# Patient Record
Sex: FEMALE | Race: WHITE | NOT HISPANIC OR LATINO | URBAN - METROPOLITAN AREA
[De-identification: names, ages, dates, MRNs, and addresses within clinical notes are randomized per-mention and may not be internally consistent; named-entity substitution may affect disease eponyms.]

---

## 2017-01-20 ENCOUNTER — EMERGENCY (EMERGENCY)
Facility: HOSPITAL | Age: 82
LOS: 1 days | Discharge: HOME | End: 2017-01-20
Attending: EMERGENCY MEDICINE | Admitting: EMERGENCY MEDICINE
Payer: MEDICARE

## 2017-01-20 VITALS
HEART RATE: 72 BPM | SYSTOLIC BLOOD PRESSURE: 123 MMHG | OXYGEN SATURATION: 100 % | TEMPERATURE: 98 F | RESPIRATION RATE: 18 BRPM | DIASTOLIC BLOOD PRESSURE: 75 MMHG

## 2017-01-20 DIAGNOSIS — Z95.828 PRESENCE OF OTHER VASCULAR IMPLANTS AND GRAFTS: Chronic | ICD-10-CM

## 2017-01-20 PROCEDURE — 99283 EMERGENCY DEPT VISIT LOW MDM: CPT | Mod: GC

## 2017-01-20 RX ORDER — LIDOCAINE 4 G/100G
1 CREAM TOPICAL
Qty: 1 | Refills: 0
Start: 2017-01-20 | End: 2017-02-19

## 2017-01-20 RX ORDER — LIDOCAINE 4 G/100G
1 CREAM TOPICAL ONCE
Qty: 0 | Refills: 0 | Status: COMPLETED | OUTPATIENT
Start: 2017-01-20 | End: 2017-01-20

## 2017-01-20 RX ADMIN — LIDOCAINE 1 PATCH: 4 CREAM TOPICAL at 23:34

## 2017-01-20 NOTE — ED PROVIDER NOTE - ATTENDING CONTRIBUTION TO CARE
Dr. Mcdaniel: I have personally seen and examined this patient at the bedside. I have fully participated in the care of this patient. I have reviewed all pertinent clinical information, including history, physical exam, plan and the Resident's note and agree except as noted. HPI above as by me. PE above as by me. DDX low back pain PLAN no fecal urinary incontinence, no new trauma, ambulatory with assistance with adequate hha and pt, discussed adding lidoderm patch q24h and tylenol q4h for breakthrough pain. patient's pain improved with lidoderm and tylenol. daughter feels safe to take patient home. Understands to return for new or worsenign symptoms. Stable dc home.

## 2017-01-20 NOTE — ED PROVIDER NOTE - NEUROLOGICAL, MLM
Alert and oriented, no focal deficits, no motor or sensory deficits. 5/5 strength in bilateral lower extremities, no loss of sensation.

## 2017-01-20 NOTE — ED PROVIDER NOTE - ASSOCIATED SYMPTOMS
Violeta at: 86F hx htn, extensive degenerative spine disease, ckd brought in by daughter for low back pain. Patient [facility] 2/2 degenerative spine disease, dc percocet 5/325 mg one tab q6h prn, home PT, and HHA m-f 9-5. Pt and family denies falls since discharge, denies urinary or fecal incontinence, or fever. Patient reports continued pain, grabs at left paraspinal thoracic area. Daughter concerned that percocet gives relief for a few hours then patient severely uncomfortable, pain worse with walking, is ambulatory with cane.

## 2017-01-20 NOTE — ED PROVIDER NOTE - PMH
Anemia Iron Deficiency    CAD (Coronary Artery Disease)    Carpal Tunnel Syndrome    CKD (chronic kidney disease)    Dementia    DVT (deep venous thrombosis)    Dyslipidemia    GI (gastrointestinal bleed)    Glaucoma    H/o Diverticulosis    HTN (Hypertension)    OA (Osteoarthritis)    Trigger finger

## 2017-01-20 NOTE — ED PROVIDER NOTE - PHYSICAL EXAMINATION
Violeta att: nad, aaox2, perrl, neck supple, neg spinal tenderness, pos diffuse lt paraspinal tenderness lumbar level, jolene le intact sensation neg numbness, patient voided voluntarily into bedside commode, transfer oob using her cane with no assitance

## 2017-01-20 NOTE — ED PROVIDER NOTE - NS ED MD DISPO DISCHARGE CCDA
" I have right ar and shoulder pain for a month I received medication from my MD but even after the refill it doesn't help. I can't sleep I can't move it. "
Patient/Caregiver provided printed discharge information.

## 2017-01-20 NOTE — ED PROVIDER NOTE - PSH
Cataract  bilateral 2008    [place] filter in place  placed 2012  H/o Ovarian Cyst    Knee Arthroplasty right 8/2009 & left 9/2010

## 2017-01-20 NOTE — ED PROVIDER NOTE - OBJECTIVE STATEMENT
87 y/o F with h/o hypertension, dementia, degenerative spine disease, CKD s/p [facility] presents with lower back pain. Daughter at bedside states that she gave patient oxycodone 5 mg at 1 PM today and patient was whimpering in pain. Patient laying in bed comfortably and denies any pain at this time. Daughter states that pain is mainly when patient moves. Patient has been ambulatory in the house today. No new symptoms today. Daughter concerned about pain control.

## 2017-01-20 NOTE — ED PROVIDER NOTE - CHPI ED SYMPTOMS NEG
no weakness/no tingling/no saddle anesthesia, no bowel/bladder incontinence, no lower extremity weakness/no fever/no chills

## 2017-01-20 NOTE — ED PROVIDER NOTE - MEDICAL DECISION MAKING DETAILS
87 y/o F with degenerative spine disease presents with lower back pain. Patient s/p discharge from [place] [facility]. Denies any new neurological symptoms or new distribution of pain. Patient without pain currently at rest. Will try percocet for pain control and reassess. No imaging indicated as there are no red flag symptoms and patient is neurologically intact.

## 2017-01-20 NOTE — ED ADULT TRIAGE NOTE - CHIEF COMPLAINT QUOTE
Pt discharged from [facility] for rehab yesterday. Was there for back pain and was having difficulty ambulating. States she is taking oxycodone 5mg at home for pain but says it is not helping. Last took med at 1pm. Daughter states pt was crying in pain at home.

## 2017-01-21 VITALS
RESPIRATION RATE: 18 BRPM | SYSTOLIC BLOOD PRESSURE: 99 MMHG | DIASTOLIC BLOOD PRESSURE: 50 MMHG | OXYGEN SATURATION: 97 % | HEART RATE: 60 BPM

## 2017-01-21 NOTE — ED ADULT NURSE NOTE - CHIEF COMPLAINT QUOTE
Pt discharged from [facility] for rehab yesterday. Was there for back pain and was having difficulty ambulating. States she is taking oxycodone 5mg at home for pain but says it is not helping. Last took med at 1pm. Daughter states pt was crying in pain at home.

## 2017-01-21 NOTE — ED ADULT NURSE NOTE - PSH
Cataract  bilateral 2008    [place] filter in place  placed 2012  H/o Ovarian Cyst    Knee Arthroplasty right 8/2009 & left 9/2010

## 2017-01-24 ENCOUNTER — EMERGENCY (EMERGENCY)
Facility: HOSPITAL | Age: 82
LOS: 1 days | Discharge: HOME | End: 2017-01-24
Attending: EMERGENCY MEDICINE | Admitting: EMERGENCY MEDICINE
Payer: MEDICARE

## 2017-01-24 VITALS
SYSTOLIC BLOOD PRESSURE: 114 MMHG | RESPIRATION RATE: 18 BRPM | DIASTOLIC BLOOD PRESSURE: 67 MMHG | HEART RATE: 62 BPM | TEMPERATURE: 98 F

## 2017-01-24 DIAGNOSIS — Z95.828 PRESENCE OF OTHER VASCULAR IMPLANTS AND GRAFTS: Chronic | ICD-10-CM

## 2017-01-24 PROCEDURE — 99284 EMERGENCY DEPT VISIT MOD MDM: CPT | Mod: GC

## 2017-01-24 RX ORDER — MULTIVIT WITH MIN/MFOLATE/K2 340-15/3 G
300 POWDER (GRAM) ORAL ONCE
Qty: 0 | Refills: 0 | Status: COMPLETED | OUTPATIENT
Start: 2017-01-24 | End: 2017-01-24

## 2017-01-24 RX ADMIN — Medication 300 MILLILITER(S): at 23:02

## 2017-01-24 RX ADMIN — Medication 1 ENEMA: at 23:22

## 2017-01-24 NOTE — ED PROVIDER NOTE - PSH
Cataract  bilateral 2008    [place] filter in place  placed 2012  H/o Ovarian Cyst    Knee Arthroplasty right 8/2009 & left 9/2010

## 2017-01-24 NOTE — ED PROVIDER NOTE - OBJECTIVE STATEMENT
85yo female with pmh of hypertension, dementia, degenerative spine disease, CKD presents with constipation. Pt was given oxycodone for back pain and has not had a bowel movement for 6 days. Pt with lower abd pain and constipated. Pt has been taking senna colace and today took lactulose with no relief. PT denies fevers/chills, n/v, urinary symptoms, cp/sob/palp, ha. loc.

## 2017-01-24 NOTE — ED ADULT NURSE NOTE - CHIEF COMPLAINT QUOTE
Pt d/c from [facility] last Thursday. Seen here on Friday for lower back pain. Pt has been taking opioids for pain and now c/o constipation. Pt took lactulose and ducolax today and only had small BM. Pt's daughter also states that pt had low bp today during home PT. Systolic was 86. BP now 114/67. Pt has degenerative spine disease.

## 2017-01-24 NOTE — ED ADULT NURSE NOTE - OBJECTIVE STATEMENT
Pt received into room 1 co ABD pain and constipation. Pt states she was recently discharged from [facility] last Thursday for lower back pain where she was prescribed opoid medication. Pt states she took lactulose and dulcolax today but only had a very small BM and is still experiencing discomfort and pain. Pt A&0 x2, in NAD, VS as noted, MD evaluated, medicated as ordered. PMH dementia. Comfort measures provided, call bell within reach, family at bedside, will continue to monitor for safety and comfort.

## 2017-01-24 NOTE — ED PROVIDER NOTE - ATTENDING CONTRIBUTION TO CARE
DR. CHAIDEZ, ATTENDING MD-  I performed a face to face bedside interview with patient regarding history of present illness, review of symptoms and past medical history. I completed an independent physical exam.  I have discussed patient's plan of care with the resident.   Documentation as above in the note.    my exam: abd soft, nt

## 2017-01-24 NOTE — ED PROVIDER NOTE - PROGRESS NOTE DETAILS
pt had 3 large BM in the ED after bowel treatment and is feeling better.  Was pending a CT scan that was delayed.  The patient felt better after BM and is requesting dc without the scan

## 2017-01-24 NOTE — ED ADULT TRIAGE NOTE - CHIEF COMPLAINT QUOTE
Pt d/c from [facility] last Thursday. Seen here on Friday for lower back pain. Pt has been taking opioids for pain and now c/o constipation. Pt took lactulose and ducolax today and only had small BM. Pt's daughter also states that pt had low bp today during home PT. Systolic was 86. BP now 114/67. Pt has degenerative spine disease.

## 2017-01-24 NOTE — ED ADULT NURSE NOTE - PSH
Cataract  bilateral 2008    [place] filter in place  placed 2012  H/o Ovarian Cyst    Knee Arthroplasty right 8/2009 & left 9/2010

## 2017-01-25 VITALS
SYSTOLIC BLOOD PRESSURE: 132 MMHG | HEART RATE: 58 BPM | OXYGEN SATURATION: 100 % | DIASTOLIC BLOOD PRESSURE: 61 MMHG | RESPIRATION RATE: 17 BRPM

## 2017-01-25 LAB
ALBUMIN SERPL ELPH-MCNC: 3.5 G/DL — SIGNIFICANT CHANGE UP (ref 3.3–5)
ALP SERPL-CCNC: 67 U/L — SIGNIFICANT CHANGE UP (ref 40–120)
ALT FLD-CCNC: 12 U/L — SIGNIFICANT CHANGE UP (ref 4–33)
AST SERPL-CCNC: 26 U/L — SIGNIFICANT CHANGE UP (ref 4–32)
BASE EXCESS BLDV CALC-SCNC: -1 MMOL/L — SIGNIFICANT CHANGE UP
BASOPHILS # BLD AUTO: 0.02 K/UL — SIGNIFICANT CHANGE UP (ref 0–0.2)
BASOPHILS NFR BLD AUTO: 0.3 % — SIGNIFICANT CHANGE UP (ref 0–2)
BILIRUB SERPL-MCNC: 0.4 MG/DL — SIGNIFICANT CHANGE UP (ref 0.2–1.2)
BLOOD GAS VENOUS - CREATININE: 2.77 MG/DL — HIGH (ref 0.5–1.3)
BUN SERPL-MCNC: 58 MG/DL — HIGH (ref 7–23)
CALCIUM SERPL-MCNC: 10.3 MG/DL — SIGNIFICANT CHANGE UP (ref 8.4–10.5)
CHLORIDE BLDV-SCNC: 104 MMOL/L — SIGNIFICANT CHANGE UP (ref 96–108)
CHLORIDE SERPL-SCNC: 99 MMOL/L — SIGNIFICANT CHANGE UP (ref 98–107)
CO2 SERPL-SCNC: 23 MMOL/L — SIGNIFICANT CHANGE UP (ref 22–31)
CREAT SERPL-MCNC: 2.69 MG/DL — HIGH (ref 0.5–1.3)
EOSINOPHIL # BLD AUTO: 0.12 K/UL — SIGNIFICANT CHANGE UP (ref 0–0.5)
EOSINOPHIL NFR BLD AUTO: 1.8 % — SIGNIFICANT CHANGE UP (ref 0–6)
GAS PNL BLDV: 138 MMOL/L — SIGNIFICANT CHANGE UP (ref 136–146)
GLUCOSE BLDV-MCNC: 105 — HIGH (ref 70–99)
GLUCOSE SERPL-MCNC: 107 MG/DL — HIGH (ref 70–99)
HCO3 BLDV-SCNC: 22 MMOL/L — SIGNIFICANT CHANGE UP (ref 20–27)
HCT VFR BLD CALC: 32.4 % — LOW (ref 34.5–45)
HCT VFR BLDV CALC: 33.3 % — LOW (ref 34.5–45)
HGB BLD-MCNC: 10.2 G/DL — LOW (ref 11.5–15.5)
HGB BLDV-MCNC: 10.8 G/DL — LOW (ref 11.5–15.5)
IMM GRANULOCYTES NFR BLD AUTO: 0.2 % — SIGNIFICANT CHANGE UP (ref 0–1.5)
LACTATE BLDV-MCNC: 1.4 MMOL/L — SIGNIFICANT CHANGE UP (ref 0.5–2)
LIDOCAIN IGE QN: 30.2 U/L — SIGNIFICANT CHANGE UP (ref 7–60)
LYMPHOCYTES # BLD AUTO: 1.05 K/UL — SIGNIFICANT CHANGE UP (ref 1–3.3)
LYMPHOCYTES # BLD AUTO: 16.2 % — SIGNIFICANT CHANGE UP (ref 13–44)
MCHC RBC-ENTMCNC: 28.3 PG — SIGNIFICANT CHANGE UP (ref 27–34)
MCHC RBC-ENTMCNC: 31.5 % — LOW (ref 32–36)
MCV RBC AUTO: 89.8 FL — SIGNIFICANT CHANGE UP (ref 80–100)
MONOCYTES # BLD AUTO: 0.62 K/UL — SIGNIFICANT CHANGE UP (ref 0–0.9)
MONOCYTES NFR BLD AUTO: 9.6 % — SIGNIFICANT CHANGE UP (ref 2–14)
NEUTROPHILS # BLD AUTO: 4.67 K/UL — SIGNIFICANT CHANGE UP (ref 1.8–7.4)
NEUTROPHILS NFR BLD AUTO: 71.9 % — SIGNIFICANT CHANGE UP (ref 43–77)
PCO2 BLDV: 47 MMHG — SIGNIFICANT CHANGE UP (ref 41–51)
PH BLDV: 7.33 PH — SIGNIFICANT CHANGE UP (ref 7.32–7.43)
PLATELET # BLD AUTO: 353 K/UL — SIGNIFICANT CHANGE UP (ref 150–400)
PMV BLD: 9 FL — SIGNIFICANT CHANGE UP (ref 7–13)
PO2 BLDV: < 24 MMHG — LOW (ref 35–40)
POTASSIUM BLDV-SCNC: 5.4 MMOL/L — HIGH (ref 3.4–4.5)
POTASSIUM SERPL-MCNC: 5.1 MMOL/L — SIGNIFICANT CHANGE UP (ref 3.5–5.3)
POTASSIUM SERPL-SCNC: 5.1 MMOL/L — SIGNIFICANT CHANGE UP (ref 3.5–5.3)
PROT SERPL-MCNC: 7.1 G/DL — SIGNIFICANT CHANGE UP (ref 6–8.3)
RBC # BLD: 3.61 M/UL — LOW (ref 3.8–5.2)
RBC # FLD: 18.4 % — HIGH (ref 10.3–14.5)
SAO2 % BLDV: 34.6 % — LOW (ref 60–85)
SODIUM SERPL-SCNC: 140 MMOL/L — SIGNIFICANT CHANGE UP (ref 135–145)
WBC # BLD: 6.49 K/UL — SIGNIFICANT CHANGE UP (ref 3.8–10.5)
WBC # FLD AUTO: 6.49 K/UL — SIGNIFICANT CHANGE UP (ref 3.8–10.5)

## 2017-01-25 PROCEDURE — 74000: CPT | Mod: 26

## 2017-01-25 RX ORDER — SODIUM CHLORIDE 9 MG/ML
1000 INJECTION INTRAMUSCULAR; INTRAVENOUS; SUBCUTANEOUS ONCE
Qty: 0 | Refills: 0 | Status: COMPLETED | OUTPATIENT
Start: 2017-01-25 | End: 2017-01-25

## 2017-01-25 RX ADMIN — SODIUM CHLORIDE 1000 MILLILITER(S): 9 INJECTION INTRAMUSCULAR; INTRAVENOUS; SUBCUTANEOUS at 02:29

## 2017-01-27 ENCOUNTER — INPATIENT (INPATIENT)
Facility: HOSPITAL | Age: 82
LOS: 3 days | Discharge: NURSING, REHAB OR LONG-TERM CARE | End: 2017-01-31
Attending: INTERNAL MEDICINE | Admitting: INTERNAL MEDICINE
Payer: MEDICARE

## 2017-01-27 VITALS
HEART RATE: 72 BPM | WEIGHT: 119.93 LBS | RESPIRATION RATE: 20 BRPM | DIASTOLIC BLOOD PRESSURE: 61 MMHG | TEMPERATURE: 98 F | SYSTOLIC BLOOD PRESSURE: 110 MMHG | OXYGEN SATURATION: 97 % | HEIGHT: 63 IN

## 2017-01-27 DIAGNOSIS — Z95.828 PRESENCE OF OTHER VASCULAR IMPLANTS AND GRAFTS: Chronic | ICD-10-CM

## 2017-01-27 DIAGNOSIS — I82.532 CHRONIC EMBOLISM AND THROMBOSIS OF LEFT POPLITEAL VEIN: ICD-10-CM

## 2017-01-27 DIAGNOSIS — S32.000A WEDGE COMPRESSION FRACTURE OF UNSPECIFIED LUMBAR VERTEBRA, INITIAL ENCOUNTER FOR CLOSED FRACTURE: ICD-10-CM

## 2017-01-27 DIAGNOSIS — E87.5 HYPERKALEMIA: ICD-10-CM

## 2017-01-27 DIAGNOSIS — N18.2 CHRONIC KIDNEY DISEASE, STAGE 2 (MILD): ICD-10-CM

## 2017-01-27 LAB
ALBUMIN SERPL ELPH-MCNC: 2.9 G/DL — LOW (ref 3.3–5)
ALP SERPL-CCNC: 76 U/L — SIGNIFICANT CHANGE UP (ref 40–120)
ALT FLD-CCNC: 23 U/L — SIGNIFICANT CHANGE UP (ref 12–78)
ANION GAP SERPL CALC-SCNC: 10 MMOL/L — SIGNIFICANT CHANGE UP (ref 5–17)
ANION GAP SERPL CALC-SCNC: 9 MMOL/L — SIGNIFICANT CHANGE UP (ref 5–17)
ANISOCYTOSIS BLD QL: SLIGHT — SIGNIFICANT CHANGE UP
APPEARANCE UR: CLEAR — SIGNIFICANT CHANGE UP
APTT BLD: 24.7 SEC — LOW (ref 27.5–37.4)
AST SERPL-CCNC: 26 U/L — SIGNIFICANT CHANGE UP (ref 15–37)
BACTERIA # UR AUTO: ABNORMAL
BILIRUB SERPL-MCNC: 0.2 MG/DL — SIGNIFICANT CHANGE UP (ref 0.2–1.2)
BILIRUB UR-MCNC: NEGATIVE — SIGNIFICANT CHANGE UP
BUN SERPL-MCNC: 47 MG/DL — HIGH (ref 7–23)
BUN SERPL-MCNC: 50 MG/DL — HIGH (ref 7–23)
CALCIUM SERPL-MCNC: 9.3 MG/DL — SIGNIFICANT CHANGE UP (ref 8.5–10.1)
CALCIUM SERPL-MCNC: 9.4 MG/DL — SIGNIFICANT CHANGE UP (ref 8.5–10.1)
CHLORIDE SERPL-SCNC: 108 MMOL/L — SIGNIFICANT CHANGE UP (ref 96–108)
CHLORIDE SERPL-SCNC: 109 MMOL/L — HIGH (ref 96–108)
CO2 SERPL-SCNC: 25 MMOL/L — SIGNIFICANT CHANGE UP (ref 22–31)
CO2 SERPL-SCNC: 25 MMOL/L — SIGNIFICANT CHANGE UP (ref 22–31)
COLOR SPEC: YELLOW — SIGNIFICANT CHANGE UP
CREAT SERPL-MCNC: 2.14 MG/DL — HIGH (ref 0.5–1.3)
CREAT SERPL-MCNC: 2.34 MG/DL — HIGH (ref 0.5–1.3)
DACRYOCYTES BLD QL SMEAR: SLIGHT — SIGNIFICANT CHANGE UP
DIFF PNL FLD: NEGATIVE — SIGNIFICANT CHANGE UP
ELLIPTOCYTES BLD QL SMEAR: SLIGHT — SIGNIFICANT CHANGE UP
EOSINOPHIL NFR BLD AUTO: 4 % — SIGNIFICANT CHANGE UP (ref 0–6)
EPI CELLS # UR: SIGNIFICANT CHANGE UP
GLUCOSE SERPL-MCNC: 64 MG/DL — LOW (ref 70–99)
GLUCOSE SERPL-MCNC: 97 MG/DL — SIGNIFICANT CHANGE UP (ref 70–99)
GLUCOSE UR QL: NEGATIVE MG/DL — SIGNIFICANT CHANGE UP
HCT VFR BLD CALC: 28.7 % — LOW (ref 34.5–45)
HGB BLD-MCNC: 9.8 G/DL — LOW (ref 11.5–15.5)
HYPOCHROMIA BLD QL: SLIGHT — SIGNIFICANT CHANGE UP
KETONES UR-MCNC: NEGATIVE — SIGNIFICANT CHANGE UP
LEUKOCYTE ESTERASE UR-ACNC: ABNORMAL
LIDOCAIN IGE QN: 145 U/L — SIGNIFICANT CHANGE UP (ref 73–393)
LYMPHOCYTES # BLD AUTO: 14 % — SIGNIFICANT CHANGE UP (ref 13–44)
MACROCYTES BLD QL: SLIGHT — SIGNIFICANT CHANGE UP
MCHC RBC-ENTMCNC: 29.2 PG — SIGNIFICANT CHANGE UP (ref 27–34)
MCHC RBC-ENTMCNC: 34.1 GM/DL — SIGNIFICANT CHANGE UP (ref 32–36)
MCV RBC AUTO: 85.7 FL — SIGNIFICANT CHANGE UP (ref 80–100)
MICROCYTES BLD QL: SLIGHT — SIGNIFICANT CHANGE UP
MONOCYTES NFR BLD AUTO: 1 % — LOW (ref 2–14)
NEUTROPHILS NFR BLD AUTO: 81 % — HIGH (ref 43–77)
NITRITE UR-MCNC: POSITIVE
OVALOCYTES BLD QL SMEAR: SLIGHT — SIGNIFICANT CHANGE UP
PH UR: 6 — SIGNIFICANT CHANGE UP (ref 4.8–8)
PLAT MORPH BLD: NORMAL — SIGNIFICANT CHANGE UP
PLATELET # BLD AUTO: 355 K/UL — SIGNIFICANT CHANGE UP (ref 150–400)
POIKILOCYTOSIS BLD QL AUTO: SLIGHT — SIGNIFICANT CHANGE UP
POLYCHROMASIA BLD QL SMEAR: SLIGHT — SIGNIFICANT CHANGE UP
POTASSIUM SERPL-MCNC: 4.2 MMOL/L — SIGNIFICANT CHANGE UP (ref 3.5–5.3)
POTASSIUM SERPL-MCNC: 5.4 MMOL/L — HIGH (ref 3.5–5.3)
POTASSIUM SERPL-SCNC: 4.2 MMOL/L — SIGNIFICANT CHANGE UP (ref 3.5–5.3)
POTASSIUM SERPL-SCNC: 5.4 MMOL/L — HIGH (ref 3.5–5.3)
PROT SERPL-MCNC: 7.2 GM/DL — SIGNIFICANT CHANGE UP (ref 6–8.3)
PROT UR-MCNC: NEGATIVE MG/DL — SIGNIFICANT CHANGE UP
RBC # BLD: 3.35 M/UL — LOW (ref 3.8–5.2)
RBC # FLD: 16.8 % — HIGH (ref 11–15)
RBC BLD AUTO: ABNORMAL
SCHISTOCYTES BLD QL AUTO: SLIGHT — SIGNIFICANT CHANGE UP
SODIUM SERPL-SCNC: 142 MMOL/L — SIGNIFICANT CHANGE UP (ref 135–145)
SODIUM SERPL-SCNC: 144 MMOL/L — SIGNIFICANT CHANGE UP (ref 135–145)
SP GR SPEC: 1.01 — SIGNIFICANT CHANGE UP (ref 1.01–1.02)
UROBILINOGEN FLD QL: NEGATIVE MG/DL — SIGNIFICANT CHANGE UP
WBC # BLD: 6.8 K/UL — SIGNIFICANT CHANGE UP (ref 3.8–10.5)
WBC # FLD AUTO: 6.8 K/UL — SIGNIFICANT CHANGE UP (ref 3.8–10.5)
WBC UR QL: ABNORMAL

## 2017-01-27 PROCEDURE — 74176 CT ABD & PELVIS W/O CONTRAST: CPT | Mod: 26

## 2017-01-27 PROCEDURE — 71010: CPT | Mod: 26

## 2017-01-27 PROCEDURE — 93970 EXTREMITY STUDY: CPT | Mod: 26

## 2017-01-27 PROCEDURE — 99284 EMERGENCY DEPT VISIT MOD MDM: CPT

## 2017-01-27 RX ORDER — SODIUM CHLORIDE 9 MG/ML
3 INJECTION INTRAMUSCULAR; INTRAVENOUS; SUBCUTANEOUS ONCE
Qty: 0 | Refills: 0 | Status: COMPLETED | OUTPATIENT
Start: 2017-01-27 | End: 2017-01-27

## 2017-01-27 RX ORDER — DEXTROSE 50 % IN WATER 50 %
50 SYRINGE (ML) INTRAVENOUS ONCE
Qty: 0 | Refills: 0 | Status: COMPLETED | OUTPATIENT
Start: 2017-01-27 | End: 2017-01-27

## 2017-01-27 RX ORDER — DONEPEZIL HYDROCHLORIDE 10 MG/1
10 TABLET, FILM COATED ORAL AT BEDTIME
Qty: 0 | Refills: 0 | Status: DISCONTINUED | OUTPATIENT
Start: 2017-01-27 | End: 2017-01-31

## 2017-01-27 RX ORDER — PREGABALIN 225 MG/1
100 CAPSULE ORAL DAILY
Qty: 0 | Refills: 0 | Status: DISCONTINUED | OUTPATIENT
Start: 2017-01-27 | End: 2017-01-31

## 2017-01-27 RX ORDER — CEFTRIAXONE 500 MG/1
1 INJECTION, POWDER, FOR SOLUTION INTRAMUSCULAR; INTRAVENOUS EVERY 24 HOURS
Qty: 0 | Refills: 0 | Status: DISCONTINUED | OUTPATIENT
Start: 2017-01-27 | End: 2017-01-31

## 2017-01-27 RX ORDER — VALSARTAN 80 MG/1
80 TABLET ORAL DAILY
Qty: 0 | Refills: 0 | Status: DISCONTINUED | OUTPATIENT
Start: 2017-01-27 | End: 2017-01-31

## 2017-01-27 RX ORDER — CALCITRIOL 0.5 UG/1
0.25 CAPSULE ORAL DAILY
Qty: 0 | Refills: 0 | Status: DISCONTINUED | OUTPATIENT
Start: 2017-01-27 | End: 2017-01-31

## 2017-01-27 RX ORDER — LIDOCAINE 4 G/100G
1 CREAM TOPICAL DAILY
Qty: 0 | Refills: 0 | Status: DISCONTINUED | OUTPATIENT
Start: 2017-01-27 | End: 2017-01-31

## 2017-01-27 RX ORDER — LATANOPROST 0.05 MG/ML
1 SOLUTION/ DROPS OPHTHALMIC; TOPICAL AT BEDTIME
Qty: 0 | Refills: 0 | Status: DISCONTINUED | OUTPATIENT
Start: 2017-01-27 | End: 2017-01-31

## 2017-01-27 RX ORDER — FUROSEMIDE 40 MG
20 TABLET ORAL DAILY
Qty: 0 | Refills: 0 | Status: DISCONTINUED | OUTPATIENT
Start: 2017-01-27 | End: 2017-01-31

## 2017-01-27 RX ORDER — INSULIN HUMAN 100 [IU]/ML
10 INJECTION, SOLUTION SUBCUTANEOUS ONCE
Qty: 0 | Refills: 0 | Status: COMPLETED | OUTPATIENT
Start: 2017-01-27 | End: 2017-01-27

## 2017-01-27 RX ORDER — SODIUM POLYSTYRENE SULFONATE 4.1 MEQ/G
30 POWDER, FOR SUSPENSION ORAL ONCE
Qty: 0 | Refills: 0 | Status: COMPLETED | OUTPATIENT
Start: 2017-01-27 | End: 2017-01-27

## 2017-01-27 RX ORDER — HEPARIN SODIUM 5000 [USP'U]/ML
5000 INJECTION INTRAVENOUS; SUBCUTANEOUS EVERY 12 HOURS
Qty: 0 | Refills: 0 | Status: DISCONTINUED | OUTPATIENT
Start: 2017-01-27 | End: 2017-01-31

## 2017-01-27 RX ORDER — FERROUS SULFATE 325(65) MG
325 TABLET ORAL DAILY
Qty: 0 | Refills: 0 | Status: DISCONTINUED | OUTPATIENT
Start: 2017-01-27 | End: 2017-01-31

## 2017-01-27 RX ORDER — IOHEXOL 300 MG/ML
30 INJECTION, SOLUTION INTRAVENOUS ONCE
Qty: 0 | Refills: 0 | Status: COMPLETED | OUTPATIENT
Start: 2017-01-27 | End: 2017-01-27

## 2017-01-27 RX ORDER — PYRIDOXINE HCL (VITAMIN B6) 100 MG
100 TABLET ORAL DAILY
Qty: 0 | Refills: 0 | Status: DISCONTINUED | OUTPATIENT
Start: 2017-01-27 | End: 2017-01-31

## 2017-01-27 RX ORDER — SODIUM CHLORIDE 9 MG/ML
1000 INJECTION, SOLUTION INTRAVENOUS
Qty: 0 | Refills: 0 | Status: DISCONTINUED | OUTPATIENT
Start: 2017-01-27 | End: 2017-01-31

## 2017-01-27 RX ORDER — AMIODARONE HYDROCHLORIDE 400 MG/1
200 TABLET ORAL EVERY OTHER DAY
Qty: 0 | Refills: 0 | Status: DISCONTINUED | OUTPATIENT
Start: 2017-01-27 | End: 2017-01-31

## 2017-01-27 RX ORDER — MULTIVIT-MIN/FERROUS GLUCONATE 9 MG/15 ML
1 LIQUID (ML) ORAL DAILY
Qty: 0 | Refills: 0 | Status: DISCONTINUED | OUTPATIENT
Start: 2017-01-27 | End: 2017-01-31

## 2017-01-27 RX ADMIN — Medication 50 MILLILITER(S): at 16:18

## 2017-01-27 RX ADMIN — INSULIN HUMAN 10 UNIT(S): 100 INJECTION, SOLUTION SUBCUTANEOUS at 16:19

## 2017-01-27 RX ADMIN — DONEPEZIL HYDROCHLORIDE 10 MILLIGRAM(S): 10 TABLET, FILM COATED ORAL at 21:22

## 2017-01-27 RX ADMIN — IOHEXOL 30 MILLILITER(S): 300 INJECTION, SOLUTION INTRAVENOUS at 16:21

## 2017-01-27 RX ADMIN — SODIUM CHLORIDE 3 MILLILITER(S): 9 INJECTION INTRAMUSCULAR; INTRAVENOUS; SUBCUTANEOUS at 14:47

## 2017-01-27 RX ADMIN — SODIUM POLYSTYRENE SULFONATE 30 GRAM(S): 4.1 POWDER, FOR SUSPENSION ORAL at 16:19

## 2017-01-27 RX ADMIN — LATANOPROST 1 DROP(S): 0.05 SOLUTION/ DROPS OPHTHALMIC; TOPICAL at 21:33

## 2017-01-27 NOTE — ED PROVIDER NOTE - PSH
Cataract  bilateral 2008    [place] filter in place  placed 2012  H/o Ovarian Cyst    Knee Arthroplasty right 8/2009 & left 9/2010

## 2017-01-27 NOTE — ED ADULT NURSE NOTE - PSH
Cataract  bilateral 2008    [place] filter in place  placed 2012  H/o Ovarian Cyst    Knee Arthroplasty right 8/2009 & left 9/2010

## 2017-01-27 NOTE — ED PROVIDER NOTE - CARE PLAN
Principal Discharge DX:	Hyperkalemia  Secondary Diagnosis:	CKD (chronic kidney disease)  Secondary Diagnosis:	Dementia

## 2017-01-27 NOTE — ED ADULT NURSE NOTE - OBJECTIVE STATEMENT
received pt lying on stretcher c/o swelling to the b/l legs,  left leg worse, pain across the abdomen since last vidya denies any F/N/V/D

## 2017-01-28 DIAGNOSIS — N39.0 URINARY TRACT INFECTION, SITE NOT SPECIFIED: ICD-10-CM

## 2017-01-28 LAB
ANION GAP SERPL CALC-SCNC: 9 MMOL/L — SIGNIFICANT CHANGE UP (ref 5–17)
BUN SERPL-MCNC: 38 MG/DL — HIGH (ref 7–23)
CALCIUM SERPL-MCNC: 8.9 MG/DL — SIGNIFICANT CHANGE UP (ref 8.5–10.1)
CHLORIDE SERPL-SCNC: 109 MMOL/L — HIGH (ref 96–108)
CO2 SERPL-SCNC: 27 MMOL/L — SIGNIFICANT CHANGE UP (ref 22–31)
CREAT SERPL-MCNC: 1.93 MG/DL — HIGH (ref 0.5–1.3)
GLUCOSE SERPL-MCNC: 72 MG/DL — SIGNIFICANT CHANGE UP (ref 70–99)
HCT VFR BLD CALC: 28.3 % — LOW (ref 34.5–45)
HGB BLD-MCNC: 9.4 G/DL — LOW (ref 11.5–15.5)
MCHC RBC-ENTMCNC: 29.3 PG — SIGNIFICANT CHANGE UP (ref 27–34)
MCHC RBC-ENTMCNC: 33.2 GM/DL — SIGNIFICANT CHANGE UP (ref 32–36)
MCV RBC AUTO: 88.3 FL — SIGNIFICANT CHANGE UP (ref 80–100)
PLATELET # BLD AUTO: 328 K/UL — SIGNIFICANT CHANGE UP (ref 150–400)
POTASSIUM SERPL-MCNC: 4.3 MMOL/L — SIGNIFICANT CHANGE UP (ref 3.5–5.3)
POTASSIUM SERPL-SCNC: 4.3 MMOL/L — SIGNIFICANT CHANGE UP (ref 3.5–5.3)
RBC # BLD: 3.2 M/UL — LOW (ref 3.8–5.2)
RBC # FLD: 17.1 % — HIGH (ref 11–15)
SODIUM SERPL-SCNC: 145 MMOL/L — SIGNIFICANT CHANGE UP (ref 135–145)
WBC # BLD: 7 K/UL — SIGNIFICANT CHANGE UP (ref 3.8–10.5)
WBC # FLD AUTO: 7 K/UL — SIGNIFICANT CHANGE UP (ref 3.8–10.5)

## 2017-01-28 RX ADMIN — Medication 1 TABLET(S): at 11:13

## 2017-01-28 RX ADMIN — HEPARIN SODIUM 5000 UNIT(S): 5000 INJECTION INTRAVENOUS; SUBCUTANEOUS at 17:08

## 2017-01-28 RX ADMIN — CALCITRIOL 0.25 MICROGRAM(S): 0.5 CAPSULE ORAL at 11:13

## 2017-01-28 RX ADMIN — LIDOCAINE 1 PATCH: 4 CREAM TOPICAL at 23:13

## 2017-01-28 RX ADMIN — HEPARIN SODIUM 5000 UNIT(S): 5000 INJECTION INTRAVENOUS; SUBCUTANEOUS at 06:06

## 2017-01-28 RX ADMIN — Medication 325 MILLIGRAM(S): at 11:13

## 2017-01-28 RX ADMIN — LIDOCAINE 1 PATCH: 4 CREAM TOPICAL at 11:13

## 2017-01-28 RX ADMIN — LATANOPROST 1 DROP(S): 0.05 SOLUTION/ DROPS OPHTHALMIC; TOPICAL at 22:43

## 2017-01-28 RX ADMIN — CEFTRIAXONE 100 GRAM(S): 500 INJECTION, POWDER, FOR SOLUTION INTRAMUSCULAR; INTRAVENOUS at 01:39

## 2017-01-28 RX ADMIN — Medication 20 MILLIGRAM(S): at 06:06

## 2017-01-28 RX ADMIN — SODIUM CHLORIDE 70 MILLILITER(S): 9 INJECTION, SOLUTION INTRAVENOUS at 01:39

## 2017-01-28 RX ADMIN — PREGABALIN 100 MICROGRAM(S): 225 CAPSULE ORAL at 11:13

## 2017-01-28 RX ADMIN — VALSARTAN 80 MILLIGRAM(S): 80 TABLET ORAL at 06:06

## 2017-01-28 RX ADMIN — AMIODARONE HYDROCHLORIDE 200 MILLIGRAM(S): 400 TABLET ORAL at 11:13

## 2017-01-28 RX ADMIN — Medication 100 MILLIGRAM(S): at 11:13

## 2017-01-28 RX ADMIN — DONEPEZIL HYDROCHLORIDE 10 MILLIGRAM(S): 10 TABLET, FILM COATED ORAL at 22:43

## 2017-01-28 NOTE — CONSULT NOTE ADULT - ASSESSMENT
Chronic renal disease: renal function are stable. edema is now mild. no further intervention indicated renal wise. d/c plan as per admitting physician.

## 2017-01-28 NOTE — CONSULT NOTE ADULT - SUBJECTIVE AND OBJECTIVE BOX
Patient is a 86y old  Female who presents with a chief complaint of leg edema (2017 19:39)    HPI:  85 yo h/o CKD, anemia and HTN c/o b/l leg swelling, denies sob and chest pain. (2017 19:39)  Patient is well known to our service. She carries the diagnosis of CKD which has been stable and managed medically over the years.     PAST MEDICAL & SURGICAL HISTORY:  Dementia  Trigger finger  CKD (chronic kidney disease)  DVT (deep venous thrombosis)  GI (gastrointestinal bleed)  Anemia Iron Deficiency  H/o Diverticulosis  Carpal Tunnel Syndrome  Trigger Finger  Dyslipidemia  Glaucoma  OA (Osteoarthritis)  HTN (Hypertension)  CAD (Coronary Artery Disease)  [place] filter in place: placed   H/o Ovarian Cyst  Cataract  bilateral   Knee Arthroplasty right 2009 &amp; left 2010    Allergies    No Known Drug Allergies  PCN, Sulfa (Urticaria)    Intolerances      FAMILY HISTORY:  No pertinent family history in first degree relatives  Family history unknown  No significant family history    MEDICATIONS  (STANDING):  valsartan 80milliGRAM(s) Oral daily  amiodarone    Tablet 200milliGRAM(s) Oral every other day  donepezil 10milliGRAM(s) Oral at bedtime  lidocaine   Patch 1Patch Transdermal daily  furosemide    Tablet 20milliGRAM(s) Oral daily  ferrous    sulfate 325milliGRAM(s) Oral daily  latanoprost 0.005% Ophthalmic Solution 1Drop(s) Both EYES at bedtime  calcitriol   Capsule 0.25MICROGram(s) Oral daily  multivitamin/minerals 1Tablet(s) Oral daily  cyanocobalamin 100MICROGram(s) Oral daily  pyridoxine 100milliGRAM(s) Oral daily  calcium carbonate  625 mG + Vitamin D (OsCal 250 + D) 1Tablet(s) Oral daily  cefTRIAXone   IVPB 1Gram(s) IV Intermittent every 24 hours  heparin  Injectable 5000Unit(s) SubCutaneous every 12 hours  sodium chloride 0.45%. 1000milliLiter(s) IV Continuous <Continuous>    Vital Signs Last 24 Hrs  T(C): 37.3, Max: 37.7 ( @ 22:30)  T(F): 99.2, Max: 99.8 ( @ 22:30)  HR: 74 (70 - 76)  BP: 146/77 (109/62 - 146/77)  BP(mean): --  RR: 18 (16 - 20)  SpO2: 98% (95% - 98%)  I&O's Summary    I & Os for current day (as of 2017 10:40)  =============================================  IN: 750 ml / OUT: 775 ml / NET: -25 ml    Daily Height in cm: 160.02 (2017 13:43)    Daily   PHYSICAL EXAM:    Constitutional: Patient is resting comfortably in bed; confused but verbally responsive.     Eyes: normal      Neck: supple    Respiratory: clear lungs    Cardiovascular: irrg. rate and rhythm.     Gastrointestinal: soft, non tender    Extremities: no edema                         9.4    7.0   )-----------( 328      ( 2017 08:52 )             28.3     2017 08:52    145    |  109    |  38     ----------------------------<  72     4.3     |  27     |  1.93     Ca    8.9        2017 08:52    TPro  7.2    /  Alb  2.9    /  TBili  0.2    /  DBili  x      /  AST  26     /  ALT  23     /  AlkPhos  76     2017 14:50    Urinalysis Basic - ( 2017 16:32 )    Color: Yellow / Appearance: Clear / S.010 / pH: x  Gluc: x / Ketone: Negative  / Bili: Negative / Urobili: Negative mg/dL   Blood: x / Protein: Negative mg/dL / Nitrite: Positive   Leuk Esterase: Moderate / RBC: x / WBC 11-25   Sq Epi: x / Non Sq Epi: Occasional / Bacteria: Many

## 2017-01-28 NOTE — PROGRESS NOTE ADULT - SUBJECTIVE AND OBJECTIVE BOX
Patient is a 86y old  Female who presents with a chief complaint of leg edema (2017 19:39)      INTERVAL HPI/OVERNIGHT EVENTS: doing better    MEDICATIONS  (STANDING):  valsartan 80milliGRAM(s) Oral daily  amiodarone    Tablet 200milliGRAM(s) Oral every other day  donepezil 10milliGRAM(s) Oral at bedtime  lidocaine   Patch 1Patch Transdermal daily  furosemide    Tablet 20milliGRAM(s) Oral daily  ferrous    sulfate 325milliGRAM(s) Oral daily  latanoprost 0.005% Ophthalmic Solution 1Drop(s) Both EYES at bedtime  calcitriol   Capsule 0.25MICROGram(s) Oral daily  multivitamin/minerals 1Tablet(s) Oral daily  cyanocobalamin 100MICROGram(s) Oral daily  pyridoxine 100milliGRAM(s) Oral daily  calcium carbonate  625 mG + Vitamin D (OsCal 250 + D) 1Tablet(s) Oral daily  cefTRIAXone   IVPB 1Gram(s) IV Intermittent every 24 hours  heparin  Injectable 5000Unit(s) SubCutaneous every 12 hours  sodium chloride 0.45%. 1000milliLiter(s) IV Continuous <Continuous>    MEDICATIONS  (PRN):      Allergies    No Known Drug Allergies  PCN, Sulfa (Urticaria)    Intolerances        REVIEW OF SYSTEMS:  CONSTITUTIONAL: No fever, weight loss, or fatigue  EYES: No eye pain, visual disturbances, or discharge  ENMT:  No difficulty hearing, tinnitus, vertigo; No sinus or throat pain  NECK: No pain or stiffness  BREASTS: No pain, masses, or nipple discharge  RESPIRATORY: No cough, wheezing, chills or hemoptysis; No shortness of breath  CARDIOVASCULAR: No chest pain, palpitations, dizziness, or leg swelling  GASTROINTESTINAL: No abdominal or epigastric pain. No nausea, vomiting, or hematemesis; No diarrhea or constipation. No melena or hematochezia.  GENITOURINARY: No dysuria, frequency, hematuria, or incontinence  NEUROLOGICAL: No headaches, memory loss, loss of strength, numbness, or tremors  SKIN: No itching, burning, rashes, or lesions   LYMPH NODES: No enlarged glands  ENDOCRINE: No heat or cold intolerance; No hair loss  MUSCULOSKELETAL: No joint pain or swelling; No muscle, back, or extremity pain  PSYCHIATRIC: No depression, anxiety, mood swings, or difficulty sleeping  HEME/LYMPH: No easy bruising, or bleeding gums  ALLERGY AND IMMUNOLOGIC: No hives or eczema    Vital Signs Last 24 Hrs  T(C): 36.7, Max: 37.7 ( @ 22:30)  T(F): 98, Max: 99.8 ( @ 22:30)  HR: 78 (70 - 78)  BP: 102/71 (102/71 - 146/77)  BP(mean): --  RR: 16 (16 - 19)  SpO2: 98% (95% - 98%)    PHYSICAL EXAM:  GENERAL: NAD, well-groomed, well-developed  HEAD:  Atraumatic, Normocephalic  EYES: EOMI, PERRLA, conjunctiva and sclera clear  ENMT: No tonsillar erythema, exudates, or enlargement; Moist mucous membranes, Good dentition, No lesions  NECK: Supple, No JVD, Normal thyroid  NERVOUS SYSTEM:  Alert & Oriented X3, Good concentration; Motor Strength 5/5 B/L upper and lower extremities; DTRs 2+ intact and symmetric  CHEST/LUNG: Clear to percussion bilaterally; No rales, rhonchi, wheezing, or rubs  HEART: Regular rate and rhythm; No murmurs, rubs, or gallops  ABDOMEN: Soft, Nontender, Nondistended; Bowel sounds present  EXTREMITIES:  2+ Peripheral Pulses, No clubbing, cyanosis, or edema  LYMPH: No lymphadenopathy noted  SKIN: No rashes or lesions    LABS:                        9.4    7.0   )-----------( 328      ( 2017 08:52 )             28.3     2017 08:52    145    |  109    |  38     ----------------------------<  72     4.3     |  27     |  1.93     Ca    8.9        2017 08:52    TPro  7.2    /  Alb  2.9    /  TBili  0.2    /  DBili  x      /  AST  26     /  ALT  23     /  AlkPhos  76     2017 14:50    PTT - ( 2017 19:00 )  PTT:24.7 sec  Urinalysis Basic - ( 2017 16:32 )    Color: Yellow / Appearance: Clear / S.010 / pH: x  Gluc: x / Ketone: Negative  / Bili: Negative / Urobili: Negative mg/dL   Blood: x / Protein: Negative mg/dL / Nitrite: Positive   Leuk Esterase: Moderate / RBC: x / WBC 11-25   Sq Epi: x / Non Sq Epi: Occasional / Bacteria: Many      CAPILLARY BLOOD GLUCOSE    CULTURES:    HEMOGLOBIN A1C:    CHOLESTEROL:        RADIOLOGY & ADDITIONAL TESTS:

## 2017-01-29 DIAGNOSIS — N39.0 URINARY TRACT INFECTION, SITE NOT SPECIFIED: ICD-10-CM

## 2017-01-29 RX ADMIN — CALCITRIOL 0.25 MICROGRAM(S): 0.5 CAPSULE ORAL at 11:06

## 2017-01-29 RX ADMIN — PREGABALIN 100 MICROGRAM(S): 225 CAPSULE ORAL at 11:06

## 2017-01-29 RX ADMIN — SODIUM CHLORIDE 70 MILLILITER(S): 9 INJECTION, SOLUTION INTRAVENOUS at 05:49

## 2017-01-29 RX ADMIN — Medication 20 MILLIGRAM(S): at 05:49

## 2017-01-29 RX ADMIN — Medication 325 MILLIGRAM(S): at 11:06

## 2017-01-29 RX ADMIN — Medication 1 TABLET(S): at 11:06

## 2017-01-29 RX ADMIN — DONEPEZIL HYDROCHLORIDE 10 MILLIGRAM(S): 10 TABLET, FILM COATED ORAL at 22:38

## 2017-01-29 RX ADMIN — Medication 1 TABLET(S): at 11:26

## 2017-01-29 RX ADMIN — Medication 100 MILLIGRAM(S): at 11:06

## 2017-01-29 RX ADMIN — LIDOCAINE 1 PATCH: 4 CREAM TOPICAL at 11:06

## 2017-01-29 RX ADMIN — LATANOPROST 1 DROP(S): 0.05 SOLUTION/ DROPS OPHTHALMIC; TOPICAL at 22:39

## 2017-01-29 RX ADMIN — LIDOCAINE 1 PATCH: 4 CREAM TOPICAL at 23:06

## 2017-01-29 RX ADMIN — HEPARIN SODIUM 5000 UNIT(S): 5000 INJECTION INTRAVENOUS; SUBCUTANEOUS at 17:27

## 2017-01-29 RX ADMIN — CEFTRIAXONE 100 GRAM(S): 500 INJECTION, POWDER, FOR SOLUTION INTRAMUSCULAR; INTRAVENOUS at 00:10

## 2017-01-29 RX ADMIN — VALSARTAN 80 MILLIGRAM(S): 80 TABLET ORAL at 05:49

## 2017-01-29 RX ADMIN — HEPARIN SODIUM 5000 UNIT(S): 5000 INJECTION INTRAVENOUS; SUBCUTANEOUS at 05:54

## 2017-01-29 NOTE — PROGRESS NOTE ADULT - SUBJECTIVE AND OBJECTIVE BOX
Patient is a 86y old  Female who presents with a chief complaint of leg edema (2017 19:39)      INTERVAL HPI/OVERNIGHT EVENTS: pt is doing well    MEDICATIONS  (STANDING):  valsartan 80milliGRAM(s) Oral daily  amiodarone    Tablet 200milliGRAM(s) Oral every other day  donepezil 10milliGRAM(s) Oral at bedtime  lidocaine   Patch 1Patch Transdermal daily  furosemide    Tablet 20milliGRAM(s) Oral daily  ferrous    sulfate 325milliGRAM(s) Oral daily  latanoprost 0.005% Ophthalmic Solution 1Drop(s) Both EYES at bedtime  calcitriol   Capsule 0.25MICROGram(s) Oral daily  multivitamin/minerals 1Tablet(s) Oral daily  cyanocobalamin 100MICROGram(s) Oral daily  pyridoxine 100milliGRAM(s) Oral daily  calcium carbonate  625 mG + Vitamin D (OsCal 250 + D) 1Tablet(s) Oral daily  cefTRIAXone   IVPB 1Gram(s) IV Intermittent every 24 hours  heparin  Injectable 5000Unit(s) SubCutaneous every 12 hours  sodium chloride 0.45%. 1000milliLiter(s) IV Continuous <Continuous>    MEDICATIONS  (PRN):      Allergies    No Known Drug Allergies  PCN, Sulfa (Urticaria)    Intolerances        REVIEW OF SYSTEMS:  CONSTITUTIONAL: No fever, weight loss, or fatigue  EYES: No eye pain, visual disturbances, or discharge  ENMT:  No difficulty hearing, tinnitus, vertigo; No sinus or throat pain  NECK: No pain or stiffness  BREASTS: No pain, masses, or nipple discharge  RESPIRATORY: No cough, wheezing, chills or hemoptysis; No shortness of breath  CARDIOVASCULAR: No chest pain, palpitations, dizziness, or leg swelling  GASTROINTESTINAL: No abdominal or epigastric pain. No nausea, vomiting, or hematemesis; No diarrhea or constipation. No melena or hematochezia.  GENITOURINARY: No dysuria, frequency, hematuria, or incontinence  NEUROLOGICAL: No headaches, memory loss, loss of strength, numbness, or tremors  SKIN: No itching, burning, rashes, or lesions   LYMPH NODES: No enlarged glands  ENDOCRINE: No heat or cold intolerance; No hair loss  MUSCULOSKELETAL: No joint pain or swelling; No muscle, back, or extremity pain  PSYCHIATRIC: No depression, anxiety, mood swings, or difficulty sleeping  HEME/LYMPH: No easy bruising, or bleeding gums  ALLERGY AND IMMUNOLOGIC: No hives or eczema    Vital Signs Last 24 Hrs  T(C): 37.2, Max: 37.7 ( @ 17:15)  T(F): 99, Max: 99.8 ( @ 17:15)  HR: 66 (66 - 78)  BP: 146/59 (102/71 - 146/59)  BP(mean): --  RR: 16 (16 - 17)  SpO2: 96% (96% - 99%)    PHYSICAL EXAM:  GENERAL: NAD, well-groomed, well-developed  HEAD:  Atraumatic, Normocephalic  EYES: EOMI, PERRLA, conjunctiva and sclera clear  ENMT: No tonsillar erythema, exudates, or enlargement; Moist mucous membranes, Good dentition, No lesions  NECK: Supple, No JVD, Normal thyroid  NERVOUS SYSTEM:  Alert & Oriented X3, Good concentration; Motor Strength 5/5 B/L upper and lower extremities; DTRs 2+ intact and symmetric  CHEST/LUNG: Clear to percussion bilaterally; No rales, rhonchi, wheezing, or rubs  HEART: Regular rate and rhythm; No murmurs, rubs, or gallops  ABDOMEN: Soft, Nontender, Nondistended; Bowel sounds present  EXTREMITIES:  2+ Peripheral Pulses, No clubbing, cyanosis, or edema  LYMPH: No lymphadenopathy noted  SKIN: No rashes or lesions    LABS:                        9.4    7.0   )-----------( 328      ( 2017 08:52 )             28.3     2017 08:52    145    |  109    |  38     ----------------------------<  72     4.3     |  27     |  1.93     Ca    8.9        2017 08:52    TPro  7.2    /  Alb  2.9    /  TBili  0.2    /  DBili  x      /  AST  26     /  ALT  23     /  AlkPhos  76     2017 14:50    PTT - ( 2017 19:00 )  PTT:24.7 sec  Urinalysis Basic - ( 2017 16:32 )    Color: Yellow / Appearance: Clear / S.010 / pH: x  Gluc: x / Ketone: Negative  / Bili: Negative / Urobili: Negative mg/dL   Blood: x / Protein: Negative mg/dL / Nitrite: Positive   Leuk Esterase: Moderate / RBC: x / WBC 11-25   Sq Epi: x / Non Sq Epi: Occasional / Bacteria: Many      CAPILLARY BLOOD GLUCOSE    CULTURES:    HEMOGLOBIN A1C:    CHOLESTEROL:        RADIOLOGY & ADDITIONAL TESTS:

## 2017-01-30 LAB
ANION GAP SERPL CALC-SCNC: 10 MMOL/L — SIGNIFICANT CHANGE UP (ref 5–17)
BUN SERPL-MCNC: 24 MG/DL — HIGH (ref 7–23)
CALCIUM SERPL-MCNC: 9.3 MG/DL — SIGNIFICANT CHANGE UP (ref 8.5–10.1)
CHLORIDE SERPL-SCNC: 107 MMOL/L — SIGNIFICANT CHANGE UP (ref 96–108)
CO2 SERPL-SCNC: 28 MMOL/L — SIGNIFICANT CHANGE UP (ref 22–31)
CREAT SERPL-MCNC: 1.62 MG/DL — HIGH (ref 0.5–1.3)
GLUCOSE SERPL-MCNC: 76 MG/DL — SIGNIFICANT CHANGE UP (ref 70–99)
HCT VFR BLD CALC: 28.5 % — LOW (ref 34.5–45)
HGB BLD-MCNC: 9.4 G/DL — LOW (ref 11.5–15.5)
MCHC RBC-ENTMCNC: 29.1 PG — SIGNIFICANT CHANGE UP (ref 27–34)
MCHC RBC-ENTMCNC: 33.1 GM/DL — SIGNIFICANT CHANGE UP (ref 32–36)
MCV RBC AUTO: 87.9 FL — SIGNIFICANT CHANGE UP (ref 80–100)
PLATELET # BLD AUTO: 312 K/UL — SIGNIFICANT CHANGE UP (ref 150–400)
POTASSIUM SERPL-MCNC: 4.1 MMOL/L — SIGNIFICANT CHANGE UP (ref 3.5–5.3)
POTASSIUM SERPL-SCNC: 4.1 MMOL/L — SIGNIFICANT CHANGE UP (ref 3.5–5.3)
RBC # BLD: 3.25 M/UL — LOW (ref 3.8–5.2)
RBC # FLD: 17.3 % — HIGH (ref 11–15)
SODIUM SERPL-SCNC: 145 MMOL/L — SIGNIFICANT CHANGE UP (ref 135–145)
WBC # BLD: 6.5 K/UL — SIGNIFICANT CHANGE UP (ref 3.8–10.5)
WBC # FLD AUTO: 6.5 K/UL — SIGNIFICANT CHANGE UP (ref 3.8–10.5)

## 2017-01-30 RX ORDER — CALCIUM CARBONATE 500(1250)
1 TABLET ORAL DAILY
Qty: 0 | Refills: 0 | Status: DISCONTINUED | OUTPATIENT
Start: 2017-01-30 | End: 2017-01-31

## 2017-01-30 RX ADMIN — SODIUM CHLORIDE 70 MILLILITER(S): 9 INJECTION, SOLUTION INTRAVENOUS at 11:33

## 2017-01-30 RX ADMIN — Medication 1 TABLET(S): at 11:40

## 2017-01-30 RX ADMIN — PREGABALIN 100 MICROGRAM(S): 225 CAPSULE ORAL at 11:41

## 2017-01-30 RX ADMIN — HEPARIN SODIUM 5000 UNIT(S): 5000 INJECTION INTRAVENOUS; SUBCUTANEOUS at 05:33

## 2017-01-30 RX ADMIN — CEFTRIAXONE 100 GRAM(S): 500 INJECTION, POWDER, FOR SOLUTION INTRAMUSCULAR; INTRAVENOUS at 00:36

## 2017-01-30 RX ADMIN — Medication 100 MILLIGRAM(S): at 11:44

## 2017-01-30 RX ADMIN — HEPARIN SODIUM 5000 UNIT(S): 5000 INJECTION INTRAVENOUS; SUBCUTANEOUS at 18:18

## 2017-01-30 RX ADMIN — Medication 325 MILLIGRAM(S): at 11:40

## 2017-01-30 RX ADMIN — LIDOCAINE 1 PATCH: 4 CREAM TOPICAL at 18:17

## 2017-01-30 RX ADMIN — CEFTRIAXONE 100 GRAM(S): 500 INJECTION, POWDER, FOR SOLUTION INTRAMUSCULAR; INTRAVENOUS at 23:40

## 2017-01-30 RX ADMIN — AMIODARONE HYDROCHLORIDE 200 MILLIGRAM(S): 400 TABLET ORAL at 11:40

## 2017-01-30 RX ADMIN — Medication 1 TABLET(S): at 15:39

## 2017-01-30 RX ADMIN — Medication 20 MILLIGRAM(S): at 05:32

## 2017-01-30 RX ADMIN — LIDOCAINE 1 PATCH: 4 CREAM TOPICAL at 11:41

## 2017-01-30 RX ADMIN — CALCITRIOL 0.25 MICROGRAM(S): 0.5 CAPSULE ORAL at 11:40

## 2017-01-30 RX ADMIN — DONEPEZIL HYDROCHLORIDE 10 MILLIGRAM(S): 10 TABLET, FILM COATED ORAL at 22:16

## 2017-01-30 RX ADMIN — LATANOPROST 1 DROP(S): 0.05 SOLUTION/ DROPS OPHTHALMIC; TOPICAL at 22:16

## 2017-01-30 NOTE — PHYSICAL THERAPY INITIAL EVALUATION ADULT - CRITERIA FOR SKILLED THERAPEUTIC INTERVENTIONS
therapy frequency/predicted duration of therapy intervention/anticipated discharge recommendation/Subacute Rehab/risk reduction/prevention/impairments found/functional limitations in following categories/rehab potential

## 2017-01-30 NOTE — PHYSICAL THERAPY INITIAL EVALUATION ADULT - PLANNED THERAPY INTERVENTIONS, PT EVAL
strengthening/postural re-education/balance training/transfer training/gait training/bed mobility training

## 2017-01-30 NOTE — PROGRESS NOTE ADULT - SUBJECTIVE AND OBJECTIVE BOX
Patient is a 86y old  Female who presents with a chief complaint of leg edema (27 Jan 2017 19:39)      INTERVAL HPI/OVERNIGHT EVENTS: no event    MEDICATIONS  (STANDING):  valsartan 80milliGRAM(s) Oral daily  amiodarone    Tablet 200milliGRAM(s) Oral every other day  donepezil 10milliGRAM(s) Oral at bedtime  lidocaine   Patch 1Patch Transdermal daily  furosemide    Tablet 20milliGRAM(s) Oral daily  ferrous    sulfate 325milliGRAM(s) Oral daily  latanoprost 0.005% Ophthalmic Solution 1Drop(s) Both EYES at bedtime  calcitriol   Capsule 0.25MICROGram(s) Oral daily  multivitamin/minerals 1Tablet(s) Oral daily  cyanocobalamin 100MICROGram(s) Oral daily  pyridoxine 100milliGRAM(s) Oral daily  calcium carbonate  625 mG + Vitamin D (OsCal 250 + D) 1Tablet(s) Oral daily  cefTRIAXone   IVPB 1Gram(s) IV Intermittent every 24 hours  heparin  Injectable 5000Unit(s) SubCutaneous every 12 hours  sodium chloride 0.45%. 1000milliLiter(s) IV Continuous <Continuous>    MEDICATIONS  (PRN):      Allergies    No Known Drug Allergies  PCN, Sulfa (Urticaria)    Intolerances        REVIEW OF SYSTEMS:  CONSTITUTIONAL: No fever, weight loss, or fatigue  EYES: No eye pain, visual disturbances, or discharge  ENMT:  No difficulty hearing, tinnitus, vertigo; No sinus or throat pain  NECK: No pain or stiffness  BREASTS: No pain, masses, or nipple discharge  RESPIRATORY: No cough, wheezing, chills or hemoptysis; No shortness of breath  CARDIOVASCULAR: No chest pain, palpitations, dizziness, or leg swelling  GASTROINTESTINAL: No abdominal or epigastric pain. No nausea, vomiting, or hematemesis; No diarrhea or constipation. No melena or hematochezia.  GENITOURINARY: No dysuria, frequency, hematuria, or incontinence  NEUROLOGICAL: No headaches, memory loss, loss of strength, numbness, or tremors  SKIN: No itching, burning, rashes, or lesions   LYMPH NODES: No enlarged glands  ENDOCRINE: No heat or cold intolerance; No hair loss  MUSCULOSKELETAL: No joint pain or swelling; No muscle, back, or extremity pain  PSYCHIATRIC: No depression, anxiety, mood swings, or difficulty sleeping  HEME/LYMPH: No easy bruising, or bleeding gums  ALLERGY AND IMMUNOLOGIC: No hives or eczema    Vital Signs Last 24 Hrs  T(C): 37.1, Max: 37.1 (01-30 @ 05:38)  T(F): 98.8, Max: 98.8 (01-30 @ 05:38)  HR: 59 (59 - 73)  BP: 116/62 (107/56 - 136/75)  BP(mean): --  RR: 14 (12 - 15)  SpO2: 99% (98% - 99%)    PHYSICAL EXAM:  GENERAL: NAD, well-groomed, well-developed  HEAD:  Atraumatic, Normocephalic  EYES: EOMI, PERRLA, conjunctiva and sclera clear  ENMT: No tonsillar erythema, exudates, or enlargement; Moist mucous membranes, Good dentition, No lesions  NECK: Supple, No JVD, Normal thyroid  NERVOUS SYSTEM:  Alert & Oriented X3, Good concentration; Motor Strength 5/5 B/L upper and lower extremities; DTRs 2+ intact and symmetric  CHEST/LUNG: Clear to percussion bilaterally; No rales, rhonchi, wheezing, or rubs  HEART: Regular rate and rhythm; No murmurs, rubs, or gallops  ABDOMEN: Soft, Nontender, Nondistended; Bowel sounds present  EXTREMITIES:  2+ Peripheral Pulses, No clubbing, cyanosis, or edema  LYMPH: No lymphadenopathy noted  SKIN: No rashes or lesions    LABS:                        9.4    6.5   )-----------( 312      ( 30 Jan 2017 06:16 )             28.5     30 Jan 2017 06:16    145    |  107    |  24     ----------------------------<  76     4.1     |  28     |  1.62     Ca    9.3        30 Jan 2017 06:16          CAPILLARY BLOOD GLUCOSE    CULTURES:    HEMOGLOBIN A1C:    CHOLESTEROL:        RADIOLOGY & ADDITIONAL TESTS:

## 2017-01-30 NOTE — PHYSICAL THERAPY INITIAL EVALUATION ADULT - BED MOBILITY LIMITATIONS, REHAB EVAL
decreased ability to use legs for bridging/pushing/impaired ability to control trunk for mobility/decreased ability to use arms for pushing/pulling

## 2017-01-31 VITALS
TEMPERATURE: 99 F | RESPIRATION RATE: 14 BRPM | HEART RATE: 71 BPM | DIASTOLIC BLOOD PRESSURE: 88 MMHG | OXYGEN SATURATION: 98 % | SYSTOLIC BLOOD PRESSURE: 133 MMHG

## 2017-01-31 RX ADMIN — LIDOCAINE 1 PATCH: 4 CREAM TOPICAL at 11:10

## 2017-01-31 RX ADMIN — PREGABALIN 100 MICROGRAM(S): 225 CAPSULE ORAL at 11:09

## 2017-01-31 RX ADMIN — Medication 20 MILLIGRAM(S): at 05:17

## 2017-01-31 RX ADMIN — Medication 325 MILLIGRAM(S): at 11:09

## 2017-01-31 RX ADMIN — HEPARIN SODIUM 5000 UNIT(S): 5000 INJECTION INTRAVENOUS; SUBCUTANEOUS at 05:17

## 2017-01-31 RX ADMIN — CALCITRIOL 0.25 MICROGRAM(S): 0.5 CAPSULE ORAL at 11:09

## 2017-01-31 RX ADMIN — SODIUM CHLORIDE 70 MILLILITER(S): 9 INJECTION, SOLUTION INTRAVENOUS at 05:18

## 2017-01-31 RX ADMIN — Medication 100 MILLIGRAM(S): at 11:09

## 2017-01-31 RX ADMIN — Medication 1 TABLET(S): at 11:09

## 2017-01-31 NOTE — DISCHARGE NOTE ADULT - SECONDARY DIAGNOSIS.
Chronic deep vein thrombosis (DVT) of popliteal vein of left lower extremity Stage 2 chronic kidney disease Dementia without behavioral disturbance, unspecified dementia type Urinary tract infection with hematuria, site unspecified

## 2017-01-31 NOTE — DISCHARGE NOTE ADULT - PATIENT PORTAL LINK FT
“You can access the [facility] Patient Portal, offered by [facility], by registering with the following website: [email or web address]”

## 2017-01-31 NOTE — DISCHARGE NOTE ADULT - CARE PLAN
Principal Discharge DX:	Lumbar compression fracture, closed, initial encounter  Goal:	pain control  Instructions for follow-up, activity and diet:	follow up with rehab  Secondary Diagnosis:	Chronic deep vein thrombosis (DVT) of popliteal vein of left lower extremity  Secondary Diagnosis:	Stage 2 chronic kidney disease  Secondary Diagnosis:	Dementia without behavioral disturbance, unspecified dementia type  Secondary Diagnosis:	Urinary tract infection with hematuria, site unspecified

## 2017-01-31 NOTE — DISCHARGE NOTE ADULT - MEDICATION SUMMARY - MEDICATIONS TO TAKE
I will START or STAY ON the medications listed below when I get home from the hospital:    oxyCODONE 5 mg oral capsule  --  by mouth , As Needed  -- Indication: For Lumbar compression fracture, closed, initial encounter    valsartan 80 mg oral tablet  -- 1 tab(s) by mouth once a day  -- Indication: For HTN    amiodarone 200 mg oral tablet  -- 1 tab(s) by mouth every other day  -- Indication: For AFIB    pravastatin 40 mg oral tablet  -- 1 tab(s) by mouth once a day (at bedtime)  -- Indication: For HLD    isradipine 5 mg oral capsule  -- 1 cap(s) by mouth once a day in the morning (TDD:7.5)  -- Indication: For HTN    isradipine 2.5 mg oral capsule  -- 1 cap(s) by mouth once a day (at bedtime) (TDD: 7.5mg)  -- Indication: For HTN    donepezil 10 mg oral tablet  -- 1 tab(s) by mouth once a day (at bedtime)  -- Indication: For Dementia    Lidoderm 5% topical film  -- Apply on skin to affected area once a day  -- For external use only.  Remove old patch prior to applying a new patch.    -- Indication: For Lumbar compression fracture, closed, initial encounter    furosemide 20 mg oral tablet  -- 1 tab(s) by mouth once a day  -- Indication: For HTN    ferrous sulfate 325 mg (65 mg elemental iron) oral tablet  -- 1 tab(s) by mouth once a day  -- Indication: For anemia    polyethylene glycol 3350 oral powder for reconstitution  -- 17 gram(s) by mouth once a day, As needed, Constipation  -- Indication: For Constipation    Kionex 15 g/60 mL oral and rectal suspension  -- 60 milliliter(s) oral 2 times a week  -- Indication: For Hyperkalemia    Lumigan 0.01% solution  -- 1 gtt drops to each affected eye once a day (in the evening) x 30 days   -- Indication: For eye    Caltrate 600 + D oral tablet  -- 1 tab(s) by mouth once a day  -- Indication: For osteoporosis    Centrum Silver oral tablet  -- 1 tab(s) by mouth once a day  -- Indication: For vitamin    Vitamin B12 100 mcg oral tablet  -- 1 tab(s) by mouth once a day  -- Indication: For vitamin    calcitriol 0.25 mcg oral capsule  -- 1 cap(s) by mouth once a day  -- Indication: For vitamin    Vitamin B6 100 mg tablet  -- 1 tab(s) by mouth once a day  -- Indication: For vitamin

## 2017-02-03 DIAGNOSIS — I25.10 ATHEROSCLEROTIC HEART DISEASE OF NATIVE CORONARY ARTERY WITHOUT ANGINA PECTORIS: ICD-10-CM

## 2017-02-03 DIAGNOSIS — M19.90 UNSPECIFIED OSTEOARTHRITIS, UNSPECIFIED SITE: ICD-10-CM

## 2017-02-03 DIAGNOSIS — M48.56XA COLLAPSED VERTEBRA, NOT ELSEWHERE CLASSIFIED, LUMBAR REGION, INITIAL ENCOUNTER FOR FRACTURE: ICD-10-CM

## 2017-02-03 DIAGNOSIS — Z88.2 ALLERGY STATUS TO SULFONAMIDES: ICD-10-CM

## 2017-02-03 DIAGNOSIS — Z88.0 ALLERGY STATUS TO PENICILLIN: ICD-10-CM

## 2017-02-03 DIAGNOSIS — I48.91 UNSPECIFIED ATRIAL FIBRILLATION: ICD-10-CM

## 2017-02-03 DIAGNOSIS — E87.5 HYPERKALEMIA: ICD-10-CM

## 2017-02-03 DIAGNOSIS — M54.5 LOW BACK PAIN: ICD-10-CM

## 2017-02-03 DIAGNOSIS — I12.9 HYPERTENSIVE CHRONIC KIDNEY DISEASE WITH STAGE 1 THROUGH STAGE 4 CHRONIC KIDNEY DISEASE, OR UNSPECIFIED CHRONIC KIDNEY DISEASE: ICD-10-CM

## 2017-02-03 DIAGNOSIS — S32.000A WEDGE COMPRESSION FRACTURE OF UNSPECIFIED LUMBAR VERTEBRA, INITIAL ENCOUNTER FOR CLOSED FRACTURE: ICD-10-CM

## 2017-02-03 DIAGNOSIS — F03.90 UNSPECIFIED DEMENTIA, UNSPECIFIED SEVERITY, WITHOUT BEHAVIORAL DISTURBANCE, PSYCHOTIC DISTURBANCE, MOOD DISTURBANCE, AND ANXIETY: ICD-10-CM

## 2017-02-03 DIAGNOSIS — R60.0 LOCALIZED EDEMA: ICD-10-CM

## 2017-02-03 DIAGNOSIS — R31.9 HEMATURIA, UNSPECIFIED: ICD-10-CM

## 2017-02-03 DIAGNOSIS — Z95.828 PRESENCE OF OTHER VASCULAR IMPLANTS AND GRAFTS: ICD-10-CM

## 2017-02-03 DIAGNOSIS — Z87.11 PERSONAL HISTORY OF PEPTIC ULCER DISEASE: ICD-10-CM

## 2017-02-03 DIAGNOSIS — D64.9 ANEMIA, UNSPECIFIED: ICD-10-CM

## 2017-02-03 DIAGNOSIS — Z79.891 LONG TERM (CURRENT) USE OF OPIATE ANALGESIC: ICD-10-CM

## 2017-02-03 DIAGNOSIS — H40.9 UNSPECIFIED GLAUCOMA: ICD-10-CM

## 2017-02-03 DIAGNOSIS — N18.2 CHRONIC KIDNEY DISEASE, STAGE 2 (MILD): ICD-10-CM

## 2017-02-03 DIAGNOSIS — N39.0 URINARY TRACT INFECTION, SITE NOT SPECIFIED: ICD-10-CM

## 2017-02-03 DIAGNOSIS — E78.5 HYPERLIPIDEMIA, UNSPECIFIED: ICD-10-CM

## 2017-02-03 DIAGNOSIS — I82.532 CHRONIC EMBOLISM AND THROMBOSIS OF LEFT POPLITEAL VEIN: ICD-10-CM

## 2017-06-20 NOTE — ED ADULT NURSE NOTE - THOUGHTS OF HOMICIDE/VIOLENCE TOWARDS OTHERS YN, MLM
Patient Name: Lizy Uriostegui, : 2004, MRN: [id number]   Date:  2017  Referring Physician:  Kirstin Dennis    Diagnosis:Closed displaced fracture of left tibial spine , Acute meniscal tear of left knee, subsequent encounter s/p ACL repair 130   122   5/5   3+/5     Knee extension   0   -3  5/5   3/5: no resistance applied at this time                             Plan: Continue skilled Physical Therapy 2 x/week or a total of 16 visits over a 90 day period Frequency may be tapered as needed No

## 2017-12-22 ENCOUNTER — EMERGENCY (EMERGENCY)
Facility: HOSPITAL | Age: 82
LOS: 0 days | Discharge: HOME | End: 2017-12-22
Attending: EMERGENCY MEDICINE
Payer: MEDICARE

## 2017-12-22 VITALS
OXYGEN SATURATION: 98 % | WEIGHT: 119.05 LBS | HEIGHT: 62 IN | TEMPERATURE: 98 F | RESPIRATION RATE: 18 BRPM | HEART RATE: 60 BPM | SYSTOLIC BLOOD PRESSURE: 141 MMHG | DIASTOLIC BLOOD PRESSURE: 84 MMHG

## 2017-12-22 VITALS
SYSTOLIC BLOOD PRESSURE: 131 MMHG | DIASTOLIC BLOOD PRESSURE: 63 MMHG | RESPIRATION RATE: 17 BRPM | TEMPERATURE: 98 F | OXYGEN SATURATION: 100 %

## 2017-12-22 DIAGNOSIS — I25.10 ATHEROSCLEROTIC HEART DISEASE OF NATIVE CORONARY ARTERY WITHOUT ANGINA PECTORIS: ICD-10-CM

## 2017-12-22 DIAGNOSIS — G56.00 CARPAL TUNNEL SYNDROME, UNSPECIFIED UPPER LIMB: ICD-10-CM

## 2017-12-22 DIAGNOSIS — R51 HEADACHE: ICD-10-CM

## 2017-12-22 DIAGNOSIS — D50.9 IRON DEFICIENCY ANEMIA, UNSPECIFIED: ICD-10-CM

## 2017-12-22 DIAGNOSIS — N18.9 CHRONIC KIDNEY DISEASE, UNSPECIFIED: ICD-10-CM

## 2017-12-22 DIAGNOSIS — F03.90 UNSPECIFIED DEMENTIA WITHOUT BEHAVIORAL DISTURBANCE: ICD-10-CM
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
## 2017-12-22 DIAGNOSIS — E78.5 HYPERLIPIDEMIA, UNSPECIFIED: ICD-10-CM
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
## 2017-12-22 DIAGNOSIS — S09.90XA UNSPECIFIED INJURY OF HEAD, INITIAL ENCOUNTER: ICD-10-CM

## 2017-12-22 DIAGNOSIS — M25.511 PAIN IN RIGHT SHOULDER: ICD-10-CM

## 2017-12-22 DIAGNOSIS — Z95.828 PRESENCE OF OTHER VASCULAR IMPLANTS AND GRAFTS: Chronic | ICD-10-CM

## 2017-12-22 DIAGNOSIS — Z88.2 ALLERGY STATUS TO SULFONAMIDES: ICD-10-CM

## 2017-12-22 DIAGNOSIS — I12.9 HYPERTENSIVE CHRONIC KIDNEY DISEASE WITH STAGE 1 THROUGH STAGE 4 CHRONIC KIDNEY DISEASE, OR UNSPECIFIED CHRONIC KIDNEY DISEASE: ICD-10-CM

## 2017-12-22 DIAGNOSIS — S42.294A OTHER NONDISPLACED FRACTURE OF UPPER END OF RIGHT HUMERUS, INITIAL ENCOUNTER FOR CLOSED FRACTURE: ICD-10-CM

## 2017-12-22 DIAGNOSIS — K92.2 GASTROINTESTINAL HEMORRHAGE, UNSPECIFIED: ICD-10-CM

## 2017-12-22 DIAGNOSIS — M19.90 UNSPECIFIED OSTEOARTHRITIS, UNSPECIFIED SITE: ICD-10-CM

## 2017-12-22 DIAGNOSIS — H40.9 UNSPECIFIED GLAUCOMA: ICD-10-CM

## 2017-12-22 DIAGNOSIS — Z86.718 PERSONAL HISTORY OF OTHER VENOUS THROMBOSIS AND EMBOLISM: ICD-10-CM

## 2017-12-22 DIAGNOSIS — Z88.0 ALLERGY STATUS TO PENICILLIN: ICD-10-CM

## 2017-12-22 LAB
ALBUMIN SERPL ELPH-MCNC: 3.5 G/DL — SIGNIFICANT CHANGE UP (ref 3.3–5)
ALP SERPL-CCNC: 65 U/L — SIGNIFICANT CHANGE UP (ref 40–120)
ALT FLD-CCNC: 20 U/L — SIGNIFICANT CHANGE UP (ref 12–78)
ANION GAP SERPL CALC-SCNC: 8 MMOL/L — SIGNIFICANT CHANGE UP (ref 5–17)
APTT BLD: 29.7 SEC — SIGNIFICANT CHANGE UP (ref 27.5–37.4)
AST SERPL-CCNC: 19 U/L — SIGNIFICANT CHANGE UP (ref 15–37)
BASOPHILS # BLD AUTO: 0 K/UL — SIGNIFICANT CHANGE UP (ref 0–0.2)
BASOPHILS NFR BLD AUTO: 0.4 % — SIGNIFICANT CHANGE UP (ref 0–2)
BILIRUB SERPL-MCNC: 0.6 MG/DL — SIGNIFICANT CHANGE UP (ref 0.2–1.2)
BUN SERPL-MCNC: 32 MG/DL — HIGH (ref 7–23)
CALCIUM SERPL-MCNC: 9.5 MG/DL — SIGNIFICANT CHANGE UP (ref 8.5–10.1)
CHLORIDE SERPL-SCNC: 108 MMOL/L — SIGNIFICANT CHANGE UP (ref 96–108)
CK MB BLD-MCNC: 1.8 % — SIGNIFICANT CHANGE UP (ref 0–3.5)
CK MB CFR SERPL CALC: 2 NG/ML — SIGNIFICANT CHANGE UP (ref 0.5–3.6)
CK SERPL-CCNC: 111 U/L — SIGNIFICANT CHANGE UP (ref 26–192)
CO2 SERPL-SCNC: 27 MMOL/L — SIGNIFICANT CHANGE UP (ref 22–31)
CREAT SERPL-MCNC: 1.95 MG/DL — HIGH (ref 0.5–1.3)
EOSINOPHIL # BLD AUTO: 0 K/UL — SIGNIFICANT CHANGE UP (ref 0–0.5)
EOSINOPHIL NFR BLD AUTO: 0.1 % — SIGNIFICANT CHANGE UP (ref 0–6)
GLUCOSE SERPL-MCNC: 146 MG/DL — HIGH (ref 70–99)
HCT VFR BLD CALC: 37.1 % — SIGNIFICANT CHANGE UP (ref 34.5–45)
HGB BLD-MCNC: 11.9 G/DL — SIGNIFICANT CHANGE UP (ref 11.5–15.5)
INR BLD: 1.18 RATIO — HIGH (ref 0.88–1.16)
LYMPHOCYTES # BLD AUTO: 0.4 K/UL — LOW (ref 1–3.3)
LYMPHOCYTES # BLD AUTO: 3.5 % — LOW (ref 13–44)
MCHC RBC-ENTMCNC: 30.3 PG — SIGNIFICANT CHANGE UP (ref 27–34)
MCHC RBC-ENTMCNC: 32 GM/DL — SIGNIFICANT CHANGE UP (ref 32–36)
MCV RBC AUTO: 94.5 FL — SIGNIFICANT CHANGE UP (ref 80–100)
MONOCYTES # BLD AUTO: 0.4 K/UL — SIGNIFICANT CHANGE UP (ref 0–0.9)
MONOCYTES NFR BLD AUTO: 3.3 % — SIGNIFICANT CHANGE UP (ref 2–14)
NEUTROPHILS # BLD AUTO: 10.3 K/UL — HIGH (ref 1.8–7.4)
NEUTROPHILS NFR BLD AUTO: 92.6 % — HIGH (ref 43–77)
PLATELET # BLD AUTO: 258 K/UL — SIGNIFICANT CHANGE UP (ref 150–400)
POTASSIUM SERPL-MCNC: 4.7 MMOL/L — SIGNIFICANT CHANGE UP (ref 3.5–5.3)
POTASSIUM SERPL-SCNC: 4.7 MMOL/L — SIGNIFICANT CHANGE UP (ref 3.5–5.3)
PROT SERPL-MCNC: 7.2 GM/DL — SIGNIFICANT CHANGE UP (ref 6–8.3)
PROTHROM AB SERPL-ACNC: 12.9 SEC — HIGH (ref 9.8–12.7)
RBC # BLD: 3.93 M/UL — SIGNIFICANT CHANGE UP (ref 3.8–5.2)
RBC # FLD: 18.9 % — HIGH (ref 11–15)
SODIUM SERPL-SCNC: 143 MMOL/L — SIGNIFICANT CHANGE UP (ref 135–145)
TROPONIN I SERPL-MCNC: <.015 NG/ML — SIGNIFICANT CHANGE UP (ref 0.01–0.04)
WBC # BLD: 11.1 K/UL — HIGH (ref 3.8–10.5)
WBC # FLD AUTO: 11.1 K/UL — HIGH (ref 3.8–10.5)

## 2017-12-22 PROCEDURE — 73080 X-RAY EXAM OF ELBOW: CPT | Mod: 26,RT

## 2017-12-22 PROCEDURE — 70450 CT HEAD/BRAIN W/O DYE: CPT | Mod: 26

## 2017-12-22 PROCEDURE — 73030 X-RAY EXAM OF SHOULDER: CPT | Mod: 26,RT

## 2017-12-22 PROCEDURE — 93010 ELECTROCARDIOGRAM REPORT: CPT

## 2017-12-22 PROCEDURE — 73060 X-RAY EXAM OF HUMERUS: CPT | Mod: 26,RT

## 2017-12-22 PROCEDURE — 99284 EMERGENCY DEPT VISIT MOD MDM: CPT | Mod: 57,25

## 2017-12-22 PROCEDURE — 23600 CLTX PROX HUMRL FX W/O MNPJ: CPT | Mod: 54

## 2017-12-22 RX ORDER — MORPHINE SULFATE 50 MG/1
2 CAPSULE, EXTENDED RELEASE ORAL ONCE
Qty: 0 | Refills: 0 | Status: DISCONTINUED | OUTPATIENT
Start: 2017-12-22 | End: 2017-12-22

## 2017-12-22 RX ORDER — OXYCODONE HYDROCHLORIDE 5 MG/1
0 TABLET ORAL
Qty: 0 | Refills: 0 | COMMUNITY

## 2017-12-22 RX ADMIN — MORPHINE SULFATE 2 MILLIGRAM(S): 50 CAPSULE, EXTENDED RELEASE ORAL at 10:39

## 2017-12-22 NOTE — ED PROVIDER NOTE - PROGRESS NOTE DETAILS
Berenice: ct head and labs unremarkable (cr at pt's baseline). spoke to ortho, would like sling and f/u with Dr. Cardona, no surgery at this time. neurovascularly intact. given script for percocet for breakthrough pain, ibuprofen for moderate. precautions/instructions for meds, will f/u ortho. given copy of results. daughter present and understands.

## 2017-12-22 NOTE — ED PROVIDER NOTE - MUSCULOSKELETAL, MLM
right shoulder with deformity/tenderness near proximal humerus, limited rom from pain, no ttp over clavicle. normal sensation/strength/perfusion distally.

## 2017-12-22 NOTE — ED PROVIDER NOTE - OBJECTIVE STATEMENT
Dementia  Trigger finger  CKD (chronic kidney disease)  DVT (deep venous thrombosis)  GI (gastrointestinal bleed)  Anemia Iron Deficiency  H/o Diverticulosis  Carpal Tunnel Syndrome  Trigger Finger  Dyslipidemia  Glaucoma  OA (Osteoarthritis)  HTN (Hypertension)  CAD (Coronary Artery Disease)  Ariel filter in place: placed 2012 86 y/o female hx Dementia, CKD,, DVT with ivc filter, GI bleed/diverticulosis, htn, CAD, recent diagnosis afib started on eliquis several months ago present with right shoulder pain s/p believed mechanical fall this morning. Pt lives with daughter, daughter says checked on pt shortly before, then a little after pt was screaming for help and was on grown, wearing slippers and appears to have slipped on slick ground. Pt was awake, with severe shoulder pain. Pt only c/o shoulder pain, no headache, neuro symptoms, cp, sob, abd pain. Pt with some dementia but is at baseline per daughter, answers most questions appropriately.     ROS: No fever/chills. No eye pain/changes in vision, No ear pain/sore throat/dysphagia, No chest pain/palpitations. No SOB/cough/. No abdominal pain, N/V/D, no black/bloody bm. No dysuria/frequency/discharge, No headache. No Dizziness.    No rashes or breaks in skin. No numbness/tingling/weakness.

## 2017-12-22 NOTE — ED PROVIDER NOTE - MEDICAL DECISION MAKING DETAILS
likely proximal humerus fracture (less likely dislocation). on eliquis and likely mechanical fall, will do ct head, labs, ekg, xray RUE, pain control, reassess.

## 2017-12-22 NOTE — ED ADULT NURSE NOTE - PSH
Cataract  bilateral 2008    Sunshine filter in place  placed 2012  H/o Ovarian Cyst    Knee Arthroplasty right 8/2009 & left 9/2010

## 2017-12-22 NOTE — ED ADULT NURSE REASSESSMENT NOTE - NS ED NURSE REASSESS COMMENT FT1
pt seen and re-evaluated by ED attending d/c ready in stable condition left ED ambulatory via wheelchair  accompanied by daughter

## 2017-12-22 NOTE — ED PROVIDER NOTE - CARE PLAN
Principal Discharge DX:	Other closed nondisplaced fracture of proximal end of right humerus, initial encounter

## 2017-12-22 NOTE — ED ADULT NURSE NOTE - OBJECTIVE STATEMENT
received pt with daughter at bedside slipped and fell in the bedroom on the way to the bathroom, hx of dementia unknown loc

## 2018-01-02 ENCOUNTER — INPATIENT (INPATIENT)
Facility: HOSPITAL | Age: 83
LOS: 2 days | Discharge: NURSING, REHAB OR LONG-TERM CARE | End: 2018-01-05
Attending: INTERNAL MEDICINE | Admitting: INTERNAL MEDICINE
Payer: MEDICARE

## 2018-01-02 VITALS
TEMPERATURE: 98 F | SYSTOLIC BLOOD PRESSURE: 121 MMHG | WEIGHT: 119.93 LBS | DIASTOLIC BLOOD PRESSURE: 70 MMHG | RESPIRATION RATE: 18 BRPM | OXYGEN SATURATION: 99 % | HEIGHT: 63 IN | HEART RATE: 57 BPM

## 2018-01-02 DIAGNOSIS — N18.9 CHRONIC KIDNEY DISEASE, UNSPECIFIED: ICD-10-CM

## 2018-01-02 DIAGNOSIS — Z95.828 PRESENCE OF OTHER VASCULAR IMPLANTS AND GRAFTS: Chronic | ICD-10-CM

## 2018-01-02 DIAGNOSIS — R07.9 CHEST PAIN, UNSPECIFIED: ICD-10-CM

## 2018-01-02 DIAGNOSIS — F03.90 UNSPECIFIED DEMENTIA WITHOUT BEHAVIORAL DISTURBANCE: ICD-10-CM

## 2018-01-02 DIAGNOSIS — R06.02 SHORTNESS OF BREATH: ICD-10-CM

## 2018-01-02 LAB
ACANTHOCYTES BLD QL SMEAR: SLIGHT — SIGNIFICANT CHANGE UP
ALBUMIN SERPL ELPH-MCNC: 2.7 G/DL — LOW (ref 3.3–5)
ALP SERPL-CCNC: 74 U/L — SIGNIFICANT CHANGE UP (ref 40–120)
ALT FLD-CCNC: 16 U/L — SIGNIFICANT CHANGE UP (ref 12–78)
ANION GAP SERPL CALC-SCNC: 10 MMOL/L — SIGNIFICANT CHANGE UP (ref 5–17)
ANISOCYTOSIS BLD QL: SLIGHT — SIGNIFICANT CHANGE UP
APTT BLD: 32.4 SEC — SIGNIFICANT CHANGE UP (ref 27.5–37.4)
AST SERPL-CCNC: 18 U/L — SIGNIFICANT CHANGE UP (ref 15–37)
BILIRUB SERPL-MCNC: 0.4 MG/DL — SIGNIFICANT CHANGE UP (ref 0.2–1.2)
BUN SERPL-MCNC: 34 MG/DL — HIGH (ref 7–23)
CALCIUM SERPL-MCNC: 9.3 MG/DL — SIGNIFICANT CHANGE UP (ref 8.5–10.1)
CHLORIDE SERPL-SCNC: 107 MMOL/L — SIGNIFICANT CHANGE UP (ref 96–108)
CK MB BLD-MCNC: 1.1 % — SIGNIFICANT CHANGE UP (ref 0–3.5)
CK MB CFR SERPL CALC: 0.6 NG/ML — SIGNIFICANT CHANGE UP (ref 0.5–3.6)
CK SERPL-CCNC: 55 U/L — SIGNIFICANT CHANGE UP (ref 26–192)
CO2 SERPL-SCNC: 27 MMOL/L — SIGNIFICANT CHANGE UP (ref 22–31)
CREAT SERPL-MCNC: 2.09 MG/DL — HIGH (ref 0.5–1.3)
DACRYOCYTES BLD QL SMEAR: SLIGHT — SIGNIFICANT CHANGE UP
ELLIPTOCYTES BLD QL SMEAR: SLIGHT — SIGNIFICANT CHANGE UP
EOSINOPHIL NFR BLD AUTO: 2 % — SIGNIFICANT CHANGE UP (ref 0–6)
GLUCOSE SERPL-MCNC: 84 MG/DL — SIGNIFICANT CHANGE UP (ref 70–99)
HCT VFR BLD CALC: 31.6 % — LOW (ref 34.5–45)
HGB BLD-MCNC: 10.2 G/DL — LOW (ref 11.5–15.5)
HYPOCHROMIA BLD QL: SLIGHT — SIGNIFICANT CHANGE UP
INR BLD: 1.22 RATIO — HIGH (ref 0.88–1.16)
LYMPHOCYTES # BLD AUTO: 18 % — SIGNIFICANT CHANGE UP (ref 13–44)
MACROCYTES BLD QL: SLIGHT — SIGNIFICANT CHANGE UP
MCHC RBC-ENTMCNC: 29.5 PG — SIGNIFICANT CHANGE UP (ref 27–34)
MCHC RBC-ENTMCNC: 32.2 GM/DL — SIGNIFICANT CHANGE UP (ref 32–36)
MCV RBC AUTO: 91.8 FL — SIGNIFICANT CHANGE UP (ref 80–100)
MICROCYTES BLD QL: SLIGHT — SIGNIFICANT CHANGE UP
MONOCYTES NFR BLD AUTO: 1 % — LOW (ref 2–14)
NEUTROPHILS NFR BLD AUTO: 79 % — HIGH (ref 43–77)
NT-PROBNP SERPL-SCNC: 1189 PG/ML — HIGH (ref 0–450)
OVALOCYTES BLD QL SMEAR: SLIGHT — SIGNIFICANT CHANGE UP
PLAT MORPH BLD: NORMAL — SIGNIFICANT CHANGE UP
PLATELET # BLD AUTO: 316 K/UL — SIGNIFICANT CHANGE UP (ref 150–400)
POIKILOCYTOSIS BLD QL AUTO: SLIGHT — SIGNIFICANT CHANGE UP
POLYCHROMASIA BLD QL SMEAR: SLIGHT — SIGNIFICANT CHANGE UP
POTASSIUM SERPL-MCNC: 4.4 MMOL/L — SIGNIFICANT CHANGE UP (ref 3.5–5.3)
POTASSIUM SERPL-SCNC: 4.4 MMOL/L — SIGNIFICANT CHANGE UP (ref 3.5–5.3)
PROT SERPL-MCNC: 6.9 GM/DL — SIGNIFICANT CHANGE UP (ref 6–8.3)
PROTHROM AB SERPL-ACNC: 13.4 SEC — HIGH (ref 9.8–12.7)
RBC # BLD: 3.44 M/UL — LOW (ref 3.8–5.2)
RBC # FLD: 18.4 % — HIGH (ref 11–15)
RBC BLD AUTO: ABNORMAL
SCHISTOCYTES BLD QL AUTO: SLIGHT — SIGNIFICANT CHANGE UP
SODIUM SERPL-SCNC: 144 MMOL/L — SIGNIFICANT CHANGE UP (ref 135–145)
TROPONIN I SERPL-MCNC: <.015 NG/ML — SIGNIFICANT CHANGE UP (ref 0.01–0.04)
WBC # BLD: 7 K/UL — SIGNIFICANT CHANGE UP (ref 3.8–10.5)
WBC # FLD AUTO: 7 K/UL — SIGNIFICANT CHANGE UP (ref 3.8–10.5)

## 2018-01-02 PROCEDURE — 93010 ELECTROCARDIOGRAM REPORT: CPT

## 2018-01-02 PROCEDURE — 71045 X-RAY EXAM CHEST 1 VIEW: CPT | Mod: 26

## 2018-01-02 PROCEDURE — 99285 EMERGENCY DEPT VISIT HI MDM: CPT

## 2018-01-02 RX ORDER — DONEPEZIL HYDROCHLORIDE 10 MG/1
10 TABLET, FILM COATED ORAL AT BEDTIME
Qty: 0 | Refills: 0 | Status: DISCONTINUED | OUTPATIENT
Start: 2018-01-02 | End: 2018-01-05

## 2018-01-02 RX ORDER — AMLODIPINE BESYLATE 2.5 MG/1
2.5 TABLET ORAL
Qty: 0 | Refills: 0 | Status: DISCONTINUED | OUTPATIENT
Start: 2018-01-02 | End: 2018-01-05

## 2018-01-02 RX ORDER — INFLUENZA VIRUS VACCINE 15; 15; 15; 15 UG/.5ML; UG/.5ML; UG/.5ML; UG/.5ML
0.5 SUSPENSION INTRAMUSCULAR ONCE
Qty: 0 | Refills: 0 | Status: DISCONTINUED | OUTPATIENT
Start: 2018-01-02 | End: 2018-01-05

## 2018-01-02 RX ORDER — ASPIRIN/CALCIUM CARB/MAGNESIUM 324 MG
325 TABLET ORAL ONCE
Qty: 0 | Refills: 0 | Status: COMPLETED | OUTPATIENT
Start: 2018-01-02 | End: 2018-01-02

## 2018-01-02 RX ORDER — PYRIDOXINE HCL (VITAMIN B6) 100 MG
100 TABLET ORAL DAILY
Qty: 0 | Refills: 0 | Status: DISCONTINUED | OUTPATIENT
Start: 2018-01-02 | End: 2018-01-05

## 2018-01-02 RX ORDER — ACETAMINOPHEN 500 MG
650 TABLET ORAL EVERY 6 HOURS
Qty: 0 | Refills: 0 | Status: DISCONTINUED | OUTPATIENT
Start: 2018-01-02 | End: 2018-01-05

## 2018-01-02 RX ORDER — APIXABAN 2.5 MG/1
1.25 TABLET, FILM COATED ORAL EVERY 12 HOURS
Qty: 0 | Refills: 0 | Status: DISCONTINUED | OUTPATIENT
Start: 2018-01-02 | End: 2018-01-03

## 2018-01-02 RX ORDER — ONDANSETRON 8 MG/1
4 TABLET, FILM COATED ORAL EVERY 8 HOURS
Qty: 0 | Refills: 0 | Status: DISCONTINUED | OUTPATIENT
Start: 2018-01-02 | End: 2018-01-05

## 2018-01-02 RX ORDER — ATORVASTATIN CALCIUM 80 MG/1
10 TABLET, FILM COATED ORAL AT BEDTIME
Qty: 0 | Refills: 0 | Status: DISCONTINUED | OUTPATIENT
Start: 2018-01-02 | End: 2018-01-05

## 2018-01-02 RX ORDER — LATANOPROST 0.05 MG/ML
1 SOLUTION/ DROPS OPHTHALMIC; TOPICAL AT BEDTIME
Qty: 0 | Refills: 0 | Status: DISCONTINUED | OUTPATIENT
Start: 2018-01-02 | End: 2018-01-05

## 2018-01-02 RX ORDER — CALCITRIOL 0.5 UG/1
0.25 CAPSULE ORAL DAILY
Qty: 0 | Refills: 0 | Status: DISCONTINUED | OUTPATIENT
Start: 2018-01-02 | End: 2018-01-05

## 2018-01-02 RX ADMIN — Medication 325 MILLIGRAM(S): at 18:51

## 2018-01-02 RX ADMIN — ATORVASTATIN CALCIUM 10 MILLIGRAM(S): 80 TABLET, FILM COATED ORAL at 22:43

## 2018-01-02 RX ADMIN — LATANOPROST 1 DROP(S): 0.05 SOLUTION/ DROPS OPHTHALMIC; TOPICAL at 22:43

## 2018-01-02 RX ADMIN — DONEPEZIL HYDROCHLORIDE 10 MILLIGRAM(S): 10 TABLET, FILM COATED ORAL at 22:44

## 2018-01-02 NOTE — ED PROVIDER NOTE - PSH
Cataract  bilateral 2008    [place] filter in place  placed 2012  H/o Ovarian Cyst    Knee Arthroplasty right 8/2009 & left 9/2010

## 2018-01-02 NOTE — H&P ADULT - NSHPLABSRESULTS_GEN_ALL_CORE
10.2   7.0   )-----------( 316      ( 02 Jan 2018 16:35 )             31.6     01-02    144  |  107  |  34<H>  ----------------------------<  84  4.4   |  27  |  2.09<H>    Ca    9.3      02 Jan 2018 16:35    TPro  6.9  /  Alb  2.7<L>  /  TBili  0.4  /  DBili  x   /  AST  18  /  ALT  16  /  AlkPhos  74  01-02    PT/INR - ( 02 Jan 2018 16:35 )   PT: 13.4 sec;   INR: 1.22 ratio         PTT - ( 02 Jan 2018 16:35 )  PTT:32.4 sec

## 2018-01-02 NOTE — H&P ADULT - PSH
Cataract  bilateral 2008    [place] filter in place  placed 2012  H/o Ovarian Cyst    Knee Arthroplasty right 8/2009 & left 9/2010

## 2018-01-02 NOTE — ED PROVIDER NOTE - OBJECTIVE STATEMENT
86 yo female with history of dementia, afib presents with daughter c/o chest pain and shortness of breath ewarlier today while walking. Patient denies fever, chills, fall, abdominal pain. Patient's daughter states that pain did not last long, less than 5 min. Patient unable to provide further information. Daughter states that feet are swollen.

## 2018-01-02 NOTE — ED ADULT NURSE NOTE - NS ED NURSE RECORD ANOTHER VITAL SIGN
Yes Is This A New Presentation, Or A Follow-Up?: Skin Lesion How Severe Is Your Skin Lesion?: moderate Has Your Skin Lesion Been Treated?: been treated

## 2018-01-02 NOTE — ED PROVIDER NOTE - CARE PLAN
Principal Discharge DX:	Chest pain  Secondary Diagnosis:	Shortness of breath  Secondary Diagnosis:	Dementia

## 2018-01-02 NOTE — ED ADULT NURSE NOTE - CHIEF COMPLAINT QUOTE
pt BIB daughter states this AM had a period of shortness of breath with chest pain. hx of afib on eloquest

## 2018-01-02 NOTE — H&P ADULT - ASSESSMENT
86 yo female with history of dementia, afib presents with daughter c/o chest pain and shortness of breath ewarlier today while walking. Patient denies fever, chills, fall, abdominal pain.

## 2018-01-03 DIAGNOSIS — N18.3 CHRONIC KIDNEY DISEASE, STAGE 3 (MODERATE): ICD-10-CM

## 2018-01-03 LAB
ANION GAP SERPL CALC-SCNC: 12 MMOL/L — SIGNIFICANT CHANGE UP (ref 5–17)
BUN SERPL-MCNC: 36 MG/DL — HIGH (ref 7–23)
CALCIUM SERPL-MCNC: 9.1 MG/DL — SIGNIFICANT CHANGE UP (ref 8.5–10.1)
CHLORIDE SERPL-SCNC: 106 MMOL/L — SIGNIFICANT CHANGE UP (ref 96–108)
CK SERPL-CCNC: 41 U/L — SIGNIFICANT CHANGE UP (ref 26–192)
CK SERPL-CCNC: 54 U/L — SIGNIFICANT CHANGE UP (ref 26–192)
CO2 SERPL-SCNC: 25 MMOL/L — SIGNIFICANT CHANGE UP (ref 22–31)
CREAT SERPL-MCNC: 1.89 MG/DL — HIGH (ref 0.5–1.3)
GLUCOSE SERPL-MCNC: 71 MG/DL — SIGNIFICANT CHANGE UP (ref 70–99)
MAGNESIUM SERPL-MCNC: 2.2 MG/DL — SIGNIFICANT CHANGE UP (ref 1.6–2.6)
POTASSIUM SERPL-MCNC: 4.1 MMOL/L — SIGNIFICANT CHANGE UP (ref 3.5–5.3)
POTASSIUM SERPL-SCNC: 4.1 MMOL/L — SIGNIFICANT CHANGE UP (ref 3.5–5.3)
SODIUM SERPL-SCNC: 143 MMOL/L — SIGNIFICANT CHANGE UP (ref 135–145)
TROPONIN I SERPL-MCNC: <.015 NG/ML — SIGNIFICANT CHANGE UP (ref 0.01–0.04)
TROPONIN I SERPL-MCNC: <.015 NG/ML — SIGNIFICANT CHANGE UP (ref 0.01–0.04)

## 2018-01-03 RX ORDER — APIXABAN 2.5 MG/1
2.5 TABLET, FILM COATED ORAL EVERY 12 HOURS
Qty: 0 | Refills: 0 | Status: DISCONTINUED | OUTPATIENT
Start: 2018-01-03 | End: 2018-01-05

## 2018-01-03 RX ADMIN — Medication 1 TABLET(S): at 11:18

## 2018-01-03 RX ADMIN — ATORVASTATIN CALCIUM 10 MILLIGRAM(S): 80 TABLET, FILM COATED ORAL at 21:17

## 2018-01-03 RX ADMIN — DONEPEZIL HYDROCHLORIDE 10 MILLIGRAM(S): 10 TABLET, FILM COATED ORAL at 21:17

## 2018-01-03 RX ADMIN — AMLODIPINE BESYLATE 2.5 MILLIGRAM(S): 2.5 TABLET ORAL at 05:27

## 2018-01-03 RX ADMIN — APIXABAN 2.5 MILLIGRAM(S): 2.5 TABLET, FILM COATED ORAL at 17:11

## 2018-01-03 RX ADMIN — LATANOPROST 1 DROP(S): 0.05 SOLUTION/ DROPS OPHTHALMIC; TOPICAL at 21:17

## 2018-01-03 RX ADMIN — CALCITRIOL 0.25 MICROGRAM(S): 0.5 CAPSULE ORAL at 11:18

## 2018-01-03 RX ADMIN — Medication 100 MILLIGRAM(S): at 11:18

## 2018-01-03 RX ADMIN — APIXABAN 2.5 MILLIGRAM(S): 2.5 TABLET, FILM COATED ORAL at 05:27

## 2018-01-03 RX ADMIN — AMLODIPINE BESYLATE 2.5 MILLIGRAM(S): 2.5 TABLET ORAL at 17:11

## 2018-01-03 NOTE — CONSULT NOTE ADULT - SUBJECTIVE AND OBJECTIVE BOX
HPI:  HPI:  86 yo female with history of dementia, afib presents with daughter c/o chest pain and shortness of breath ewarlier today while walking. Patient denies fever, chills, fall, abdominal pain. (2018 20:39)      Chief Complaint:  Patient is a 87y old  Female who presents with a chief complaint of chest pain and weakness (2018 20:39)      Review of Systems:  see above         Social History/Family History  SOCHX:   tobacco,  -  alcohol    FMHX: FA/MO  - contributory       Discussed with:  PMD, Family    Physical Exam:    Vital Signs:  Vital Signs Last 24 Hrs  T(C): 37.6 (2018 17:38), Max: 37.6 (2018 17:38)  T(F): 99.6 (2018 17:38), Max: 99.6 (2018 17:38)  HR: 62 (2018 17:38) (56 - 66)  BP: 165/64 (2018 17:38) (130/60 - 165/64)  BP(mean): --  RR: 18 (2018 17:38) (16 - 18)  SpO2: 99% (2018 17:38) (94% - 100%)  Daily Height in cm: 157.48 (2018 22:00)    Daily Weight in k.6 (2018 05:35)  I&O's Summary    2018 07:01  -  2018 21:25  --------------------------------------------------------  IN: 480 mL / OUT: 0 mL / NET: 480 mL          Chest:  Full & symmetric excursion, no increased effort, breath sounds clear  Cardiovascular:  Regular rhythm, S1, S2, no murmur/rub/S3/S4, no carotid/femoral/abdominal bruit, radial/pedal pulses 2+, no edema  Abdomen:  Soft, non-tender, non-distended, normoactive bowel sounds, no HSM      Laboratory:                          10.2   7.0   )-----------( 316      ( 2018 16:35 )             31.6         143  |  106  |  36<H>  ----------------------------<  71  4.1   |  25  |  1.89<H>    Ca    9.1      2018 06:31  Mg     2.2     -03    TPro  6.9  /  Alb  2.7<L>  /  TBili  0.4  /  DBili  x   /  AST  18  /  ALT  16  /  AlkPhos  74        CARDIAC MARKERS ( 2018 06:31 )  <.015 ng/mL / x     / 54 U/L / x     / x      CARDIAC MARKERS ( 2018 00:21 )  <.015 ng/mL / x     / 41 U/L / x     / x      CARDIAC MARKERS ( 2018 16:35 )  <.015 ng/mL / x     / 55 U/L / x     / 0.6 ng/mL      CAPILLARY BLOOD GLUCOSE        LIVER FUNCTIONS - ( 2018 16:35 )  Alb: 2.7 g/dL / Pro: 6.9 gm/dL / ALK PHOS: 74 U/L / ALT: 16 U/L / AST: 18 U/L / GGT: x           PT/INR - ( 2018 16:35 )   PT: 13.4 sec;   INR: 1.22 ratio         PTT - ( 2018 16:35 )  PTT:32.4 sec        Assessment:  SOB, CP  CE negative  Await echo  No new EKG changes

## 2018-01-03 NOTE — PROGRESS NOTE ADULT - SUBJECTIVE AND OBJECTIVE BOX
Patient is a 87y old  Female who presents with a chief complaint of chest pain and weakness (02 Jan 2018 20:39)      INTERVAL HPI/OVERNIGHT EVENTS:  pt with no complaint  MEDICATIONS  (STANDING):  amLODIPine   Tablet 2.5 milliGRAM(s) Oral two times a day  apixaban 2.5 milliGRAM(s) Oral every 12 hours  atorvastatin 10 milliGRAM(s) Oral at bedtime  calcitriol   Capsule 0.25 MICROGram(s) Oral daily  calcium carbonate 1250 mG + Vitamin D (OsCal 500 + D) 1 Tablet(s) Oral daily  donepezil 10 milliGRAM(s) Oral at bedtime  influenza   Vaccine 0.5 milliLiter(s) IntraMuscular once  latanoprost 0.005% Ophthalmic Solution 1 Drop(s) Both EYES at bedtime  pyridoxine 100 milliGRAM(s) Oral daily    MEDICATIONS  (PRN):  acetaminophen   Tablet. 650 milliGRAM(s) Oral every 6 hours PRN Mild Pain (1 - 3)  ondansetron    Tablet 4 milliGRAM(s) Oral every 8 hours PRN Nausea and/or Vomiting      Allergies    No Known Drug Allergies  PCN, Sulfa (Urticaria)    Intolerances        REVIEW OF SYSTEMS:  CONSTITUTIONAL: No fever, weight loss, or fatigue  EYES: No eye pain, visual disturbances, or discharge  ENMT:  No difficulty hearing, tinnitus, vertigo; No sinus or throat pain  NECK: No pain or stiffness  BREASTS: No pain, masses, or nipple discharge  RESPIRATORY: No cough, wheezing, chills or hemoptysis; No shortness of breath  CARDIOVASCULAR: No chest pain, palpitations, dizziness, or leg swelling  GASTROINTESTINAL: No abdominal or epigastric pain. No nausea, vomiting, or hematemesis; No diarrhea or constipation. No melena or hematochezia.  GENITOURINARY: No dysuria, frequency, hematuria, or incontinence  NEUROLOGICAL: No headaches, memory loss, loss of strength, numbness, or tremors  SKIN: No itching, burning, rashes, or lesions   LYMPH NODES: No enlarged glands  ENDOCRINE: No heat or cold intolerance; No hair loss  MUSCULOSKELETAL: No joint pain or swelling; No muscle, back, or extremity pain  PSYCHIATRIC: No depression, anxiety, mood swings, or difficulty sleeping  HEME/LYMPH: No easy bruising, or bleeding gums  ALLERGY AND IMMUNOLOGIC: No hives or eczema    Vital Signs Last 24 Hrs  T(C): 37.1 (03 Jan 2018 11:37), Max: 37.1 (03 Jan 2018 11:37)  T(F): 98.7 (03 Jan 2018 11:37), Max: 98.7 (03 Jan 2018 11:37)  HR: 66 (03 Jan 2018 11:37) (56 - 66)  BP: 130/60 (03 Jan 2018 11:37) (121/70 - 156/74)  BP(mean): --  RR: 16 (03 Jan 2018 11:37) (15 - 19)  SpO2: 100% (03 Jan 2018 11:37) (94% - 100%)    PHYSICAL EXAM:  GENERAL: NAD, well-groomed, well-developed  HEAD:  Atraumatic, Normocephalic  EYES: EOMI, PERRLA, conjunctiva and sclera clear  ENMT: No tonsillar erythema, exudates, or enlargement; Moist mucous membranes, Good dentition, No lesions  NECK: Supple, No JVD, Normal thyroid  NERVOUS SYSTEM:  Alert & Oriented X3, Good concentration; Motor Strength 5/5 B/L upper and lower extremities; DTRs 2+ intact and symmetric  CHEST/LUNG: Clear to percussion bilaterally; No rales, rhonchi, wheezing, or rubs  HEART: Regular rate and rhythm; No murmurs, rubs, or gallops  ABDOMEN: Soft, Nontender, Nondistended; Bowel sounds present  EXTREMITIES:  2+ Peripheral Pulses, No clubbing, cyanosis, or edema  LYMPH: No lymphadenopathy noted  SKIN: No rashes or lesions    LABS:                        10.2   7.0   )-----------( 316      ( 02 Jan 2018 16:35 )             31.6     01-03    143  |  106  |  36<H>  ----------------------------<  71  4.1   |  25  |  1.89<H>    Ca    9.1      03 Jan 2018 06:31  Mg     2.2     01-03    TPro  6.9  /  Alb  2.7<L>  /  TBili  0.4  /  DBili  x   /  AST  18  /  ALT  16  /  AlkPhos  74  01-02    PT/INR - ( 02 Jan 2018 16:35 )   PT: 13.4 sec;   INR: 1.22 ratio         PTT - ( 02 Jan 2018 16:35 )  PTT:32.4 sec    CAPILLARY BLOOD GLUCOSE        CULTURES:    HEMOGLOBIN A1C:    CHOLESTEROL:        RADIOLOGY & ADDITIONAL TESTS:

## 2018-01-04 DIAGNOSIS — S42.201K UNSPECIFIED FRACTURE OF UPPER END OF RIGHT HUMERUS, SUBSEQUENT ENCOUNTER FOR FRACTURE WITH NONUNION: ICD-10-CM

## 2018-01-04 LAB
ANION GAP SERPL CALC-SCNC: 9 MMOL/L — SIGNIFICANT CHANGE UP (ref 5–17)
BUN SERPL-MCNC: 31 MG/DL — HIGH (ref 7–23)
CALCIUM SERPL-MCNC: 9.5 MG/DL — SIGNIFICANT CHANGE UP (ref 8.5–10.1)
CHLORIDE SERPL-SCNC: 105 MMOL/L — SIGNIFICANT CHANGE UP (ref 96–108)
CO2 SERPL-SCNC: 27 MMOL/L — SIGNIFICANT CHANGE UP (ref 22–31)
CREAT SERPL-MCNC: 1.8 MG/DL — HIGH (ref 0.5–1.3)
GLUCOSE SERPL-MCNC: 72 MG/DL — SIGNIFICANT CHANGE UP (ref 70–99)
HCT VFR BLD CALC: 31.8 % — LOW (ref 34.5–45)
HGB BLD-MCNC: 10.7 G/DL — LOW (ref 11.5–15.5)
MCHC RBC-ENTMCNC: 30.7 PG — SIGNIFICANT CHANGE UP (ref 27–34)
MCHC RBC-ENTMCNC: 33.5 GM/DL — SIGNIFICANT CHANGE UP (ref 32–36)
MCV RBC AUTO: 91.7 FL — SIGNIFICANT CHANGE UP (ref 80–100)
PLATELET # BLD AUTO: 329 K/UL — SIGNIFICANT CHANGE UP (ref 150–400)
POTASSIUM SERPL-MCNC: 4.6 MMOL/L — SIGNIFICANT CHANGE UP (ref 3.5–5.3)
POTASSIUM SERPL-SCNC: 4.6 MMOL/L — SIGNIFICANT CHANGE UP (ref 3.5–5.3)
RBC # BLD: 3.47 M/UL — LOW (ref 3.8–5.2)
RBC # FLD: 17.6 % — HIGH (ref 11–15)
SODIUM SERPL-SCNC: 141 MMOL/L — SIGNIFICANT CHANGE UP (ref 135–145)
WBC # BLD: 7.9 K/UL — SIGNIFICANT CHANGE UP (ref 3.8–10.5)
WBC # FLD AUTO: 7.9 K/UL — SIGNIFICANT CHANGE UP (ref 3.8–10.5)

## 2018-01-04 PROCEDURE — 73030 X-RAY EXAM OF SHOULDER: CPT | Mod: 26,RT

## 2018-01-04 RX ADMIN — AMLODIPINE BESYLATE 2.5 MILLIGRAM(S): 2.5 TABLET ORAL at 17:24

## 2018-01-04 RX ADMIN — CALCITRIOL 0.25 MICROGRAM(S): 0.5 CAPSULE ORAL at 11:20

## 2018-01-04 RX ADMIN — Medication 100 MILLIGRAM(S): at 11:20

## 2018-01-04 RX ADMIN — ATORVASTATIN CALCIUM 10 MILLIGRAM(S): 80 TABLET, FILM COATED ORAL at 21:17

## 2018-01-04 RX ADMIN — Medication 1 TABLET(S): at 11:20

## 2018-01-04 RX ADMIN — DONEPEZIL HYDROCHLORIDE 10 MILLIGRAM(S): 10 TABLET, FILM COATED ORAL at 21:17

## 2018-01-04 RX ADMIN — LATANOPROST 1 DROP(S): 0.05 SOLUTION/ DROPS OPHTHALMIC; TOPICAL at 21:17

## 2018-01-04 RX ADMIN — AMLODIPINE BESYLATE 2.5 MILLIGRAM(S): 2.5 TABLET ORAL at 05:18

## 2018-01-04 RX ADMIN — APIXABAN 2.5 MILLIGRAM(S): 2.5 TABLET, FILM COATED ORAL at 05:19

## 2018-01-04 RX ADMIN — APIXABAN 2.5 MILLIGRAM(S): 2.5 TABLET, FILM COATED ORAL at 17:24

## 2018-01-04 NOTE — PROGRESS NOTE ADULT - PROBLEM SELECTOR PROBLEM 3
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

## 2018-01-04 NOTE — PHYSICAL THERAPY INITIAL EVALUATION ADULT - CRITERIA FOR SKILLED THERAPEUTIC INTERVENTIONS
anticipated discharge recommendation/rehab potential/impairments found/functional limitations in following categories/risk reduction/prevention

## 2018-01-04 NOTE — PHYSICAL THERAPY INITIAL EVALUATION ADULT - PERTINENT HX OF CURRENT PROBLEM, REHAB EVAL
Pt is a 87y.o. F admitted for chest pain and SOB. PMH significant for dementia. AFib. CT head: negative. CXR: negative. Elbow radiograph: negative. Shoulder radiograph: (+) for proximal humeral fracture, NWB RUE with sling, not a surgical candidate.

## 2018-01-04 NOTE — PHYSICAL THERAPY INITIAL EVALUATION ADULT - MODALITIES TREATMENT COMMENTS
Pt transferred supine<>sit with min(A)x1, remained compliant with NWB orders of RUE but required verbal cueing. Sling adjusted with patient sitting EOB. Pt able to stand with mod(A)x1 but unable to take >1step. Increased dyspnea upon standing. Pt returned to supine positioning with mod(A)x1, call bell and phone in reach, JESSICA Rabago aware.

## 2018-01-04 NOTE — PHYSICAL THERAPY INITIAL EVALUATION ADULT - MANUAL MUSCLE TESTING RESULTS, REHAB EVAL
grossly assessed due to/grossly 2+ to 3-/5 bilateral LE, grossly 3+/5 LUE; Unable to test RUE due to presence of proximal humeral fracture

## 2018-01-04 NOTE — PROGRESS NOTE ADULT - SUBJECTIVE AND OBJECTIVE BOX
Patient is a 87y old  Female who presents with a chief complaint of chest pain and weakness (02 Jan 2018 20:39)      INTERVAL HPI/OVERNIGHT EVENTS:  pt is doing better  MEDICATIONS  (STANDING):  amLODIPine   Tablet 2.5 milliGRAM(s) Oral two times a day  apixaban 2.5 milliGRAM(s) Oral every 12 hours  atorvastatin 10 milliGRAM(s) Oral at bedtime  calcitriol   Capsule 0.25 MICROGram(s) Oral daily  calcium carbonate 1250 mG + Vitamin D (OsCal 500 + D) 1 Tablet(s) Oral daily  donepezil 10 milliGRAM(s) Oral at bedtime  influenza   Vaccine 0.5 milliLiter(s) IntraMuscular once  latanoprost 0.005% Ophthalmic Solution 1 Drop(s) Both EYES at bedtime  pyridoxine 100 milliGRAM(s) Oral daily    MEDICATIONS  (PRN):  acetaminophen   Tablet. 650 milliGRAM(s) Oral every 6 hours PRN Mild Pain (1 - 3)  ondansetron    Tablet 4 milliGRAM(s) Oral every 8 hours PRN Nausea and/or Vomiting      Allergies    No Known Drug Allergies  PCN, Sulfa (Urticaria)    Intolerances        REVIEW OF SYSTEMS:  CONSTITUTIONAL: No fever, weight loss, or fatigue  EYES: No eye pain, visual disturbances, or discharge  ENMT:  No difficulty hearing, tinnitus, vertigo; No sinus or throat pain  NECK: No pain or stiffness  BREASTS: No pain, masses, or nipple discharge  RESPIRATORY: No cough, wheezing, chills or hemoptysis; No shortness of breath  CARDIOVASCULAR: No chest pain, palpitations, dizziness, or leg swelling  GASTROINTESTINAL: No abdominal or epigastric pain. No nausea, vomiting, or hematemesis; No diarrhea or constipation. No melena or hematochezia.  GENITOURINARY: No dysuria, frequency, hematuria, or incontinence  NEUROLOGICAL: No headaches, memory loss, loss of strength, numbness, or tremors  SKIN: No itching, burning, rashes, or lesions   LYMPH NODES: No enlarged glands  ENDOCRINE: No heat or cold intolerance; No hair loss  MUSCULOSKELETAL: No joint pain or swelling; No muscle, back, or extremity pain  PSYCHIATRIC: No depression, anxiety, mood swings, or difficulty sleeping  HEME/LYMPH: No easy bruising, or bleeding gums  ALLERGY AND IMMUNOLOGIC: No hives or eczema    Vital Signs Last 24 Hrs  T(C): 37.1 (04 Jan 2018 11:05), Max: 37.6 (03 Jan 2018 17:38)  T(F): 98.8 (04 Jan 2018 11:05), Max: 99.6 (03 Jan 2018 17:38)  HR: 63 (04 Jan 2018 11:05) (62 - 64)  BP: 115/59 (04 Jan 2018 11:05) (115/59 - 165/64)  BP(mean): --  RR: 18 (04 Jan 2018 11:05) (18 - 18)  SpO2: 99% (04 Jan 2018 11:05) (97% - 100%)    PHYSICAL EXAM:  GENERAL: NAD, well-groomed, well-developed  HEAD:  Atraumatic, Normocephalic  EYES: EOMI, PERRLA, conjunctiva and sclera clear  ENMT: No tonsillar erythema, exudates, or enlargement; Moist mucous membranes, Good dentition, No lesions  NECK: Supple, No JVD, Normal thyroid  NERVOUS SYSTEM:  Alert & Oriented X3, Good concentration; Motor Strength 5/5 B/L upper and lower extremities; DTRs 2+ intact and symmetric  CHEST/LUNG: Clear to percussion bilaterally; No rales, rhonchi, wheezing, or rubs  HEART: Regular rate and rhythm; No murmurs, rubs, or gallops  ABDOMEN: Soft, Nontender, Nondistended; Bowel sounds present  EXTREMITIES:  2+ Peripheral Pulses, No clubbing, cyanosis, or edema  LYMPH: No lymphadenopathy noted  SKIN: No rashes or lesions    LABS:                        10.7   7.9   )-----------( 329      ( 04 Jan 2018 07:36 )             31.8     01-04    141  |  105  |  31<H>  ----------------------------<  72  4.6   |  27  |  1.80<H>    Ca    9.5      04 Jan 2018 07:36  Mg     2.2     01-03    TPro  6.9  /  Alb  2.7<L>  /  TBili  0.4  /  DBili  x   /  AST  18  /  ALT  16  /  AlkPhos  74  01-02    PT/INR - ( 02 Jan 2018 16:35 )   PT: 13.4 sec;   INR: 1.22 ratio         PTT - ( 02 Jan 2018 16:35 )  PTT:32.4 sec    CAPILLARY BLOOD GLUCOSE        CULTURES:    HEMOGLOBIN A1C:    CHOLESTEROL:        RADIOLOGY & ADDITIONAL TESTS:

## 2018-01-04 NOTE — PHYSICAL THERAPY INITIAL EVALUATION ADULT - IMPAIRMENTS FOUND, PT EVAL
gross motor/joint integrity and mobility/aerobic capacity/endurance/muscle strength/ROM/Limitations in (B)LE strength, (B)UE ROM, balance

## 2018-01-04 NOTE — CONSULT NOTE ADULT - SUBJECTIVE AND OBJECTIVE BOX
87y Female RHD, seen on 12/22/17 s/p [facility] fall w/ R prox hum fx, treated with sling RUE and outpt followup, admitted 1/2/18 for chest pain/weakness, consulted for WB status of RUE and for further orthopedic followup as pt did not see orthopedist outpt. Pt denies any further trauma, no new falls, denies numbness/tingling. Denies fever/chills. Denies pain/injury elsewhere. No other complaints.  Ambulates mostly with cane. Pt hx dementia but answered most questions appropriately.      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, unspecified fracture morphology, subsequent encounter  Stage 3 chronic kidney disease: Stage 3 chronic kidney disease  Dementia without behavioral disturbance, unspecified dementia type: Dementia without behavioral disturbance, unspecified dementia type  CKD (chronic kidney disease): CKD (chronic kidney disease)  Shortness of breath: Shortness of breath  Chest pain, unspecified type: Chest pain, unspecified type    MEDICATIONS  (STANDING):  amLODIPine   Tablet 2.5 milliGRAM(s) Oral two times a day  apixaban 2.5 milliGRAM(s) Oral every 12 hours  atorvastatin 10 milliGRAM(s) Oral at bedtime  calcitriol   Capsule 0.25 MICROGram(s) Oral daily  calcium carbonate 1250 mG + Vitamin D (OsCal 500 + D) 1 Tablet(s) Oral daily  donepezil 10 milliGRAM(s) Oral at bedtime  influenza   Vaccine 0.5 milliLiter(s) IntraMuscular once  latanoprost 0.005% Ophthalmic Solution 1 Drop(s) Both EYES at bedtime  pyridoxine 100 milliGRAM(s) Oral daily    Allergies    No Known Drug Allergies  PCN, Sulfa (Urticaria)    Intolerances                        10.7   7.9   )-----------( 329      ( 04 Jan 2018 07:36 )             31.8     04 Jan 2018 07:36    141    |  105    |  31     ----------------------------<  72     4.6     |  27     |  1.80     Ca    9.5        04 Jan 2018 07:36  Mg     2.2       03 Jan 2018 00:21    Vital Signs Last 24 Hrs  T(C): 36.4 (01-04-18 @ 16:41), Max: 37.1 (01-04-18 @ 11:05)  T(F): 97.6 (01-04-18 @ 16:41), Max: 98.8 (01-04-18 @ 11:05)  HR: 62 (01-04-18 @ 16:41) (62 - 73)  BP: 114/63 (01-04-18 @ 16:41) (114/63 - 148/71)  BP(mean): --  RR: 18 (01-04-18 @ 16:41) (18 - 18)  SpO2: 99% (01-04-18 @ 16:41) (97% - 100%)    Imaging: XR demonstrates R proximal humerus fracture, confirmed located by axillary view.     Physical Exam  Gen: NAD  R UE: Skin intact,  +ttp shoulder, no other bony ttp, +r/u/m/ain/pin function, SILT, radial pulse intact, compartments soft/compressible, warm/well perfused  Secondary: no bony ttp/full painless ROM at baseline per pt b/l LE/LUE, SILT, pulses palpable, able to SLR b/l.    A/P: 87y Female with R proximal humerus fracture    Pain control  NWB R UE in sling, keep c/d/I   Ice  Active movement of fingers/elbow encouraged  Ca/Vit D, outpt osteoporosis workup  Possible need for surgical intervention discussed with patient  All question answered  no acute orthopedic surgical intervention at this time  Follow up with Dr. Cardona as outpatient within 1 week, call office for appointment   Ortho stable 87y Female RHD, seen on 12/22/17 s/p [facility] fall w/ R prox hum fx, treated with sling RUE and outpt followup, admitted 1/2/18 for chest pain/weakness, consulted for WB status of RUE and for further orthopedic followup as pt did not see orthopedist outpt. Pt denies any further trauma, no new falls, denies numbness/tingling. Denies fever/chills. Denies pain/injury elsewhere. No other complaints.  Ambulates mostly with cane. Pt hx dementia but answered most questions appropriately.      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, unspecified fracture morphology, subsequent encounter  Stage 3 chronic kidney disease: Stage 3 chronic kidney disease  Dementia without behavioral disturbance, unspecified dementia type: Dementia without behavioral disturbance, unspecified dementia type  CKD (chronic kidney disease): CKD (chronic kidney disease)  Shortness of breath: Shortness of breath  Chest pain, unspecified type: Chest pain, unspecified type    MEDICATIONS  (STANDING):  amLODIPine   Tablet 2.5 milliGRAM(s) Oral two times a day  apixaban 2.5 milliGRAM(s) Oral every 12 hours  atorvastatin 10 milliGRAM(s) Oral at bedtime  calcitriol   Capsule 0.25 MICROGram(s) Oral daily  calcium carbonate 1250 mG + Vitamin D (OsCal 500 + D) 1 Tablet(s) Oral daily  donepezil 10 milliGRAM(s) Oral at bedtime  influenza   Vaccine 0.5 milliLiter(s) IntraMuscular once  latanoprost 0.005% Ophthalmic Solution 1 Drop(s) Both EYES at bedtime  pyridoxine 100 milliGRAM(s) Oral daily    Allergies    No Known Drug Allergies  PCN, Sulfa (Urticaria)    Intolerances                        10.7   7.9   )-----------( 329      ( 04 Jan 2018 07:36 )             31.8     04 Jan 2018 07:36    141    |  105    |  31     ----------------------------<  72     4.6     |  27     |  1.80     Ca    9.5        04 Jan 2018 07:36  Mg     2.2       03 Jan 2018 00:21    Vital Signs Last 24 Hrs  T(C): 36.4 (01-04-18 @ 16:41), Max: 37.1 (01-04-18 @ 11:05)  T(F): 97.6 (01-04-18 @ 16:41), Max: 98.8 (01-04-18 @ 11:05)  HR: 62 (01-04-18 @ 16:41) (62 - 73)  BP: 114/63 (01-04-18 @ 16:41) (114/63 - 148/71)  BP(mean): --  RR: 18 (01-04-18 @ 16:41) (18 - 18)  SpO2: 99% (01-04-18 @ 16:41) (97% - 100%)    Imaging: XR demonstrates R proximal humerus fracture, confirmed located by axillary view.     Physical Exam  Gen: NAD  R UE: Skin intact,  +ttp shoulder, no other bony ttp, +r/u/m/ain/pin function, SILT, radial pulse intact, compartments soft/compressible, warm/well perfused  Secondary: no bony ttp/full painless ROM at baseline per pt b/l LE/LUE, SILT, pulses palpable, able to SLR b/l.    A/P: 87y Female with R proximal humerus fracture    Pain control  NWB R UE in sling, keep c/d/I   Ice  Active movement of fingers/elbow encouraged  Ca/Vit D, outpt osteoporosis workup  Possible need for surgical intervention discussed with patient  Surgery would likely be the best option but pt has very high risk of postoperative complications  All question answered  no acute orthopedic surgical intervention at this time  Follow up with Dr. Cardona as outpatient within 1 week, call office for appointment   Ortho stable

## 2018-01-04 NOTE — PHYSICAL THERAPY INITIAL EVALUATION ADULT - BED MOBILITY LIMITATIONS, REHAB EVAL
NWB through RUE via MD order/decreased ability to use legs for bridging/pushing/decreased ability to use arms for pushing/pulling

## 2018-01-05 ENCOUNTER — TRANSCRIPTION ENCOUNTER (OUTPATIENT)
Age: 83
End: 2018-01-05

## 2018-01-05 VITALS — OXYGEN SATURATION: 95 % | DIASTOLIC BLOOD PRESSURE: 81 MMHG | HEART RATE: 63 BPM | SYSTOLIC BLOOD PRESSURE: 129 MMHG

## 2018-01-05 LAB
ANION GAP SERPL CALC-SCNC: 9 MMOL/L — SIGNIFICANT CHANGE UP (ref 5–17)
BUN SERPL-MCNC: 34 MG/DL — HIGH (ref 7–23)
CALCIUM SERPL-MCNC: 9.2 MG/DL — SIGNIFICANT CHANGE UP (ref 8.5–10.1)
CHLORIDE SERPL-SCNC: 110 MMOL/L — HIGH (ref 96–108)
CO2 SERPL-SCNC: 25 MMOL/L — SIGNIFICANT CHANGE UP (ref 22–31)
CREAT SERPL-MCNC: 1.8 MG/DL — HIGH (ref 0.5–1.3)
GLUCOSE SERPL-MCNC: 74 MG/DL — SIGNIFICANT CHANGE UP (ref 70–99)
HCT VFR BLD CALC: 34.2 % — LOW (ref 34.5–45)
HGB BLD-MCNC: 10.7 G/DL — LOW (ref 11.5–15.5)
MCHC RBC-ENTMCNC: 29 PG — SIGNIFICANT CHANGE UP (ref 27–34)
MCHC RBC-ENTMCNC: 31.2 GM/DL — LOW (ref 32–36)
MCV RBC AUTO: 93 FL — SIGNIFICANT CHANGE UP (ref 80–100)
PLATELET # BLD AUTO: 346 K/UL — SIGNIFICANT CHANGE UP (ref 150–400)
POTASSIUM SERPL-MCNC: 4.8 MMOL/L — SIGNIFICANT CHANGE UP (ref 3.5–5.3)
POTASSIUM SERPL-SCNC: 4.8 MMOL/L — SIGNIFICANT CHANGE UP (ref 3.5–5.3)
RBC # BLD: 3.68 M/UL — LOW (ref 3.8–5.2)
RBC # FLD: 18.2 % — HIGH (ref 11–15)
SODIUM SERPL-SCNC: 144 MMOL/L — SIGNIFICANT CHANGE UP (ref 135–145)
WBC # BLD: 8.3 K/UL — SIGNIFICANT CHANGE UP (ref 3.8–10.5)
WBC # FLD AUTO: 8.3 K/UL — SIGNIFICANT CHANGE UP (ref 3.8–10.5)

## 2018-01-05 PROCEDURE — 93306 TTE W/DOPPLER COMPLETE: CPT | Mod: 26

## 2018-01-05 RX ORDER — ATORVASTATIN CALCIUM 80 MG/1
1 TABLET, FILM COATED ORAL
Qty: 0 | Refills: 0 | DISCHARGE
Start: 2018-01-05

## 2018-01-05 RX ORDER — APIXABAN 2.5 MG/1
1 TABLET, FILM COATED ORAL
Qty: 0 | Refills: 0 | DISCHARGE
Start: 2018-01-05

## 2018-01-05 RX ORDER — APIXABAN 2.5 MG/1
0.5 TABLET, FILM COATED ORAL
Qty: 0 | Refills: 0 | DISCHARGE
Start: 2018-01-05

## 2018-01-05 RX ADMIN — Medication 650 MILLIGRAM(S): at 16:33

## 2018-01-05 RX ADMIN — APIXABAN 2.5 MILLIGRAM(S): 2.5 TABLET, FILM COATED ORAL at 05:43

## 2018-01-05 RX ADMIN — Medication 1 TABLET(S): at 11:17

## 2018-01-05 RX ADMIN — Medication 100 MILLIGRAM(S): at 11:17

## 2018-01-05 RX ADMIN — CALCITRIOL 0.25 MICROGRAM(S): 0.5 CAPSULE ORAL at 11:17

## 2018-01-05 RX ADMIN — AMLODIPINE BESYLATE 2.5 MILLIGRAM(S): 2.5 TABLET ORAL at 05:43

## 2018-01-05 RX ADMIN — Medication 650 MILLIGRAM(S): at 16:04

## 2018-01-05 NOTE — DISCHARGE NOTE ADULT - SECONDARY DIAGNOSIS.
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

## 2018-01-05 NOTE — OCCUPATIONAL THERAPY INITIAL EVALUATION ADULT - PRECAUTIONS/LIMITATIONS, REHAB EVAL
Pt  has diminished reaction time due to age related changes and cognitive limitations./fall precautions

## 2018-01-05 NOTE — OCCUPATIONAL THERAPY INITIAL EVALUATION ADULT - IMPAIRMENTS CONTRIBUTING IMPAIRED BED MOBILITY, REHAB EVAL
decreased ROM/decreased sensation/ataxic/impaired postural control/cognition/impaired coordination/impaired sensory feedback/pain/scissoring/decreased strength/impaired balance/decreased flexibility

## 2018-01-05 NOTE — OCCUPATIONAL THERAPY INITIAL EVALUATION ADULT - GENERAL OBSERVATIONS, REHAB EVAL
Pt was  seen for initial  consult encountered supine in bed, AA&Oxto  name; pt is confused, but cooperative & followed simple command.Pt was observed with shoulder supported in sling due to fractured humerus. Pt denied pain. Pt presents with generalized weakness , deficits with activity tolerance, balance, ADL management and functional mobility. Pt is right hand .

## 2018-01-05 NOTE — OCCUPATIONAL THERAPY INITIAL EVALUATION ADULT - PLANNED THERAPY INTERVENTIONS, OT EVAL
ADL retraining/IADL retraining/balance training/fine motor coordination training/ROM/strengthening/stretching/joint mobilization/parent/caregiver training.../bed mobility training/massage/motor coordination training/energy conservation techniques/cognitive, visual perceptual/neuromuscular re-education/transfer training

## 2018-01-05 NOTE — OCCUPATIONAL THERAPY INITIAL EVALUATION ADULT - LIVES WITH, PROFILE
her daughter in an apartment . Pt is unable to  provide information  about the set up of her environment due to cognitive limitations.

## 2018-01-05 NOTE — DISCHARGE NOTE ADULT - MEDICATION SUMMARY - MEDICATIONS TO TAKE
I will START or STAY ON the medications listed below when I get home from the hospital:    Percocet 5/325 oral tablet  -- 1 tab(s) by mouth every 8 hours, As Needed -for severe pain MDD:3   -- Caution federal law prohibits the transfer of this drug to any person other  than the person for whom it was prescribed.  May cause drowsiness.  Alcohol may intensify this effect.  Use care when operating dangerous machinery.  This prescription cannot be refilled.  This product contains acetaminophen.  Do not use  with any other product containing acetaminophen to prevent possible liver damage.  Using more of this medication than prescribed may cause serious breathing problems.    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, unspecified fracture morphology, subsequent encounter    valsartan 80 mg oral tablet  -- orally every other day (at bedtime)  -- Indication: For htn    amiodarone 200 mg oral tablet  -- 1 tab(s) by mouth every other day  -- Indication: For AFIB    apixaban 2.5 mg oral tablet  -- 1 tab(s) by mouth every 12 hours  -- Indication: For AFIB    atorvastatin 10 mg oral tablet  -- 1 tab(s) by mouth once a day (at bedtime)  -- Indication: For Dc    pravastatin 40 mg oral tablet  -- 1 tab(s) by mouth once a day (at bedtime)  -- Indication: For HLD    isradipine 2.5 mg oral capsule  -- 1 cap(s) by mouth once a day (at bedtime) (TDD: 7.5mg)  -- Indication: For HTN    Kionex 15 g/60 mL oral and rectal suspension  -- 60 milliliter(s) oral 2 times a week  -- Indication: For potassium    donepezil 10 mg oral tablet  -- 1 tab(s) by mouth once a day (at bedtime)  -- Indication: For Dementia    Lidoderm 5% topical film  -- Apply on skin to affected area once a day  -- For external use only.  Remove old patch prior to applying a new patch.    -- Indication: For pain    ferrous sulfate 325 mg (65 mg elemental iron) oral tablet  -- 1 tab(s) by mouth once a day  -- Indication: For anemia    Lumigan 0.01% solution  -- 1 gtt drops to each affected eye once a day (in the evening) x 30 days   -- Indication: For eye    Caltrate 600 + D oral tablet  -- 1 tab(s) by mouth once a day  -- Indication: For osteoporosis    Centrum Silver oral tablet  -- 1 tab(s) by mouth once a day  -- Indication: For vitamin    Vitamin B12 100 mcg oral tablet  -- 1 tab(s) by mouth once a day  -- Indication: For vitamin    calcitriol 0.25 mcg oral capsule  -- 1 cap(s) by mouth once a day  -- Indication: For vitamin    Vitamin B6 100 mg tablet  -- 1 tab(s) by mouth once a day  -- Indication: For vitamin

## 2018-01-05 NOTE — OCCUPATIONAL THERAPY INITIAL EVALUATION ADULT - RANGE OF MOTION EXAMINATION, UPPER EXTREMITY
ROM in right elbow , wrist and digits is diminishes by greater  40%/Left UE Passive ROM was WFL  (within functional limits)/Left UE Active ROM was WFL (within functional limits)

## 2018-01-05 NOTE — DISCHARGE NOTE ADULT - HOSPITAL COURSE
88 yo female with history of dementia, afib presents with daughter c/o chest pain and shortness of breath ewarlier today while walking. Patient denies fever, chills, fall, abdominal pain.

## 2018-01-05 NOTE — DISCHARGE NOTE ADULT - CARE PLAN
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

## 2018-01-05 NOTE — DISCHARGE NOTE ADULT - PATIENT PORTAL LINK FT
“You can access the [facility] Patient Portal, offered by [facility], by registering with the following website: [email or web address]”

## 2018-01-05 NOTE — OCCUPATIONAL THERAPY INITIAL EVALUATION ADULT - PERTINENT HX OF CURRENT PROBLEM, REHAB EVAL
Pt presented to ER and  is diagnosed with chest , shortness of breath and Dementia .  X-ray on 1/4/18 of right shoulder results confirm  right  humeral head fracture.

## 2018-01-05 NOTE — OCCUPATIONAL THERAPY INITIAL EVALUATION ADULT - IMPAIRED TRANSFERS: BED/CHAIR, REHAB EVAL
narrow base of support/pain/impaired postural control/impaired coordination/decreased flexibility/ataxic/impaired balance/scissoring/abnormal muscle tone/impaired sensory feedback/decreased strength/decreased ROM/decreased sensation/cognition/impaired motor control

## 2018-01-05 NOTE — OCCUPATIONAL THERAPY INITIAL EVALUATION ADULT - SOCIAL CONCERNS
Pt  id more confused due to new environment and caregiver.  No one is available during the days to care for the patient and she cannot be left alone due to high risk for injury from falls/Complex psychosocial needs/coping issues

## 2018-01-08 DIAGNOSIS — Z79.891 LONG TERM (CURRENT) USE OF OPIATE ANALGESIC: ICD-10-CM

## 2018-01-08 DIAGNOSIS — H40.9 UNSPECIFIED GLAUCOMA: ICD-10-CM

## 2018-01-08 DIAGNOSIS — F03.90 UNSPECIFIED DEMENTIA WITHOUT BEHAVIORAL DISTURBANCE: ICD-10-CM

## 2018-01-08 DIAGNOSIS — K57.90 DIVERTICULOSIS OF INTESTINE, PART UNSPECIFIED, WITHOUT PERFORATION OR ABSCESS WITHOUT BLEEDING: ICD-10-CM

## 2018-01-08 DIAGNOSIS — R07.9 CHEST PAIN, UNSPECIFIED: ICD-10-CM

## 2018-01-08 DIAGNOSIS — Z88.2 ALLERGY STATUS TO SULFONAMIDES: ICD-10-CM

## 2018-01-08 DIAGNOSIS — Z88.0 ALLERGY STATUS TO PENICILLIN: ICD-10-CM

## 2018-01-08 DIAGNOSIS — D50.9 IRON DEFICIENCY ANEMIA, UNSPECIFIED: ICD-10-CM

## 2018-01-08 DIAGNOSIS — Z86.718 PERSONAL HISTORY OF OTHER VENOUS THROMBOSIS AND EMBOLISM: ICD-10-CM

## 2018-01-08 DIAGNOSIS — M81.0 AGE-RELATED OSTEOPOROSIS WITHOUT CURRENT PATHOLOGICAL FRACTURE: ICD-10-CM

## 2018-01-08 DIAGNOSIS — S42.201K UNSPECIFIED FRACTURE OF UPPER END OF RIGHT HUMERUS, SUBSEQUENT ENCOUNTER FOR FRACTURE WITH NONUNION: ICD-10-CM

## 2018-01-08 DIAGNOSIS — I25.10 ATHEROSCLEROTIC HEART DISEASE OF NATIVE CORONARY ARTERY WITHOUT ANGINA PECTORIS: ICD-10-CM

## 2018-01-08 DIAGNOSIS — Z91.81 HISTORY OF FALLING: ICD-10-CM

## 2018-01-08 DIAGNOSIS — N18.3 CHRONIC KIDNEY DISEASE, STAGE 3 (MODERATE): ICD-10-CM

## 2018-01-08 DIAGNOSIS — I48.91 UNSPECIFIED ATRIAL FIBRILLATION: ICD-10-CM

## 2018-01-08 DIAGNOSIS — E78.5 HYPERLIPIDEMIA, UNSPECIFIED: ICD-10-CM

## 2018-01-08 DIAGNOSIS — R06.02 SHORTNESS OF BREATH: ICD-10-CM
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
## 2018-01-08 DIAGNOSIS — I12.9 HYPERTENSIVE CHRONIC KIDNEY DISEASE WITH STAGE 1 THROUGH STAGE 4 CHRONIC KIDNEY DISEASE, OR UNSPECIFIED CHRONIC KIDNEY DISEASE: ICD-10-CM

## 2019-06-02 ENCOUNTER — INPATIENT (INPATIENT)
Facility: HOSPITAL | Age: 84
LOS: 1 days | Discharge: HOME HEALTH | End: 2019-06-04
Attending: INTERNAL MEDICINE | Admitting: INTERNAL MEDICINE
Payer: MEDICARE

## 2019-06-02 VITALS
SYSTOLIC BLOOD PRESSURE: 99 MMHG | HEIGHT: 62 IN | RESPIRATION RATE: 17 BRPM | DIASTOLIC BLOOD PRESSURE: 50 MMHG | OXYGEN SATURATION: 99 % | HEART RATE: 67 BPM | TEMPERATURE: 99 F | WEIGHT: 117.95 LBS

## 2019-06-02 DIAGNOSIS — Z95.828 PRESENCE OF OTHER VASCULAR IMPLANTS AND GRAFTS: Chronic | ICD-10-CM

## 2019-06-02 LAB
ALBUMIN SERPL ELPH-MCNC: 3.1 G/DL — LOW (ref 3.3–5)
ALP SERPL-CCNC: 55 U/L — SIGNIFICANT CHANGE UP (ref 40–120)
ALT FLD-CCNC: 14 U/L — SIGNIFICANT CHANGE UP (ref 12–78)
ANION GAP SERPL CALC-SCNC: 7 MMOL/L — SIGNIFICANT CHANGE UP (ref 5–17)
APTT BLD: 34.5 SEC — SIGNIFICANT CHANGE UP (ref 28.5–37)
AST SERPL-CCNC: 13 U/L — LOW (ref 15–37)
BASOPHILS # BLD AUTO: 0.04 K/UL — SIGNIFICANT CHANGE UP (ref 0–0.2)
BASOPHILS NFR BLD AUTO: 0.7 % — SIGNIFICANT CHANGE UP (ref 0–2)
BILIRUB SERPL-MCNC: 0.3 MG/DL — SIGNIFICANT CHANGE UP (ref 0.2–1.2)
BUN SERPL-MCNC: 52 MG/DL — HIGH (ref 7–23)
CALCIUM SERPL-MCNC: 9.8 MG/DL — SIGNIFICANT CHANGE UP (ref 8.5–10.1)
CHLORIDE SERPL-SCNC: 109 MMOL/L — HIGH (ref 96–108)
CO2 SERPL-SCNC: 26 MMOL/L — SIGNIFICANT CHANGE UP (ref 22–31)
CREAT SERPL-MCNC: 2.43 MG/DL — HIGH (ref 0.5–1.3)
EOSINOPHIL # BLD AUTO: 0.16 K/UL — SIGNIFICANT CHANGE UP (ref 0–0.5)
EOSINOPHIL NFR BLD AUTO: 2.8 % — SIGNIFICANT CHANGE UP (ref 0–6)
GLUCOSE SERPL-MCNC: 86 MG/DL — SIGNIFICANT CHANGE UP (ref 70–99)
HCT VFR BLD CALC: 28.1 % — LOW (ref 34.5–45)
HGB BLD-MCNC: 8.7 G/DL — LOW (ref 11.5–15.5)
IMM GRANULOCYTES NFR BLD AUTO: 0.2 % — SIGNIFICANT CHANGE UP (ref 0–1.5)
INR BLD: 1.17 RATIO — HIGH (ref 0.88–1.16)
LACTATE SERPL-SCNC: 0.7 MMOL/L — SIGNIFICANT CHANGE UP (ref 0.7–2)
LYMPHOCYTES # BLD AUTO: 1.14 K/UL — SIGNIFICANT CHANGE UP (ref 1–3.3)
LYMPHOCYTES # BLD AUTO: 19.9 % — SIGNIFICANT CHANGE UP (ref 13–44)
MAGNESIUM SERPL-MCNC: 2.6 MG/DL — SIGNIFICANT CHANGE UP (ref 1.6–2.6)
MCHC RBC-ENTMCNC: 28.9 PG — SIGNIFICANT CHANGE UP (ref 27–34)
MCHC RBC-ENTMCNC: 31 GM/DL — LOW (ref 32–36)
MCV RBC AUTO: 93.4 FL — SIGNIFICANT CHANGE UP (ref 80–100)
MONOCYTES # BLD AUTO: 0.62 K/UL — SIGNIFICANT CHANGE UP (ref 0–0.9)
MONOCYTES NFR BLD AUTO: 10.8 % — SIGNIFICANT CHANGE UP (ref 2–14)
NEUTROPHILS # BLD AUTO: 3.77 K/UL — SIGNIFICANT CHANGE UP (ref 1.8–7.4)
NEUTROPHILS NFR BLD AUTO: 65.6 % — SIGNIFICANT CHANGE UP (ref 43–77)
NRBC # BLD: 0 /100 WBCS — SIGNIFICANT CHANGE UP (ref 0–0)
PLATELET # BLD AUTO: 263 K/UL — SIGNIFICANT CHANGE UP (ref 150–400)
POTASSIUM SERPL-MCNC: 5 MMOL/L — SIGNIFICANT CHANGE UP (ref 3.5–5.3)
POTASSIUM SERPL-SCNC: 5 MMOL/L — SIGNIFICANT CHANGE UP (ref 3.5–5.3)
PROT SERPL-MCNC: 6.9 GM/DL — SIGNIFICANT CHANGE UP (ref 6–8.3)
PROTHROM AB SERPL-ACNC: 13.2 SEC — HIGH (ref 10–12.9)
RBC # BLD: 3.01 M/UL — LOW (ref 3.8–5.2)
RBC # FLD: 19.9 % — HIGH (ref 10.3–14.5)
SODIUM SERPL-SCNC: 142 MMOL/L — SIGNIFICANT CHANGE UP (ref 135–145)
WBC # BLD: 5.74 K/UL — SIGNIFICANT CHANGE UP (ref 3.8–10.5)
WBC # FLD AUTO: 5.74 K/UL — SIGNIFICANT CHANGE UP (ref 3.8–10.5)

## 2019-06-02 PROCEDURE — 93010 ELECTROCARDIOGRAM REPORT: CPT

## 2019-06-02 PROCEDURE — 99285 EMERGENCY DEPT VISIT HI MDM: CPT

## 2019-06-02 PROCEDURE — 71045 X-RAY EXAM CHEST 1 VIEW: CPT | Mod: 26

## 2019-06-02 PROCEDURE — 99222 1ST HOSP IP/OBS MODERATE 55: CPT | Mod: AI

## 2019-06-02 RX ORDER — VALSARTAN 80 MG/1
0 TABLET ORAL
Qty: 0 | Refills: 0 | DISCHARGE

## 2019-06-02 RX ORDER — SODIUM CHLORIDE 9 MG/ML
1000 INJECTION INTRAMUSCULAR; INTRAVENOUS; SUBCUTANEOUS
Refills: 0 | Status: COMPLETED | OUTPATIENT
Start: 2019-06-02 | End: 2019-06-03

## 2019-06-02 RX ADMIN — Medication 100 MILLIGRAM(S): at 20:51

## 2019-06-02 NOTE — H&P ADULT - NSICDXPASTMEDICALHX_GEN_ALL_CORE_FT
PAST MEDICAL HISTORY:  Anemia Iron Deficiency     CAD (Coronary Artery Disease)     Carpal Tunnel Syndrome     CKD (chronic kidney disease)     Dementia     DVT (deep venous thrombosis)     Dyslipidemia     GI (gastrointestinal bleed)     Glaucoma     H/o Diverticulosis     HTN (Hypertension)     OA (Osteoarthritis)     Trigger finger

## 2019-06-02 NOTE — H&P ADULT - HISTORY OF PRESENT ILLNESS
Pt is a 89 y/o female w/ pmh afib, arrhythmia on amiodarone, diverticulosis, glaucoma, htn, dementia, CKD,dvt,and  anemia comes in w/lle redness x 1 day.  daughter states that she just noticed pts lle with redness and skin breakdown.  she didnt check temp at home.  pt is w/dementia and doesnt report any problems.  Pt states she is just surprised to see this now, she nor daughter recall and trauma.  no sob, cp, palpitations, n/v/d/c no travels or sick contacts.

## 2019-06-02 NOTE — ED PROVIDER NOTE - PHYSICAL EXAMINATION
Gen: Alert, NAD  Head: NC, AT   Eyes: PERRL, EOMI, normal lids/conjunctiva  ENT: normal hearing, patent oropharynx without erythema/exudate, uvula midline  Neck: supple, no tenderness, Trachea midline  Pulm: Bilateral BS, normal resp effort, no wheeze/stridor/retractions  CV: RRR, no M/R/G, 2+ radial and dp pulses bl, no edema  Abd: soft, NT/ND, +BS, no hepatosplenomegaly  Mskel: extremities x4 with normal ROM and no joint effusions. no ctl spine ttp.   Skin: left leg with well demarcated area of erythem and warmth that is ttp.   Neuro: AAOx2, no sensory/motor deficits, CN 2-12 intact

## 2019-06-02 NOTE — ED PROVIDER NOTE - CLINICAL SUMMARY MEDICAL DECISION MAKING FREE TEXT BOX
pt pw cellulitis of leg. given age and comorbities, favor admission for iv abx. I read ekg as sinus amanda rate 59, right axis deviation, no st elevation or depression, normal qtc and pr, narrow qrs, no t inversions.

## 2019-06-02 NOTE — H&P ADULT - NSHPLABSRESULTS_GEN_ALL_CORE
LABS:                        8.7    5.74  )-----------( 263      ( 02 Jun 2019 20:34 )             28.1     06-02    142  |  109<H>  |  52<H>  ----------------------------<  86  5.0   |  26  |  2.43<H>    Ca    9.8      02 Jun 2019 20:34  Mg     2.6     06-02    TPro  6.9  /  Alb  3.1<L>  /  TBili  0.3  /  DBili  x   /  AST  13<L>  /  ALT  14  /  AlkPhos  55  06-02    PT/INR - ( 02 Jun 2019 20:46 )   PT: 13.2 sec;   INR: 1.17 ratio         PTT - ( 02 Jun 2019 20:46 )  PTT:34.5 sec    CAPILLARY BLOOD GLUCOSE          RADIOLOGY & ADDITIONAL TESTS:    Imaging Personally Reviewed:  [ X] YES  [ ] NO

## 2019-06-02 NOTE — ED ADULT NURSE NOTE - OBJECTIVE STATEMENT
pt received alert and oriented x2, as per daughter pt with bilateral leg and ankle edema, with redness to the right lower extremity , with swelling.

## 2019-06-02 NOTE — ED PROVIDER NOTE - OBJECTIVE STATEMENT
Pertinent PMH/PSH/FHx/SHx and Review of Systems contained within:  89F hx dementia, afib on eliquis and amio, htn, ckd, anemia pw left leg swelling and redness noticed x1 day. no fever, chills, nausea, vomiting, weakness, or trauma. Also ROS neg for bleeding, numbness, dysuria, ha, vision loss, rhinorrhea, dysuria  daughter with pt at bedside, nothing given for symptoms .  Fh and Sh not otherwise contributory  ROS otherwise negative

## 2019-06-02 NOTE — H&P ADULT - ASSESSMENT
88 y/o female w/ pmh afib, arrhythmia on amiodarone, diverticulosis, glaucoma, htn, dementia, CKD,dvt,and  anemia comes w/dellulitis of lle

## 2019-06-02 NOTE — H&P ADULT - NSICDXPASTSURGICALHX_GEN_ALL_CORE_FT
PAST SURGICAL HISTORY:  Cataract  bilateral 2008     Ariel filter in place placed 2012    H/o Ovarian Cyst     Knee Arthroplasty right 8/2009 & left 9/2010

## 2019-06-02 NOTE — ED ADULT TRIAGE NOTE - CHIEF COMPLAINT QUOTE
as per daughter " The left ankle and shin is swollen and painful." hx poppy filter, on Eliquis, ckd, htn,

## 2019-06-02 NOTE — H&P ADULT - NSHPPHYSICALEXAM_GEN_ALL_CORE
Vital Signs Last 24 Hrs  T(C): 37.4 (02 Jun 2019 16:11), Max: 37.4 (02 Jun 2019 16:11)  T(F): 99.3 (02 Jun 2019 16:11), Max: 99.3 (02 Jun 2019 16:11)  HR: 67 (02 Jun 2019 16:11) (67 - 67)  BP: 99/50 (02 Jun 2019 16:11) (99/50 - 99/50)  BP(mean): --  RR: 17 (02 Jun 2019 16:11) (17 - 17)  SpO2: 99% (02 Jun 2019 16:11) (99% - 99%)    PHYSICAL EXAM:    GENERAL: frail elderly female  HEAD:  Atraumatic, Normocephalic  EYES: EOMI, PERRLA, conjunctiva and sclera clear  ENMT: No tonsillar erythema, exudates, or enlargement; Moist mucous membranes, No lesions  NECK: Supple, No JVD, Normal thyroid  NERVOUS SYSTEM:  Alert & Oriented X1, Motor Strength 5/5 B/L upper and lower extremities;   CHEST/LUNG: Clear to percussion bilaterally; No rales, rhonchi, wheezing, or rubs  HEART: Regular rate and rhythm; No rubs, or gallops, +S1,S2  ABDOMEN: Soft, Nontender, Nondistended; Bowel sounds present  EXTREMITIES:  2+ Peripheral Pulses, No clubbing, cyanosis, 1+edema, +erythema 5x6 cm on left shin, +skin breakdown  LYMPH: No cervical adenopathy  RECTAL: deferred  BREAST: deferred  : deferred  SKIN: No rashes or lesions    IMPROVE VTE Individual Risk Assessment          RISK                                                          Points  [  x] Previous VTE                                                3  [  ] Thrombophilia                                             2  [  ] Lower limb paralysis                                    2        (unable to hold up >15 seconds)    [  ] Current Cancer                                             2         (within 6 months)  [  x] Immobilization > 24 hrs                              1  [  ] ICU/CCU stay > 24 hours                            1  [ x ] Age > 60                                                    1  IMPROVE VTE Score ____5_____

## 2019-06-03 DIAGNOSIS — I25.10 ATHEROSCLEROTIC HEART DISEASE OF NATIVE CORONARY ARTERY WITHOUT ANGINA PECTORIS: ICD-10-CM

## 2019-06-03 DIAGNOSIS — E78.5 HYPERLIPIDEMIA, UNSPECIFIED: ICD-10-CM

## 2019-06-03 DIAGNOSIS — I10 ESSENTIAL (PRIMARY) HYPERTENSION: ICD-10-CM

## 2019-06-03 DIAGNOSIS — Z29.9 ENCOUNTER FOR PROPHYLACTIC MEASURES, UNSPECIFIED: ICD-10-CM

## 2019-06-03 DIAGNOSIS — F03.90 UNSPECIFIED DEMENTIA WITHOUT BEHAVIORAL DISTURBANCE: ICD-10-CM

## 2019-06-03 DIAGNOSIS — L03.116 CELLULITIS OF LEFT LOWER LIMB: ICD-10-CM

## 2019-06-03 DIAGNOSIS — I48.2 CHRONIC ATRIAL FIBRILLATION: ICD-10-CM

## 2019-06-03 DIAGNOSIS — N18.4 CHRONIC KIDNEY DISEASE, STAGE 4 (SEVERE): ICD-10-CM

## 2019-06-03 LAB
ANION GAP SERPL CALC-SCNC: 8 MMOL/L — SIGNIFICANT CHANGE UP (ref 5–17)
BUN SERPL-MCNC: 49 MG/DL — HIGH (ref 7–23)
CALCIUM SERPL-MCNC: 9.2 MG/DL — SIGNIFICANT CHANGE UP (ref 8.5–10.1)
CHLORIDE SERPL-SCNC: 113 MMOL/L — HIGH (ref 96–108)
CO2 SERPL-SCNC: 25 MMOL/L — SIGNIFICANT CHANGE UP (ref 22–31)
CREAT SERPL-MCNC: 2.28 MG/DL — HIGH (ref 0.5–1.3)
GLUCOSE SERPL-MCNC: 74 MG/DL — SIGNIFICANT CHANGE UP (ref 70–99)
HCT VFR BLD CALC: 29.4 % — LOW (ref 34.5–45)
HGB BLD-MCNC: 9.1 G/DL — LOW (ref 11.5–15.5)
MCHC RBC-ENTMCNC: 29.1 PG — SIGNIFICANT CHANGE UP (ref 27–34)
MCHC RBC-ENTMCNC: 31 GM/DL — LOW (ref 32–36)
MCV RBC AUTO: 93.9 FL — SIGNIFICANT CHANGE UP (ref 80–100)
NRBC # BLD: 0 /100 WBCS — SIGNIFICANT CHANGE UP (ref 0–0)
PLATELET # BLD AUTO: 258 K/UL — SIGNIFICANT CHANGE UP (ref 150–400)
POTASSIUM SERPL-MCNC: 4.7 MMOL/L — SIGNIFICANT CHANGE UP (ref 3.5–5.3)
POTASSIUM SERPL-SCNC: 4.7 MMOL/L — SIGNIFICANT CHANGE UP (ref 3.5–5.3)
RBC # BLD: 3.13 M/UL — LOW (ref 3.8–5.2)
RBC # FLD: 19.7 % — HIGH (ref 10.3–14.5)
SODIUM SERPL-SCNC: 146 MMOL/L — HIGH (ref 135–145)
WBC # BLD: 5.21 K/UL — SIGNIFICANT CHANGE UP (ref 3.8–10.5)
WBC # FLD AUTO: 5.21 K/UL — SIGNIFICANT CHANGE UP (ref 3.8–10.5)

## 2019-06-03 RX ORDER — FERROUS SULFATE 325(65) MG
325 TABLET ORAL DAILY
Refills: 0 | Status: DISCONTINUED | OUTPATIENT
Start: 2019-06-03 | End: 2019-06-04

## 2019-06-03 RX ORDER — DONEPEZIL HYDROCHLORIDE 10 MG/1
10 TABLET, FILM COATED ORAL AT BEDTIME
Refills: 0 | Status: DISCONTINUED | OUTPATIENT
Start: 2019-06-03 | End: 2019-06-04

## 2019-06-03 RX ORDER — DOCUSATE SODIUM 100 MG
100 CAPSULE ORAL
Refills: 0 | Status: DISCONTINUED | OUTPATIENT
Start: 2019-06-03 | End: 2019-06-04

## 2019-06-03 RX ORDER — LACTOBACILLUS ACIDOPHILUS 100MM CELL
1 CAPSULE ORAL
Refills: 0 | Status: DISCONTINUED | OUTPATIENT
Start: 2019-06-03 | End: 2019-06-04

## 2019-06-03 RX ORDER — ATORVASTATIN CALCIUM 80 MG/1
10 TABLET, FILM COATED ORAL AT BEDTIME
Refills: 0 | Status: DISCONTINUED | OUTPATIENT
Start: 2019-06-03 | End: 2019-06-04

## 2019-06-03 RX ORDER — APIXABAN 2.5 MG/1
2.5 TABLET, FILM COATED ORAL EVERY 12 HOURS
Refills: 0 | Status: DISCONTINUED | OUTPATIENT
Start: 2019-06-03 | End: 2019-06-04

## 2019-06-03 RX ORDER — POLYETHYLENE GLYCOL 3350 17 G/17G
17 POWDER, FOR SOLUTION ORAL DAILY
Refills: 0 | Status: DISCONTINUED | OUTPATIENT
Start: 2019-06-03 | End: 2019-06-04

## 2019-06-03 RX ORDER — LATANOPROST 0.05 MG/ML
1 SOLUTION/ DROPS OPHTHALMIC; TOPICAL AT BEDTIME
Refills: 0 | Status: DISCONTINUED | OUTPATIENT
Start: 2019-06-03 | End: 2019-06-04

## 2019-06-03 RX ORDER — SODIUM CHLORIDE 9 MG/ML
1000 INJECTION, SOLUTION INTRAVENOUS
Refills: 0 | Status: DISCONTINUED | OUTPATIENT
Start: 2019-06-03 | End: 2019-06-03

## 2019-06-03 RX ORDER — CEFAZOLIN SODIUM 1 G
500 VIAL (EA) INJECTION EVERY 12 HOURS
Refills: 0 | Status: DISCONTINUED | OUTPATIENT
Start: 2019-06-03 | End: 2019-06-04

## 2019-06-03 RX ORDER — AMIODARONE HYDROCHLORIDE 400 MG/1
100 TABLET ORAL
Refills: 0 | Status: DISCONTINUED | OUTPATIENT
Start: 2019-06-03 | End: 2019-06-04

## 2019-06-03 RX ORDER — CEFTRIAXONE 500 MG/1
1 INJECTION, POWDER, FOR SOLUTION INTRAMUSCULAR; INTRAVENOUS EVERY 24 HOURS
Refills: 0 | Status: DISCONTINUED | OUTPATIENT
Start: 2019-06-03 | End: 2019-06-03

## 2019-06-03 RX ADMIN — Medication 100 MILLIGRAM(S): at 17:47

## 2019-06-03 RX ADMIN — APIXABAN 2.5 MILLIGRAM(S): 2.5 TABLET, FILM COATED ORAL at 17:47

## 2019-06-03 RX ADMIN — Medication 1 TABLET(S): at 11:52

## 2019-06-03 RX ADMIN — Medication 100 MILLIGRAM(S): at 18:23

## 2019-06-03 RX ADMIN — AMIODARONE HYDROCHLORIDE 100 MILLIGRAM(S): 400 TABLET ORAL at 07:00

## 2019-06-03 RX ADMIN — Medication 1 TABLET(S): at 17:47

## 2019-06-03 RX ADMIN — APIXABAN 2.5 MILLIGRAM(S): 2.5 TABLET, FILM COATED ORAL at 06:12

## 2019-06-03 RX ADMIN — Medication 1 TABLET(S): at 08:35

## 2019-06-03 RX ADMIN — LATANOPROST 1 DROP(S): 0.05 SOLUTION/ DROPS OPHTHALMIC; TOPICAL at 23:45

## 2019-06-03 RX ADMIN — SODIUM CHLORIDE 100 MILLILITER(S): 9 INJECTION INTRAMUSCULAR; INTRAVENOUS; SUBCUTANEOUS at 01:30

## 2019-06-03 RX ADMIN — Medication 100 MILLIGRAM(S): at 06:10

## 2019-06-03 RX ADMIN — ATORVASTATIN CALCIUM 10 MILLIGRAM(S): 80 TABLET, FILM COATED ORAL at 21:42

## 2019-06-03 RX ADMIN — Medication 325 MILLIGRAM(S): at 11:52

## 2019-06-03 RX ADMIN — DONEPEZIL HYDROCHLORIDE 10 MILLIGRAM(S): 10 TABLET, FILM COATED ORAL at 21:42

## 2019-06-03 NOTE — PROGRESS NOTE ADULT - SUBJECTIVE AND OBJECTIVE BOX
Patient is a 89y old  Female who presents with a chief complaint of left leg cellulitis (03 Jun 2019 10:20)      INTERVAL HPI/OVERNIGHT EVENTS:  pt with no acute distress  MEDICATIONS  (STANDING):  amiodarone    Tablet 100 milliGRAM(s) Oral <User Schedule>  apixaban 2.5 milliGRAM(s) Oral every 12 hours  atorvastatin 10 milliGRAM(s) Oral at bedtime  ceFAZolin   IVPB 500 milliGRAM(s) IV Intermittent every 12 hours  docusate sodium 100 milliGRAM(s) Oral two times a day  donepezil 10 milliGRAM(s) Oral at bedtime  ferrous    sulfate 325 milliGRAM(s) Oral daily  lactobacillus acidophilus 1 Tablet(s) Oral three times a day with meals  latanoprost 0.005% Ophthalmic Solution 1 Drop(s) Both EYES at bedtime  sodium chloride 0.45%. 1000 milliLiter(s) (70 mL/Hr) IV Continuous <Continuous>    MEDICATIONS  (PRN):  polyethylene glycol 3350 17 Gram(s) Oral daily PRN Constipation      Allergies    No Known Drug Allergies  PCN, Sulfa (Urticaria)    Intolerances        REVIEW OF SYSTEMS:  CONSTITUTIONAL: No fever, weight loss, or fatigue  EYES: No eye pain, visual disturbances, or discharge  ENMT:  No difficulty hearing, tinnitus, vertigo; No sinus or throat pain  NECK: No pain or stiffness  BREASTS: No pain, masses, or nipple discharge  RESPIRATORY: No cough, wheezing, chills or hemoptysis; No shortness of breath  CARDIOVASCULAR: No chest pain, palpitations, dizziness, or leg swelling  GASTROINTESTINAL: No abdominal or epigastric pain. No nausea, vomiting, or hematemesis; No diarrhea or constipation. No melena or hematochezia.  GENITOURINARY: No dysuria, frequency, hematuria, or incontinence  NEUROLOGICAL: No headaches, memory loss, loss of strength, numbness, or tremors  SKIN: No itching, burning, rashes, or lesions   LYMPH NODES: No enlarged glands  ENDOCRINE: No heat or cold intolerance; No hair loss  MUSCULOSKELETAL: No joint pain or swelling; No muscle, back, or extremity pain  PSYCHIATRIC: No depression, anxiety, mood swings, or difficulty sleeping  HEME/LYMPH: No easy bruising, or bleeding gums  ALLERGY AND IMMUNOLOGIC: No hives or eczema    Vital Signs Last 24 Hrs  T(C): 36.5 (03 Jun 2019 05:59), Max: 37.4 (02 Jun 2019 16:11)  T(F): 97.7 (03 Jun 2019 05:59), Max: 99.3 (02 Jun 2019 16:11)  HR: 61 (03 Jun 2019 05:59) (59 - 68)  BP: 141/65 (03 Jun 2019 05:59) (99/50 - 141/65)  BP(mean): --  RR: 18 (03 Jun 2019 05:59) (16 - 18)  SpO2: 98% (03 Jun 2019 05:59) (98% - 99%)    PHYSICAL EXAM:  GENERAL: NAD, well-groomed, well-developed  HEAD:  Atraumatic, Normocephalic  EYES: EOMI, PERRLA, conjunctiva and sclera clear  ENMT: No tonsillar erythema, exudates, or enlargement; Moist mucous membranes, Good dentition, No lesions  NECK: Supple, No JVD, Normal thyroid  NERVOUS SYSTEM:  Alert & Oriented X3, Good concentration; Motor Strength 5/5 B/L upper and lower extremities; DTRs 2+ intact and symmetric  CHEST/LUNG: Clear to percussion bilaterally; No rales, rhonchi, wheezing, or rubs  HEART: Regular rate and rhythm; No murmurs, rubs, or gallops  ABDOMEN: Soft, Nontender, Nondistended; Bowel sounds present  EXTREMITIES:  2+ Peripheral Pulses, No clubbing, cyanosis, or edema  LYMPH: No lymphadenopathy noted  SKIN: No rashes or lesions    LABS:                        9.1    5.21  )-----------( 258      ( 03 Jun 2019 08:13 )             29.4     06-03    146<H>  |  113<H>  |  49<H>  ----------------------------<  74  4.7   |  25  |  2.28<H>    Ca    9.2      03 Jun 2019 08:13  Mg     2.6     06-02    TPro  6.9  /  Alb  3.1<L>  /  TBili  0.3  /  DBili  x   /  AST  13<L>  /  ALT  14  /  AlkPhos  55  06-02    PT/INR - ( 02 Jun 2019 20:46 )   PT: 13.2 sec;   INR: 1.17 ratio         PTT - ( 02 Jun 2019 20:46 )  PTT:34.5 sec    CAPILLARY BLOOD GLUCOSE        CULTURES:    HEMOGLOBIN A1C:    CHOLESTEROL:        RADIOLOGY & ADDITIONAL TESTS:

## 2019-06-04 ENCOUNTER — TRANSCRIPTION ENCOUNTER (OUTPATIENT)
Age: 84
End: 2019-06-04

## 2019-06-04 VITALS
OXYGEN SATURATION: 98 % | RESPIRATION RATE: 17 BRPM | SYSTOLIC BLOOD PRESSURE: 153 MMHG | HEART RATE: 70 BPM | DIASTOLIC BLOOD PRESSURE: 89 MMHG

## 2019-06-04 LAB
ANION GAP SERPL CALC-SCNC: 8 MMOL/L — SIGNIFICANT CHANGE UP (ref 5–17)
BLD GP AB SCN SERPL QL: SIGNIFICANT CHANGE UP
BUN SERPL-MCNC: 45 MG/DL — HIGH (ref 7–23)
CALCIUM SERPL-MCNC: 10 MG/DL — SIGNIFICANT CHANGE UP (ref 8.5–10.1)
CHLORIDE SERPL-SCNC: 112 MMOL/L — HIGH (ref 96–108)
CO2 SERPL-SCNC: 22 MMOL/L — SIGNIFICANT CHANGE UP (ref 22–31)
CREAT SERPL-MCNC: 1.92 MG/DL — HIGH (ref 0.5–1.3)
GLUCOSE SERPL-MCNC: 100 MG/DL — HIGH (ref 70–99)
HCT VFR BLD CALC: 30.3 % — LOW (ref 34.5–45)
HGB BLD-MCNC: 9.4 G/DL — LOW (ref 11.5–15.5)
MCHC RBC-ENTMCNC: 28.7 PG — SIGNIFICANT CHANGE UP (ref 27–34)
MCHC RBC-ENTMCNC: 31 GM/DL — LOW (ref 32–36)
MCV RBC AUTO: 92.7 FL — SIGNIFICANT CHANGE UP (ref 80–100)
NRBC # BLD: 0 /100 WBCS — SIGNIFICANT CHANGE UP (ref 0–0)
PLATELET # BLD AUTO: 262 K/UL — SIGNIFICANT CHANGE UP (ref 150–400)
POTASSIUM SERPL-MCNC: 4.7 MMOL/L — SIGNIFICANT CHANGE UP (ref 3.5–5.3)
POTASSIUM SERPL-SCNC: 4.7 MMOL/L — SIGNIFICANT CHANGE UP (ref 3.5–5.3)
RBC # BLD: 3.27 M/UL — LOW (ref 3.8–5.2)
RBC # FLD: 19.2 % — HIGH (ref 10.3–14.5)
SODIUM SERPL-SCNC: 142 MMOL/L — SIGNIFICANT CHANGE UP (ref 135–145)
WBC # BLD: 6.25 K/UL — SIGNIFICANT CHANGE UP (ref 3.8–10.5)
WBC # FLD AUTO: 6.25 K/UL — SIGNIFICANT CHANGE UP (ref 3.8–10.5)

## 2019-06-04 RX ORDER — LACTOBACILLUS ACIDOPHILUS 100MM CELL
1 CAPSULE ORAL
Qty: 28 | Refills: 0
Start: 2019-06-04 | End: 2019-06-17

## 2019-06-04 RX ORDER — CEPHALEXIN 500 MG
1 CAPSULE ORAL
Qty: 10 | Refills: 0
Start: 2019-06-04 | End: 2019-06-08

## 2019-06-04 RX ADMIN — Medication 100 MILLIGRAM(S): at 06:15

## 2019-06-04 RX ADMIN — Medication 1 TABLET(S): at 08:11

## 2019-06-04 RX ADMIN — Medication 325 MILLIGRAM(S): at 11:28

## 2019-06-04 RX ADMIN — Medication 1 TABLET(S): at 13:07

## 2019-06-04 RX ADMIN — APIXABAN 2.5 MILLIGRAM(S): 2.5 TABLET, FILM COATED ORAL at 06:15

## 2019-06-04 RX ADMIN — Medication 100 MILLIGRAM(S): at 06:40

## 2019-06-04 NOTE — PROGRESS NOTE ADULT - SUBJECTIVE AND OBJECTIVE BOX
Patient feels well no complaints today.    MEDICATIONS  (STANDING):  amiodarone    Tablet 100 milliGRAM(s) Oral <User Schedule>  apixaban 2.5 milliGRAM(s) Oral every 12 hours  atorvastatin 10 milliGRAM(s) Oral at bedtime  ceFAZolin   IVPB 500 milliGRAM(s) IV Intermittent every 12 hours  docusate sodium 100 milliGRAM(s) Oral two times a day  donepezil 10 milliGRAM(s) Oral at bedtime  ferrous    sulfate 325 milliGRAM(s) Oral daily  lactobacillus acidophilus 1 Tablet(s) Oral three times a day with meals  latanoprost 0.005% Ophthalmic Solution 1 Drop(s) Both EYES at bedtime    MEDICATIONS  (PRN):  polyethylene glycol 3350 17 Gram(s) Oral daily PRN Constipation      06-03-19 @ 07:01  -  06-04-19 @ 07:00  --------------------------------------------------------  IN: 280 mL / OUT: 0 mL / NET: 280 mL    06-04-19 @ 07:01  -  06-04-19 @ 14:09  --------------------------------------------------------  IN: 150 mL / OUT: 0 mL / NET: 150 mL      PHYSICAL EXAM:      T(C): 36.6 (06-04-19 @ 11:09), Max: 37.2 (06-04-19 @ 05:21)  HR: 70 (06-04-19 @ 12:32) (59 - 70)  BP: 153/89 (06-04-19 @ 12:32) (144/60 - 161/74)  RR: 17 (06-04-19 @ 12:32) (16 - 18)  SpO2: 98% (06-04-19 @ 12:32) (97% - 100%)  Wt(kg): --  Respiratory: clear anteriorly, decreased BS at bases  Cardiovascular: S1 S2  Gastrointestinal: soft NT ND +BS  Extremities:  tr edema                                    9.4    6.25  )-----------( 262      ( 04 Jun 2019 06:58 )             30.3     06-04    142  |  112<H>  |  45<H>  ----------------------------<  100<H>  4.7   |  22  |  1.92<H>    Ca    10.0      04 Jun 2019 06:58  Mg     2.6     06-02    TPro  6.9  /  Alb  3.1<L>  /  TBili  0.3  /  DBili  x   /  AST  13<L>  /  ALT  14  /  AlkPhos  55  06-02      LIVER FUNCTIONS - ( 02 Jun 2019 20:34 )  Alb: 3.1 g/dL / Pro: 6.9 gm/dL / ALK PHOS: 55 U/L / ALT: 14 U/L / AST: 13 U/L / GGT: x           Creatinine Trend: 1.92<--, 2.28<--, 2.43<--    Assessment and Plan:    Chronic kidney disease 4; indices stabilizing;          PLAN:  Supportive care; continue current rx.

## 2019-06-04 NOTE — PHYSICAL THERAPY INITIAL EVALUATION ADULT - TRANSFER TRAINING, PT EVAL
Pt will independently perform sit to/from stand transfers without LOB using rolling walker by 2-3 weeks

## 2019-06-04 NOTE — DISCHARGE NOTE PROVIDER - HOSPITAL COURSE
Pt is a 88 y/o female w/ pmh afib, arrhythmia on amiodarone, diverticulosis, glaucoma, htn, dementia, CKD,dvt,and  anemia comes in w/lle redness x 1 day.  daughter states that she just noticed pts lle with redness and skin breakdown.  she didnt check temp at home.  pt is w/dementia and doesnt report any problems.  Pt states she is just surprised to see this now, she nor daughter recall and trauma. Patient treated for cellulitis with IV ABX. Cellulitis resolved and much improved. Patient symptoms controlled, medically optimized, and stable for discharge. Patient to follow up with PMD. To complete PO ABX.

## 2019-06-04 NOTE — DISCHARGE NOTE PROVIDER - NSDCCPCAREPLAN_GEN_ALL_CORE_FT
PRINCIPAL DISCHARGE DIAGNOSIS  Diagnosis: Cellulitis of leg, left  Assessment and Plan of Treatment: Complete PO ABX  Follow up with PMD      SECONDARY DISCHARGE DIAGNOSES  Diagnosis: Coronary artery disease involving native coronary artery of native heart without angina pectoris  Assessment and Plan of Treatment: Stable  Continue medications as prescribed      Diagnosis: Dementia without behavioral disturbance, unspecified dementia type  Assessment and Plan of Treatment: Preserve ADLs    Diagnosis: Essential hypertension  Assessment and Plan of Treatment: Continue medications as prescribed  Follow up with PMD  Low-sodium diet  AHA Recipes - [email or web address]      Diagnosis: Stage 4 chronic kidney disease  Assessment and Plan of Treatment: Stable  Follow up with PMD    Diagnosis: Chronic atrial fibrillation  Assessment and Plan of Treatment: Stable  Continue medications as prescribed

## 2019-06-04 NOTE — DISCHARGE NOTE PROVIDER - CARE PROVIDER_API CALL
Walter Owens (MD)  Internal Medicine  [address]  [place] [address]  Phone: [phone]  Fax: [phone]  Follow Up Time: 1 week

## 2019-06-04 NOTE — DISCHARGE NOTE NURSING/CASE MANAGEMENT/SOCIAL WORK - NSDCDPATPORTLINK_GEN_ALL_CORE
You can access the [facility] Patient Portal, offered by [facility], by registering with the following website: [email or web address]

## 2019-06-08 LAB
CULTURE RESULTS: SIGNIFICANT CHANGE UP
CULTURE RESULTS: SIGNIFICANT CHANGE UP
SPECIMEN SOURCE: SIGNIFICANT CHANGE UP
SPECIMEN SOURCE: SIGNIFICANT CHANGE UP

## 2019-06-09 DIAGNOSIS — Z79.899 OTHER LONG TERM (CURRENT) DRUG THERAPY: ICD-10-CM

## 2019-06-09 DIAGNOSIS — I25.10 ATHEROSCLEROTIC HEART DISEASE OF NATIVE CORONARY ARTERY WITHOUT ANGINA PECTORIS: ICD-10-CM

## 2019-06-09 DIAGNOSIS — M19.90 UNSPECIFIED OSTEOARTHRITIS, UNSPECIFIED SITE: ICD-10-CM

## 2019-06-09 DIAGNOSIS — D50.9 IRON DEFICIENCY ANEMIA, UNSPECIFIED: ICD-10-CM

## 2019-06-09 DIAGNOSIS — Z98.42 CATARACT EXTRACTION STATUS, LEFT EYE: ICD-10-CM

## 2019-06-09 DIAGNOSIS — I12.9 HYPERTENSIVE CHRONIC KIDNEY DISEASE WITH STAGE 1 THROUGH STAGE 4 CHRONIC KIDNEY DISEASE, OR UNSPECIFIED CHRONIC KIDNEY DISEASE: ICD-10-CM

## 2019-06-09 DIAGNOSIS — H40.9 UNSPECIFIED GLAUCOMA: ICD-10-CM

## 2019-06-09 DIAGNOSIS — F03.90 UNSPECIFIED DEMENTIA WITHOUT BEHAVIORAL DISTURBANCE: ICD-10-CM

## 2019-06-09 DIAGNOSIS — E78.5 HYPERLIPIDEMIA, UNSPECIFIED: ICD-10-CM

## 2019-06-09 DIAGNOSIS — L03.116 CELLULITIS OF LEFT LOWER LIMB: ICD-10-CM

## 2019-06-09 DIAGNOSIS — Z86.718 PERSONAL HISTORY OF OTHER VENOUS THROMBOSIS AND EMBOLISM: ICD-10-CM

## 2019-06-09 DIAGNOSIS — Z88.2 ALLERGY STATUS TO SULFONAMIDES: ICD-10-CM

## 2019-06-09 DIAGNOSIS — N18.4 CHRONIC KIDNEY DISEASE, STAGE 4 (SEVERE): ICD-10-CM

## 2019-06-09 DIAGNOSIS — I48.2 CHRONIC ATRIAL FIBRILLATION: ICD-10-CM

## 2019-06-09 DIAGNOSIS — Z79.01 LONG TERM (CURRENT) USE OF ANTICOAGULANTS: ICD-10-CM

## 2019-06-09 DIAGNOSIS — Z98.41 CATARACT EXTRACTION STATUS, RIGHT EYE: ICD-10-CM

## 2019-06-09 DIAGNOSIS — Z88.0 ALLERGY STATUS TO PENICILLIN: ICD-10-CM

## 2019-06-09 DIAGNOSIS — E87.0 HYPEROSMOLALITY AND HYPERNATREMIA: ICD-10-CM

## 2019-06-09 DIAGNOSIS — Z95.828 PRESENCE OF OTHER VASCULAR IMPLANTS AND GRAFTS: ICD-10-CM

## 2019-09-15 ENCOUNTER — EMERGENCY (EMERGENCY)
Facility: HOSPITAL | Age: 84
LOS: 1 days | Discharge: NURSING, REHAB OR LONG-TERM CARE | End: 2019-09-15
Attending: EMERGENCY MEDICINE | Admitting: EMERGENCY MEDICINE
Payer: MEDICARE

## 2019-09-15 VITALS
DIASTOLIC BLOOD PRESSURE: 61 MMHG | OXYGEN SATURATION: 97 % | SYSTOLIC BLOOD PRESSURE: 134 MMHG | TEMPERATURE: 99 F | RESPIRATION RATE: 15 BRPM | HEART RATE: 68 BPM

## 2019-09-15 VITALS
DIASTOLIC BLOOD PRESSURE: 61 MMHG | SYSTOLIC BLOOD PRESSURE: 97 MMHG | RESPIRATION RATE: 15 BRPM | HEIGHT: 58 IN | WEIGHT: 117.95 LBS | TEMPERATURE: 98 F | OXYGEN SATURATION: 98 % | HEART RATE: 64 BPM

## 2019-09-15 DIAGNOSIS — Z95.828 PRESENCE OF OTHER VASCULAR IMPLANTS AND GRAFTS: Chronic | ICD-10-CM

## 2019-09-15 LAB
ALBUMIN SERPL ELPH-MCNC: 3 G/DL — LOW (ref 3.3–5)
ALP SERPL-CCNC: 49 U/L — SIGNIFICANT CHANGE UP (ref 30–120)
ALT FLD-CCNC: 19 U/L DA — SIGNIFICANT CHANGE UP (ref 10–60)
ANION GAP SERPL CALC-SCNC: 8 MMOL/L — SIGNIFICANT CHANGE UP (ref 5–17)
APTT BLD: 28.4 SEC — LOW (ref 28.5–37)
AST SERPL-CCNC: 23 U/L — SIGNIFICANT CHANGE UP (ref 10–40)
BASOPHILS # BLD AUTO: 0.02 K/UL — SIGNIFICANT CHANGE UP (ref 0–0.2)
BASOPHILS NFR BLD AUTO: 0.3 % — SIGNIFICANT CHANGE UP (ref 0–2)
BILIRUB SERPL-MCNC: 0.2 MG/DL — SIGNIFICANT CHANGE UP (ref 0.2–1.2)
BUN SERPL-MCNC: 56 MG/DL — HIGH (ref 7–23)
CALCIUM SERPL-MCNC: 9.8 MG/DL — SIGNIFICANT CHANGE UP (ref 8.4–10.5)
CHLORIDE SERPL-SCNC: 110 MMOL/L — HIGH (ref 96–108)
CO2 SERPL-SCNC: 26 MMOL/L — SIGNIFICANT CHANGE UP (ref 22–31)
CREAT SERPL-MCNC: 2.29 MG/DL — HIGH (ref 0.5–1.3)
EOSINOPHIL # BLD AUTO: 0.1 K/UL — SIGNIFICANT CHANGE UP (ref 0–0.5)
EOSINOPHIL NFR BLD AUTO: 1.7 % — SIGNIFICANT CHANGE UP (ref 0–6)
GLUCOSE SERPL-MCNC: 113 MG/DL — HIGH (ref 70–99)
HCT VFR BLD CALC: 30.5 % — LOW (ref 34.5–45)
HGB BLD-MCNC: 9.5 G/DL — LOW (ref 11.5–15.5)
IMM GRANULOCYTES NFR BLD AUTO: 0.7 % — SIGNIFICANT CHANGE UP (ref 0–1.5)
INR BLD: 1.11 RATIO — SIGNIFICANT CHANGE UP (ref 0.88–1.16)
LYMPHOCYTES # BLD AUTO: 0.73 K/UL — LOW (ref 1–3.3)
LYMPHOCYTES # BLD AUTO: 12.7 % — LOW (ref 13–44)
MCHC RBC-ENTMCNC: 26.9 PG — LOW (ref 27–34)
MCHC RBC-ENTMCNC: 31.1 GM/DL — LOW (ref 32–36)
MCV RBC AUTO: 86.4 FL — SIGNIFICANT CHANGE UP (ref 80–100)
MONOCYTES # BLD AUTO: 0.5 K/UL — SIGNIFICANT CHANGE UP (ref 0–0.9)
MONOCYTES NFR BLD AUTO: 8.7 % — SIGNIFICANT CHANGE UP (ref 2–14)
NEUTROPHILS # BLD AUTO: 4.38 K/UL — SIGNIFICANT CHANGE UP (ref 1.8–7.4)
NEUTROPHILS NFR BLD AUTO: 75.9 % — SIGNIFICANT CHANGE UP (ref 43–77)
NRBC # BLD: 0 /100 WBCS — SIGNIFICANT CHANGE UP (ref 0–0)
PLATELET # BLD AUTO: 200 K/UL — SIGNIFICANT CHANGE UP (ref 150–400)
POTASSIUM SERPL-MCNC: 5.2 MMOL/L — SIGNIFICANT CHANGE UP (ref 3.5–5.3)
POTASSIUM SERPL-SCNC: 5.2 MMOL/L — SIGNIFICANT CHANGE UP (ref 3.5–5.3)
PROT SERPL-MCNC: 6.5 G/DL — SIGNIFICANT CHANGE UP (ref 6–8.3)
PROTHROM AB SERPL-ACNC: 12.1 SEC — SIGNIFICANT CHANGE UP (ref 10–12.9)
RBC # BLD: 3.53 M/UL — LOW (ref 3.8–5.2)
RBC # FLD: 17.1 % — HIGH (ref 10.3–14.5)
SODIUM SERPL-SCNC: 144 MMOL/L — SIGNIFICANT CHANGE UP (ref 135–145)
TROPONIN I SERPL-MCNC: 0 NG/ML — LOW (ref 0.02–0.06)
WBC # BLD: 5.77 K/UL — SIGNIFICANT CHANGE UP (ref 3.8–10.5)
WBC # FLD AUTO: 5.77 K/UL — SIGNIFICANT CHANGE UP (ref 3.8–10.5)

## 2019-09-15 PROCEDURE — 85610 PROTHROMBIN TIME: CPT

## 2019-09-15 PROCEDURE — 84484 ASSAY OF TROPONIN QUANT: CPT

## 2019-09-15 PROCEDURE — 93005 ELECTROCARDIOGRAM TRACING: CPT

## 2019-09-15 PROCEDURE — 93010 ELECTROCARDIOGRAM REPORT: CPT

## 2019-09-15 PROCEDURE — 80053 COMPREHEN METABOLIC PANEL: CPT

## 2019-09-15 PROCEDURE — 70450 CT HEAD/BRAIN W/O DYE: CPT | Mod: 26

## 2019-09-15 PROCEDURE — 71045 X-RAY EXAM CHEST 1 VIEW: CPT | Mod: 26

## 2019-09-15 PROCEDURE — 85027 COMPLETE CBC AUTOMATED: CPT

## 2019-09-15 PROCEDURE — 36415 COLL VENOUS BLD VENIPUNCTURE: CPT

## 2019-09-15 PROCEDURE — 71045 X-RAY EXAM CHEST 1 VIEW: CPT

## 2019-09-15 PROCEDURE — 99284 EMERGENCY DEPT VISIT MOD MDM: CPT

## 2019-09-15 PROCEDURE — 85730 THROMBOPLASTIN TIME PARTIAL: CPT

## 2019-09-15 PROCEDURE — 70450 CT HEAD/BRAIN W/O DYE: CPT

## 2019-09-15 PROCEDURE — 99284 EMERGENCY DEPT VISIT MOD MDM: CPT | Mod: 25

## 2019-09-15 RX ORDER — SODIUM CHLORIDE 9 MG/ML
1000 INJECTION INTRAMUSCULAR; INTRAVENOUS; SUBCUTANEOUS ONCE
Refills: 0 | Status: DISCONTINUED | OUTPATIENT
Start: 2019-09-15 | End: 2019-09-19

## 2019-09-15 NOTE — ED ADULT NURSE NOTE - CHPI ED NUR SYMPTOMS NEG
no vomiting/no nausea/no dizziness/no fever/no back pain/no shortness of breath/no congestion/no chills/no chest pain/no diaphoresis

## 2019-09-15 NOTE — ED ADULT NURSE NOTE - CHIEF COMPLAINT QUOTE
patient is from [facility], patient was unresponsive on a chair for awhile, as per pt's family. patient is awake and alert in triage. denies any pain

## 2019-09-15 NOTE — ED ADULT TRIAGE NOTE - CHIEF COMPLAINT QUOTE
patient is from [facility], patient was unresponsive on a chair for awhile, as per pt's family. patient is awake and alert in triage. denies any pain

## 2019-09-15 NOTE — ED PROVIDER NOTE - PATIENT PORTAL LINK FT
You can access the FollowMyHealth Patient Portal offered by [facility] by registering at the following website: [email or web address]. By joining [employer]’s FollowMyHealth portal, you will also be able to view your health information using other applications (apps) compatible with our system.

## 2019-09-15 NOTE — ED CLERICAL - CLERICAL COMMENTS
Called for transport back to [facility].  [facility]ulanz is coming between 2015 and 2045.

## 2019-09-15 NOTE — ED ADULT NURSE NOTE - NSIMPLEMENTINTERV_GEN_ALL_ED
Implemented All Fall with Harm Risk Interventions:  [place] to call system. Call bell, personal items and telephone within reach. Instruct patient to call for assistance. Room bathroom lighting operational. Non-slip footwear when patient is off stretcher. Physically safe environment: no spills, clutter or unnecessary equipment. Stretcher in lowest position, wheels locked, appropriate side rails in place. Provide visual cue, wrist band, yellow gown, etc. Monitor gait and stability. Monitor for mental status changes and reorient to person, place, and time. Review medications for side effects contributing to fall risk. Reinforce activity limits and safety measures with patient and family. Provide visual clues: red socks.

## 2019-09-15 NOTE — ED PROVIDER NOTE - MUSCULOSKELETAL, MLM
Spine appears normal, full range of motion, no muscle or joint tenderness. +Some arthritic changes in the knees.

## 2019-09-15 NOTE — ED ADULT NURSE NOTE - PSH
Cataract  bilateral 2008    [place] filter in place  placed 2012  H/o Ovarian Cyst    Knee Arthroplasty right 8/2009 & left 9/2010

## 2019-09-15 NOTE — ED PROVIDER NOTE - PMH
Anemia Iron Deficiency    Arthritis    CAD (Coronary Artery Disease)    Carpal Tunnel Syndrome    CKD (chronic kidney disease)    Dementia    DVT (deep venous thrombosis)    Dyslipidemia    GI (gastrointestinal bleed)    Glaucoma    H/o Diverticulosis    HTN (Hypertension)    OA (Osteoarthritis)    Trigger finger

## 2019-09-15 NOTE — ED PROVIDER NOTE - PSH
Cataract  bilateral 2008    [place] filter in place  placed 2012  H/o Ovarian Cyst    Knee Arthroplasty right 8/2009 & left 9/2010

## 2019-09-28 NOTE — ED PROVIDER NOTE - MUSCULOSKELETAL, MLM
Patient:   SHAN CAMPOS            MRN: [id number]            FIN: [id number]               Age:   40 years     Sex:  MALE     :  76   Associated Diagnoses:   None   Author:   AIMEE FORREST      Basic Information   Admit information:  PCP Dr. Eladio Jacome .    Source of history:  Self, Medical record.    History limitation:  None.       Chief Complaint   back pain      History of Present Illness             The patient presents with   Mr. Campos is a 39 yo male with no chronic problems who presented for evaluation of severe mid back pain. States he drove to [place] and had his left arm in an abnormally high position for most of the ride due to armrest position in rental car. He had some pain after this so on drive back kept arm in normal position. As part of his normal workout he did some pullups which caused worsening pain. Pain is located in mid back on left side. States he was unable to rotate his torso. He used heat which improved his pain but then also used an e-stim pad which made his pain much worse. Pain progressed to point that he was unable to move or walk so came to ED for evaluation. He was given muscle relaxants and started on medrol dosepak overnight. Today feeling much better though still with some pain; he is ambulating hallway..        Review of Systems   Constitutional:  No fever, No chills.    Respiratory:  No shortness of breath, No cough.    Cardiovascular:  No chest pain.    Gastrointestinal:  No nausea, No vomiting, No diarrhea.    Musculoskeletal:       Back pain: On the left side, In the middle of the back.    All other systems are negative      Health Status   Allergies:    Allergic Reactions (All)  Severity Not Documented  Cats- No reactions were documented.  No Known Medication Allergies   Current medications:   Medications (12) Active  Scheduled: (8)  MethylPREDNISolone 4 mg tab (from Dosepak)  1 tab, Oral, BID [with lunch & dinner]  MethylPREDNISolone 4 mg tab (from  Dosepak)  1 tab, Oral, TID [with meals]  MethylPREDNISolone 4 mg tab (from Dosepak)  2 tab, Oral, Q Bedtime  MethylPREDNISolone 4 mg tab (from Dosepak)  1 tab, Oral, QID [with meals & HS]  MethylPREDNISolone 4 mg tab (from Dosepak)  1 tab, Oral, TID [with breakfast, lunch & HS]  MethylPREDNISolone 4 mg tab (from Dosepak)  1 tab, Oral, BID [with breakfast & HS]  MethylPREDNISolone 4 mg tab (from Dosepak)  1 tab, Oral, Daily [with breakfast]  MethylPREDNISolone 4 mg tab Dosepak 21s  2 tab, Oral, BID [with breakfast & HS]  Continuous: (0)  PRN: (4)  Cyclobenzaprine 10 mg tab  10 mg 1 tab, Oral, Q8H  HYDROcodone-acetaminophen  mg tab  1 tab, Oral, Q6H  HYDROcodone-acetaminophen 5-325 mg tab  1 tab, Oral, Q6H  Morphine PF 2 mg/1 mL inj SDV  2 mg 1 mL, IV Push, Q2H  Home Medications (3) Active  cyclobenzaprine oral 10 mg tablet (Flexeril) 10 mg = 1 tab, PRN, Oral, Q8H  ketorolac 10 mg oral tablet 10 mg = 1 tab, PRN, Oral, Q6H  Medrol Dosepak 2 tab, Oral, BID [with breakfast & HS]         Histories   Past Medical History: Problem List / Past Medical History   No Chronic Problems     Family History:    MOTHER  Diabetes mellitus type 2: negative     Procedure history:    Appendectomy ([id number]).   Social History        Alcohol  Details: Use: None.  Exercise  Details: Excercise: Regularly.  Duration (mins): 60.  Times Per Week: Daily.  Type: Weight lifting.  Home/Environment  Details: Alcohol Abuse in Household: No.  Substance Abuse in Household: No.  Smoker in Household: Yes.  Patient Lives With: Spouse.  Ambulation: Independent.  Bathing: Independent.  Dressing: Independent.  Driving: Independent.  Eating: Independent.  Elimination: Independent.  Grooming: Independent.  Preparing Meal: Independent.  Taking Meds: Independent.  Toileting: Independent.  Transfers: Independent.  Substance Abuse  Details: Use: None.  Tobacco  Details: Smoked/Smokeless Tobacco Last 30 Days: Yes.  Use: Current some day  smoker.  Cultural/Hinduism Practices  Details: Hinduism or Cultural Practices While in Hospital: No.  .        Physical Examination   VS/Measurements     Vitals between:   02-MAR-2017 11:35:58   TO   03-MAR-2017 11:35:58                   LAST RESULT MINIMUM MAXIMUM  Temperature 36.6 36.4 36.6  Heart Rate 63 56 67  Respiratory Rate 16 16 18  NISBP           136 122 136  NIDBP           82 74 86  NIMBP           92 92 103  SpO2                    100 100 100      General:  Alert and oriented, No acute distress.    HENT:  Normocephalic, Oral mucosa is moist.    Neck:  Supple, Non-tender, No lymphadenopathy.    Respiratory:  Lungs are clear to auscultation, Respirations are non-labored, Breath sounds are equal.    Cardiovascular:  Normal rate, Regular rhythm, No murmur, No edema.    Gastrointestinal:  Soft, Non-tender, Non-distended, Normal bowel sounds, No organomegaly.    Musculoskeletal     tenderness to palpation of paraspinous muscles in mid left back.     Integumentary:  Warm, Dry, Pink.    Neurologic:  Alert, Oriented, No focal deficits, Cranial Nerves II-XII are grossly intact.    Psychiatric:  Cooperative, Appropriate mood & affect.       Review / Management   Results review:    No Qualifying Labs are resulted on this patient in the last 24 hours   .    Documentation reviewed:  Outpatient record including home medications.       Impression and Plan   Course:    Mr. Campos is a 39 yo male who presented with severe back spasm    Severe back spasm  --now improved s/p muscle relaxants, medrol dosepak  --can complete medrol dosepak at home  --discharge home with flexeril, toradol prn  --advised easy back stretches, continued mobility at home  --if persistent may benefit from outpatient physical therapy    FEN/Prophy  --no IVFs  --replete lytes per protocol  --general diet  --SCDs, ambulate    Dispo: discharge home.             Electronically Signed On 03/03/2017  11:40  __________________________________________________   [facility]     Spine appears normal, range of motion is not limited, no muscle or joint tenderness

## 2019-11-16 ENCOUNTER — INPATIENT (INPATIENT)
Facility: HOSPITAL | Age: 84
LOS: 3 days | Discharge: OTHER INSTITUTION | DRG: 153 | End: 2019-11-20
Attending: INTERNAL MEDICINE | Admitting: INTERNAL MEDICINE
Payer: MEDICARE

## 2019-11-16 VITALS
HEART RATE: 82 BPM | RESPIRATION RATE: 18 BRPM | SYSTOLIC BLOOD PRESSURE: 136 MMHG | WEIGHT: 134.92 LBS | DIASTOLIC BLOOD PRESSURE: 71 MMHG | HEIGHT: 61 IN | TEMPERATURE: 98 F | OXYGEN SATURATION: 92 %

## 2019-11-16 DIAGNOSIS — Z95.828 PRESENCE OF OTHER VASCULAR IMPLANTS AND GRAFTS: Chronic | ICD-10-CM

## 2019-11-16 DIAGNOSIS — R50.9 FEVER, UNSPECIFIED: ICD-10-CM

## 2019-11-16 LAB
ALBUMIN SERPL ELPH-MCNC: 3.8 G/DL — SIGNIFICANT CHANGE UP (ref 3.3–5)
ALP SERPL-CCNC: 75 U/L — SIGNIFICANT CHANGE UP (ref 40–120)
ALT FLD-CCNC: 18 U/L — SIGNIFICANT CHANGE UP (ref 12–78)
ANION GAP SERPL CALC-SCNC: 7 MMOL/L — SIGNIFICANT CHANGE UP (ref 5–17)
APPEARANCE UR: CLEAR — SIGNIFICANT CHANGE UP
APTT BLD: 35 SEC — SIGNIFICANT CHANGE UP (ref 28.5–37)
AST SERPL-CCNC: 24 U/L — SIGNIFICANT CHANGE UP (ref 15–37)
BASOPHILS # BLD AUTO: 0 K/UL — SIGNIFICANT CHANGE UP (ref 0–0.2)
BASOPHILS NFR BLD AUTO: 0 % — SIGNIFICANT CHANGE UP (ref 0–2)
BILIRUB SERPL-MCNC: 0.3 MG/DL — SIGNIFICANT CHANGE UP (ref 0.2–1.2)
BILIRUB UR-MCNC: NEGATIVE — SIGNIFICANT CHANGE UP
BUN SERPL-MCNC: 53 MG/DL — HIGH (ref 7–23)
CALCIUM SERPL-MCNC: 9.9 MG/DL — SIGNIFICANT CHANGE UP (ref 8.5–10.1)
CHLORIDE SERPL-SCNC: 116 MMOL/L — HIGH (ref 96–108)
CO2 SERPL-SCNC: 22 MMOL/L — SIGNIFICANT CHANGE UP (ref 22–31)
COLOR SPEC: YELLOW — SIGNIFICANT CHANGE UP
CREAT SERPL-MCNC: 2 MG/DL — HIGH (ref 0.5–1.3)
DIFF PNL FLD: ABNORMAL
EOSINOPHIL # BLD AUTO: 0 K/UL — SIGNIFICANT CHANGE UP (ref 0–0.5)
EOSINOPHIL NFR BLD AUTO: 0 % — SIGNIFICANT CHANGE UP (ref 0–6)
GLUCOSE SERPL-MCNC: 125 MG/DL — HIGH (ref 70–99)
GLUCOSE UR QL: NEGATIVE — SIGNIFICANT CHANGE UP
HCT VFR BLD CALC: 36 % — SIGNIFICANT CHANGE UP (ref 34.5–45)
HGB BLD-MCNC: 11.3 G/DL — LOW (ref 11.5–15.5)
INR BLD: 1.18 RATIO — HIGH (ref 0.88–1.16)
KETONES UR-MCNC: NEGATIVE — SIGNIFICANT CHANGE UP
LACTATE SERPL-SCNC: 1.5 MMOL/L — SIGNIFICANT CHANGE UP (ref 0.7–2)
LEUKOCYTE ESTERASE UR-ACNC: ABNORMAL
LYMPHOCYTES # BLD AUTO: 0.44 K/UL — LOW (ref 1–3.3)
LYMPHOCYTES # BLD AUTO: 6 % — LOW (ref 13–44)
MCHC RBC-ENTMCNC: 27.8 PG — SIGNIFICANT CHANGE UP (ref 27–34)
MCHC RBC-ENTMCNC: 31.4 GM/DL — LOW (ref 32–36)
MCV RBC AUTO: 88.5 FL — SIGNIFICANT CHANGE UP (ref 80–100)
MONOCYTES # BLD AUTO: 0.37 K/UL — SIGNIFICANT CHANGE UP (ref 0–0.9)
MONOCYTES NFR BLD AUTO: 5 % — SIGNIFICANT CHANGE UP (ref 2–14)
NEUTROPHILS # BLD AUTO: 6.52 K/UL — SIGNIFICANT CHANGE UP (ref 1.8–7.4)
NEUTROPHILS NFR BLD AUTO: 88 % — HIGH (ref 43–77)
NITRITE UR-MCNC: NEGATIVE — SIGNIFICANT CHANGE UP
NRBC # BLD: SIGNIFICANT CHANGE UP /100 WBCS (ref 0–0)
PH UR: 5 — SIGNIFICANT CHANGE UP (ref 5–8)
PLATELET # BLD AUTO: 222 K/UL — SIGNIFICANT CHANGE UP (ref 150–400)
POTASSIUM SERPL-MCNC: 5 MMOL/L — SIGNIFICANT CHANGE UP (ref 3.5–5.3)
POTASSIUM SERPL-SCNC: 5 MMOL/L — SIGNIFICANT CHANGE UP (ref 3.5–5.3)
PROCALCITONIN SERPL-MCNC: 0.18 NG/ML — HIGH (ref 0–0.04)
PROT SERPL-MCNC: 7.4 G/DL — SIGNIFICANT CHANGE UP (ref 6–8.3)
PROT UR-MCNC: NEGATIVE — SIGNIFICANT CHANGE UP
PROTHROM AB SERPL-ACNC: 13.4 SEC — HIGH (ref 10–12.9)
RBC # BLD: 4.07 M/UL — SIGNIFICANT CHANGE UP (ref 3.8–5.2)
RBC # FLD: 19.4 % — HIGH (ref 10.3–14.5)
SODIUM SERPL-SCNC: 145 MMOL/L — SIGNIFICANT CHANGE UP (ref 135–145)
SP GR SPEC: 1.01 — SIGNIFICANT CHANGE UP (ref 1.01–1.02)
UROBILINOGEN FLD QL: NEGATIVE — SIGNIFICANT CHANGE UP
WBC # BLD: 7.33 K/UL — SIGNIFICANT CHANGE UP (ref 3.8–10.5)
WBC # FLD AUTO: 7.33 K/UL — SIGNIFICANT CHANGE UP (ref 3.8–10.5)

## 2019-11-16 PROCEDURE — 99285 EMERGENCY DEPT VISIT HI MDM: CPT

## 2019-11-16 PROCEDURE — 76775 US EXAM ABDO BACK WALL LIM: CPT | Mod: 26

## 2019-11-16 PROCEDURE — 71045 X-RAY EXAM CHEST 1 VIEW: CPT | Mod: 26

## 2019-11-16 RX ORDER — SODIUM CHLORIDE 9 MG/ML
1000 INJECTION, SOLUTION INTRAVENOUS
Refills: 0 | Status: DISCONTINUED | OUTPATIENT
Start: 2019-11-16 | End: 2019-11-20

## 2019-11-16 RX ORDER — APIXABAN 2.5 MG/1
2.5 TABLET, FILM COATED ORAL
Refills: 0 | Status: DISCONTINUED | OUTPATIENT
Start: 2019-11-16 | End: 2019-11-20

## 2019-11-16 RX ORDER — DONEPEZIL HYDROCHLORIDE 10 MG/1
5 TABLET, FILM COATED ORAL AT BEDTIME
Refills: 0 | Status: DISCONTINUED | OUTPATIENT
Start: 2019-11-16 | End: 2019-11-20

## 2019-11-16 RX ORDER — CALCITRIOL 0.5 UG/1
0.25 CAPSULE ORAL DAILY
Refills: 0 | Status: DISCONTINUED | OUTPATIENT
Start: 2019-11-16 | End: 2019-11-20

## 2019-11-16 RX ORDER — FERROUS SULFATE 325(65) MG
325 TABLET ORAL DAILY
Refills: 0 | Status: DISCONTINUED | OUTPATIENT
Start: 2019-11-16 | End: 2019-11-20

## 2019-11-16 RX ORDER — IBUPROFEN 200 MG
600 TABLET ORAL ONCE
Refills: 0 | Status: COMPLETED | OUTPATIENT
Start: 2019-11-16 | End: 2019-11-16

## 2019-11-16 RX ORDER — ATORVASTATIN CALCIUM 80 MG/1
10 TABLET, FILM COATED ORAL AT BEDTIME
Refills: 0 | Status: DISCONTINUED | OUTPATIENT
Start: 2019-11-16 | End: 2019-11-20

## 2019-11-16 RX ORDER — SODIUM CHLORIDE 9 MG/ML
1000 INJECTION INTRAMUSCULAR; INTRAVENOUS; SUBCUTANEOUS ONCE
Refills: 0 | Status: COMPLETED | OUTPATIENT
Start: 2019-11-16 | End: 2019-11-16

## 2019-11-16 RX ADMIN — Medication 600 MILLIGRAM(S): at 16:35

## 2019-11-16 RX ADMIN — Medication 600 MILLIGRAM(S): at 17:57

## 2019-11-16 RX ADMIN — SODIUM CHLORIDE 50 MILLILITER(S): 9 INJECTION, SOLUTION INTRAVENOUS at 23:35

## 2019-11-16 NOTE — ED PROVIDER NOTE - PHYSICAL EXAMINATION
Gen: demented  Head/eyes: NC/AT, PERRL, EOMI  ENT: airway patent, left TM mild fullness, right TM clear  Neck: supple, no tenderness/meningismus/JVD, Trachea midline  Pulm/lung: coarse breath sounds b/l, normal resp effort, no wheeze/stridor/retractions  CV/heart: RRR, no M/R/G, +2 dist pulses (radial, pedal DP/PT, popliteal)  GI/Abd: soft, NT/ND, +BS, no guarding/rebound tenderness  Musculoskeletal: no edema/erythema/cyanosis, FROM in all extremities, no C/T/L spine ttp  Skin: no rash, no vesicles, no petechaie, no ecchymosis, no swelling  Neuro: demented, moving all 4 extremities

## 2019-11-16 NOTE — PATIENT PROFILE ADULT - FALL HARM RISK
age(85 years old or older)/coagulation(Bleeding disorder R/T clinical cond/anti-coags)/other/bones(Osteoporosis,prev fx,steroid use,metastatic bone ca)

## 2019-11-16 NOTE — ED ADULT NURSE NOTE - CHIEF COMPLAINT QUOTE
[facility] for left [facility] pain. Patient was given Tylenol prior to arrival. Per EMS, also noted with a cough.

## 2019-11-16 NOTE — ED ADULT TRIAGE NOTE - CHIEF COMPLAINT QUOTE
[facility] for left [facility] pain. Patient was given Tylenol prior to arrival. Per EMS, also noted with a cough.

## 2019-11-16 NOTE — ED PROVIDER NOTE - OBJECTIVE STATEMENT
89 yo female hx of dementia, sent from [facility] for temp of 100.4 and left ear pain (but patient currently denies ear pain) and shaking with cough. Was given 500mg of tylenol at 2pm.  Unable to obtain further history from patient.

## 2019-11-16 NOTE — ED ADULT NURSE NOTE - OBJECTIVE STATEMENT
Pt sent from [facility] for fever x 1 day tympanic. Pt p/w cough, fever, chills/rigors. Denies pain.

## 2019-11-16 NOTE — CONSULT NOTE ADULT - ASSESSMENT
SHAHANA REDMAN  1930 DOA 2019 [facility] P [phone] DR DHEERAJ COBB  ALLERGY pncl sulfa         CONTACT    Datr  sel            Initial evaluation/Pulmonary Critical Care consultation requested on  2019  by Dr Cobb   from Dr Estrella   Patient examined chart reviewed    HOSPITAL ADMISSION   PATIENT CAME  FROM (if information available)        PATIENT SHAHANA REDMAN  1930 DOA 2019 [facility] P [phone] DHEERAJ COBB                           PT DESCRIPTION       This section was excerpted from ER md note but was independently verified by me    · Chief Complaint: The patient is a 89y Female complaining of ear pain.  · Unable to Obtain: Dementia  · HPI Objective Statement: 89 yo female hx of dementia, sent from [facility] for temp of 100.4 and left ear pain (but patient currently denies ear pain) and shaking with cough. Was given 500mg of tylenol at 2pm.  Unable to obtain further history from patient.    PAST MEDICAL/SURGICAL/FAMILY/SOCIAL HISTORY:    Tobacco Usage:  · Tobacco Usage Unknown if ever smoked  · Unknown smoker reason Cognitively Impaired    ALLERGIES AND HOME MEDICATIONS:   Allergies:        Allergies:  penicillin: Drug, Unknown  sulfa drugs: Drug Category, Unknown    Home Medications:   * Incomplete Medication History as of 2019 15:30 documented in Structured Notes  · Eliquis 2.5 mg oral tablet: Last Dose Taken:    · donepezil: Last Dose Taken:    · pravastatin 40 mg oral tablet: Last Dose Taken:  , 1 tab(s) orally once a day  · calcitriol 0.25 mcg oral capsule: Last Dose Taken:  , 1 cap(s) orally once a day  · ferrous sulfate 324 mg (65 mg elemental iron) oral tablet: Last Dose Taken:    · amiodarone 100 mg oral tablet: Last Dose Taken:      REVIEW OF SYSTEMS:    Review of Systems:  · UNABLE TO OBTAIN: Dementia    VITAL SIGNS( Pullset):    ,,ED ADULT Flow Sheet:    2019 15:18  · Temp (F): 98.1  · Temp (C) Temp (C): 36.7  · Temp site Temp Site: oral  · Heart Rate Heart Rate (beats/min): 82  · BP Systolic Systolic: 136  · BP Diastolic Diastolic (mm Hg): 71  · Respiration Rate (breaths/min) Respiration Rate (breaths/min): 18  · SpO2 (%) SpO2 (%): 92  · O2 delivery Patient On: room air  · How was the weight captured? Weight Type/Method: stated  · Dosing Weight (KILOGRAMS) Dosing Weight (KILOGRAMS): 61.2  · Dosing Weight  (POUNDS) Dosing Weight (POUNDS): 134.9  · Height type Height Type: stated  · Height (FEET) Height (FEET): 5  · Height (INCHES) Height (INCHES): 1  · Height (CENTIMETERS) Height (CENTIMETERS): 154.94  · BSA (m2): 1.6  · BMI (kG/m2) BMI (kG/m2): 25.5  · Ideal Body Weight(kg) Ideal Body Weight(kg): 48  · Presence of Pain: denies pain/discomfort  · SpO2 (%) SpO2 (%): 92  · O2 delivery Patient On: room air    15:27  · Preferred Language to Address Healthcare Preferred Language to Address Healthcare: English    PHYSICAL EXAM:   · Physical Examination: Gen: demented  Head/eyes: NC/AT, PERRL, EOMI  ENT: airway patent, left TM mild fullness, right TM clear  Neck: supple, no tenderness/meningismus/JVD, Trachea midline  Pulm/lung: coarse breath sounds b/l, normal resp effort, no wheeze/stridor/retractions  CV/heart: RRR, no M/R/G, +2 dist pulses (radial, pedal DP/PT, popliteal)  GI/Abd: soft, NT/ND, +BS, no guarding/rebound tenderness  Musculoskeletal: no edema/erythema/cyanosis, FROM in all extremities, no C/T/L spine ttp  Skin: no rash, no vesicles, no petechaie, no ecchymosis, no swelling  Neuro: demented, moving all 4 extremities              VITALS/LABS         2019 101 89 143/60 98%  2019 W 7.3 Hb 11.3 Plt 222 INR 1.1 Na 145 K 5 CO2 22 Cr 2   2019 ua w 6-10   2019 CXR napd Old r humeral fx      PATIENT WHITE FEROZ  1930 DOA 2019 [facility] P [phone] DR DHEERAJ COBB                           PATIENT DATA   ALLERGY pncl sulfa  ADVANCED DIRECTIVE       WT    61           BMI   25                                                                                                                                         HEAD OF BED ELEVATION Yes          DVT PROPHYLAXIS      apixaban 2.5x2 () a f   STRESS ULCER PROPHYLAXIS  INFECTION PPLX                                                        DYSPHAGIA EVAL                                           DIET dash ()                     ECHO                               EKG  ENZ    GAS EXCH    2019 RA 98%                                                   CXR    2019 CXR napd Old r humeral fx                           CT  Hb   2019 Hb 11            W 2019 W 7.3  PC 2019 pc .18  Cr 2019 Cr 2                               Pt description                            CC    cf home l ear pain cough     er vitals   130/70 82 afeb 92%                    HPI              Pt unable to give much history admitted with coufh earpain fever Pulm consulted 2019     PMH   Dementia     home meds   eliquis 2.5 donepezil pravastat 40 calcitriol .25 amiodarone 100                                 PULMONARY/CRITICAL CARE ASSESSMENT/RECOMMENDATIONS                        PT DATA ASSESSMENT RECOMMENDATION DETAILS         FEVER  EARACHE   2019 W 7.3  2019 ua w 6-10   2019 CXR napd Old r humeral fx  2019 pc .18  Levaquin ()     A fib   Apixaban 2.5 x2 ()     BLAYNE V CKD   2019 Cr 2   2019 Check us renal       TIME SPENT Over 55 minutes aggregate care time spent on encounter; activities included   direct pt care, counseling and/or coordinating care reviewing notes, lab data/ imaging , discussion with multidisciplinary team/ pt /family. Risks, benefits, alternatives  discussed in detail.

## 2019-11-16 NOTE — CONSULT NOTE ADULT - SUBJECTIVE AND OBJECTIVE BOX
CARDIOLOGY CONSULT NOTE    Patient is a 89y Female with a known history of :    HPI:      REVIEW OF SYSTEMS:    CONSTITUTIONAL: No fever, weight loss, or fatigue  EYES: No eye pain, visual disturbances, or discharge  ENMT:  No difficulty hearing, tinnitus, vertigo; No sinus or throat pain  NECK: No pain or stiffness  BREASTS: No pain, masses, or nipple discharge  RESPIRATORY: No cough, wheezing, chills or hemoptysis; No shortness of breath  CARDIOVASCULAR: No chest pain, palpitations, dizziness, or leg swelling  GASTROINTESTINAL: No abdominal or epigastric pain. No nausea, vomiting, or hematemesis; No diarrhea or constipation. No melena or hematochezia.  GENITOURINARY: No dysuria, frequency, hematuria, or incontinence  NEUROLOGICAL: No headaches, memory loss, loss of strength, numbness, or tremors  SKIN: No itching, burning, rashes, or lesions   LYMPH NODES: No enlarged glands  ENDOCRINE: No heat or cold intolerance; No hair loss  MUSCULOSKELETAL: No joint pain or swelling; No muscle, back, or extremity pain  PSYCHIATRIC: No depression, anxiety, mood swings, or difficulty sleeping  HEME/LYMPH: No easy bruising, or bleeding gums  ALLERGY AND IMMUNOLOGIC: No hives or eczema    MEDICATIONS  (STANDING):  apixaban 2.5 milliGRAM(s) Oral two times a day  atorvastatin 10 milliGRAM(s) Oral at bedtime  calcitriol   Capsule 0.25 MICROGram(s) Oral daily  donepezil 5 milliGRAM(s) Oral at bedtime  ferrous    sulfate 325 milliGRAM(s) Oral daily  sodium chloride 0.45%. 1000 milliLiter(s) (50 mL/Hr) IV Continuous <Continuous>    MEDICATIONS  (PRN):      ALLERGIES: penicillin (Unknown)  sulfa drugs (Unknown)      Social History:     FAMILY HISTORY:      PAST MEDICAL & SURGICAL HISTORY:        PHYSICAL EXAMINATION:  -----------------------------  T(C): 37.7 (11-16-19 @ 18:26), Max: 38.3 (11-16-19 @ 16:11)  HR: 89 (11-16-19 @ 18:26) (82 - 89)  BP: 143/63 (11-16-19 @ 18:26) (136/71 - 143/63)  RR: 17 (11-16-19 @ 18:26) (17 - 18)  SpO2: 98% (11-16-19 @ 18:26) (92% - 98%)  Wt(kg): --    Height (cm): 154.94 (11-16 @ 15:18)  Weight (kg): 61.2 (11-16 @ 15:18)  BMI (kg/m2): 25.5 (11-16 @ 15:18)  BSA (m2): 1.6 (11-16 @ 15:18)    Constitutional: well developed, normal appearance, well groomed, well nourished, no deformities and no acute distress.   Eyes: the conjunctiva exhibited no abnormalities and the eyelids demonstrated no xanthelasmas.   HEENT: normal oral mucosa, no oral pallor and no oral cyanosis.   Neck: normal jugular venous A waves present, normal jugular venous V waves present and no jugular venous kelley A waves.   Pulmonary: no respiratory distress, normal respiratory rhythm and effort, no accessory muscle use and lungs were clear to auscultation bilaterally.   Cardiovascular: heart rate and rhythm were normal, normal S1 and S2 and no murmur, gallop, rub, heave or thrill are present.   Abdomen: soft, non-tender, no hepato-splenomegaly and no abdominal mass palpated.   Musculoskeletal: the gait could not be assessed..   Extremities: no clubbing of the fingernails, no localized cyanosis, no petechial hemorrhages and no ischemic changes.   Skin: normal skin color and pigmentation, no rash, no venous stasis, no skin lesions, no skin ulcer and no xanthoma was observed.   Psychiatric: oriented to person, place, and time, the affect was normal, the mood was normal and not feeling anxious.     LABS:   --------  11-16    145  |  116<H>  |  53<H>  ----------------------------<  125<H>  5.0   |  22  |  2.00<H>    Ca    9.9      16 Nov 2019 16:35    TPro  7.4  /  Alb  3.8  /  TBili  0.3  /  DBili  x   /  AST  24  /  ALT  18  /  AlkPhos  75  11-16                         11.3   7.33  )-----------( 222      ( 16 Nov 2019 16:35 )             36.0     PT/INR - ( 16 Nov 2019 16:35 )   PT: 13.4 sec;   INR: 1.18 ratio         PTT - ( 16 Nov 2019 16:35 )  PTT:35.0 sec            RADIOLOGY:  -----------------        ECG:     ECHO CARDIOLOGY CONSULT NOTE    Patient is a 89y Female with a known history of :    HPI:      REVIEW OF SYSTEMS:    CONSTITUTIONAL: No fever, weight loss, or fatigue  EYES: No eye pain, visual disturbances, or discharge  ENMT:  No difficulty hearing, tinnitus, vertigo; No sinus or throat pain  NECK: No pain or stiffness  BREASTS: No pain, masses, or nipple discharge  RESPIRATORY: No cough, wheezing, chills or hemoptysis; No shortness of breath  CARDIOVASCULAR: No chest pain, palpitations, dizziness, or leg swelling  GASTROINTESTINAL: No abdominal or epigastric pain. No nausea, vomiting, or hematemesis; No diarrhea or constipation. No melena or hematochezia.  GENITOURINARY: No dysuria, frequency, hematuria, or incontinence  NEUROLOGICAL: No headaches, memory loss, loss of strength, numbness, or tremors  SKIN: No itching, burning, rashes, or lesions   LYMPH NODES: No enlarged glands  ENDOCRINE: No heat or cold intolerance; No hair loss  MUSCULOSKELETAL: No joint pain or swelling; No muscle, back, or extremity pain  PSYCHIATRIC: No depression, anxiety, mood swings, or difficulty sleeping  HEME/LYMPH: No easy bruising, or bleeding gums  ALLERGY AND IMMUNOLOGIC: No hives or eczema    MEDICATIONS  (STANDING):  apixaban 2.5 milliGRAM(s) Oral two times a day  atorvastatin 10 milliGRAM(s) Oral at bedtime  calcitriol   Capsule 0.25 MICROGram(s) Oral daily  donepezil 5 milliGRAM(s) Oral at bedtime  ferrous    sulfate 325 milliGRAM(s) Oral daily  sodium chloride 0.45%. 1000 milliLiter(s) (50 mL/Hr) IV Continuous <Continuous>    MEDICATIONS  (PRN):      ALLERGIES: penicillin (Unknown)  sulfa drugs (Unknown)      Social History: non smoker    FAMILY HISTORY:      PAST MEDICAL & SURGICAL HISTORY:  s/p bilateral knee replacement         PHYSICAL EXAMINATION:  -----------------------------  T(C): 37.7 (11-16-19 @ 18:26), Max: 38.3 (11-16-19 @ 16:11)  HR: 89 (11-16-19 @ 18:26) (82 - 89)  BP: 143/63 (11-16-19 @ 18:26) (136/71 - 143/63)  RR: 17 (11-16-19 @ 18:26) (17 - 18)  SpO2: 98% (11-16-19 @ 18:26) (92% - 98%)  Wt(kg): --    Height (cm): 154.94 (11-16 @ 15:18)  Weight (kg): 61.2 (11-16 @ 15:18)  BMI (kg/m2): 25.5 (11-16 @ 15:18)  BSA (m2): 1.6 (11-16 @ 15:18)    Constitutional: well developed, normal appearance, well groomed, well nourished, no deformities and no acute distress.   Eyes: the conjunctiva exhibited no abnormalities and the eyelids demonstrated no xanthelasmas.   HEENT: normal oral mucosa, no oral pallor and no oral cyanosis.   Neck: normal jugular venous A waves present, normal jugular venous V waves present and no jugular venous kelley A waves.   Pulmonary: no respiratory distress, normal respiratory rhythm and effort, no accessory muscle use and lungs were clear to auscultation bilaterally.   Cardiovascular: heart rate and rhythm were normal, normal S1 and S2 and no murmur, gallop, rub, heave or thrill are present.   Abdomen: soft, non-tender, no hepato-splenomegaly and no abdominal mass palpated.   Musculoskeletal: the gait could not be assessed..   Extremities: no clubbing of the fingernails, no localized cyanosis, no petechial hemorrhages and no ischemic changes.   Skin: normal skin color and pigmentation, no rash, no venous stasis, no skin lesions, no skin ulcer and no xanthoma was observed.   Psychiatric: oriented to person, place, and time, the affect was normal, the mood was normal and not feeling anxious.     LABS:   --------  11-16    145  |  116<H>  |  53<H>  ----------------------------<  125<H>  5.0   |  22  |  2.00<H>    Ca    9.9      16 Nov 2019 16:35    TPro  7.4  /  Alb  3.8  /  TBili  0.3  /  DBili  x   /  AST  24  /  ALT  18  /  AlkPhos  75  11-16                         11.3   7.33  )-----------( 222      ( 16 Nov 2019 16:35 )             36.0     PT/INR - ( 16 Nov 2019 16:35 )   PT: 13.4 sec;   INR: 1.18 ratio         PTT - ( 16 Nov 2019 16:35 )  PTT:35.0 sec            RADIOLOGY:  -----------------        ECG:     ECHO

## 2019-11-16 NOTE — CONSULT NOTE ADULT - SUBJECTIVE AND OBJECTIVE BOX
FEROZ HARKINS    PLV APER 02    Patient is a 89y old  Female who presents with a chief complaint of cough , fever   r/o pn (2019 19:49)       Allergies    penicillin (Unknown)  sulfa drugs (Unknown)    Intolerances        HPI:      PAST MEDICAL & SURGICAL HISTORY:      FAMILY HISTORY:        MEDICATIONS   apixaban 2.5 milliGRAM(s) Oral two times a day  atorvastatin 10 milliGRAM(s) Oral at bedtime  calcitriol   Capsule 0.25 MICROGram(s) Oral daily  donepezil 5 milliGRAM(s) Oral at bedtime  ferrous    sulfate 325 milliGRAM(s) Oral daily  levoFLOXacin IVPB 250 milliGRAM(s) IV Intermittent every 24 hours  sodium chloride 0.45%. 1000 milliLiter(s) IV Continuous <Continuous>      Vital Signs Last 24 Hrs  T(C): 37.7 (2019 18:26), Max: 38.3 (2019 16:11)  T(F): 99.9 (2019 18:26), Max: 101 (2019 16:11)  HR: 89 (2019 18:26) (82 - 89)  BP: 143/63 (2019 18:26) (136/71 - 143/63)  BP(mean): --  RR: 17 (2019 18:26) (17 - 18)  SpO2: 98% (2019 18:26) (92% - 98%)            LABS:                        11.3   7.33  )-----------( 222      ( 2019 16:35 )             36.0     11-    145  |  116<H>  |  53<H>  ----------------------------<  125<H>  5.0   |  22  |  2.00<H>    Ca    9.9      2019 16:35    TPro  7.4  /  Alb  3.8  /  TBili  0.3  /  DBili  x   /  AST  24  /  ALT  18  /  AlkPhos  75  11-16    PT/INR - ( 2019 16:35 )   PT: 13.4 sec;   INR: 1.18 ratio         PTT - ( 2019 16:35 )  PTT:35.0 sec  Urinalysis Basic - ( 2019 18:02 )    Color: Yellow / Appearance: Clear / S.015 / pH: x  Gluc: x / Ketone: Negative  / Bili: Negative / Urobili: Negative   Blood: x / Protein: Negative / Nitrite: Negative   Leuk Esterase: Small / RBC: 3-5 /HPF / WBC 6-10   Sq Epi: x / Non Sq Epi: Few / Bacteria: Few            WBC:  WBC Count: 7.33 K/uL ( @ 16:35)      MICROBIOLOGY:  RECENT CULTURES:              PT/INR - ( 2019 16:35 )   PT: 13.4 sec;   INR: 1.18 ratio         PTT - ( 2019 16:35 )  PTT:35.0 sec    Sodium:  Sodium, Serum: 145 mmol/L ( @ 16:35)      2.00 mg/dL  @ 16:35      Hemoglobin:  Hemoglobin: 11.3 g/dL ( @ 16:35)      Platelets: Platelet Count - Automated: 222 K/uL ( @ 16:35)      LIVER FUNCTIONS - ( 2019 16:35 )  Alb: 3.8 g/dL / Pro: 7.4 g/dL / ALK PHOS: 75 U/L / ALT: 18 U/L / AST: 24 U/L / GGT: x             Urinalysis Basic - ( 2019 18:02 )    Color: Yellow / Appearance: Clear / S.015 / pH: x  Gluc: x / Ketone: Negative  / Bili: Negative / Urobili: Negative   Blood: x / Protein: Negative / Nitrite: Negative   Leuk Esterase: Small / RBC: 3-5 /HPF / WBC 6-10   Sq Epi: x / Non Sq Epi: Few / Bacteria: Few        RADIOLOGY & ADDITIONAL STUDIES:

## 2019-11-16 NOTE — CONSULT NOTE ADULT - ASSESSMENT
admitted with ear ache ,cough and fever   r/o pn - had levaquin  further antibiotics - as per pulmonary  ashd   atrial fib - on eliquis  echo for lv function  hypertension  dyslipidemia  renal failure - iv fluids   repeat lab  discussed  with daughter at bed side

## 2019-11-17 DIAGNOSIS — E78.5 HYPERLIPIDEMIA, UNSPECIFIED: ICD-10-CM

## 2019-11-17 DIAGNOSIS — R05 COUGH: ICD-10-CM

## 2019-11-17 DIAGNOSIS — I10 ESSENTIAL (PRIMARY) HYPERTENSION: ICD-10-CM

## 2019-11-17 DIAGNOSIS — N19 UNSPECIFIED KIDNEY FAILURE: ICD-10-CM

## 2019-11-17 DIAGNOSIS — I48.91 UNSPECIFIED ATRIAL FIBRILLATION: ICD-10-CM

## 2019-11-17 DIAGNOSIS — J06.9 ACUTE UPPER RESPIRATORY INFECTION, UNSPECIFIED: ICD-10-CM

## 2019-11-17 DIAGNOSIS — H66.90 OTITIS MEDIA, UNSPECIFIED, UNSPECIFIED EAR: ICD-10-CM

## 2019-11-17 DIAGNOSIS — H40.9 UNSPECIFIED GLAUCOMA: ICD-10-CM

## 2019-11-17 DIAGNOSIS — Z29.9 ENCOUNTER FOR PROPHYLACTIC MEASURES, UNSPECIFIED: ICD-10-CM

## 2019-11-17 DIAGNOSIS — I25.10 ATHEROSCLEROTIC HEART DISEASE OF NATIVE CORONARY ARTERY WITHOUT ANGINA PECTORIS: ICD-10-CM

## 2019-11-17 DIAGNOSIS — R50.9 FEVER, UNSPECIFIED: ICD-10-CM

## 2019-11-17 DIAGNOSIS — F03.90 UNSPECIFIED DEMENTIA WITHOUT BEHAVIORAL DISTURBANCE: ICD-10-CM

## 2019-11-17 PROBLEM — M19.90 UNSPECIFIED OSTEOARTHRITIS, UNSPECIFIED SITE: Chronic | Status: ACTIVE | Noted: 2019-09-15

## 2019-11-17 LAB
ANION GAP SERPL CALC-SCNC: 7 MMOL/L — SIGNIFICANT CHANGE UP (ref 5–17)
BUN SERPL-MCNC: 53 MG/DL — HIGH (ref 7–23)
CALCIUM SERPL-MCNC: 9 MG/DL — SIGNIFICANT CHANGE UP (ref 8.5–10.1)
CHLORIDE SERPL-SCNC: 118 MMOL/L — HIGH (ref 96–108)
CO2 SERPL-SCNC: 21 MMOL/L — LOW (ref 22–31)
CREAT SERPL-MCNC: 1.9 MG/DL — HIGH (ref 0.5–1.3)
CULTURE RESULTS: NO GROWTH — SIGNIFICANT CHANGE UP
GLUCOSE SERPL-MCNC: 68 MG/DL — LOW (ref 70–99)
HCT VFR BLD CALC: 29.5 % — LOW (ref 34.5–45)
HCT VFR BLD CALC: 35.5 % — SIGNIFICANT CHANGE UP (ref 34.5–45)
HGB BLD-MCNC: 10.6 G/DL — LOW (ref 11.5–15.5)
HGB BLD-MCNC: 9.3 G/DL — LOW (ref 11.5–15.5)
MCHC RBC-ENTMCNC: 27.9 PG — SIGNIFICANT CHANGE UP (ref 27–34)
MCHC RBC-ENTMCNC: 31.5 GM/DL — LOW (ref 32–36)
MCV RBC AUTO: 88.6 FL — SIGNIFICANT CHANGE UP (ref 80–100)
NRBC # BLD: 0 /100 WBCS — SIGNIFICANT CHANGE UP (ref 0–0)
OB PNL STL: NEGATIVE — SIGNIFICANT CHANGE UP
PLATELET # BLD AUTO: 171 K/UL — SIGNIFICANT CHANGE UP (ref 150–400)
POTASSIUM SERPL-MCNC: 4.7 MMOL/L — SIGNIFICANT CHANGE UP (ref 3.5–5.3)
POTASSIUM SERPL-SCNC: 4.7 MMOL/L — SIGNIFICANT CHANGE UP (ref 3.5–5.3)
RBC # BLD: 3.33 M/UL — LOW (ref 3.8–5.2)
RBC # FLD: 19.2 % — HIGH (ref 10.3–14.5)
SODIUM SERPL-SCNC: 146 MMOL/L — HIGH (ref 135–145)
SPECIMEN SOURCE: SIGNIFICANT CHANGE UP
WBC # BLD: 6.51 K/UL — SIGNIFICANT CHANGE UP (ref 3.8–10.5)
WBC # FLD AUTO: 6.51 K/UL — SIGNIFICANT CHANGE UP (ref 3.8–10.5)

## 2019-11-17 RX ORDER — ACETAMINOPHEN 500 MG
650 TABLET ORAL EVERY 6 HOURS
Refills: 0 | Status: DISCONTINUED | OUTPATIENT
Start: 2019-11-17 | End: 2019-11-20

## 2019-11-17 RX ORDER — PANTOPRAZOLE SODIUM 20 MG/1
40 TABLET, DELAYED RELEASE ORAL
Refills: 0 | Status: DISCONTINUED | OUTPATIENT
Start: 2019-11-17 | End: 2019-11-20

## 2019-11-17 RX ORDER — LACTOBACILLUS ACIDOPHILUS 100MM CELL
1 CAPSULE ORAL EVERY 8 HOURS
Refills: 0 | Status: DISCONTINUED | OUTPATIENT
Start: 2019-11-17 | End: 2019-11-20

## 2019-11-17 RX ORDER — ASCORBIC ACID 60 MG
500 TABLET,CHEWABLE ORAL DAILY
Refills: 0 | Status: DISCONTINUED | OUTPATIENT
Start: 2019-11-17 | End: 2019-11-20

## 2019-11-17 RX ORDER — BIMATOPROST 0.3 MG/ML
1 SOLUTION/ DROPS OPHTHALMIC AT BEDTIME
Refills: 0 | Status: DISCONTINUED | OUTPATIENT
Start: 2019-11-17 | End: 2019-11-20

## 2019-11-17 RX ADMIN — Medication 600 MILLIGRAM(S): at 18:18

## 2019-11-17 RX ADMIN — CALCITRIOL 0.25 MICROGRAM(S): 0.5 CAPSULE ORAL at 11:49

## 2019-11-17 RX ADMIN — SODIUM CHLORIDE 50 MILLILITER(S): 9 INJECTION, SOLUTION INTRAVENOUS at 18:22

## 2019-11-17 RX ADMIN — Medication 1 TABLET(S): at 22:09

## 2019-11-17 RX ADMIN — Medication 1 TABLET(S): at 15:11

## 2019-11-17 RX ADMIN — APIXABAN 2.5 MILLIGRAM(S): 2.5 TABLET, FILM COATED ORAL at 05:25

## 2019-11-17 RX ADMIN — DONEPEZIL HYDROCHLORIDE 5 MILLIGRAM(S): 10 TABLET, FILM COATED ORAL at 22:09

## 2019-11-17 RX ADMIN — APIXABAN 2.5 MILLIGRAM(S): 2.5 TABLET, FILM COATED ORAL at 18:22

## 2019-11-17 RX ADMIN — DONEPEZIL HYDROCHLORIDE 5 MILLIGRAM(S): 10 TABLET, FILM COATED ORAL at 00:07

## 2019-11-17 RX ADMIN — ATORVASTATIN CALCIUM 10 MILLIGRAM(S): 80 TABLET, FILM COATED ORAL at 22:09

## 2019-11-17 RX ADMIN — ATORVASTATIN CALCIUM 10 MILLIGRAM(S): 80 TABLET, FILM COATED ORAL at 00:06

## 2019-11-17 RX ADMIN — BIMATOPROST 1 DROP(S): 0.3 SOLUTION/ DROPS OPHTHALMIC at 22:09

## 2019-11-17 RX ADMIN — Medication 500 MILLIGRAM(S): at 11:49

## 2019-11-17 RX ADMIN — Medication 325 MILLIGRAM(S): at 11:49

## 2019-11-17 NOTE — CONSULT NOTE ADULT - PROBLEM SELECTOR RECOMMENDATION 4
f/u ,obtain ECHO to evaluate LVEF,cardiology consult,continue current managmnet,O2 supply,anticoagulation plan as per cardiology consult

## 2019-11-17 NOTE — CONSULT NOTE ADULT - ASSESSMENT
89 yo female hx of dementia, sent from [facility] for temp of 100.4 and left ear pain (but patient currently denies ear pain) and shaking with cough 89 yo female hx of dementia, sent from [facility] for temp of 100.4 and left ear pain (but patient currently denies ear pain) and shaking with cough now with ckd and aiden

## 2019-11-17 NOTE — H&P ADULT - ASSESSMENT
89 yo female with  hx of dementia ,CAD ,AF ON ELIQUIS , HTN , GERD , OSTEOPOROSIS ,INSOMNIA ,ANEMIA ,gib ,glaucoma ,carpal tunnel syndrome ,hld  sent from [facility] for temp of 100.4 associated with  Left ear pain cough. Was given 500mg of tylenol at 2pm.  Unable to obtain further history from patient She denies l ear ache since arrival to ER , she states her cough resolevd as well but as per aid at bedside patient has wet productive cough .Patient was admitted for tx of URI/sinus infection  and possible bronchitis .Seen by pulm Dr Estrella and cardiologist Dr Grande .Found to be anemic and workup ordered Patient deneid chest pain and SOB Palliative care consult requested ,to discuss advance directives and complete MOLST

## 2019-11-17 NOTE — H&P ADULT - PROBLEM SELECTOR PLAN 3
MUCINEX ,TYLENOL PRN ,ZITHROMYCIN ,SERIAL CBC ,SEPTIC WORKUP ,MONITOR FOR WBC AND TEMPS MUCINEX ,TYLENOL PRN ,LEVAQUIN  ,SERIAL CBC ,SEPTIC WORKUP ,MONITOR FOR WBC AND TEMPS

## 2019-11-17 NOTE — H&P ADULT - NSHPLABSRESULTS_GEN_ALL_CORE
< from: US Renal (11.16.19 @ 22:51) >    TECHNIQUE:   Imaging protocol: Real-time ultrasound of the retroperitoneum with image   documentation. Complete exam focused on the kidneys and bladder.     COMPARISON:   No relevant prior studies available.     FINDINGS:   Limitations: Study limited by the patient&apos;s body habitus.   Right kidney: Right kidney length 7.3 cm. 11 mm cyst midpole. 2.3 cm   probable   parapelvic cyst. No definite hydronephrosis. Mildly increased   echogenicity   right kidney.   Left kidney: Left kidney length 7.0 cm. No mass. No hydronephrosis.   Mildly   increased echogenicity left kidney.   Bladder: Unremarkable.     IMPRESSION:   Mildly echogenic kidneys suggestive of medical renal disease. Correlate   clinically.    < end of copied text >    < from: Xray Chest 1 View AP/PA (11.16.19 @ 17:22) >    EXAM:  XR CHEST AP OR PA 1V                            PROCEDURE DATE:  11/16/2019          INTERPRETATION:  History: Cough and fever    Portable semiupright chest x-ray is performed. There is no study for   comparison    The study is limited by rotation. The heart does not appear enlarged.   There is no focal infiltrate or pleural effusion. There is an old healed   right proximal humeral fracture and osteopenia and degenerative changes   of the spine.    Impression: No active pulmonary disease  Old right humeral fracture      < end of copied text >

## 2019-11-17 NOTE — H&P ADULT - RS GEN PE MLT RESP DETAILS PC
breath sounds equal/normal/diminished breath sounds, L/airway patent/good air movement/clear to auscultation bilaterally/respirations non-labored/diminished breath sounds, R

## 2019-11-17 NOTE — PROGRESS NOTE ADULT - ASSESSMENT
cont rx REVIEW OF SYMPTOMS      Able to give ROS  NO     PHYSICAL EXAM    HEENT Unremarkable PERRLA atraumatic   RESP Fair air entry EXP prolonged    Harsh breath sound Resp distres mild   CARDIAC S1 S2 No S3     NO JVD    ABDOMEN SOFT BS PRESENT NOT DISTENDED No hepatosplenomegaly PEDAL EDEMA present No calf tenderness  NO rash   GENERAL Not TOXIC looking    VITALS/LABS       2019 afeb 80 109/71 17 100  2019  W 6.5 Hb 10.6 Plt 171 Na 146 K 4.7 CO2 21 Cr 1.9     PATIENT WHITE FEROZ  1930 DOA 2019 [facility] P [phone] DR DHEERAJ SILVERIO                           PATIENT DATA   ALLERGY pncl sulfa  ADVANCED DIRECTIVE       WT    61           BMI   25                                                                                                                                         HEAD OF BED ELEVATION Yes          DVT PROPHYLAXIS      apixaban 2.5x2 () a f   STRESS ULCER PROPHYLAXIS protonix 40 ()   INFECTION PPLX                                                        DYSPHAGIA EVAL                                           DIET dash No citrus No nuts No seeds No concentrated K ()                     ECHO                               EKG  ENZ    GAS EXCH    2019 RA 98%                                                   CXR    2019 CXR napd Old r humeral fx                           CT  Hb   -2019 Hb 11-10.6            W -2019 W 7.3-6.5  PC 2019 pc .18  Cr -2019 Cr 2 - 1.9   US RENAL  Medical renal dis   IV       1/2 ns 50 ()               MICROBIO                               ANTIBIOTICS  levaquin 250 ()                                        Pt description                            CC    cf home l ear pain cough     er vitals   130/70 82 afeb 92%                    HPI              Pt unable to give much history admitted with coufh earpain fever Pulm consulted 2019     PMH   Dementia     home meds   eliquis 2.5 donepezil pravastat 40 calcitriol .25 amiodarone 100       PT DATA ASSESSMENT RECOMMENDATION DETAILS         FEVER  EARACHE   2019 W 7.3  2019 ua w 6-10   2019 CXR napd Old r humeral fx  2019 pc .18  Levaquin ()     A fib   Apixaban 2.5 x2 ()     ANEMIA   2019 SOB n     BLAYNE V CKD   2019 Cr 2   2019 Check us renal       ASSESSMENT RECOMMENDATIONS   89 f PMH OBS admitted  with fever cough earache   Pt pc was sl high She is being Rxed empirically for resp tract v middle ear infection with levaquin pending rvp and cultures   Cr elevation is likely chronic  Pt is on apixaban for a fib   Plan is to assess for dc once culture resulted     TIME SPENT Over 25 minutes aggregate care time spent on encounter; activities included   direct pt care, counseling and/or coordinating care reviewing notes, lab data/ imaging , discussion with multidisciplinary team/ pt /family. Risks, benefits, alternatives  discussed in detail.

## 2019-11-17 NOTE — H&P ADULT - HISTORY OF PRESENT ILLNESS
88 yo female with  hx of dementia ,CAD ,AF ON ELIQUIS , HTN , GERD , OSTEOPOROSIS ,INSOMNIA ,ANEMIA ,gib ,glaucoma ,carpal tunnel syndrome ,hld  sent from [facility] for temp of 100.4 associated with  Left ear pain cough. Was given 500mg of tylenol at 2pm.  Unable to obtain further history from patient She denies l ear ache since arrival to ER , she states her cough resolevd as well but as per aid at bedside patient has wet productive cough .Patient was admitted for tx of URI/sinus infection  and possible bronchitis .Seen by pulm Dr Estrella and cardiologist Dr Grande .Found to be anemic and workup ordered Patient deneid chest pain and SOB Palliative care consult requested ,to discuss advance directives and complete MOLST

## 2019-11-17 NOTE — H&P ADULT - NSICDXPASTMEDICALHX_GEN_ALL_CORE_FT
PAST MEDICAL HISTORY:  Anemia Iron Deficiency     Arteriosclerotic heart disease (ASHD)     Arthritis     CAD (Coronary Artery Disease)     Carpal Tunnel Syndrome     CKD (chronic kidney disease)     Dementia     DVT (deep venous thrombosis)     Dyslipidemia     GI (gastrointestinal bleed)     Glaucoma     H/o Diverticulosis     HTN (Hypertension)     OA (Osteoarthritis)     Trigger finger

## 2019-11-17 NOTE — CONSULT NOTE ADULT - SUBJECTIVE AND OBJECTIVE BOX
Patient is a 89y Female whom presented to the hospital with     PAST MEDICAL & SURGICAL HISTORY:      MEDICATIONS  (STANDING):  apixaban 2.5 milliGRAM(s) Oral two times a day  ascorbic acid 500 milliGRAM(s) Oral daily  atorvastatin 10 milliGRAM(s) Oral at bedtime  calcitriol   Capsule 0.25 MICROGram(s) Oral daily  donepezil 5 milliGRAM(s) Oral at bedtime  ferrous    sulfate 325 milliGRAM(s) Oral daily  guaiFENesin  milliGRAM(s) Oral every 12 hours  lactobacillus acidophilus 1 Tablet(s) Oral every 8 hours  levoFLOXacin IVPB 250 milliGRAM(s) IV Intermittent every 24 hours  pantoprazole    Tablet 40 milliGRAM(s) Oral before breakfast  sodium chloride 0.45%. 1000 milliLiter(s) (50 mL/Hr) IV Continuous <Continuous>      Allergies    penicillin (Unknown)  sulfa drugs (Unknown)    Intolerances    Bananas (Other (Mild to Mod))  Potatoes (Other (Mild to Mod))      SOCIAL HISTORY:  Denies ETOh,Smoking,     FAMILY HISTORY:      REVIEW OF SYSTEMS:    CONSTITUTIONAL: No weakness, fevers or chills  EYES/ENT: No visual changes;  no throat pain   NECK: No pain or stiffness  RESPIRATORY: No cough, wheezing, hemoptysis; No shortness of breath  CARDIOVASCULAR: No chest pain or palpitations  GASTROINTESTINAL: No abdominal or epigastric pain. No nausea, vomiting,     No diarrhea or constipation. No melena   GENITOURINARY: No dysuria, frequency or hematuria  NEUROLOGICAL: No numbness or weakness  SKIN: dry      VITAL:  T(C): , Max: 38.3 (19 @ 16:11)  T(F): , Max: 101 (19 @ 16:11)  HR: 80 (19 @ 08:23)  BP: 109/71 (19 @ 08:23)  BP(mean): --  RR: 17 (19 @ 08:23)  SpO2: 100% (19 @ 08:23)  Wt(kg): --    I and O's:     @ 07:01  -   @ 07:00  --------------------------------------------------------  IN: 450 mL / OUT: 200 mL / NET: 250 mL      Height (cm): 154.94 ( @ 15:18)  Weight (kg): 61.2 ( @ 15:18)  BMI (kg/m2): 25.5 ( @ 15:18)  BSA (m2): 1.6 ( @ 15:18)    PHYSICAL EXAM:    Constitutional: NAD  HEENT: conjunctive   clear   Neck:  No JVD  Respiratory: CTAB  Cardiovascular: S1 and S2  Gastrointestinal: BS+, soft, NT/ND  Extremities: No peripheral edema  Neurological: A/O x 3, no focal deficits  Psychiatric: Normal mood, normal affect  : No Lamar  Skin: No rashes  Access: Not applicable    LABS:                        9.3    6.51  )-----------( 171      ( 2019 06:11 )             29.5     11-17    146<H>  |  118<H>  |  53<H>  ----------------------------<  68<L>  4.7   |  21<L>  |  1.90<H>    Ca    9.0      2019 06:11    TPro  7.4  /  Alb  3.8  /  TBili  0.3  /  DBili  x   /  AST  24  /  ALT  18  /  AlkPhos  75  11-16      Urine Studies:  Urinalysis Basic - ( 2019 18:02 )    Color: Yellow / Appearance: Clear / S.015 / pH: x  Gluc: x / Ketone: Negative  / Bili: Negative / Urobili: Negative   Blood: x / Protein: Negative / Nitrite: Negative   Leuk Esterase: Small / RBC: 3-5 /HPF / WBC 6-10   Sq Epi: x / Non Sq Epi: Few / Bacteria: Few            RADIOLOGY & ADDITIONAL STUDIES: Patient is a 89y Female whom presented to the hospital with ckd and aiden     PAST MEDICAL & SURGICAL HISTORY:  htn   ckd     MEDICATIONS  (STANDING):  apixaban 2.5 milliGRAM(s) Oral two times a day  ascorbic acid 500 milliGRAM(s) Oral daily  atorvastatin 10 milliGRAM(s) Oral at bedtime  calcitriol   Capsule 0.25 MICROGram(s) Oral daily  donepezil 5 milliGRAM(s) Oral at bedtime  ferrous    sulfate 325 milliGRAM(s) Oral daily  guaiFENesin  milliGRAM(s) Oral every 12 hours  lactobacillus acidophilus 1 Tablet(s) Oral every 8 hours  levoFLOXacin IVPB 250 milliGRAM(s) IV Intermittent every 24 hours  pantoprazole    Tablet 40 milliGRAM(s) Oral before breakfast  sodium chloride 0.45%. 1000 milliLiter(s) (50 mL/Hr) IV Continuous <Continuous>      Allergies    penicillin (Unknown)  sulfa drugs (Unknown)    Intolerances    Bananas (Other (Mild to Mod))  Potatoes (Other (Mild to Mod))      SOCIAL HISTORY:  Denies ETOh,Smoking,     FAMILY HISTORY:  family history is review no h/o kidney disease in family     REVIEW OF SYSTEMS:    CONSTITUTIONAL: pos  weakness, fevers or chills  RESPIRATORY: No cough, wheezing, hemoptysis; No shortness of breath  CARDIOVASCULAR: No chest pain or palpitations  GASTROINTESTINAL: No abdominal or epigastric pain. No nausea, vomiting,     No diarrhea or constipation. No melena   GENITOURINARY: No dysuria, frequency or hematuria  NEUROLOGICAL: pos  weakness  SKIN: dry      VITAL:  T(C): , Max: 38.3 (19 @ 16:11)  T(F): , Max: 101 (19 @ 16:11)  HR: 80 (19 @ 08:23)  BP: 109/71 (19 @ 08:23)  BP(mean): --  RR: 17 (19 @ 08:23)  SpO2: 100% (19 @ 08:23)  Wt(kg): --    I and O's:     @ 07:01  -   @ 07:00  --------------------------------------------------------  IN: 450 mL / OUT: 200 mL / NET: 250 mL      Height (cm): 154.94 ( @ 15:18)  Weight (kg): 61.2 ( @ 15:18)  BMI (kg/m2): 25.5 ( @ 15:18)  BSA (m2): 1.6 ( @ 15:18)    PHYSICAL EXAM:    Constitutional: NAD  HEENT: conjunctive   clear   Neck:  No JVD  Respiratory: decrease bs b/l   Cardiovascular: S1 and S2  Gastrointestinal: BS+, soft, NT/ND  Extremities: No peripheral edema  Neurological: no focal deficits  Psychiatric: Normal mood, normal affect  : No Lamar  Skin: dry   Access: Not applicable    LABS:                        9.3    6.51  )-----------( 171      ( 2019 06:11 )             29.5     11-17    146<H>  |  118<H>  |  53<H>  ----------------------------<  68<L>  4.7   |  21<L>  |  1.90<H>    Ca    9.0      2019 06:11    TPro  7.4  /  Alb  3.8  /  TBili  0.3  /  DBili  x   /  AST  24  /  ALT  18  /  AlkPhos  75  11-16      Urine Studies:  Urinalysis Basic - ( 2019 18:02 )    Color: Yellow / Appearance: Clear / S.015 / pH: x  Gluc: x / Ketone: Negative  / Bili: Negative / Urobili: Negative   Blood: x / Protein: Negative / Nitrite: Negative   Leuk Esterase: Small / RBC: 3-5 /HPF / WBC 6-10   Sq Epi: x / Non Sq Epi: Few / Bacteria: Few            RADIOLOGY & ADDITIONAL STUDIES:

## 2019-11-17 NOTE — H&P ADULT - PROBLEM/PLAN-4
DISPLAY PLAN FREE TEXT Interpreted by ED physician ANA  Ankle, RIGHT x-ray, 3 views  Trimal fracture, minimally displaced.  No dislocation.    Repeat post-splinting  Interpreted by ED physician ANA  Ankle, RIGHT x-ray, 2 views  Acceptable alignment, splint in place

## 2019-11-17 NOTE — PROGRESS NOTE ADULT - SUBJECTIVE AND OBJECTIVE BOX
FEROZ HARKINS    PLV 2NOR 234 D1    Patient is a 89y old  Female who presents with a chief complaint of cough ,left earache and fever (2019 11:50)       Allergies    penicillin (Unknown)  sulfa drugs (Unknown)    Intolerances    Bananas (Other (Mild to Mod))  Potatoes (Other (Mild to Mod))      HPI:      PAST MEDICAL & SURGICAL HISTORY:      FAMILY HISTORY:        MEDICATIONS   acetaminophen   Tablet .. 650 milliGRAM(s) Oral every 6 hours PRN  apixaban 2.5 milliGRAM(s) Oral two times a day  ascorbic acid 500 milliGRAM(s) Oral daily  atorvastatin 10 milliGRAM(s) Oral at bedtime  calcitriol   Capsule 0.25 MICROGram(s) Oral daily  donepezil 5 milliGRAM(s) Oral at bedtime  ferrous    sulfate 325 milliGRAM(s) Oral daily  guaiFENesin  milliGRAM(s) Oral every 12 hours  lactobacillus acidophilus 1 Tablet(s) Oral every 8 hours  levoFLOXacin IVPB 250 milliGRAM(s) IV Intermittent every 24 hours  pantoprazole    Tablet 40 milliGRAM(s) Oral before breakfast  sodium chloride 0.45%. 1000 milliLiter(s) IV Continuous <Continuous>      Vital Signs Last 24 Hrs  T(C): 36.4 (2019 08:23), Max: 38.3 (2019 16:11)  T(F): 97.5 (2019 08:23), Max: 101 (2019 16:11)  HR: 80 (2019 08:23) (78 - 89)  BP: 109/71 (2019 08:23) (109/71 - 143/83)  BP(mean): --  RR: 17 (2019 08:23) (16 - 18)  SpO2: 100% (2019 08:23) (92% - 100%)      19 @ 07:01  -  19 @ 07:00  --------------------------------------------------------  IN: 450 mL / OUT: 200 mL / NET: 250 mL            LABS:                        9.3    6.51  )-----------( 171      ( 2019 06:11 )             29.5         146<H>  |  118<H>  |  53<H>  ----------------------------<  68<L>  4.7   |  21<L>  |  1.90<H>    Ca    9.0      2019 06:11    TPro  7.4  /  Alb  3.8  /  TBili  0.3  /  DBili  x   /  AST  24  /  ALT  18  /  AlkPhos  75      PT/INR - ( 2019 16:35 )   PT: 13.4 sec;   INR: 1.18 ratio         PTT - ( 2019 16:35 )  PTT:35.0 sec  Urinalysis Basic - ( 2019 18:02 )    Color: Yellow / Appearance: Clear / S.015 / pH: x  Gluc: x / Ketone: Negative  / Bili: Negative / Urobili: Negative   Blood: x / Protein: Negative / Nitrite: Negative   Leuk Esterase: Small / RBC: 3-5 /HPF / WBC 6-10   Sq Epi: x / Non Sq Epi: Few / Bacteria: Few            WBC:  WBC Count: 6.51 K/uL ( @ 06:11)  WBC Count: 7.33 K/uL ( @ 16:35)      MICROBIOLOGY:  RECENT CULTURES:              PT/INR - ( 2019 16:35 )   PT: 13.4 sec;   INR: 1.18 ratio         PTT - ( 2019 16:35 )  PTT:35.0 sec    Sodium:  Sodium, Serum: 146 mmol/L ( @ 06:11)  Sodium, Serum: 145 mmol/L ( @ 16:35)      1.90 mg/dL  @ 06:11  2.00 mg/dL  @ 16:35      Hemoglobin:  Hemoglobin: 9.3 g/dL ( @ 06:11)  Hemoglobin: 11.3 g/dL ( @ 16:35)      Platelets: Platelet Count - Automated: 171 K/uL ( @ 06:11)  Platelet Count - Automated: 222 K/uL ( @ 16:35)      LIVER FUNCTIONS - ( 2019 16:35 )  Alb: 3.8 g/dL / Pro: 7.4 g/dL / ALK PHOS: 75 U/L / ALT: 18 U/L / AST: 24 U/L / GGT: x             Urinalysis Basic - ( 2019 18:02 )    Color: Yellow / Appearance: Clear / S.015 / pH: x  Gluc: x / Ketone: Negative  / Bili: Negative / Urobili: Negative   Blood: x / Protein: Negative / Nitrite: Negative   Leuk Esterase: Small / RBC: 3-5 /HPF / WBC 6-10   Sq Epi: x / Non Sq Epi: Few / Bacteria: Few        RADIOLOGY & ADDITIONAL STUDIES:

## 2019-11-17 NOTE — H&P ADULT - SKIN/BREAST
Patient ID: Pipe Vincent is a 48 y o  male  Assessment/Plan:    No problem-specific Assessment & Plan notes found for this encounter  Diagnoses and all orders for this visit:    Intractable episodic cluster headache         Patient Instructions   Cluster Headaches:  Mallory Cardenas presents for follow-up with regard to his prior diagnosis of cluster headache  He is doing reasonably well, he has not had any significant breakthroughs of his cluster headache in the last 12-18 months however he is having significant additional health issues at this time  In particular he has leukocytosis, thrombocytosis, and what appears to be polycythemic of unclear origin  He is working with his hematologist and oncologist   He is working on quitting smoking   -we did spend a little bit of time in the office today talking about habit modification and different smoking cessation strategies  -he can continue to treat his headaches on an as-needed basis with oral and injectable Imitrex at his current regimen   -[facility] need in order to get him oxygen to be used at a dose of 12 liters/minute if he were to have a breakthrough cluster headache, he should also make sure he is well hydrated at all times and has his medication with him if he is going to undergo therapeutic phlebotomy treatments  -if he does have any breakthrough clusters he should contact our office immediately and we will consider additional possible preventative medications at that point in time  I will plan for him to return to the office to see us in 8 months time but I would be happy to see him sooner if the need should arise  Subjective:    HPI     Mallory Cardenas presents for follow-up with regard to his prior cluster headaches  He reports that he has not had any typical cluster breakthroughs over the course of the last 12-18 months    He is having relatively significant ongoing medical issues including otherwise unexplained thrombocytosis, leukocytosis, and what appears to be polycythemia  There is a current plan for therapeutic phlebotomy  He also reports that he is currently working on quitting smoking and we had a discussion in the office with regard to modifying his habits at home and developing coping strategies other than going for a cigarette  He has been unsuccessful at obtaining oxygen at this point in time  We will contact [facility] pharmacy in order to determine why/what the need  We did review his recent lab work in detail in the office together  We also discussed possible preventative strategies  Did have side effects on verapamil  He reports that he tried Topamax in the past but he is uncertain as to why it was stopped  I would suggest that this would represent a reasonable option but he would like to defer until such time as he would actually have some recurrent breakthrough cluster headaches  I agree that this is a reasonable strategy  He also reports that he is having significant difficulty swallowing, both solids and liquids  He is experiencing what he describes as Asad hemorrhage throat    He is seeing physician for this today  This is caused weight loss and some presyncopal events      Past Medical History:   Diagnosis Date    Allergic rhinitis     Anxiety     Arthritis     Back pain     Chronic obstructive asthma (HCC)     Chronic pain syndrome     Cluster headaches     Depression     Hypertension     Insomnia     Kidney stones     Laryngospasm     Leukocytosis     Migraine     Myocardial infarction (HCC)     Nephrolithiasis     Organic impotence     Pneumonia due to infectious organism 1/16/2019    Seasonal allergies     Sleep apnea     does not use CPAP    Stroke ([address] )     TMJ (temporomandibular joint syndrome)     Wheezing     last assessed 05/19/17       Social History     Social History    Marital status:      Spouse name: N/A    Number of children: N/A    Years of education: N/A     Occupational History    Disability      Social History Main Topics    Smoking status: Current Some Day Smoker     Packs/day: 0 50     Years: 25 00    Smokeless tobacco: Never Used      Comment: using patch    Alcohol use Yes      Comment: twice a month     Drug use: Yes     Frequency: 7 0 times per week     Types: Marijuana      Comment: medical marijuana daily    Sexual activity: Not Asked     Other Topics Concern    None     Social History Narrative    As per [employer]: Denied: History of Current smoker        As per allscripts: Smoking       Family History   Problem Relation Age of Onset    Diabetes Father     Obesity Father     Liver cancer Father     Heart disease Father     Diabetes Brother     Hypertension Brother     Leukemia Mother     Hypertension Mother     Cancer Family          Current Outpatient Prescriptions:     albuterol (VENTOLIN HFA) 90 mcg/act inhaler, Inhale 2 puffs every 6 (six) hours as needed for wheezing, Disp: 18 g, Rfl: 2    aspirin 81 MG tablet, Take 1 tablet by mouth daily, Disp: , Rfl:     diazepam (VALIUM) 5 mg tablet, Take 1 tablet (5 mg total) by mouth every 12 (twelve) hours as needed for anxiety, Disp: 40 tablet, Rfl: 0    fluticasone (FLONASE) 50 mcg/act nasal spray, 2 sprays into each nostril daily as needed for rhinitis or allergies, Disp: 16 g, Rfl: 2    fluticasone-salmeterol (ADVAIR DISKUS) 250-50 mcg/dose inhaler, Inhale 1 puff 2 (two) times a day (Patient taking differently: Inhale 1 puff daily  ), Disp: 1 Inhaler, Rfl: 2    ibuprofen (MOTRIN) 800 mg tablet, TAKE 1 TABLET BY MOUTH THREE TIMES DAILY AS NEEDED, Disp: 90 tablet, Rfl: 0    oxyCODONE (ROXICODONE) 10 MG TABS, Take 1 tablet (10 mg total) by mouth every 6 (six) hours as needed for severe pain Max Daily Amount: 40 mg, Disp: 90 tablet, Rfl: 0    VENTOLIN  (90 Base) MCG/ACT inhaler, USE 2 PUFFS EVERY 6 HOURS AS NEEDED FOR WHEEZING, Disp: 18 g, Rfl: 0    Elastic Bandages & Supports (LUMBAR BACK BRACE/SUPPORT PAD) MISC, by Does not apply route daily (Patient not taking: Reported on 1/23/2019 ), Disp: 1 each, Rfl: 0        The following portions of the patient's history were reviewed and updated as appropriate: allergies, current medications, past family history, past medical history, past social history and problem list          Objective:    Blood pressure 120/88, pulse 68, height 6' 1" (1 854 m), weight 88 5 kg (195 lb)  Physical Exam    Neurological Exam    The time of my evaluation he was awake, alert, and brightly interactive  He was a reasonable historian  There was no dysarthria or aphasia  Cranial nerves 2-12 were symmetrically intact  There was no obvious lateralizing weakness  His gait was antalgic  ROS:    Review of Systems   Constitutional: Positive for appetite change and unexpected weight change (weight loss)  Negative for fever  HENT: Positive for sneezing and trouble swallowing  Negative for hearing loss, tinnitus and voice change  Eyes: Negative  Negative for photophobia and pain  Respiratory: Negative  Negative for shortness of breath  Snoring   Cardiovascular: Negative  Negative for palpitations  Gastrointestinal: Negative  Negative for nausea and vomiting  Endocrine: Negative  Negative for cold intolerance and heat intolerance  Genitourinary: Negative  Negative for dysuria, frequency and urgency  Musculoskeletal: Positive for back pain, joint swelling and myalgias  Negative for neck pain  Pain while walking  Difficulty walking   Skin: Negative  Negative for rash  Neurological: Negative  Negative for dizziness, tremors, seizures, syncope, facial asymmetry, speech difficulty, weakness, light-headedness, numbness and headaches  Hematological: Negative  Does not bruise/bleed easily  Psychiatric/Behavioral: Positive for sleep disturbance (waking up at night and trouble falling asleep)  Negative for confusion and hallucinations  Memory problems     Reviewed ROS as entered by medical assistant  I have spent 25 minutes with Patient  today in which greater than 50% of this time was spent in counseling/coordination of care regarding Risks and benefits of tx options, Intructions for management and Risk factor reductions  details…

## 2019-11-17 NOTE — CONSULT NOTE ADULT - PROBLEM SELECTOR RECOMMENDATION 2
ACUTE RENAL FAILURE: obtained base line bun and cr   Serum creatinine is stable at 1.9   , approximating GFR at  is decreased  ml/min.   There is no progression . No uremic symptoms   [No evidence of anemia].  Fluid status stable.  Will continue to avoid nephrotoxic drugs.  Patient remains asymptomatic.   Continue current therapy.  hold   diuretic.

## 2019-11-17 NOTE — H&P ADULT - ATTENDING COMMENTS
88 yo female with  hx of dementia ,CAD ,AF ON ELIQUIS , HTN , GERD , OSTEOPOROSIS ,INSOMNIA ,ANEMIA ,gib ,glaucoma ,carpal tunnel syndrome ,hld  sent from [facility] for temp of 100.4 associated with  Left ear pain cough. Was given 500mg of tylenol at 2pm.  Unable to obtain further history from patient She denies l ear ache since arrival to ER , she states her cough resolevd as well but as per aid at bedside patient has wet productive cough .Patient was admitted for tx of URI/sinus infection  and possible bronchitis .Seen by pulm Dr Estrella and cardiologist Dr Grande .Found to be anemic and workup ordered Patient deneid chest pain and SOB Palliative care consult requested ,to discuss advance directives and complete MOLST

## 2019-11-17 NOTE — H&P ADULT - PROBLEM SELECTOR PLAN 2
CONTINUE CURRENT ABX , WARM COMPRESSES ,TYLENOL PRN CONTINUE CURRENT ABX , WARM COMPRESSES ,TYLENOL PRN ,ON LEVAQUIN

## 2019-11-17 NOTE — PROGRESS NOTE ADULT - ASSESSMENT
admitted with cough ,fever and earache   bronchitis r/o pn- on levaquin  ashd   paf - on eliquis   hypertension  dyslipidemia  renal insuffiency

## 2019-11-17 NOTE — H&P ADULT - PROBLEM SELECTOR PLAN 4
CARDIOLOGY SUKUMAR NOTED ,CASE D/W  AND  ,AS PER CARDIOLOGIST IMPRESSION AMIODARONE SHOULD BE STOPPED

## 2019-11-17 NOTE — PROGRESS NOTE ADULT - SUBJECTIVE AND OBJECTIVE BOX
Patient is a 89y Female with a known history of :  Prophylactic measure (Z29.9)  Cough in adult (R05)  Fever, unspecified fever cause (R50.9)    HPI:      REVIEW OF SYSTEMS:    CONSTITUTIONAL: No fever, weight loss, or fatigue  EYES: No eye pain, visual disturbances, or discharge  ENMT:  No difficulty hearing, tinnitus, vertigo; No sinus or throat pain  NECK: No pain or stiffness  BREASTS: No pain, masses, or nipple discharge  RESPIRATORY: No cough, wheezing, chills or hemoptysis; No shortness of breath  CARDIOVASCULAR: No chest pain, palpitations, dizziness, or leg swelling  GASTROINTESTINAL: No abdominal or epigastric pain. No nausea, vomiting, or hematemesis; No diarrhea or constipation. No melena or hematochezia.  GENITOURINARY: No dysuria, frequency, hematuria, or incontinence  NEUROLOGICAL: No headaches, memory loss, loss of strength, numbness, or tremors  SKIN: No itching, burning, rashes, or lesions   LYMPH NODES: No enlarged glands  ENDOCRINE: No heat or cold intolerance; No hair loss  MUSCULOSKELETAL: No joint pain or swelling; No muscle, back, or extremity pain  PSYCHIATRIC: No depression, anxiety, mood swings, or difficulty sleeping  HEME/LYMPH: No easy bruising, or bleeding gums  ALLERGY AND IMMUNOLOGIC: No hives or eczema    MEDICATIONS  (STANDING):  apixaban 2.5 milliGRAM(s) Oral two times a day  ascorbic acid 500 milliGRAM(s) Oral daily  atorvastatin 10 milliGRAM(s) Oral at bedtime  calcitriol   Capsule 0.25 MICROGram(s) Oral daily  donepezil 5 milliGRAM(s) Oral at bedtime  ferrous    sulfate 325 milliGRAM(s) Oral daily  guaiFENesin  milliGRAM(s) Oral every 12 hours  lactobacillus acidophilus 1 Tablet(s) Oral every 8 hours  levoFLOXacin IVPB 250 milliGRAM(s) IV Intermittent every 24 hours  pantoprazole    Tablet 40 milliGRAM(s) Oral before breakfast  sodium chloride 0.45%. 1000 milliLiter(s) (50 mL/Hr) IV Continuous <Continuous>    MEDICATIONS  (PRN):  acetaminophen   Tablet .. 650 milliGRAM(s) Oral every 6 hours PRN Temp greater or equal to 38C (100.4F), Mild Pain (1 - 3)      ALLERGIES: penicillin (Unknown)  sulfa drugs (Unknown)      FAMILY HISTORY:      PHYSICAL EXAMINATION:  -----------------------------  T(C): 36.4 (11-17-19 @ 08:23), Max: 38.3 (11-16-19 @ 16:11)  HR: 80 (11-17-19 @ 08:23) (78 - 89)  BP: 109/71 (11-17-19 @ 08:23) (109/71 - 143/83)  RR: 17 (11-17-19 @ 08:23) (16 - 18)  SpO2: 100% (11-17-19 @ 08:23) (92% - 100%)  Wt(kg): --    11-16 @ 07:01  -  11-17 @ 07:00  --------------------------------------------------------  IN:    sodium chloride 0.45%.: 450 mL  Total IN: 450 mL    OUT:    Voided: 200 mL  Total OUT: 200 mL    Total NET: 250 mL        Height (cm): 154.94 (11-16 @ 15:18)  Weight (kg): 61.2 (11-16 @ 15:18)  BMI (kg/m2): 25.5 (11-16 @ 15:18)  BSA (m2): 1.6 (11-16 @ 15:18)    Constitutional: well developed, normal appearance, well groomed, well nourished, no deformities and no acute distress.   Eyes: the conjunctiva exhibited no abnormalities and the eyelids demonstrated no xanthelasmas.   HEENT: normal oral mucosa, no oral pallor and no oral cyanosis.   Neck: normal jugular venous A waves present, normal jugular venous V waves present and no jugular venous kelley A waves.   Pulmonary: no respiratory distress, normal respiratory rhythm and effort, no accessory muscle use and lungs were clear to auscultation bilaterally.   Cardiovascular: heart rate and rhythm were normal, normal S1 and S2 and no murmur, gallop, rub, heave or thrill are present.   Abdomen: soft, non-tender, no hepato-splenomegaly and no abdominal mass palpated.   Musculoskeletal: the gait could not be assessed..   Extremities: no clubbing of the fingernails, no localized cyanosis, no petechial hemorrhages and no ischemic changes.   Skin: normal skin color and pigmentation, no rash, no venous stasis, no skin lesions, no skin ulcer and no xanthoma was observed.   Psychiatric: oriented to person, place, and time, the affect was normal, the mood was normal and not feeling anxious.     LABS:   --------  11-17    146<H>  |  118<H>  |  53<H>  ----------------------------<  68<L>  4.7   |  21<L>  |  1.90<H>    Ca    9.0      17 Nov 2019 06:11    TPro  7.4  /  Alb  3.8  /  TBili  0.3  /  DBili  x   /  AST  24  /  ALT  18  /  AlkPhos  75  11-16                         9.3    6.51  )-----------( 171      ( 17 Nov 2019 06:11 )             29.5     PT/INR - ( 16 Nov 2019 16:35 )   PT: 13.4 sec;   INR: 1.18 ratio         PTT - ( 16 Nov 2019 16:35 )  PTT:35.0 sec  11-16 @ 16:35 BNP: 548 pg/mL            RADIOLOGY:  -----------------        ECG:

## 2019-11-18 LAB
ANION GAP SERPL CALC-SCNC: 8 MMOL/L — SIGNIFICANT CHANGE UP (ref 5–17)
BUN SERPL-MCNC: 45 MG/DL — HIGH (ref 7–23)
CALCIUM SERPL-MCNC: 9.5 MG/DL — SIGNIFICANT CHANGE UP (ref 8.5–10.1)
CHLORIDE SERPL-SCNC: 116 MMOL/L — HIGH (ref 96–108)
CO2 SERPL-SCNC: 20 MMOL/L — LOW (ref 22–31)
CREAT SERPL-MCNC: 1.6 MG/DL — HIGH (ref 0.5–1.3)
GLUCOSE SERPL-MCNC: 79 MG/DL — SIGNIFICANT CHANGE UP (ref 70–99)
HCT VFR BLD CALC: 30.8 % — LOW (ref 34.5–45)
HGB BLD-MCNC: 9.8 G/DL — LOW (ref 11.5–15.5)
HMPV RNA SPEC QL NAA+PROBE: DETECTED
LEGIONELLA AG UR QL: NEGATIVE — SIGNIFICANT CHANGE UP
MCHC RBC-ENTMCNC: 27.7 PG — SIGNIFICANT CHANGE UP (ref 27–34)
MCHC RBC-ENTMCNC: 31.8 GM/DL — LOW (ref 32–36)
MCV RBC AUTO: 87 FL — SIGNIFICANT CHANGE UP (ref 80–100)
NRBC # BLD: 0 /100 WBCS — SIGNIFICANT CHANGE UP (ref 0–0)
PLATELET # BLD AUTO: 172 K/UL — SIGNIFICANT CHANGE UP (ref 150–400)
POTASSIUM SERPL-MCNC: 4.4 MMOL/L — SIGNIFICANT CHANGE UP (ref 3.5–5.3)
POTASSIUM SERPL-SCNC: 4.4 MMOL/L — SIGNIFICANT CHANGE UP (ref 3.5–5.3)
RAPID RVP RESULT: DETECTED
RBC # BLD: 3.54 M/UL — LOW (ref 3.8–5.2)
RBC # FLD: 18.6 % — HIGH (ref 10.3–14.5)
S PNEUM AG SER QL: SIGNIFICANT CHANGE UP
SODIUM SERPL-SCNC: 144 MMOL/L — SIGNIFICANT CHANGE UP (ref 135–145)
WBC # BLD: 5.37 K/UL — SIGNIFICANT CHANGE UP (ref 3.8–10.5)
WBC # FLD AUTO: 5.37 K/UL — SIGNIFICANT CHANGE UP (ref 3.8–10.5)

## 2019-11-18 RX ADMIN — APIXABAN 2.5 MILLIGRAM(S): 2.5 TABLET, FILM COATED ORAL at 18:21

## 2019-11-18 RX ADMIN — Medication 1 TABLET(S): at 21:39

## 2019-11-18 RX ADMIN — Medication 1 TABLET(S): at 05:48

## 2019-11-18 RX ADMIN — DONEPEZIL HYDROCHLORIDE 5 MILLIGRAM(S): 10 TABLET, FILM COATED ORAL at 21:39

## 2019-11-18 RX ADMIN — CALCITRIOL 0.25 MICROGRAM(S): 0.5 CAPSULE ORAL at 13:12

## 2019-11-18 RX ADMIN — PANTOPRAZOLE SODIUM 40 MILLIGRAM(S): 20 TABLET, DELAYED RELEASE ORAL at 05:47

## 2019-11-18 RX ADMIN — Medication 500 MILLIGRAM(S): at 13:12

## 2019-11-18 RX ADMIN — Medication 600 MILLIGRAM(S): at 18:21

## 2019-11-18 RX ADMIN — SODIUM CHLORIDE 50 MILLILITER(S): 9 INJECTION, SOLUTION INTRAVENOUS at 18:21

## 2019-11-18 RX ADMIN — BIMATOPROST 1 DROP(S): 0.3 SOLUTION/ DROPS OPHTHALMIC at 21:39

## 2019-11-18 RX ADMIN — Medication 1 TABLET(S): at 13:12

## 2019-11-18 RX ADMIN — ATORVASTATIN CALCIUM 10 MILLIGRAM(S): 80 TABLET, FILM COATED ORAL at 21:39

## 2019-11-18 RX ADMIN — Medication 325 MILLIGRAM(S): at 13:12

## 2019-11-18 RX ADMIN — APIXABAN 2.5 MILLIGRAM(S): 2.5 TABLET, FILM COATED ORAL at 05:47

## 2019-11-18 RX ADMIN — Medication 600 MILLIGRAM(S): at 05:47

## 2019-11-18 NOTE — PROGRESS NOTE ADULT - PROBLEM SELECTOR PLAN 3
f/u  blood and urine cx,serial lactate levels,monitor vitals closley,ivfs hydration,monitor urine output and renal profile,iv abx.

## 2019-11-18 NOTE — PROGRESS NOTE ADULT - ASSESSMENT
88 yo female hx of dementia, sent from [facility] fellowship for temp of 100.4 and left ear pain (but patient currently denies ear pain) and shaking with cough now with ckd and aiden

## 2019-11-18 NOTE — PROGRESS NOTE ADULT - PROBLEM SELECTOR PLAN 4
f/u ,obtain ECHO to evaluate LVEF,cardiology consult,continue current managmnet,O2 supply,anticoagulation plan as per cardiology consult.

## 2019-11-18 NOTE — PROGRESS NOTE ADULT - SUBJECTIVE AND OBJECTIVE BOX
Sildenafil (also called Viagra, Revatio) is available at [facility] for about $0.50 per pill.   This requires a \"good Rx\" coupon      Anticoagulation  You currently take Coumadin  Eliquis and Xarelto are Tier 3 medications through your insurance   You can look up your plan information to figure out how much these medications would cost you PROGRESS NOTE  Patient is a 89y old  Female who presents with a chief complaint of cough ,left earache and fever (2019 13:40)  Chart and available morning labs /imaging are reviewed electronically , urgent issues addressed . More information  is being added upon completion of rounds , when more information is collected and management discussed with consultants , medical staff and social service/case management on the floor     OVERNIGHT  No new issues reported by medical staff . All above noted Patient is resting in a bed comfortably .Confused ,poor mentation .No distress noted     HPI:  89 yo female with  hx of dementia ,CAD ,AF ON ELIQUIS , HTN , GERD , OSTEOPOROSIS ,INSOMNIA ,ANEMIA ,gib ,glaucoma ,carpal tunnel syndrome ,hld  sent from [facility] for temp of 100.4 associated with  Left ear pain cough. Was given 500mg of tylenol at 2pm.  Unable to obtain further history from patient She denies l ear ache since arrival to ER , she states her cough resolevd as well but as per aid at bedside patient has wet productive cough .Patient was admitted for tx of URI/sinus infection  and possible bronchitis .Seen by pulm Dr Estrella and cardiologist Dr Grande .Found to be anemic and workup ordered Patient deneid chest pain and SOB Palliative care consult requested ,to discuss advance directives and complete MOLST (2019 08:08)    PAST MEDICAL & SURGICAL HISTORY:  Arteriosclerotic heart disease (ASHD)  Arthritis  Dementia  Trigger finger  CKD (chronic kidney disease)  DVT (deep venous thrombosis)  GI (gastrointestinal bleed)  Anemia Iron Deficiency  H/o Diverticulosis  Carpal Tunnel Syndrome  Dyslipidemia  Glaucoma  OA (Osteoarthritis)  HTN (Hypertension)  CAD (Coronary Artery Disease)  [place] filter in place: placed   H/o Ovarian Cyst  Cataract  bilateral   Knee Arthroplasty right 2009 & left 2010      MEDICATIONS  (STANDING):  apixaban 2.5 milliGRAM(s) Oral two times a day  ascorbic acid 500 milliGRAM(s) Oral daily  atorvastatin 10 milliGRAM(s) Oral at bedtime  bimatoprost 0.01% Ophthalmic Solution 1 Drop(s) Both EYES at bedtime  calcitriol   Capsule 0.25 MICROGram(s) Oral daily  donepezil 5 milliGRAM(s) Oral at bedtime  ferrous    sulfate 325 milliGRAM(s) Oral daily  guaiFENesin  milliGRAM(s) Oral every 12 hours  lactobacillus acidophilus 1 Tablet(s) Oral every 8 hours  levoFLOXacin  Tablet 250 milliGRAM(s) Oral every 24 hours  pantoprazole    Tablet 40 milliGRAM(s) Oral before breakfast  sodium chloride 0.45%. 1000 milliLiter(s) (50 mL/Hr) IV Continuous <Continuous>    MEDICATIONS  (PRN):  acetaminophen   Tablet .. 650 milliGRAM(s) Oral every 6 hours PRN Temp greater or equal to 38C (100.4F), Mild Pain (1 - 3)      OBJECTIVE    T(C): 36.8 (19 @ 10:50), Max: 37.2 (19 @ 23:34)  HR: 76 (19 @ 10:50) (58 - 76)  BP: 126/82 (19 @ 10:50) (126/82 - 170/93)  RR: 16 (19 @ 10:50) (16 - 18)  SpO2: 100% (19 @ 10:50) (98% - 100%)  Wt(kg): --  I&O's Summary    2019 07:01  -  2019 07:00  --------------------------------------------------------  IN: 650 mL / OUT: 0 mL / NET: 650 mL          REVIEW OF SYSTEMS:  ROS is unobtainable due to dementia and confusion     PHYSICAL EXAM:  Appearance: NAD. VS past 24 hrs -as above   HEENT:   Moist oral mucosa. Conjunctiva clear b/l.   Neck : supple  Respiratory: Lungs CTAB.  Gastrointestinal:  Soft, nontender. No rebound. No rigidity. BS present	  Cardiovascular: RRR ,S1S2 present  Neurologic: Non-focal. Moving all extremities.  Extremities: No edema. No erythema. No calf tenderness.  Skin: No rashes, No ecchymoses, No cyanosis.	  wounds ,skin lesions-See skin assesment flow sheet   LABS:                        9.8    5.37  )-----------( 172      ( 2019 08:04 )             30.8         144  |  116<H>  |  45<H>  ----------------------------<  79  4.4   |  20<L>  |  1.60<H>    Ca    9.5      2019 08:04    TPro  7.4  /  Alb  3.8  /  TBili  0.3  /  DBili  x   /  AST  24  /  ALT  18  /  AlkPhos  75  11-16    CAPILLARY BLOOD GLUCOSE        PT/INR - ( 2019 16:35 )   PT: 13.4 sec;   INR: 1.18 ratio         PTT - ( 2019 16:35 )  PTT:35.0 sec  Urinalysis Basic - ( 2019 18:02 )    Color: Yellow / Appearance: Clear / S.015 / pH: x  Gluc: x / Ketone: Negative  / Bili: Negative / Urobili: Negative   Blood: x / Protein: Negative / Nitrite: Negative   Leuk Esterase: Small / RBC: 3-5 /HPF / WBC 6-10   Sq Epi: x / Non Sq Epi: Few / Bacteria: Few        Culture - Urine (collected 2019 23:25)  Source: .Urine Catheterized  Final Report (2019 19:06):    No growth    Culture - Blood (collected 2019 20:06)  Source: .Blood Blood  Preliminary Report (2019 21:01):    No growth to date.    Culture - Blood (collected 2019 20:06)  Source: .Blood Blood  Preliminary Report (2019 21:01):    No growth to date.      RADIOLOGY & ADDITIONAL TESTS:< from: US Renal (19 @ 22:51) >  EXAM:  US KIDNEY(S)                            PROCEDURE DATE:  2019          INTERPRETATION:  [facility] RADIOLOGIST PRELIMINARY REPORT    PROCEDURE INFORMATION:   Exam: US Retroperitoneal Complete, Kidneys and Bladder.   Exam date and time: 2019 10:07 PM   Clinical history: 89 years old, female; Fever     TECHNIQUE:   Imaging protocol: Real-time ultrasound of the retroperitoneum with image   documentation. Complete exam focused on the kidneys and bladder.     COMPARISON:   No relevant prior studies available.     FINDINGS:   Limitations: Study limited by the patient&apos;s body habitus.   Right kidney: Right kidney length 7.3 cm. 11 mm cyst midpole. 2.3 cm   probable   parapelvic cyst. No definite hydronephrosis. Mildly increased   echogenicity   right kidney.   Left kidney: Left kidney length 7.0 cm. No mass. No hydronephrosis.   Mildly   increased echogenicity left kidney.   Bladder: Unremarkable.     IMPRESSION:   Mildly echogenic kidneys suggestive of medical renal disease. Correlate   clinically.    Agree with above report    < end of copied text >     reviewed elctronically  ASSESSMENT/PLAN:

## 2019-11-18 NOTE — DIETITIAN INITIAL EVALUATION ADULT. - OTHER INFO
Pt eating breakfast well with assistance from nursing student.Per EMR, consuming 75% of meals. BM 11/16. Pt admit with upper resp symptoms, cough. States she has a good appetite. Hx dementia, from [facility]; their info states 5'2", 114#, appears more accurate than ER admit ht/wt. No pressure injuries. Hx heperkalemia, avoids hi K+ foods, diet Rx noted for low K+ diet. NFPE does not reveal any significant  fat loss, mild/moderate temporal muscle wasting only.

## 2019-11-18 NOTE — GOALS OF CARE CONVERSATION - ADVANCED CARE PLANNING - CONVERSATION DETAILS
Received referral for advanced directives and spoke with daughter Donna MAGANA.She will provide a copy for the chart, she is not interested in a DNR at this time.Will follow up tomorrow.

## 2019-11-18 NOTE — PHARMACOTHERAPY INTERVENTION NOTE - COMMENTS
Patient on Eliquis for AFib.   Patient's eGFR <30  DOACs not recommended in this patient population.   Discussed with MD (Christiane).  MD aware and would like to continue.
Patient on IV levofloxacin for PNA.   Discussed with MD ( NUSRAT) as patient's rvp positive.   Recommended to discontinue ABX.   recommendation accepted and order discontinued.

## 2019-11-18 NOTE — PROGRESS NOTE ADULT - ASSESSMENT
cont rx REVIEW OF SYMPTOMS      Able to give ROS  NO     PHYSICAL EXAM    HEENT Unremarkable PERRLA atraumatic   RESP Fair air entry EXP prolonged    Harsh breath sound Resp distres mild   CARDIAC S1 S2 No S3     NO JVD    ABDOMEN SOFT BS PRESENT NOT DISTENDED No hepatosplenomegaly PEDAL EDEMA present No calf tenderness  NO rash   GENERAL Not TOXIC looking    VITALS/LABS       2019 afeb 76 120/80 16 100%   2019 W 5.3 Hb 9.8 Plt 172 Na 144 K 4.4 CO2 20 Cr 1.6     PATIENT WHITE FEROZ  1930 DOA 2019 [facility] [phone] DHEERAJ SILVERIO                           PATIENT DATA   ALLERGY pncl sulfa  ADVANCED DIRECTIVE       WT    61           BMI   25                                                                                                                                         HEAD OF BED ELEVATION Yes          DVT PROPHYLAXIS      apixaban 2.5x2 () a f   STRESS ULCER PROPHYLAXIS protonix 40 ()   INFECTION PPLX                                                        DYSPHAGIA EVAL                                           DIET dash No citrus No nuts No seeds No concentrated K ()                     ECHO                               EKG  ENZ    GAS EXCH    2019 RA 98%                                                   CXR    2019 CXR napd Old r humeral fx                           CT  Hb   --2019 Hb 11-10.6-9.8            W --2019 W 7.3-6.5-5.3  PC 2019 pc .18  Cr --2019 Cr 2 - 1.9-1.6   US RENAL  Medical renal dis   IV       1/2 ns 50 ()               MICROBIO    hmpv  urine culture n  blod culture n  pc .18                             ANTIBIOTICS  levaquin 250 ()              ASSESSMENT RECOMMENDATIONS   89 f PMH OBS admitted  with fever cough earache   Pt pc was sl high CXR was napd blod and urine cultures were n She is being Rxed empirically for resp tract v middle ear infection with levaquin  PC was .18 hmpv came positive     Cr elevation is likely chronic  Pt is on apixaban for a fib   Plan is to change to po levaquin and dc planning       TIME SPENT Over 25 minutes aggregate care time spent on encounter; activities included   direct pt care, counseling and/or coordinating care reviewing notes, lab data/ imaging , discussion with multidisciplinary team/ pt /family. Risks, benefits, alternatives  discussed in detail.

## 2019-11-18 NOTE — PROGRESS NOTE ADULT - ATTENDING COMMENTS
89 yo female with  hx of dementia ,CAD ,AF ON ELIQUIS , HTN , GERD , OSTEOPOROSIS ,INSOMNIA ,ANEMIA ,gib ,glaucoma ,carpal tunnel syndrome ,hld  sent from [facility] for temp of 100.4 associated with  Left ear pain cough. Was given 500mg of tylenol at 2pm.  Unable to obtain further history from patient She denies l ear ache since arrival to ER , she states her cough resolevd as well but as per aid at bedside patient has wet productive cough .Patient was admitted for tx of URI/sinus infection  and possible bronchitis .Seen by pulm Dr Estrella and cardiologist Dr Grande .Found to be anemic and workup ordered Patient deneid chest pain and SOB Palliative care consult requested ,to discuss advance directives and complete MOLST

## 2019-11-18 NOTE — PROGRESS NOTE ADULT - ASSESSMENT
88 yo female with  hx of dementia ,CAD ,AF ON ELIQUIS , HTN , GERD , OSTEOPOROSIS ,INSOMNIA ,ANEMIA ,gib ,glaucoma ,carpal tunnel syndrome ,hld  sent from [facility] for temp of 100.4 associated with  Left ear pain cough. Was given 500mg of tylenol at 2pm.  Unable to obtain further history from patient She denies l ear ache since arrival to ER , she states her cough resolevd as well but as per aid at bedside patient has wet productive cough .Patient was admitted for tx of URI/sinus infection  and possible bronchitis .Seen by pulm Dr Estrella and cardiologist Dr Grande .Found to be anemic and workup ordered Patient deneid chest pain and SOB Palliative care consult requested ,to discuss advance directives and complete MOLST

## 2019-11-18 NOTE — PROGRESS NOTE ADULT - SUBJECTIVE AND OBJECTIVE BOX
Patient is a 89y Female whom presented to the hospital with ckd and aiden   pt seen andexamined nad   PAST MEDICAL & SURGICAL HISTORY:  htn   ckd     MEDICATIONS  (STANDING):  apixaban 2.5 milliGRAM(s) Oral two times a day  ascorbic acid 500 milliGRAM(s) Oral daily  atorvastatin 10 milliGRAM(s) Oral at bedtime  calcitriol   Capsule 0.25 MICROGram(s) Oral daily  donepezil 5 milliGRAM(s) Oral at bedtime  ferrous    sulfate 325 milliGRAM(s) Oral daily  guaiFENesin  milliGRAM(s) Oral every 12 hours  lactobacillus acidophilus 1 Tablet(s) Oral every 8 hours  levoFLOXacin IVPB 250 milliGRAM(s) IV Intermittent every 24 hours  pantoprazole    Tablet 40 milliGRAM(s) Oral before breakfast  sodium chloride 0.45%. 1000 milliLiter(s) (50 mL/Hr) IV Continuous <Continuous>      Allergies    penicillin (Unknown)  sulfa drugs (Unknown)    Intolerances    Bananas (Other (Mild to Mod))  Potatoes (Other (Mild to Mod))      SOCIAL HISTORY:  Denies ETOh,Smoking,     FAMILY HISTORY:  family history is review no h/o kidney disease in family     REVIEW OF SYSTEMS:    CONSTITUTIONAL: pos  weakness, fevers or chills  RESPIRATORY: No cough, wheezing, hemoptysis; No shortness of breath  CARDIOVASCULAR: No chest pain or palpitations  GASTROINTESTINAL: No abdominal or epigastric pain. No nausea, vomiting,     No diarrhea or constipation. No melena   GENITOURINARY: No dysuria, frequency or hematuria  NEUROLOGICAL: pos  weakness  SKIN: dry                          9.8    5.37  )-----------( 172      ( 2019 08:04 )             30.8       CBC Full  -  ( 2019 08:04 )  WBC Count : 5.37 K/uL  RBC Count : 3.54 M/uL  Hemoglobin : 9.8 g/dL  Hematocrit : 30.8 %  Platelet Count - Automated : 172 K/uL  Mean Cell Volume : 87.0 fl  Mean Cell Hemoglobin : 27.7 pg  Mean Cell Hemoglobin Concentration : 31.8 gm/dL  Auto Neutrophil # : x  Auto Lymphocyte # : x  Auto Monocyte # : x  Auto Eosinophil # : x  Auto Basophil # : x  Auto Neutrophil % : x  Auto Lymphocyte % : x  Auto Monocyte % : x  Auto Eosinophil % : x  Auto Basophil % : x      11-18    144  |  116<H>  |  45<H>  ----------------------------<  79  4.4   |  20<L>  |  1.60<H>    Ca    9.5      2019 08:04    TPro  7.4  /  Alb  3.8  /  TBili  0.3  /  DBili  x   /  AST  24  /  ALT  18  /  AlkPhos  75  11-16      CAPILLARY BLOOD GLUCOSE          Vital Signs Last 24 Hrs  T(C): 36.8 (2019 10:50), Max: 37.2 (2019 23:34)  T(F): 98.3 (2019 10:50), Max: 99 (2019 23:34)  HR: 76 (2019 10:50) (58 - 76)  BP: 126/82 (2019 10:50) (126/82 - 170/93)  BP(mean): --  RR: 16 (2019 10:50) (16 - 18)  SpO2: 100% (2019 10:50) (98% - 100%)    Urinalysis Basic - ( 2019 18:02 )    Color: Yellow / Appearance: Clear / S.015 / pH: x  Gluc: x / Ketone: Negative  / Bili: Negative / Urobili: Negative   Blood: x / Protein: Negative / Nitrite: Negative   Leuk Esterase: Small / RBC: 3-5 /HPF / WBC 6-10   Sq Epi: x / Non Sq Epi: Few / Bacteria: Few        PT/INR - ( 2019 16:35 )   PT: 13.4 sec;   INR: 1.18 ratio         PTT - ( 2019 16:35 )  PTT:35.0 sec  PHYSICAL EXAM:    Constitutional: NAD  HEENT: conjunctive   clear   Neck:  No JVD  Respiratory: decrease bs b/l   Cardiovascular: S1 and S2  Gastrointestinal: BS+, soft, NT/ND  Extremities: No peripheral edema  Neurological: no focal deficits  Psychiatric: Normal mood, normal affect  : No Lamar  Skin: dry   Access: Not applicable

## 2019-11-18 NOTE — PROGRESS NOTE ADULT - SUBJECTIVE AND OBJECTIVE BOX
FEROZ HARKINS    PLV 2NOR 234 D1    Patient is a 89y old  Female who presents with a chief complaint of cough ,left earache and fever (2019 08:44)       Allergies    PCN, Sulfa (Urticaria)  penicillin (Unknown)  sulfa drugs (Unknown)    Intolerances    Bananas (Other (Mild to Mod))  Potatoes (Other (Mild to Mod))      HPI:  89 yo female with  hx of dementia ,CAD ,AF ON ELIQUIS , HTN , GERD , OSTEOPOROSIS ,INSOMNIA ,ANEMIA ,gib ,glaucoma ,carpal tunnel syndrome ,hld  sent from [facility] for temp of 100.4 associated with  Left ear pain cough. Was given 500mg of tylenol at 2pm.  Unable to obtain further history from patient She denies l ear ache since arrival to ER , she states her cough resolevd as well but as per aid at bedside patient has wet productive cough .Patient was admitted for tx of URI/sinus infection  and possible bronchitis .Seen by pulm Dr Estrella and cardiologist Dr Grande .Found to be anemic and workup ordered Patient deneid chest pain and SOB Palliative care consult requested ,to discuss advance directives and complete MOLST (2019 08:08)      PAST MEDICAL & SURGICAL HISTORY:  Arteriosclerotic heart disease (ASHD)  Arthritis  Dementia  Trigger finger  CKD (chronic kidney disease)  DVT (deep venous thrombosis)  GI (gastrointestinal bleed)  Anemia Iron Deficiency  H/o Diverticulosis  Carpal Tunnel Syndrome  Dyslipidemia  Glaucoma  OA (Osteoarthritis)  HTN (Hypertension)  CAD (Coronary Artery Disease)  [place] filter in place: placed   H/o Ovarian Cyst  Cataract  bilateral   Knee Arthroplasty right 2009 & left 2010      FAMILY HISTORY:  No pertinent family history in first degree relatives        MEDICATIONS   acetaminophen   Tablet .. 650 milliGRAM(s) Oral every 6 hours PRN  apixaban 2.5 milliGRAM(s) Oral two times a day  ascorbic acid 500 milliGRAM(s) Oral daily  atorvastatin 10 milliGRAM(s) Oral at bedtime  bimatoprost 0.01% Ophthalmic Solution 1 Drop(s) Both EYES at bedtime  calcitriol   Capsule 0.25 MICROGram(s) Oral daily  donepezil 5 milliGRAM(s) Oral at bedtime  ferrous    sulfate 325 milliGRAM(s) Oral daily  guaiFENesin  milliGRAM(s) Oral every 12 hours  lactobacillus acidophilus 1 Tablet(s) Oral every 8 hours  levoFLOXacin IVPB 250 milliGRAM(s) IV Intermittent every 24 hours  pantoprazole    Tablet 40 milliGRAM(s) Oral before breakfast  sodium chloride 0.45%. 1000 milliLiter(s) IV Continuous <Continuous>      Vital Signs Last 24 Hrs  T(C): 36.8 (2019 10:50), Max: 37.2 (2019 23:34)  T(F): 98.3 (2019 10:50), Max: 99 (2019 23:34)  HR: 76 (2019 10:50) (58 - 76)  BP: 126/82 (2019 10:50) (126/82 - 170/93)  BP(mean): --  RR: 16 (2019 10:50) (16 - 18)  SpO2: 100% (2019 10:50) (98% - 100%)      19 @ 07:01  -  19 @ 07:00  --------------------------------------------------------  IN: 650 mL / OUT: 0 mL / NET: 650 mL            LABS:                        9.8    5.37  )-----------( 172      ( 2019 08:04 )             30.8         144  |  116<H>  |  45<H>  ----------------------------<  79  4.4   |  20<L>  |  1.60<H>    Ca    9.5      2019 08:04    TPro  7.4  /  Alb  3.8  /  TBili  0.3  /  DBili  x   /  AST  24  /  ALT  18  /  AlkPhos  75  11-16    PT/INR - ( 2019 16:35 )   PT: 13.4 sec;   INR: 1.18 ratio         PTT - ( 2019 16:35 )  PTT:35.0 sec  Urinalysis Basic - ( 2019 18:02 )    Color: Yellow / Appearance: Clear / S.015 / pH: x  Gluc: x / Ketone: Negative  / Bili: Negative / Urobili: Negative   Blood: x / Protein: Negative / Nitrite: Negative   Leuk Esterase: Small / RBC: 3-5 /HPF / WBC 6-10   Sq Epi: x / Non Sq Epi: Few / Bacteria: Few            WBC:  WBC Count: 5.37 K/uL ( @ 08:04)  WBC Count: 6.51 K/uL ( @ 06:11)  WBC Count: 7.33 K/uL ( @ 16:35)      MICROBIOLOGY:  RECENT CULTURES:   .Urine Catheterized XXXX XXXX   No growth     .Blood Blood XXXX XXXX   No growth to date.                PT/INR - ( 2019 16:35 )   PT: 13.4 sec;   INR: 1.18 ratio         PTT - ( 2019 16:35 )  PTT:35.0 sec    Sodium:  Sodium, Serum: 144 mmol/L ( @ 08:04)  Sodium, Serum: 146 mmol/L ( @ 06:11)  Sodium, Serum: 145 mmol/L ( @ 16:35)      1.60 mg/dL  @ 08:04  1.90 mg/dL  @ 06:11  2.00 mg/dL  @ 16:35      Hemoglobin:  Hemoglobin: 9.8 g/dL ( @ 08:04)  Hemoglobin: 10.6 g/dL ( @ 13:18)  Hemoglobin: 9.3 g/dL ( @ 06:11)  Hemoglobin: 11.3 g/dL ( @ 16:35)      Platelets: Platelet Count - Automated: 172 K/uL ( @ 08:04)  Platelet Count - Automated: 171 K/uL ( @ 06:11)  Platelet Count - Automated: 222 K/uL ( @ 16:35)      LIVER FUNCTIONS - ( 2019 16:35 )  Alb: 3.8 g/dL / Pro: 7.4 g/dL / ALK PHOS: 75 U/L / ALT: 18 U/L / AST: 24 U/L / GGT: x             Urinalysis Basic - ( 2019 18:02 )    Color: Yellow / Appearance: Clear / S.015 / pH: x  Gluc: x / Ketone: Negative  / Bili: Negative / Urobili: Negative   Blood: x / Protein: Negative / Nitrite: Negative   Leuk Esterase: Small / RBC: 3-5 /HPF / WBC 6-10   Sq Epi: x / Non Sq Epi: Few / Bacteria: Few        RADIOLOGY & ADDITIONAL STUDIES:

## 2019-11-18 NOTE — PROGRESS NOTE ADULT - SUBJECTIVE AND OBJECTIVE BOX
Patient is a 89y Female with a known history of :  Dementia (F03.90)  Glaucoma (H40.9)  HTN (hypertension) (I10)  Afib (I48.91)  URI (upper respiratory infection) (J06.9)  Otitis media, acute (H66.90)  Hypertension, unspecified type (I10)  Renal failure (N19)  Dyslipidemia (E78.5)  Hypertension (I10)  Atrial fibrillation (I48.91)  ASHD (arteriosclerotic heart disease) (I25.10)  Prophylactic measure (Z29.9)  Cough in adult (R05)  Fever, unspecified fever cause (R50.9)    HPI:  88 yo female with  hx of dementia ,CAD ,AF ON ELIQUIS , HTN , GERD , OSTEOPOROSIS ,INSOMNIA ,ANEMIA ,gib ,glaucoma ,carpal tunnel syndrome ,hld  sent from [facility] for temp of 100.4 associated with  Left ear pain cough. Was given 500mg of tylenol at 2pm.  Unable to obtain further history from patient She denies l ear ache since arrival to ER , she states her cough resolevd as well but as per aid at bedside patient has wet productive cough .Patient was admitted for tx of URI/sinus infection  and possible bronchitis .Seen by pulm Dr Estrella and cardiologist Dr Grande .Found to be anemic and workup ordered Patient deneid chest pain and SOB Palliative care consult requested ,to discuss advance directives and complete MOLST (17 Nov 2019 08:08)      REVIEW OF SYSTEMS:    CONSTITUTIONAL: No fever, weight loss, or fatigue  EYES: No eye pain, visual disturbances, or discharge  ENMT:  No difficulty hearing, tinnitus, vertigo; No sinus or throat pain  NECK: No pain or stiffness  BREASTS: No pain, masses, or nipple discharge  RESPIRATORY: No cough, wheezing, chills or hemoptysis; No shortness of breath  CARDIOVASCULAR: No chest pain, palpitations, dizziness, or leg swelling  GASTROINTESTINAL: No abdominal or epigastric pain. No nausea, vomiting, or hematemesis; No diarrhea or constipation. No melena or hematochezia.  GENITOURINARY: No dysuria, frequency, hematuria, or incontinence  NEUROLOGICAL: No headaches, memory loss, loss of strength, numbness, or tremors  SKIN: No itching, burning, rashes, or lesions   LYMPH NODES: No enlarged glands  ENDOCRINE: No heat or cold intolerance; No hair loss  MUSCULOSKELETAL: No joint pain or swelling; No muscle, back, or extremity pain  PSYCHIATRIC: No depression, anxiety, mood swings, or difficulty sleeping  HEME/LYMPH: No easy bruising, or bleeding gums  ALLERGY AND IMMUNOLOGIC: No hives or eczema    MEDICATIONS  (STANDING):  apixaban 2.5 milliGRAM(s) Oral two times a day  ascorbic acid 500 milliGRAM(s) Oral daily  atorvastatin 10 milliGRAM(s) Oral at bedtime  bimatoprost 0.01% Ophthalmic Solution 1 Drop(s) Both EYES at bedtime  calcitriol   Capsule 0.25 MICROGram(s) Oral daily  donepezil 5 milliGRAM(s) Oral at bedtime  ferrous    sulfate 325 milliGRAM(s) Oral daily  guaiFENesin  milliGRAM(s) Oral every 12 hours  lactobacillus acidophilus 1 Tablet(s) Oral every 8 hours  levoFLOXacin IVPB 250 milliGRAM(s) IV Intermittent every 24 hours  pantoprazole    Tablet 40 milliGRAM(s) Oral before breakfast  sodium chloride 0.45%. 1000 milliLiter(s) (50 mL/Hr) IV Continuous <Continuous>    MEDICATIONS  (PRN):  acetaminophen   Tablet .. 650 milliGRAM(s) Oral every 6 hours PRN Temp greater or equal to 38C (100.4F), Mild Pain (1 - 3)      ALLERGIES: PCN, Sulfa (Urticaria)  penicillin (Unknown)  sulfa drugs (Unknown)      FAMILY HISTORY:  No pertinent family history in first degree relatives      PHYSICAL EXAMINATION:  -----------------------------  T(C): 36.9 (11-18-19 @ 08:15), Max: 37.2 (11-17-19 @ 23:34)  HR: 72 (11-18-19 @ 08:15) (58 - 72)  BP: 170/93 (11-18-19 @ 08:15) (143/81 - 170/93)  RR: 18 (11-18-19 @ 08:15) (18 - 18)  SpO2: 99% (11-18-19 @ 08:15) (98% - 100%)  Wt(kg): --    11-17 @ 07:01  -  11-18 @ 07:00  --------------------------------------------------------  IN:    sodium chloride 0.45%.: 600 mL    Solution: 50 mL  Total IN: 650 mL    OUT:  Total OUT: 0 mL    Total NET: 650 mL            Constitutional: well developed, normal appearance, well groomed, well nourished, no deformities and no acute distress.   Eyes: the conjunctiva exhibited no abnormalities and the eyelids demonstrated no xanthelasmas.   HEENT: normal oral mucosa, no oral pallor and no oral cyanosis.   Neck: normal jugular venous A waves present, normal jugular venous V waves present and no jugular venous kelley A waves.   Pulmonary: no respiratory distress, normal respiratory rhythm and effort, no accessory muscle use and lungs were clear to auscultation bilaterally.   Cardiovascular: heart rate and rhythm were normal, normal S1 and S2 and no murmur, gallop, rub, heave or thrill are present.   Abdomen: soft, non-tender, no hepato-splenomegaly and no abdominal mass palpated.   Musculoskeletal: the gait could not be assessed..   Extremities: no clubbing of the fingernails, no localized cyanosis, no petechial hemorrhages and no ischemic changes.   Skin: normal skin color and pigmentation, no rash, no venous stasis, no skin lesions, no skin ulcer and no xanthoma was observed.   Psychiatric: oriented to person, place, and time, the affect was normal, the mood was normal and not feeling anxious.     LABS:   --------  11-18    144  |  116<H>  |  45<H>  ----------------------------<  79  4.4   |  20<L>  |  1.60<H>    Ca    9.5      18 Nov 2019 08:04    TPro  7.4  /  Alb  3.8  /  TBili  0.3  /  DBili  x   /  AST  24  /  ALT  18  /  AlkPhos  75  11-16                         9.8    5.37  )-----------( 172      ( 18 Nov 2019 08:04 )             30.8     PT/INR - ( 16 Nov 2019 16:35 )   PT: 13.4 sec;   INR: 1.18 ratio         PTT - ( 16 Nov 2019 16:35 )  PTT:35.0 sec        Culture Results:   No growth (11-16 @ 23:25)  Culture Results:   No growth to date. (11-16 @ 20:06)  Culture Results:   No growth to date. (11-16 @ 20:06)      RADIOLOGY:  -----------------        ECG:

## 2019-11-19 LAB
ANION GAP SERPL CALC-SCNC: 9 MMOL/L — SIGNIFICANT CHANGE UP (ref 5–17)
BUN SERPL-MCNC: 38 MG/DL — HIGH (ref 7–23)
CALCIUM SERPL-MCNC: 8.9 MG/DL — SIGNIFICANT CHANGE UP (ref 8.5–10.1)
CHLORIDE SERPL-SCNC: 113 MMOL/L — HIGH (ref 96–108)
CO2 SERPL-SCNC: 17 MMOL/L — LOW (ref 22–31)
CREAT SERPL-MCNC: 1.6 MG/DL — HIGH (ref 0.5–1.3)
GLUCOSE SERPL-MCNC: 76 MG/DL — SIGNIFICANT CHANGE UP (ref 70–99)
HCT VFR BLD CALC: 30.7 % — LOW (ref 34.5–45)
HGB BLD-MCNC: 9.6 G/DL — LOW (ref 11.5–15.5)
MCHC RBC-ENTMCNC: 27 PG — SIGNIFICANT CHANGE UP (ref 27–34)
MCHC RBC-ENTMCNC: 31.3 GM/DL — LOW (ref 32–36)
MCV RBC AUTO: 86.5 FL — SIGNIFICANT CHANGE UP (ref 80–100)
NRBC # BLD: 0 /100 WBCS — SIGNIFICANT CHANGE UP (ref 0–0)
PLATELET # BLD AUTO: 184 K/UL — SIGNIFICANT CHANGE UP (ref 150–400)
POTASSIUM SERPL-MCNC: 4.3 MMOL/L — SIGNIFICANT CHANGE UP (ref 3.5–5.3)
POTASSIUM SERPL-SCNC: 4.3 MMOL/L — SIGNIFICANT CHANGE UP (ref 3.5–5.3)
RBC # BLD: 3.55 M/UL — LOW (ref 3.8–5.2)
RBC # FLD: 18.5 % — HIGH (ref 10.3–14.5)
SODIUM SERPL-SCNC: 139 MMOL/L — SIGNIFICANT CHANGE UP (ref 135–145)
WBC # BLD: 5.63 K/UL — SIGNIFICANT CHANGE UP (ref 3.8–10.5)
WBC # FLD AUTO: 5.63 K/UL — SIGNIFICANT CHANGE UP (ref 3.8–10.5)

## 2019-11-19 RX ADMIN — Medication 325 MILLIGRAM(S): at 11:46

## 2019-11-19 RX ADMIN — Medication 1 TABLET(S): at 14:27

## 2019-11-19 RX ADMIN — Medication 600 MILLIGRAM(S): at 18:20

## 2019-11-19 RX ADMIN — APIXABAN 2.5 MILLIGRAM(S): 2.5 TABLET, FILM COATED ORAL at 18:20

## 2019-11-19 RX ADMIN — PANTOPRAZOLE SODIUM 40 MILLIGRAM(S): 20 TABLET, DELAYED RELEASE ORAL at 06:09

## 2019-11-19 RX ADMIN — BIMATOPROST 1 DROP(S): 0.3 SOLUTION/ DROPS OPHTHALMIC at 21:15

## 2019-11-19 RX ADMIN — SODIUM CHLORIDE 50 MILLILITER(S): 9 INJECTION, SOLUTION INTRAVENOUS at 14:56

## 2019-11-19 RX ADMIN — Medication 1 TABLET(S): at 06:09

## 2019-11-19 RX ADMIN — DONEPEZIL HYDROCHLORIDE 5 MILLIGRAM(S): 10 TABLET, FILM COATED ORAL at 21:15

## 2019-11-19 RX ADMIN — CALCITRIOL 0.25 MICROGRAM(S): 0.5 CAPSULE ORAL at 11:46

## 2019-11-19 RX ADMIN — Medication 600 MILLIGRAM(S): at 06:09

## 2019-11-19 RX ADMIN — Medication 1 TABLET(S): at 21:15

## 2019-11-19 RX ADMIN — ATORVASTATIN CALCIUM 10 MILLIGRAM(S): 80 TABLET, FILM COATED ORAL at 21:15

## 2019-11-19 RX ADMIN — APIXABAN 2.5 MILLIGRAM(S): 2.5 TABLET, FILM COATED ORAL at 06:09

## 2019-11-19 RX ADMIN — Medication 500 MILLIGRAM(S): at 11:46

## 2019-11-19 NOTE — PROGRESS NOTE ADULT - ASSESSMENT
89 yo female with  hx of dementia ,CAD ,AF ON ELIQUIS , HTN , GERD , OSTEOPOROSIS ,INSOMNIA ,ANEMIA ,gib ,glaucoma ,carpal tunnel syndrome ,hld  sent from [facility] for temp of 100.4 associated with  Left ear pain cough. Was given 500mg of tylenol at 2pm.  Unable to obtain further history from patient She denies l ear ache since arrival to ER , she states her cough resolevd as well but as per aid at bedside patient has wet productive cough .Patient was admitted for tx of URI/sinus infection  and possible bronchitis .Seen by pulm Dr Estrella and cardiologist Dr Grande .Found to be anemic and workup ordered Patient deneid chest pain and SOB Palliative care consult requested ,to discuss advance directives and complete MOLST

## 2019-11-19 NOTE — PROGRESS NOTE ADULT - NSHPATTENDINGPLANDISCUSS_GEN_ALL_CORE
med staff , Dr Estrella ,Dr KHAN ,pharmacy ,[facility] , met with the daughter at bedside

## 2019-11-19 NOTE — PROGRESS NOTE ADULT - SUBJECTIVE AND OBJECTIVE BOX
FEROZ HARKINS    PLV 2NOR 234 D1    Patient is a 89y old  Female who presents with a chief complaint of cough ,left earache and fever (19 Nov 2019 08:23)       Allergies    PCN, Sulfa (Urticaria)  penicillin (Unknown)  sulfa drugs (Unknown)    Intolerances    Bananas (Other (Mild to Mod))  Potatoes (Other (Mild to Mod))      HPI:  88 yo female with  hx of dementia ,CAD ,AF ON ELIQUIS , HTN , GERD , OSTEOPOROSIS ,INSOMNIA ,ANEMIA ,gib ,glaucoma ,carpal tunnel syndrome ,hld  sent from [facility] for temp of 100.4 associated with  Left ear pain cough. Was given 500mg of tylenol at 2pm.  Unable to obtain further history from patient She denies l ear ache since arrival to ER , she states her cough resolevd as well but as per aid at bedside patient has wet productive cough .Patient was admitted for tx of URI/sinus infection  and possible bronchitis .Seen by pulm Dr Estrella and cardiologist Dr Grande .Found to be anemic and workup ordered Patient deneid chest pain and SOB Palliative care consult requested ,to discuss advance directives and complete MOLST (17 Nov 2019 08:08)      PAST MEDICAL & SURGICAL HISTORY:  Arteriosclerotic heart disease (ASHD)  Arthritis  Dementia  Trigger finger  CKD (chronic kidney disease)  DVT (deep venous thrombosis)  GI (gastrointestinal bleed)  Anemia Iron Deficiency  H/o Diverticulosis  Carpal Tunnel Syndrome  Dyslipidemia  Glaucoma  OA (Osteoarthritis)  HTN (Hypertension)  CAD (Coronary Artery Disease)  [place] filter in place: placed 2012  H/o Ovarian Cyst  Cataract  bilateral 2008  Knee Arthroplasty right 8/2009 & left 9/2010      FAMILY HISTORY:  No pertinent family history in first degree relatives        MEDICATIONS   acetaminophen   Tablet .. 650 milliGRAM(s) Oral every 6 hours PRN  apixaban 2.5 milliGRAM(s) Oral two times a day  ascorbic acid 500 milliGRAM(s) Oral daily  atorvastatin 10 milliGRAM(s) Oral at bedtime  bimatoprost 0.01% Ophthalmic Solution 1 Drop(s) Both EYES at bedtime  calcitriol   Capsule 0.25 MICROGram(s) Oral daily  donepezil 5 milliGRAM(s) Oral at bedtime  ferrous    sulfate 325 milliGRAM(s) Oral daily  guaiFENesin  milliGRAM(s) Oral every 12 hours  lactobacillus acidophilus 1 Tablet(s) Oral every 8 hours  pantoprazole    Tablet 40 milliGRAM(s) Oral before breakfast  sodium chloride 0.45%. 1000 milliLiter(s) IV Continuous <Continuous>      Vital Signs Last 24 Hrs  T(C): 37.2 (19 Nov 2019 07:29), Max: 37.7 (19 Nov 2019 00:07)  T(F): 99 (19 Nov 2019 07:29), Max: 99.9 (19 Nov 2019 00:07)  HR: 79 (19 Nov 2019 07:29) (76 - 83)  BP: 138/83 (19 Nov 2019 07:29) (136/73 - 151/80)  BP(mean): --  RR: 17 (19 Nov 2019 07:29) (17 - 18)  SpO2: 98% (19 Nov 2019 07:29) (98% - 100%)      11-18-19 @ 07:01  -  11-19-19 @ 07:00  --------------------------------------------------------  IN: 1000 mL / OUT: 300 mL / NET: 700 mL    11-19-19 @ 07:01  -  11-19-19 @ 11:37  --------------------------------------------------------  IN: 480 mL / OUT: 0 mL / NET: 480 mL            LABS:                        9.6    5.63  )-----------( 184      ( 19 Nov 2019 07:18 )             30.7     11-19    139  |  113<H>  |  38<H>  ----------------------------<  76  4.3   |  17<L>  |  1.60<H>    Ca    8.9      19 Nov 2019 07:18                WBC:  WBC Count: 5.63 K/uL (11-19 @ 07:18)  WBC Count: 5.37 K/uL (11-18 @ 08:04)  WBC Count: 6.51 K/uL (11-17 @ 06:11)  WBC Count: 7.33 K/uL (11-16 @ 16:35)      MICROBIOLOGY:  RECENT CULTURES:  11-16 .Urine Catheterized XXXX XXXX   No growth    11-16 .Blood Blood XXXX XXXX   No growth to date.                    Sodium:  Sodium, Serum: 139 mmol/L (11-19 @ 07:18)  Sodium, Serum: 144 mmol/L (11-18 @ 08:04)  Sodium, Serum: 146 mmol/L (11-17 @ 06:11)  Sodium, Serum: 145 mmol/L (11-16 @ 16:35)      1.60 mg/dL 11-19 @ 07:18  1.60 mg/dL 11-18 @ 08:04  1.90 mg/dL 11-17 @ 06:11  2.00 mg/dL 11-16 @ 16:35      Hemoglobin:  Hemoglobin: 9.6 g/dL (11-19 @ 07:18)  Hemoglobin: 9.8 g/dL (11-18 @ 08:04)  Hemoglobin: 10.6 g/dL (11-17 @ 13:18)  Hemoglobin: 9.3 g/dL (11-17 @ 06:11)  Hemoglobin: 11.3 g/dL (11-16 @ 16:35)      Platelets: Platelet Count - Automated: 184 K/uL (11-19 @ 07:18)  Platelet Count - Automated: 172 K/uL (11-18 @ 08:04)  Platelet Count - Automated: 171 K/uL (11-17 @ 06:11)  Platelet Count - Automated: 222 K/uL (11-16 @ 16:35)              RADIOLOGY & ADDITIONAL STUDIES:

## 2019-11-19 NOTE — PROGRESS NOTE ADULT - ASSESSMENT
cont rx REVIEW OF SYMPTOMS      Able to give ROS  NO     PHYSICAL EXAM    HEENT Unremarkable PERRLA atraumatic   RESP Fair air entry EXP prolonged    Harsh breath sound Resp distres mild   CARDIAC S1 S2 No S3     NO JVD    ABDOMEN SOFT BS PRESENT NOT DISTENDED No hepatosplenomegaly PEDAL EDEMA present No calf tenderness  NO rash   GENERAL Not TOXIC looking    VITALS/LABS     2019 99 83 150/80 18 100%   2019 W 5.6 Hb 9.6 Plt 184 Na 139 K 4.3 CO2 17 Cr 1.6       PATIENT WHITE FEROZ  1930 DOA 2019 [facility] [phone] DHEERAJ SILVERIO                           PATIENT DATA   ALLERGY pncl sulfa  ADVANCED DIRECTIVE       WT    61           BMI   25                                                                                                                                         HEAD OF BED ELEVATION Yes          DVT PROPHYLAXIS      apixaban 2.5x2 () a f   STRESS ULCER PROPHYLAXIS protonix 40 ()   INFECTION PPLX                                                        DYSPHAGIA EVAL                                           DIET dash No citrus No nuts No seeds No concentrated K ()                         GAS EXCH    2019 RA 98%                                                   CXR    2019 CXR napd Old r humeral fx                           CT  Hb   ---2019 Hb 11-10.6-9.8-9.6            W ---2019 W 7.3-6.5-5.3-5.6  PC 2019 pc .18  Cr ---2019 Cr 2 - 1.9-1.6-1.6   US RENAL  Medical renal dis   IV       1/2 ns 50 ()               MICROBIO    hmpv  urine culture n  blod culture n  pc .18     strep  Leg n                          ANTIBIOTICS  levaquin 250 (-)        ASSESSMENT RECOMMENDATIONS   89 f PMH OBS admitted  with fever cough earache   Pt pc was sl high CXR was napd blod and urine cultures were n She is being Rxed empirically for resp tract v middle ear infection with levaquin (-)   PC was .18 hmpv came positive     Cr elevation is likely chronic  Pt is on apixaban for a fib   dc planning       TIME SPENT Over 25 minutes aggregate care time spent on encounter; activities included   direct pt care, counseling and/or coordinating care reviewing notes, lab data/ imaging , discussion with multidisciplinary team/ pt /family. Risks, benefits, alternatives  discussed in detail.

## 2019-11-19 NOTE — PROGRESS NOTE ADULT - SUBJECTIVE AND OBJECTIVE BOX
Chart and available morning labs /imaging are reviewed electronically , urgent issues addressed . More information  is being added upon completion of rounds , when more information is collected and management discussed with consultants , medical staff and social service/case management on the floor   LATE ENTRY- patient was seen and examined earlier today  12 pm ,met with the daughter KYUNG AT BEDSIDE,poc d/w in length  . Plan of care was discussed with med staff and unit coordinator . Patient is a 89y Female whom presented to the hospital with ckd and aiden   pt seen andexamined nad   PAST MEDICAL & SURGICAL HISTORY:  htn   ckd     MEDICATIONS  (STANDING):  apixaban 2.5 milliGRAM(s) Oral two times a day  ascorbic acid 500 milliGRAM(s) Oral daily  atorvastatin 10 milliGRAM(s) Oral at bedtime  calcitriol   Capsule 0.25 MICROGram(s) Oral daily  donepezil 5 milliGRAM(s) Oral at bedtime  ferrous    sulfate 325 milliGRAM(s) Oral daily  guaiFENesin  milliGRAM(s) Oral every 12 hours  lactobacillus acidophilus 1 Tablet(s) Oral every 8 hours  levoFLOXacin IVPB 250 milliGRAM(s) IV Intermittent every 24 hours  pantoprazole    Tablet 40 milliGRAM(s) Oral before breakfast  sodium chloride 0.45%. 1000 milliLiter(s) (50 mL/Hr) IV Continuous <Continuous>      Allergies    penicillin (Unknown)  sulfa drugs (Unknown)    Intolerances    Bananas (Other (Mild to Mod))  Potatoes (Other (Mild to Mod))      SOCIAL HISTORY:  Denies ETOh,Smoking,     FAMILY HISTORY:  family history is review no h/o kidney disease in family     REVIEW OF SYSTEMS:    CONSTITUTIONAL: pos  weakness, fevers or chills  RESPIRATORY: No cough, wheezing, hemoptysis; No shortness of breath  CARDIOVASCULAR: No chest pain or palpitations  GASTROINTESTINAL: No abdominal or epigastric pain. No nausea, vomiting,     No diarrhea or constipation. No melena   GENITOURINARY: No dysuria, frequency or hematuria  NEUROLOGICAL: pos  weakness  SKIN: dry                                     9.6    5.63  )-----------( 184      ( 19 Nov 2019 07:18 )             30.7       CBC Full  -  ( 19 Nov 2019 07:18 )  WBC Count : 5.63 K/uL  RBC Count : 3.55 M/uL  Hemoglobin : 9.6 g/dL  Hematocrit : 30.7 %  Platelet Count - Automated : 184 K/uL  Mean Cell Volume : 86.5 fl  Mean Cell Hemoglobin : 27.0 pg  Mean Cell Hemoglobin Concentration : 31.3 gm/dL  Auto Neutrophil # : x  Auto Lymphocyte # : x  Auto Monocyte # : x  Auto Eosinophil # : x  Auto Basophil # : x  Auto Neutrophil % : x  Auto Lymphocyte % : x  Auto Monocyte % : x  Auto Eosinophil % : x  Auto Basophil % : x      11-19    139  |  113<H>  |  38<H>  ----------------------------<  76  4.3   |  17<L>  |  1.60<H>    Ca    8.9      19 Nov 2019 07:18        CAPILLARY BLOOD GLUCOSE          Vital Signs Last 24 Hrs  T(C): 37.2 (19 Nov 2019 07:29), Max: 37.7 (19 Nov 2019 00:07)  T(F): 99 (19 Nov 2019 07:29), Max: 99.9 (19 Nov 2019 00:07)  HR: 79 (19 Nov 2019 07:29) (76 - 83)  BP: 138/83 (19 Nov 2019 07:29) (136/73 - 151/80)  BP(mean): --  RR: 17 (19 Nov 2019 07:29) (17 - 18)  SpO2: 98% (19 Nov 2019 07:29) (98% - 100%)                PHYSICAL EXAM:    Constitutional: NAD  HEENT: conjunctive   clear   Neck:  No JVD  Respiratory: decrease bs b/l   Cardiovascular: S1 and S2  Gastrointestinal: BS+, soft, NT/ND  Extremities: No peripheral edema  Neurological: no focal deficits  Psychiatric: Normal mood, normal affect  : No Lamar  Skin: dry   Access: Not applicable  Labs and imaging reviewed electronically 45minutes spent on this visit, 50% visit time spent in care co-ordination with other attendings and counselling patient  I have discussed care plan with patient and HCP,expressed understanding of problems treatment and their effect and side effects, alternatives in detail,I have asked if they have any questions and concerns and appropriately addressed them to best of my ability

## 2019-11-19 NOTE — PROGRESS NOTE ADULT - ASSESSMENT
89 yo female hx of dementia, sent from [facility] for temp of 100.4 and left ear pain (but patient currently denies ear pain) and shaking with cough now with ckd and aiden

## 2019-11-19 NOTE — PROGRESS NOTE ADULT - SUBJECTIVE AND OBJECTIVE BOX
Patient is a 89y Female with a known history of :  Dementia (F03.90)  Glaucoma (H40.9)  HTN (hypertension) (I10)  Afib (I48.91)  URI (upper respiratory infection) (J06.9)  Otitis media, acute (H66.90)  Hypertension, unspecified type (I10)  Renal failure (N19)  Dyslipidemia (E78.5)  Hypertension (I10)  Atrial fibrillation (I48.91)  ASHD (arteriosclerotic heart disease) (I25.10)  Prophylactic measure (Z29.9)  Cough in adult (R05)  Fever, unspecified fever cause (R50.9)    HPI:  88 yo female with  hx of dementia ,CAD ,AF ON ELIQUIS , HTN , GERD , OSTEOPOROSIS ,INSOMNIA ,ANEMIA ,gib ,glaucoma ,carpal tunnel syndrome ,hld  sent from [facility] for temp of 100.4 associated with  Left ear pain cough. Was given 500mg of tylenol at 2pm.  Unable to obtain further history from patient She denies l ear ache since arrival to ER , she states her cough resolevd as well but as per aid at bedside patient has wet productive cough .Patient was admitted for tx of URI/sinus infection  and possible bronchitis .Seen by pulm Dr Estrella and cardiologist Dr Grande .Found to be anemic and workup ordered Patient deneid chest pain and SOB Palliative care consult requested ,to discuss advance directives and complete MOLST (17 Nov 2019 08:08)      REVIEW OF SYSTEMS:    CONSTITUTIONAL: No fever, weight loss, or fatigue  EYES: No eye pain, visual disturbances, or discharge  ENMT:  No difficulty hearing, tinnitus, vertigo; No sinus or throat pain  NECK: No pain or stiffness  BREASTS: No pain, masses, or nipple discharge  RESPIRATORY: No cough, wheezing, chills or hemoptysis; No shortness of breath  CARDIOVASCULAR: No chest pain, palpitations, dizziness, or leg swelling  GASTROINTESTINAL: No abdominal or epigastric pain. No nausea, vomiting, or hematemesis; No diarrhea or constipation. No melena or hematochezia.  GENITOURINARY: No dysuria, frequency, hematuria, or incontinence  NEUROLOGICAL: No headaches, memory loss, loss of strength, numbness, or tremors  SKIN: No itching, burning, rashes, or lesions   LYMPH NODES: No enlarged glands  ENDOCRINE: No heat or cold intolerance; No hair loss  MUSCULOSKELETAL: No joint pain or swelling; No muscle, back, or extremity pain  PSYCHIATRIC: No depression, anxiety, mood swings, or difficulty sleeping  HEME/LYMPH: No easy bruising, or bleeding gums  ALLERGY AND IMMUNOLOGIC: No hives or eczema    MEDICATIONS  (STANDING):  apixaban 2.5 milliGRAM(s) Oral two times a day  ascorbic acid 500 milliGRAM(s) Oral daily  atorvastatin 10 milliGRAM(s) Oral at bedtime  bimatoprost 0.01% Ophthalmic Solution 1 Drop(s) Both EYES at bedtime  calcitriol   Capsule 0.25 MICROGram(s) Oral daily  donepezil 5 milliGRAM(s) Oral at bedtime  ferrous    sulfate 325 milliGRAM(s) Oral daily  guaiFENesin  milliGRAM(s) Oral every 12 hours  lactobacillus acidophilus 1 Tablet(s) Oral every 8 hours  pantoprazole    Tablet 40 milliGRAM(s) Oral before breakfast  sodium chloride 0.45%. 1000 milliLiter(s) (50 mL/Hr) IV Continuous <Continuous>    MEDICATIONS  (PRN):  acetaminophen   Tablet .. 650 milliGRAM(s) Oral every 6 hours PRN Temp greater or equal to 38C (100.4F), Mild Pain (1 - 3)      ALLERGIES: PCN, Sulfa (Urticaria)  penicillin (Unknown)  sulfa drugs (Unknown)      FAMILY HISTORY:  No pertinent family history in first degree relatives      PHYSICAL EXAMINATION:  -----------------------------  T(C): 37.7 (11-19-19 @ 00:07), Max: 37.7 (11-19-19 @ 00:07)  HR: 83 (11-19-19 @ 00:07) (76 - 83)  BP: 151/80 (11-19-19 @ 00:07) (126/82 - 151/80)  RR: 18 (11-19-19 @ 00:07) (16 - 18)  SpO2: 100% (11-19-19 @ 00:07) (100% - 100%)  Wt(kg): --    11-18 @ 07:01  -  11-19 @ 07:00  --------------------------------------------------------  IN:    sodium chloride 0.45%.: 1000 mL  Total IN: 1000 mL    OUT:    Voided: 300 mL  Total OUT: 300 mL    Total NET: 700 mL            Constitutional: well developed, normal appearance, well groomed, well nourished, no deformities and no acute distress.   Eyes: the conjunctiva exhibited no abnormalities and the eyelids demonstrated no xanthelasmas.   HEENT: normal oral mucosa, no oral pallor and no oral cyanosis.   Neck: normal jugular venous A waves present, normal jugular venous V waves present and no jugular venous kelley A waves.   Pulmonary: no respiratory distress, normal respiratory rhythm and effort, no accessory muscle use and lungs were clear to auscultation bilaterally.   Cardiovascular: heart rate and rhythm were normal, normal S1 and S2 and no murmur, gallop, rub, heave or thrill are present.   Abdomen: soft, non-tender, no hepato-splenomegaly and no abdominal mass palpated.   Musculoskeletal: the gait could not be assessed..   Extremities: no clubbing of the fingernails, no localized cyanosis, no petechial hemorrhages and no ischemic changes.   Skin: normal skin color and pigmentation, no rash, no venous stasis, no skin lesions, no skin ulcer and no xanthoma was observed.   Psychiatric: oriented to person, place, and time, the affect was normal, the mood was normal and not feeling anxious.     LABS:   --------  11-19    139  |  113<H>  |  38<H>  ----------------------------<  76  4.3   |  17<L>  |  1.60<H>    Ca    8.9      19 Nov 2019 07:18                           9.6    5.63  )-----------( 184      ( 19 Nov 2019 07:18 )             30.7                 RADIOLOGY:  -----------------        ECG:

## 2019-11-19 NOTE — PROGRESS NOTE ADULT - PROBLEM SELECTOR PLAN 2
ACUTE RENAL FAILURE: obtained base line bun and cr   Serum creatinine is stable at 1.6   , approximating GFR at  is decreased  ml/min.   There is no progression . No uremic symptoms   [No evidence of anemia].  Fluid status stable.  Will continue to avoid nephrotoxic drugs.  Patient remains asymptomatic.   Continue current therapy.  hold   diuretic.

## 2019-11-19 NOTE — PROGRESS NOTE ADULT - ATTENDING COMMENTS
88 yo female with  hx of dementia ,CAD ,AF ON ELIQUIS , HTN , GERD , OSTEOPOROSIS ,INSOMNIA ,ANEMIA ,gib ,glaucoma ,carpal tunnel syndrome ,hld  sent from [facility] for temp of 100.4 associated with  Left ear pain cough. Was given 500mg of tylenol at 2pm.  Unable to obtain further history from patient She denies l ear ache since arrival to ER , she states her cough resolevd as well but as per aid at bedside patient has wet productive cough .Patient was admitted for tx of URI/sinus infection  and possible bronchitis .Seen by pulm Dr Estrella and cardiologist Dr Grande .Found to be anemic and workup ordered Patient deneid chest pain and SOB Palliative care consult requested ,to discuss advance directives and complete MOLST

## 2019-11-19 NOTE — PROGRESS NOTE ADULT - SUBJECTIVE AND OBJECTIVE BOX
Patient is a 89y Female whom presented to the hospital with ckd and aiden   pt seen andexamined nad   PAST MEDICAL & SURGICAL HISTORY:  htn   ckd     MEDICATIONS  (STANDING):  apixaban 2.5 milliGRAM(s) Oral two times a day  ascorbic acid 500 milliGRAM(s) Oral daily  atorvastatin 10 milliGRAM(s) Oral at bedtime  calcitriol   Capsule 0.25 MICROGram(s) Oral daily  donepezil 5 milliGRAM(s) Oral at bedtime  ferrous    sulfate 325 milliGRAM(s) Oral daily  guaiFENesin  milliGRAM(s) Oral every 12 hours  lactobacillus acidophilus 1 Tablet(s) Oral every 8 hours  levoFLOXacin IVPB 250 milliGRAM(s) IV Intermittent every 24 hours  pantoprazole    Tablet 40 milliGRAM(s) Oral before breakfast  sodium chloride 0.45%. 1000 milliLiter(s) (50 mL/Hr) IV Continuous <Continuous>      Allergies    penicillin (Unknown)  sulfa drugs (Unknown)    Intolerances    Bananas (Other (Mild to Mod))  Potatoes (Other (Mild to Mod))      SOCIAL HISTORY:  Denies ETOh,Smoking,     FAMILY HISTORY:  family history is review no h/o kidney disease in family     REVIEW OF SYSTEMS:    CONSTITUTIONAL: pos  weakness, fevers or chills  RESPIRATORY: No cough, wheezing, hemoptysis; No shortness of breath  CARDIOVASCULAR: No chest pain or palpitations  GASTROINTESTINAL: No abdominal or epigastric pain. No nausea, vomiting,     No diarrhea or constipation. No melena   GENITOURINARY: No dysuria, frequency or hematuria  NEUROLOGICAL: pos  weakness  SKIN: dry                                     9.6    5.63  )-----------( 184      ( 19 Nov 2019 07:18 )             30.7       CBC Full  -  ( 19 Nov 2019 07:18 )  WBC Count : 5.63 K/uL  RBC Count : 3.55 M/uL  Hemoglobin : 9.6 g/dL  Hematocrit : 30.7 %  Platelet Count - Automated : 184 K/uL  Mean Cell Volume : 86.5 fl  Mean Cell Hemoglobin : 27.0 pg  Mean Cell Hemoglobin Concentration : 31.3 gm/dL  Auto Neutrophil # : x  Auto Lymphocyte # : x  Auto Monocyte # : x  Auto Eosinophil # : x  Auto Basophil # : x  Auto Neutrophil % : x  Auto Lymphocyte % : x  Auto Monocyte % : x  Auto Eosinophil % : x  Auto Basophil % : x      11-19    139  |  113<H>  |  38<H>  ----------------------------<  76  4.3   |  17<L>  |  1.60<H>    Ca    8.9      19 Nov 2019 07:18        CAPILLARY BLOOD GLUCOSE          Vital Signs Last 24 Hrs  T(C): 37.2 (19 Nov 2019 07:29), Max: 37.7 (19 Nov 2019 00:07)  T(F): 99 (19 Nov 2019 07:29), Max: 99.9 (19 Nov 2019 00:07)  HR: 79 (19 Nov 2019 07:29) (76 - 83)  BP: 138/83 (19 Nov 2019 07:29) (136/73 - 151/80)  BP(mean): --  RR: 17 (19 Nov 2019 07:29) (17 - 18)  SpO2: 98% (19 Nov 2019 07:29) (98% - 100%)                PHYSICAL EXAM:    Constitutional: NAD  HEENT: conjunctive   clear   Neck:  No JVD  Respiratory: decrease bs b/l   Cardiovascular: S1 and S2  Gastrointestinal: BS+, soft, NT/ND  Extremities: No peripheral edema  Neurological: no focal deficits  Psychiatric: Normal mood, normal affect  : No Lamar  Skin: dry   Access: Not applicable

## 2019-11-20 ENCOUNTER — TRANSCRIPTION ENCOUNTER (OUTPATIENT)
Age: 84
End: 2019-11-20

## 2019-11-20 VITALS
DIASTOLIC BLOOD PRESSURE: 90 MMHG | RESPIRATION RATE: 18 BRPM | HEART RATE: 55 BPM | OXYGEN SATURATION: 99 % | SYSTOLIC BLOOD PRESSURE: 144 MMHG | TEMPERATURE: 98 F

## 2019-11-20 LAB
ANION GAP SERPL CALC-SCNC: 10 MMOL/L — SIGNIFICANT CHANGE UP (ref 5–17)
BUN SERPL-MCNC: 41 MG/DL — HIGH (ref 7–23)
CALCIUM SERPL-MCNC: 9.4 MG/DL — SIGNIFICANT CHANGE UP (ref 8.5–10.1)
CHLORIDE SERPL-SCNC: 116 MMOL/L — HIGH (ref 96–108)
CO2 SERPL-SCNC: 16 MMOL/L — LOW (ref 22–31)
CREAT SERPL-MCNC: 1.6 MG/DL — HIGH (ref 0.5–1.3)
GLUCOSE SERPL-MCNC: 69 MG/DL — LOW (ref 70–99)
HCT VFR BLD CALC: 30.2 % — LOW (ref 34.5–45)
HGB BLD-MCNC: 9.5 G/DL — LOW (ref 11.5–15.5)
MCHC RBC-ENTMCNC: 27.5 PG — SIGNIFICANT CHANGE UP (ref 27–34)
MCHC RBC-ENTMCNC: 31.5 GM/DL — LOW (ref 32–36)
MCV RBC AUTO: 87.3 FL — SIGNIFICANT CHANGE UP (ref 80–100)
NRBC # BLD: 0 /100 WBCS — SIGNIFICANT CHANGE UP (ref 0–0)
PLATELET # BLD AUTO: 196 K/UL — SIGNIFICANT CHANGE UP (ref 150–400)
POTASSIUM SERPL-MCNC: 4.8 MMOL/L — SIGNIFICANT CHANGE UP (ref 3.5–5.3)
POTASSIUM SERPL-SCNC: 4.8 MMOL/L — SIGNIFICANT CHANGE UP (ref 3.5–5.3)
RBC # BLD: 3.46 M/UL — LOW (ref 3.8–5.2)
RBC # FLD: 18.1 % — HIGH (ref 10.3–14.5)
SODIUM SERPL-SCNC: 142 MMOL/L — SIGNIFICANT CHANGE UP (ref 135–145)
WBC # BLD: 4.74 K/UL — SIGNIFICANT CHANGE UP (ref 3.8–10.5)
WBC # FLD AUTO: 4.74 K/UL — SIGNIFICANT CHANGE UP (ref 3.8–10.5)

## 2019-11-20 PROCEDURE — 81001 URINALYSIS AUTO W/SCOPE: CPT

## 2019-11-20 PROCEDURE — 99285 EMERGENCY DEPT VISIT HI MDM: CPT | Mod: 25

## 2019-11-20 PROCEDURE — 71045 X-RAY EXAM CHEST 1 VIEW: CPT

## 2019-11-20 PROCEDURE — 83880 ASSAY OF NATRIURETIC PEPTIDE: CPT

## 2019-11-20 PROCEDURE — 84145 PROCALCITONIN (PCT): CPT

## 2019-11-20 PROCEDURE — 83605 ASSAY OF LACTIC ACID: CPT

## 2019-11-20 PROCEDURE — 80048 BASIC METABOLIC PNL TOTAL CA: CPT

## 2019-11-20 PROCEDURE — 36415 COLL VENOUS BLD VENIPUNCTURE: CPT

## 2019-11-20 PROCEDURE — 85610 PROTHROMBIN TIME: CPT

## 2019-11-20 PROCEDURE — 76775 US EXAM ABDO BACK WALL LIM: CPT

## 2019-11-20 PROCEDURE — 82272 OCCULT BLD FECES 1-3 TESTS: CPT

## 2019-11-20 PROCEDURE — 80053 COMPREHEN METABOLIC PANEL: CPT

## 2019-11-20 PROCEDURE — 87798 DETECT AGENT NOS DNA AMP: CPT

## 2019-11-20 PROCEDURE — 87086 URINE CULTURE/COLONY COUNT: CPT

## 2019-11-20 PROCEDURE — 93306 TTE W/DOPPLER COMPLETE: CPT

## 2019-11-20 PROCEDURE — 97162 PT EVAL MOD COMPLEX 30 MIN: CPT

## 2019-11-20 PROCEDURE — 85018 HEMOGLOBIN: CPT

## 2019-11-20 PROCEDURE — 87486 CHLMYD PNEUM DNA AMP PROBE: CPT

## 2019-11-20 PROCEDURE — 87899 AGENT NOS ASSAY W/OPTIC: CPT

## 2019-11-20 PROCEDURE — 97116 GAIT TRAINING THERAPY: CPT

## 2019-11-20 PROCEDURE — 85027 COMPLETE CBC AUTOMATED: CPT

## 2019-11-20 PROCEDURE — 87633 RESP VIRUS 12-25 TARGETS: CPT

## 2019-11-20 PROCEDURE — 87581 M.PNEUMON DNA AMP PROBE: CPT

## 2019-11-20 PROCEDURE — 87040 BLOOD CULTURE FOR BACTERIA: CPT

## 2019-11-20 PROCEDURE — 85014 HEMATOCRIT: CPT

## 2019-11-20 PROCEDURE — 85730 THROMBOPLASTIN TIME PARTIAL: CPT

## 2019-11-20 PROCEDURE — 87449 NOS EACH ORGANISM AG IA: CPT

## 2019-11-20 RX ORDER — DONEPEZIL HYDROCHLORIDE 10 MG/1
0 TABLET, FILM COATED ORAL
Qty: 0 | Refills: 0 | DISCHARGE

## 2019-11-20 RX ORDER — AMIODARONE HYDROCHLORIDE 400 MG/1
0 TABLET ORAL
Qty: 0 | Refills: 0 | DISCHARGE

## 2019-11-20 RX ORDER — ACETAMINOPHEN 500 MG
2 TABLET ORAL
Qty: 0 | Refills: 0 | DISCHARGE
Start: 2019-11-20

## 2019-11-20 RX ORDER — APIXABAN 2.5 MG/1
0 TABLET, FILM COATED ORAL
Qty: 0 | Refills: 0 | DISCHARGE

## 2019-11-20 RX ORDER — CALCITRIOL 0.5 UG/1
1 CAPSULE ORAL
Qty: 0 | Refills: 0 | DISCHARGE

## 2019-11-20 RX ORDER — ASCORBIC ACID 60 MG
1 TABLET,CHEWABLE ORAL
Qty: 0 | Refills: 0 | DISCHARGE
Start: 2019-11-20

## 2019-11-20 RX ORDER — FERROUS SULFATE 325(65) MG
0 TABLET ORAL
Qty: 0 | Refills: 0 | DISCHARGE

## 2019-11-20 RX ADMIN — Medication 1 TABLET(S): at 06:07

## 2019-11-20 RX ADMIN — CALCITRIOL 0.25 MICROGRAM(S): 0.5 CAPSULE ORAL at 12:31

## 2019-11-20 RX ADMIN — Medication 1 TABLET(S): at 13:44

## 2019-11-20 RX ADMIN — APIXABAN 2.5 MILLIGRAM(S): 2.5 TABLET, FILM COATED ORAL at 06:07

## 2019-11-20 RX ADMIN — Medication 600 MILLIGRAM(S): at 06:07

## 2019-11-20 RX ADMIN — SODIUM CHLORIDE 50 MILLILITER(S): 9 INJECTION, SOLUTION INTRAVENOUS at 12:33

## 2019-11-20 RX ADMIN — Medication 325 MILLIGRAM(S): at 12:31

## 2019-11-20 RX ADMIN — Medication 500 MILLIGRAM(S): at 12:31

## 2019-11-20 RX ADMIN — PANTOPRAZOLE SODIUM 40 MILLIGRAM(S): 20 TABLET, DELAYED RELEASE ORAL at 06:07

## 2019-11-20 NOTE — PROGRESS NOTE ADULT - ASSESSMENT
admitted with cough ,fever and earache   bronchitis  ashd   paf - on eliquis   hypertension  dyslipidemia  renal insuffiency

## 2019-11-20 NOTE — DISCHARGE NOTE PROVIDER - HOSPITAL COURSE
88 yo female with  hx of dementia ,CAD ,AF ON ELIQUIS , HTN , GERD , OSTEOPOROSIS ,INSOMNIA ,ANEMIA ,gib ,glaucoma ,carpal tunnel syndrome ,hld  sent from [facility] for temp of 100.4 associated with  Left ear pain cough. Was given 500mg of tylenol at 2pm.  Unable to obtain further history from patient She denies l ear ache since arrival to ER , she states her cough resolevd as well but as per aid at bedside patient has wet productive cough .Patient was admitted for tx of URI/sinus infection  and possible bronchitis .Seen by pulm Dr Estrella and cardiologist Dr Grande .Found to be anemic and workup ordered Patient deneid chest pain and SOB Palliative care consult requested ,to discuss advance directives and complete MOLST     ·  Problem: Fever, unspecified fever cause.  Plan: Admitted for septic workup and evaluation,send blood and urine cx,serial lactate levels,monitor vitals closley,ivfs hydration,monitor urine output and renal profile,iv abx initiated.     ·  Problem: Otitis media, acute.  Plan: CONTINUE CURRENT ABX , WARM COMPRESSES ,TYLENOL PRN ,ON LEVAQUIN.     ·  Problem: URI (upper respiratory infection).  Plan: MUCINEX ,TYLENOL PRN ,LEVAQUIN  ,SERIAL CBC ,SEPTIC WORKUP ,MONITOR FOR WBC AND TEMPS.     ·  Problem: Afib.  Plan: CARDIOLOGY EVAL NOTED ,CASE D/W  AND  ,AS PER CARDIOLOGIST IMPRESSION AMIODARONE SHOULD BE STOPPED.     ·  Problem: HTN (hypertension).  Plan: continue current management and present  medications.     Problem: Glaucoma. Plan: continue home medications.    ·  Problem: Dementia.  Plan: supportive care ,management of agitation  OOB TC ,PT ,may require MONICA.     ·  Problem: Prophylactic measure.  Plan: Gastrointestinal stress ulcer prophylaxis and DVT prophylaxis administrated.

## 2019-11-20 NOTE — DISCHARGE NOTE NURSING/CASE MANAGEMENT/SOCIAL WORK - NSDCPEELIQUIS_GEN_ALL_CORE
Apixaban/Eliquis - Compliance/Apixaban/Eliquis - Potential for adverse drug reactions and interactions/Apixaban/Eliquis - Follow up monitoring/Apixaban/Eliquis - Dietary Advice

## 2019-11-20 NOTE — PROGRESS NOTE ADULT - ASSESSMENT
cont rx REVIEW OF SYMPTOMS      Able to give ROS  NO     PHYSICAL EXAM    HEENT Unremarkable PERRLA atraumatic   RESP Fair air entry EXP prolonged    Harsh breath sound Resp distres mild   CARDIAC S1 S2 No S3     NO JVD    ABDOMEN SOFT BS PRESENT NOT DISTENDED No hepatosplenomegaly PEDAL EDEMA present No calf tenderness  NO rash   GENERAL Not TOXIC looking    VITALS/LABS     2019 afeb 55 140/90 18   2019 W 4.7 Hb 8.5 Plt 196 Na 142 K 4.8 CO2 16 Cr 1.6     PATIENT WHITE FEROZ  1930 DOA 2019 [facility] P [phone] DR DHEERAJ SILVERIO                           PATIENT DATA   ALLERGY pncl sulfa  ADVANCED DIRECTIVE       WT    61           BMI   25                                                                                                                                         HEAD OF BED ELEVATION Yes          DVT PROPHYLAXIS      apixaban 2.5x2 () a f   STRESS ULCER PROPHYLAXIS protonix 40 ()   INFECTION PPLX                                                        DYSPHAGIA EVAL                                           DIET dash No citrus No nuts No seeds No concentrated K ()                         GAS EXCH    2019 RA 98%                                                   CXR    2019 CXR napd Old r humeral fx                           CT  Hb   ---2019 Hb 11-10.6-9.8-9.6            W ---2019 W 7.3-6.5-5.3-5.6  PC 2019 pc .18  Cr ----2019 Cr 2 - 1.9-1.6-1.6-1.6   US RENAL  Medical renal dis   IV       1/ ns 50 ()               MICROBIO    hmpv  urine culture n  blod culture n  pc .18     strep  Leg n                          ANTIBIOTICS  levaquin 250 (-)        ASSESSMENT RECOMMENDATIONS   89 f PMH OBS admitted  with fever cough earache   Pt pc was sl high CXR was napd blod and urine cultures were n She is being Rxed empirically for resp tract v middle ear infection with levaquin (-)   PC was .18 hmpv came positive     Cr elevation is likely chronic  Pt is on apixaban for a fib   dc planning     TIME SPENT Over 25 minutes aggregate care time spent on encounter; activities included   direct pt care, counseling and/or coordinating care reviewing notes, lab data/ imaging , discussion with multidisciplinary team/ pt /family. Risks, benefits, alternatives  discussed in detail.

## 2019-11-20 NOTE — PROGRESS NOTE ADULT - REASON FOR ADMISSION
cough ,left earache and fever

## 2019-11-20 NOTE — DISCHARGE NOTE NURSING/CASE MANAGEMENT/SOCIAL WORK - PATIENT PORTAL LINK FT
You can access the FollowMyHealth Patient Portal offered by [facility] by registering at the following website: [email or web address]. By joining [employer]’s FollowMyHealth portal, you will also be able to view your health information using other applications (apps) compatible with our system.

## 2019-11-20 NOTE — PROGRESS NOTE ADULT - SUBJECTIVE AND OBJECTIVE BOX
Patient is a 89y Female with a known history of :  Dementia (F03.90)  Glaucoma (H40.9)  HTN (hypertension) (I10)  Afib (I48.91)  URI (upper respiratory infection) (J06.9)  Otitis media, acute (H66.90)  Hypertension, unspecified type (I10)  Renal failure (N19)  Dyslipidemia (E78.5)  Hypertension (I10)  Atrial fibrillation (I48.91)  ASHD (arteriosclerotic heart disease) (I25.10)  Prophylactic measure (Z29.9)  Cough in adult (R05)  Fever, unspecified fever cause (R50.9)    HPI:  88 yo female with  hx of dementia ,CAD ,AF ON ELIQUIS , HTN , GERD , OSTEOPOROSIS ,INSOMNIA ,ANEMIA ,gib ,glaucoma ,carpal tunnel syndrome ,hld  sent from [facility] for temp of 100.4 associated with  Left ear pain cough. Was given 500mg of tylenol at 2pm.  Unable to obtain further history from patient She denies l ear ache since arrival to ER , she states her cough resolevd as well but as per aid at bedside patient has wet productive cough .Patient was admitted for tx of URI/sinus infection  and possible bronchitis .Seen by pulm Dr Estrella and cardiologist Dr Grande .Found to be anemic and workup ordered Patient deneid chest pain and SOB Palliative care consult requested ,to discuss advance directives and complete MOLST (17 Nov 2019 08:08)      REVIEW OF SYSTEMS:    CONSTITUTIONAL: No fever, weight loss, or fatigue  EYES: No eye pain, visual disturbances, or discharge  ENMT:  No difficulty hearing, tinnitus, vertigo; No sinus or throat pain  NECK: No pain or stiffness  BREASTS: No pain, masses, or nipple discharge  RESPIRATORY: No cough, wheezing, chills or hemoptysis; No shortness of breath  CARDIOVASCULAR: No chest pain, palpitations, dizziness, or leg swelling  GASTROINTESTINAL: No abdominal or epigastric pain. No nausea, vomiting, or hematemesis; No diarrhea or constipation. No melena or hematochezia.  GENITOURINARY: No dysuria, frequency, hematuria, or incontinence  NEUROLOGICAL: No headaches, memory loss, loss of strength, numbness, or tremors  SKIN: No itching, burning, rashes, or lesions   LYMPH NODES: No enlarged glands  ENDOCRINE: No heat or cold intolerance; No hair loss  MUSCULOSKELETAL: No joint pain or swelling; No muscle, back, or extremity pain  PSYCHIATRIC: No depression, anxiety, mood swings, or difficulty sleeping  HEME/LYMPH: No easy bruising, or bleeding gums  ALLERGY AND IMMUNOLOGIC: No hives or eczema    MEDICATIONS  (STANDING):  apixaban 2.5 milliGRAM(s) Oral two times a day  ascorbic acid 500 milliGRAM(s) Oral daily  atorvastatin 10 milliGRAM(s) Oral at bedtime  bimatoprost 0.01% Ophthalmic Solution 1 Drop(s) Both EYES at bedtime  calcitriol   Capsule 0.25 MICROGram(s) Oral daily  donepezil 5 milliGRAM(s) Oral at bedtime  ferrous    sulfate 325 milliGRAM(s) Oral daily  guaiFENesin  milliGRAM(s) Oral every 12 hours  lactobacillus acidophilus 1 Tablet(s) Oral every 8 hours  pantoprazole    Tablet 40 milliGRAM(s) Oral before breakfast  sodium chloride 0.45%. 1000 milliLiter(s) (50 mL/Hr) IV Continuous <Continuous>    MEDICATIONS  (PRN):  acetaminophen   Tablet .. 650 milliGRAM(s) Oral every 6 hours PRN Temp greater or equal to 38C (100.4F), Mild Pain (1 - 3)      ALLERGIES: PCN, Sulfa (Urticaria)  penicillin (Unknown)  sulfa drugs (Unknown)      FAMILY HISTORY:  No pertinent family history in first degree relatives      PHYSICAL EXAMINATION:  -----------------------------  T(C): 36.5 (11-20-19 @ 07:15), Max: 36.8 (11-19-19 @ 15:56)  HR: 55 (11-20-19 @ 07:15) (55 - 67)  BP: 144/90 (11-20-19 @ 07:15) (128/79 - 144/90)  RR: 18 (11-20-19 @ 07:15) (17 - 18)  SpO2: 99% (11-20-19 @ 07:15) (96% - 99%)  Wt(kg): --    11-19 @ 07:01  -  11-20 @ 07:00  --------------------------------------------------------  IN:    Oral Fluid: 840 mL  Total IN: 840 mL    OUT:  Total OUT: 0 mL    Total NET: 840 mL            Constitutional: well developed, normal appearance, well groomed, well nourished, no deformities and no acute distress.   Eyes: the conjunctiva exhibited no abnormalities and the eyelids demonstrated no xanthelasmas.   HEENT: normal oral mucosa, no oral pallor and no oral cyanosis.   Neck: normal jugular venous A waves present, normal jugular venous V waves present and no jugular venous kelley A waves.   Pulmonary: no respiratory distress, normal respiratory rhythm and effort, no accessory muscle use and lungs were clear to auscultation bilaterally.   Cardiovascular: heart rate and rhythm were normal, normal S1 and S2 and no murmur, gallop, rub, heave or thrill are present.   Abdomen: soft, non-tender, no hepato-splenomegaly and no abdominal mass palpated.   Musculoskeletal: the gait could not be assessed..   Extremities: no clubbing of the fingernails, no localized cyanosis, no petechial hemorrhages and no ischemic changes.   Skin: normal skin color and pigmentation, no rash, no venous stasis, no skin lesions, no skin ulcer and no xanthoma was observed.   Psychiatric: oriented to person, place, and time, the affect was normal, the mood was normal and not feeling anxious.     LABS:   --------  11-20    142  |  116<H>  |  41<H>  ----------------------------<  69<L>  4.8   |  16<L>  |  1.60<H>    Ca    9.4      20 Nov 2019 07:01                           9.5    4.74  )-----------( 196      ( 20 Nov 2019 07:01 )             30.2                 RADIOLOGY:  -----------------        ECG:

## 2019-11-20 NOTE — DISCHARGE NOTE PROVIDER - CARE PROVIDER_API CALL
Noe Estrella)  Critical Care Medicine; Internal Medicine; Pulmonary Disease  [address] [place] [address]  Phone: [phone]  Fax: [phone]  Follow Up Time: 1-3 days

## 2019-11-20 NOTE — DISCHARGE NOTE PROVIDER - NSDCCPCAREPLAN_GEN_ALL_CORE_FT
PRINCIPAL DISCHARGE DIAGNOSIS  Diagnosis: Viral syndrome  Assessment and Plan of Treatment:       SECONDARY DISCHARGE DIAGNOSES  Diagnosis: Infection due to human metapneumovirus (hMPV)  Assessment and Plan of Treatment:     Diagnosis: URI (upper respiratory infection)  Assessment and Plan of Treatment: URI (upper respiratory infection)    Diagnosis: Otitis media, acute  Assessment and Plan of Treatment: Otitis media, acute    Diagnosis: HTN (hypertension)  Assessment and Plan of Treatment: HTN (hypertension)    Diagnosis: Dementia  Assessment and Plan of Treatment: Dementia    Diagnosis: ASHD (arteriosclerotic heart disease)  Assessment and Plan of Treatment: ASHD (arteriosclerotic heart disease)    Diagnosis: Glaucoma  Assessment and Plan of Treatment: Glaucoma    Diagnosis: Hypertension, unspecified type  Assessment and Plan of Treatment: Hypertension, unspecified type    Diagnosis: Afib  Assessment and Plan of Treatment: Afib    Diagnosis: Dyslipidemia  Assessment and Plan of Treatment: Dyslipidemia    Diagnosis: Renal failure  Assessment and Plan of Treatment: Renal failure

## 2019-11-20 NOTE — DISCHARGE NOTE PROVIDER - NSDCMRMEDTOKEN_GEN_ALL_CORE_FT
acetaminophen 500 mg oral tablet: 2 tab(s) orally 2 times a day, As Needed  apixaban 2.5 mg oral tablet: 1 tab(s) orally every 12 hours  ascorbic acid 500 mg oral tablet: 1 tab(s) orally once a day  Avapro 75 mg oral tablet: 0.5 tab(s) orally once a day  calcitriol 0.25 mcg oral capsule: 1 cap(s) orally once a day  donepezil 10 mg oral tablet: 1 tab(s) orally once a day (at bedtime)  ferrous sulfate 324 mg (65 mg elemental iron) oral tablet: orally once a day  guaiFENesin 600 mg oral tablet, extended release: 1 tab(s) orally every 12 hours  isradipine 2.5 mg oral capsule: 1 cap(s) orally once a day  Multiple Vitamins oral capsule: 1 cap(s) orally once a day  pravastatin 40 mg oral tablet: 1 tab(s) orally once a day (at bedtime)  Total B with C oral tablet: 1 tab(s) orally once a day

## 2019-11-20 NOTE — PROGRESS NOTE ADULT - SUBJECTIVE AND OBJECTIVE BOX
Patient is a 89y Female whom presented to the hospital with ckd and aiden   pt seen and examined nad   PAST MEDICAL & SURGICAL HISTORY:  htn   ckd     MEDICATIONS  (STANDING):  apixaban 2.5 milliGRAM(s) Oral two times a day  ascorbic acid 500 milliGRAM(s) Oral daily  atorvastatin 10 milliGRAM(s) Oral at bedtime  calcitriol   Capsule 0.25 MICROGram(s) Oral daily  donepezil 5 milliGRAM(s) Oral at bedtime  ferrous    sulfate 325 milliGRAM(s) Oral daily  guaiFENesin  milliGRAM(s) Oral every 12 hours  lactobacillus acidophilus 1 Tablet(s) Oral every 8 hours  levoFLOXacin IVPB 250 milliGRAM(s) IV Intermittent every 24 hours  pantoprazole    Tablet 40 milliGRAM(s) Oral before breakfast  sodium chloride 0.45%. 1000 milliLiter(s) (50 mL/Hr) IV Continuous <Continuous>      Allergies    penicillin (Unknown)  sulfa drugs (Unknown)    Intolerances    Bananas (Other (Mild to Mod))  Potatoes (Other (Mild to Mod))      SOCIAL HISTORY:  Denies ETOh,Smoking,     FAMILY HISTORY:  family history is review no h/o kidney disease in family     REVIEW OF SYSTEMS:    CONSTITUTIONAL: pos  weakness, fevers or chills  RESPIRATORY: No cough, wheezing, hemoptysis; No shortness of breath  GASTROINTESTINAL: No abdominal or epigastric pain. No nausea, vomiting,     No diarrhea or constipation. No melena   GENITOURINARY: No dysuria, frequency or hematuria  SKIN: dry                                                9.5    4.74  )-----------( 196      ( 20 Nov 2019 07:01 )             30.2       CBC Full  -  ( 20 Nov 2019 07:01 )  WBC Count : 4.74 K/uL  RBC Count : 3.46 M/uL  Hemoglobin : 9.5 g/dL  Hematocrit : 30.2 %  Platelet Count - Automated : 196 K/uL  Mean Cell Volume : 87.3 fl  Mean Cell Hemoglobin : 27.5 pg  Mean Cell Hemoglobin Concentration : 31.5 gm/dL  Auto Neutrophil # : x  Auto Lymphocyte # : x  Auto Monocyte # : x  Auto Eosinophil # : x  Auto Basophil # : x  Auto Neutrophil % : x  Auto Lymphocyte % : x  Auto Monocyte % : x  Auto Eosinophil % : x  Auto Basophil % : x      11-20    142  |  116<H>  |  41<H>  ----------------------------<  69<L>  4.8   |  16<L>  |  1.60<H>    Ca    9.4      20 Nov 2019 07:01        CAPILLARY BLOOD GLUCOSE          Vital Signs Last 24 Hrs  T(C): 36.5 (20 Nov 2019 07:15), Max: 36.8 (19 Nov 2019 15:56)  T(F): 97.7 (20 Nov 2019 07:15), Max: 98.2 (19 Nov 2019 15:56)  HR: 55 (20 Nov 2019 07:15) (55 - 67)  BP: 144/90 (20 Nov 2019 07:15) (128/79 - 144/90)  BP(mean): --  RR: 18 (20 Nov 2019 07:15) (17 - 18)  SpO2: 99% (20 Nov 2019 07:15) (96% - 99%)                    PHYSICAL EXAM:    Constitutional: NAD  HEENT: conjunctive   clear   Neck:  No JVD  Respiratory: decrease bs b/l   Cardiovascular: S1 and S2  Gastrointestinal: BS+, soft, NT/ND  Extremities: No peripheral edema  Neurological: no focal deficits  Psychiatric: Normal mood, normal affect  : No Lamar  Skin: dry   Access: Not applicable

## 2019-11-20 NOTE — PROGRESS NOTE ADULT - SUBJECTIVE AND OBJECTIVE BOX
FEROZ HARKINS    PLV 2NOR 234 D1    Patient is a 89y old  Female who presents with a chief complaint of cough ,left earache and fever (20 Nov 2019 09:38)       Allergies    PCN, Sulfa (Urticaria)  penicillin (Unknown)  sulfa drugs (Unknown)    Intolerances    Bananas (Other (Mild to Mod))  Potatoes (Other (Mild to Mod))      HPI:  89 yo female with  hx of dementia ,CAD ,AF ON ELIQUIS , HTN , GERD , OSTEOPOROSIS ,INSOMNIA ,ANEMIA ,gib ,glaucoma ,carpal tunnel syndrome ,hld  sent from [facility] for temp of 100.4 associated with  Left ear pain cough. Was given 500mg of tylenol at 2pm.  Unable to obtain further history from patient She denies l ear ache since arrival to ER , she states her cough resolevd as well but as per aid at bedside patient has wet productive cough .Patient was admitted for tx of URI/sinus infection  and possible bronchitis .Seen by pulm Dr Estrella and cardiologist Dr Grande .Found to be anemic and workup ordered Patient deneid chest pain and SOB Palliative care consult requested ,to discuss advance directives and complete MOLST (17 Nov 2019 08:08)      PAST MEDICAL & SURGICAL HISTORY:  Arteriosclerotic heart disease (ASHD)  Arthritis  Dementia  Trigger finger  CKD (chronic kidney disease)  DVT (deep venous thrombosis)  GI (gastrointestinal bleed)  Anemia Iron Deficiency  H/o Diverticulosis  Carpal Tunnel Syndrome  Dyslipidemia  Glaucoma  OA (Osteoarthritis)  HTN (Hypertension)  CAD (Coronary Artery Disease)  Ariel filter in place: placed 2012  H/o Ovarian Cyst  Cataract  bilateral 2008  Knee Arthroplasty right 8/2009 & left 9/2010      FAMILY HISTORY:  No pertinent family history in first degree relatives        MEDICATIONS   acetaminophen   Tablet .. 650 milliGRAM(s) Oral every 6 hours PRN  apixaban 2.5 milliGRAM(s) Oral two times a day  ascorbic acid 500 milliGRAM(s) Oral daily  atorvastatin 10 milliGRAM(s) Oral at bedtime  bimatoprost 0.01% Ophthalmic Solution 1 Drop(s) Both EYES at bedtime  calcitriol   Capsule 0.25 MICROGram(s) Oral daily  donepezil 5 milliGRAM(s) Oral at bedtime  ferrous    sulfate 325 milliGRAM(s) Oral daily  guaiFENesin  milliGRAM(s) Oral every 12 hours  lactobacillus acidophilus 1 Tablet(s) Oral every 8 hours  pantoprazole    Tablet 40 milliGRAM(s) Oral before breakfast  sodium chloride 0.45%. 1000 milliLiter(s) IV Continuous <Continuous>      Vital Signs Last 24 Hrs  T(C): 36.5 (20 Nov 2019 07:15), Max: 36.8 (19 Nov 2019 15:56)  T(F): 97.7 (20 Nov 2019 07:15), Max: 98.2 (19 Nov 2019 15:56)  HR: 55 (20 Nov 2019 07:15) (55 - 67)  BP: 144/90 (20 Nov 2019 07:15) (128/79 - 144/90)  BP(mean): --  RR: 18 (20 Nov 2019 07:15) (17 - 18)  SpO2: 99% (20 Nov 2019 07:15) (96% - 99%)      11-19-19 @ 07:01  -  11-20-19 @ 07:00  --------------------------------------------------------  IN: 840 mL / OUT: 0 mL / NET: 840 mL            LABS:                        9.5    4.74  )-----------( 196      ( 20 Nov 2019 07:01 )             30.2     11-20    142  |  116<H>  |  41<H>  ----------------------------<  69<L>  4.8   |  16<L>  |  1.60<H>    Ca    9.4      20 Nov 2019 07:01                WBC:  WBC Count: 4.74 K/uL (11-20 @ 07:01)  WBC Count: 5.63 K/uL (11-19 @ 07:18)  WBC Count: 5.37 K/uL (11-18 @ 08:04)  WBC Count: 6.51 K/uL (11-17 @ 06:11)  WBC Count: 7.33 K/uL (11-16 @ 16:35)      MICROBIOLOGY:  RECENT CULTURES:  11-16 .Urine Catheterized XXXX XXXX   No growth    11-16 .Blood Blood XXXX XXXX   No growth to date.                    Sodium:  Sodium, Serum: 142 mmol/L (11-20 @ 07:01)  Sodium, Serum: 139 mmol/L (11-19 @ 07:18)  Sodium, Serum: 144 mmol/L (11-18 @ 08:04)  Sodium, Serum: 146 mmol/L (11-17 @ 06:11)  Sodium, Serum: 145 mmol/L (11-16 @ 16:35)      1.60 mg/dL 11-20 @ 07:01  1.60 mg/dL 11-19 @ 07:18  1.60 mg/dL 11-18 @ 08:04  1.90 mg/dL 11-17 @ 06:11  2.00 mg/dL 11-16 @ 16:35      Hemoglobin:  Hemoglobin: 9.5 g/dL (11-20 @ 07:01)  Hemoglobin: 9.6 g/dL (11-19 @ 07:18)  Hemoglobin: 9.8 g/dL (11-18 @ 08:04)  Hemoglobin: 10.6 g/dL (11-17 @ 13:18)  Hemoglobin: 9.3 g/dL (11-17 @ 06:11)  Hemoglobin: 11.3 g/dL (11-16 @ 16:35)      Platelets: Platelet Count - Automated: 196 K/uL (11-20 @ 07:01)  Platelet Count - Automated: 184 K/uL (11-19 @ 07:18)  Platelet Count - Automated: 172 K/uL (11-18 @ 08:04)  Platelet Count - Automated: 171 K/uL (11-17 @ 06:11)  Platelet Count - Automated: 222 K/uL (11-16 @ 16:35)              RADIOLOGY & ADDITIONAL STUDIES:

## 2019-11-20 NOTE — PROGRESS NOTE ADULT - ASSESSMENT
88 yo female hx of dementia, sent from [facility] fellowship for temp of 100.4 and left ear pain (but patient currently denies ear pain) and shaking with cough now with ckd and aiden

## 2020-02-02 ENCOUNTER — EMERGENCY (EMERGENCY)
Facility: HOSPITAL | Age: 85
LOS: 0 days | Discharge: HOME | End: 2020-02-02
Attending: EMERGENCY MEDICINE
Payer: MEDICARE

## 2020-02-02 VITALS
OXYGEN SATURATION: 96 % | WEIGHT: 117.07 LBS | HEIGHT: 63 IN | SYSTOLIC BLOOD PRESSURE: 118 MMHG | TEMPERATURE: 98 F | DIASTOLIC BLOOD PRESSURE: 59 MMHG | HEART RATE: 52 BPM | RESPIRATION RATE: 16 BRPM

## 2020-02-02 VITALS
TEMPERATURE: 98 F | OXYGEN SATURATION: 98 % | HEART RATE: 78 BPM | RESPIRATION RATE: 19 BRPM | DIASTOLIC BLOOD PRESSURE: 70 MMHG | SYSTOLIC BLOOD PRESSURE: 132 MMHG

## 2020-02-02 DIAGNOSIS — E78.5 HYPERLIPIDEMIA, UNSPECIFIED: ICD-10-CM

## 2020-02-02 DIAGNOSIS — I25.10 ATHEROSCLEROTIC HEART DISEASE OF NATIVE CORONARY ARTERY WITHOUT ANGINA PECTORIS: ICD-10-CM

## 2020-02-02 DIAGNOSIS — Z88.2 ALLERGY STATUS TO SULFONAMIDES: ICD-10-CM

## 2020-02-02 DIAGNOSIS — Z88.0 ALLERGY STATUS TO PENICILLIN: ICD-10-CM

## 2020-02-02 DIAGNOSIS — H40.9 UNSPECIFIED GLAUCOMA: ICD-10-CM

## 2020-02-02 DIAGNOSIS — Z95.828 PRESENCE OF OTHER VASCULAR IMPLANTS AND GRAFTS: Chronic | ICD-10-CM

## 2020-02-02 DIAGNOSIS — D50.9 IRON DEFICIENCY ANEMIA, UNSPECIFIED: ICD-10-CM

## 2020-02-02 DIAGNOSIS — M19.90 UNSPECIFIED OSTEOARTHRITIS, UNSPECIFIED SITE: ICD-10-CM

## 2020-02-02 DIAGNOSIS — M79.661 PAIN IN RIGHT LOWER LEG: ICD-10-CM

## 2020-02-02 DIAGNOSIS — Z86.718 PERSONAL HISTORY OF OTHER VENOUS THROMBOSIS AND EMBOLISM: ICD-10-CM

## 2020-02-02 DIAGNOSIS — G56.00 CARPAL TUNNEL SYNDROME, UNSPECIFIED UPPER LIMB: ICD-10-CM

## 2020-02-02 DIAGNOSIS — L03.115 CELLULITIS OF RIGHT LOWER LIMB: ICD-10-CM

## 2020-02-02 DIAGNOSIS — N18.9 CHRONIC KIDNEY DISEASE, UNSPECIFIED: ICD-10-CM

## 2020-02-02 DIAGNOSIS — F03.90 UNSPECIFIED DEMENTIA WITHOUT BEHAVIORAL DISTURBANCE: ICD-10-CM

## 2020-02-02 LAB
ANION GAP SERPL CALC-SCNC: 7 MMOL/L — SIGNIFICANT CHANGE UP (ref 5–17)
APTT BLD: 35.2 SEC — SIGNIFICANT CHANGE UP (ref 28.5–37)
BASOPHILS # BLD AUTO: 0.02 K/UL — SIGNIFICANT CHANGE UP (ref 0–0.2)
BASOPHILS NFR BLD AUTO: 0.3 % — SIGNIFICANT CHANGE UP (ref 0–2)
BUN SERPL-MCNC: 50 MG/DL — HIGH (ref 7–23)
CALCIUM SERPL-MCNC: 10.3 MG/DL — HIGH (ref 8.5–10.1)
CHLORIDE SERPL-SCNC: 117 MMOL/L — HIGH (ref 96–108)
CO2 SERPL-SCNC: 25 MMOL/L — SIGNIFICANT CHANGE UP (ref 22–31)
CREAT SERPL-MCNC: 1.87 MG/DL — HIGH (ref 0.5–1.3)
EOSINOPHIL # BLD AUTO: 0.08 K/UL — SIGNIFICANT CHANGE UP (ref 0–0.5)
EOSINOPHIL NFR BLD AUTO: 1.3 % — SIGNIFICANT CHANGE UP (ref 0–6)
GLUCOSE SERPL-MCNC: 64 MG/DL — LOW (ref 70–99)
HCT VFR BLD CALC: 34.6 % — SIGNIFICANT CHANGE UP (ref 34.5–45)
HGB BLD-MCNC: 10.5 G/DL — LOW (ref 11.5–15.5)
IMM GRANULOCYTES NFR BLD AUTO: 0.3 % — SIGNIFICANT CHANGE UP (ref 0–1.5)
INR BLD: 1.23 RATIO — HIGH (ref 0.88–1.16)
LYMPHOCYTES # BLD AUTO: 0.6 K/UL — LOW (ref 1–3.3)
LYMPHOCYTES # BLD AUTO: 9.8 % — LOW (ref 13–44)
MCHC RBC-ENTMCNC: 27.6 PG — SIGNIFICANT CHANGE UP (ref 27–34)
MCHC RBC-ENTMCNC: 30.3 GM/DL — LOW (ref 32–36)
MCV RBC AUTO: 90.8 FL — SIGNIFICANT CHANGE UP (ref 80–100)
MONOCYTES # BLD AUTO: 0.53 K/UL — SIGNIFICANT CHANGE UP (ref 0–0.9)
MONOCYTES NFR BLD AUTO: 8.7 % — SIGNIFICANT CHANGE UP (ref 2–14)
NEUTROPHILS # BLD AUTO: 4.85 K/UL — SIGNIFICANT CHANGE UP (ref 1.8–7.4)
NEUTROPHILS NFR BLD AUTO: 79.6 % — HIGH (ref 43–77)
NRBC # BLD: 0 /100 WBCS — SIGNIFICANT CHANGE UP (ref 0–0)
PLATELET # BLD AUTO: 240 K/UL — SIGNIFICANT CHANGE UP (ref 150–400)
POTASSIUM SERPL-MCNC: 4.6 MMOL/L — SIGNIFICANT CHANGE UP (ref 3.5–5.3)
POTASSIUM SERPL-SCNC: 4.6 MMOL/L — SIGNIFICANT CHANGE UP (ref 3.5–5.3)
PROTHROM AB SERPL-ACNC: 13.9 SEC — HIGH (ref 10–12.9)
RBC # BLD: 3.81 M/UL — SIGNIFICANT CHANGE UP (ref 3.8–5.2)
RBC # FLD: 17.7 % — HIGH (ref 10.3–14.5)
SODIUM SERPL-SCNC: 149 MMOL/L — HIGH (ref 135–145)
WBC # BLD: 6.1 K/UL — SIGNIFICANT CHANGE UP (ref 3.8–10.5)
WBC # FLD AUTO: 6.1 K/UL — SIGNIFICANT CHANGE UP (ref 3.8–10.5)

## 2020-02-02 PROCEDURE — 99284 EMERGENCY DEPT VISIT MOD MDM: CPT

## 2020-02-02 PROCEDURE — 93971 EXTREMITY STUDY: CPT | Mod: 26,RT

## 2020-02-02 RX ORDER — ACETAMINOPHEN 500 MG
2 TABLET ORAL
Qty: 20 | Refills: 0
Start: 2020-02-02

## 2020-02-02 RX ADMIN — Medication 100 MILLIGRAM(S): at 15:52

## 2020-02-02 NOTE — ED ADULT NURSE NOTE - CHIEF COMPLAINT QUOTE
Right lower leg pain and swelling noted by family, lives at [facility] Assistant living, Daughter was placing lotion on pt's legs and noticed the redness today

## 2020-02-02 NOTE — ED ADULT TRIAGE NOTE - CHIEF COMPLAINT QUOTE
Right lower leg pain and swelling noted by family, lives at [facility] Assistant living, Daughter was placing lotion on pt's legs and noticed the redness today

## 2020-02-02 NOTE — ED PROVIDER NOTE - CLINICAL SUMMARY MEDICAL DECISION MAKING FREE TEXT BOX
89F with left lower ext cellulitis and no evidence of SIRS or DVT.  Will d/c patient home on antibiotics and re-eval by PMD 89F with right lower ext cellulitis and no evidence of SIRS or DVT.  Will d/c patient home on antibiotics and re-eval by PMD

## 2020-02-02 NOTE — ED PROVIDER NOTE - PMH
Anemia Iron Deficiency    Arteriosclerotic heart disease (ASHD)    Arthritis    CAD (Coronary Artery Disease)    Carpal Tunnel Syndrome    CKD (chronic kidney disease)    Dementia    DVT (deep venous thrombosis)    Dyslipidemia    GI (gastrointestinal bleed)    Glaucoma    H/o Diverticulosis    HTN (Hypertension)    OA (Osteoarthritis)    Trigger finger

## 2020-02-02 NOTE — ED PROVIDER NOTE - PROGRESS NOTE DETAILS
Pt remains comfortable in the bed.  Discussed w/ patient and daughter all lab and radiographic results; as well as the disposition discussion with Dr. Emerson.  Will d/c the patient home with oral antibiotics,

## 2020-02-02 NOTE — ED PROVIDER NOTE - PATIENT PORTAL LINK FT
You can access the FollowMyHealth Patient Portal offered by [facility] by registering at the following website: [email or web address]. By joining [facility]’s FollowMyHealth portal, you will also be able to view your health information using other applications (apps) compatible with our system.

## 2020-02-02 NOTE — ED PROVIDER NOTE - CARE PLAN
Principal Discharge DX:	Cellulitis of left lower extremity Principal Discharge DX:	Cellulitis of right lower extremity

## 2020-02-02 NOTE — ED ADULT NURSE NOTE - NSIMPLEMENTINTERV_GEN_ALL_ED
Implemented All Universal Safety Interventions:  Ramer to call system. Call bell, personal items and telephone within reach. Instruct patient to call for assistance. Room bathroom lighting operational. Non-slip footwear when patient is off stretcher. Physically safe environment: no spills, clutter or unnecessary equipment. Stretcher in lowest position, wheels locked, appropriate side rails in place.

## 2020-02-02 NOTE — ED PROVIDER NOTE - SKIN RASH DESCRIPTION
MACULAR/6x 7 cm area of erythema and warmth surrounding a raised superficial vein with small excoriation over the area.  no d/c/REDDENED

## 2020-02-02 NOTE — ED PROVIDER NOTE - PSH
Cataract  bilateral 2008    Winkelman filter in place  placed 2012  H/o Ovarian Cyst    Knee Arthroplasty right 8/2009 & left 9/2010

## 2020-02-02 NOTE — ED PROVIDER NOTE - OBJECTIVE STATEMENT
89F with right lower leg redness and swelling of unknown duration.  Pt was without complaints or concerns.  The patient was brought to the ED after her daughter notices the redness and swelling today while putting lotion on her legs.

## 2020-02-07 NOTE — PHYSICAL THERAPY INITIAL EVALUATION ADULT - MODIFIED CLINICAL TEST OF SENSORY INTEGRATION IN BALANCE TEST
You can access the FollowMyHealth Patient Portal offered by [facility] by registering at the following website: [email or web address]. By joining [facility]’s FollowMyHealth portal, you will also be able to view your health information using other applications (apps) compatible with our system. Barthel Index: Feeding Score _10__, Bathing Score _0__, Grooming Score _0__, Dressing Score _5__, Bowels Score _10__, Bladder Score _10__, Toilet Score _5__, Transfers Score _10__, Mobility Score _0__, Stairs Score _0__,     Total Score _50__

## 2020-05-11 NOTE — ED ADULT NURSE NOTE - THOUGHTS OF SUICIDE/SELF-HARM YN, MLM
-- Message is from the [facility]--    Called and left voicemail to schedule post hospital follow appointment.     Hospital:  [facility]  Discharge date:  5/7/20  Appointment needed with PCP by: 5/12/20     ACC AGENT: If patient calls back, follow Guided Workflow to schedule the PHF appointment.    Escalate to leadership if unable to schedule an appointment within that timeframe.    
No

## 2020-07-14 NOTE — PATIENT PROFILE ADULT. - AS SC BRADEN FRICTION
(3) no apparent problem Split-Thickness Skin Graft Text: The defect edges were debeveled with a #15 scalpel blade.  Given the location of the defect, shape of the defect and the proximity to free margins a split thickness skin graft was deemed most appropriate.  Using a sterile surgical marker, the primary defect shape was transferred to the donor site. The split thickness graft was then harvested.  The skin graft was then placed in the primary defect and oriented appropriately.

## 2020-09-25 ENCOUNTER — INPATIENT (INPATIENT)
Facility: HOSPITAL | Age: 85
LOS: 2 days | Discharge: NURSING, REHAB OR LONG-TERM CARE | DRG: 481 | End: 2020-09-28
Attending: INTERNAL MEDICINE | Admitting: INTERNAL MEDICINE
Payer: MEDICARE

## 2020-09-25 ENCOUNTER — EMERGENCY (EMERGENCY)
Facility: HOSPITAL | Age: 85
LOS: 1 days | Discharge: ANOTHER HOSPITAL | End: 2020-09-25
Attending: EMERGENCY MEDICINE | Admitting: EMERGENCY MEDICINE
Payer: MEDICARE

## 2020-09-25 ENCOUNTER — TRANSCRIPTION ENCOUNTER (OUTPATIENT)
Age: 85
End: 2020-09-25

## 2020-09-25 VITALS
DIASTOLIC BLOOD PRESSURE: 99 MMHG | SYSTOLIC BLOOD PRESSURE: 171 MMHG | TEMPERATURE: 94 F | OXYGEN SATURATION: 98 % | HEART RATE: 66 BPM | HEIGHT: 63 IN | WEIGHT: 117.95 LBS | RESPIRATION RATE: 20 BRPM

## 2020-09-25 VITALS
WEIGHT: 130.07 LBS | RESPIRATION RATE: 16 BRPM | DIASTOLIC BLOOD PRESSURE: 64 MMHG | TEMPERATURE: 98 F | OXYGEN SATURATION: 98 % | SYSTOLIC BLOOD PRESSURE: 113 MMHG | HEART RATE: 71 BPM

## 2020-09-25 VITALS
OXYGEN SATURATION: 98 % | SYSTOLIC BLOOD PRESSURE: 139 MMHG | TEMPERATURE: 97 F | HEART RATE: 70 BPM | DIASTOLIC BLOOD PRESSURE: 57 MMHG | RESPIRATION RATE: 16 BRPM

## 2020-09-25 DIAGNOSIS — N18.9 CHRONIC KIDNEY DISEASE, UNSPECIFIED: ICD-10-CM

## 2020-09-25 DIAGNOSIS — S72.002A FRACTURE OF UNSPECIFIED PART OF NECK OF LEFT FEMUR, INITIAL ENCOUNTER FOR CLOSED FRACTURE: ICD-10-CM

## 2020-09-25 DIAGNOSIS — F03.90 UNSPECIFIED DEMENTIA WITHOUT BEHAVIORAL DISTURBANCE: ICD-10-CM

## 2020-09-25 DIAGNOSIS — N18.2 CHRONIC KIDNEY DISEASE, STAGE 2 (MILD): ICD-10-CM

## 2020-09-25 DIAGNOSIS — M19.90 UNSPECIFIED OSTEOARTHRITIS, UNSPECIFIED SITE: ICD-10-CM

## 2020-09-25 DIAGNOSIS — Z95.828 PRESENCE OF OTHER VASCULAR IMPLANTS AND GRAFTS: Chronic | ICD-10-CM

## 2020-09-25 DIAGNOSIS — T68.XXXA HYPOTHERMIA, INITIAL ENCOUNTER: ICD-10-CM

## 2020-09-25 DIAGNOSIS — I10 ESSENTIAL (PRIMARY) HYPERTENSION: ICD-10-CM

## 2020-09-25 DIAGNOSIS — H40.9 UNSPECIFIED GLAUCOMA: ICD-10-CM

## 2020-09-25 DIAGNOSIS — I25.10 ATHEROSCLEROTIC HEART DISEASE OF NATIVE CORONARY ARTERY WITHOUT ANGINA PECTORIS: ICD-10-CM

## 2020-09-25 DIAGNOSIS — Z29.9 ENCOUNTER FOR PROPHYLACTIC MEASURES, UNSPECIFIED: ICD-10-CM

## 2020-09-25 DIAGNOSIS — I48.91 UNSPECIFIED ATRIAL FIBRILLATION: ICD-10-CM

## 2020-09-25 LAB
ABO RH CONFIRMATION: SIGNIFICANT CHANGE UP
ALBUMIN SERPL ELPH-MCNC: 3.5 G/DL — SIGNIFICANT CHANGE UP (ref 3.3–5)
ALP SERPL-CCNC: 57 U/L — SIGNIFICANT CHANGE UP (ref 30–120)
ALT FLD-CCNC: 28 U/L DA — SIGNIFICANT CHANGE UP (ref 10–60)
ANION GAP SERPL CALC-SCNC: 5 MMOL/L — SIGNIFICANT CHANGE UP (ref 5–17)
ANION GAP SERPL CALC-SCNC: 8 MMOL/L — SIGNIFICANT CHANGE UP (ref 5–17)
APTT BLD: 28.2 SEC — SIGNIFICANT CHANGE UP (ref 27.5–35.5)
AST SERPL-CCNC: 27 U/L — SIGNIFICANT CHANGE UP (ref 10–40)
BASOPHILS # BLD AUTO: 0.01 K/UL — SIGNIFICANT CHANGE UP (ref 0–0.2)
BASOPHILS NFR BLD AUTO: 0.2 % — SIGNIFICANT CHANGE UP (ref 0–2)
BILIRUB SERPL-MCNC: 0.2 MG/DL — SIGNIFICANT CHANGE UP (ref 0.2–1.2)
BLD GP AB SCN SERPL QL: SIGNIFICANT CHANGE UP
BUN SERPL-MCNC: 51 MG/DL — HIGH (ref 7–23)
BUN SERPL-MCNC: 57 MG/DL — HIGH (ref 7–23)
CALCIUM SERPL-MCNC: 11 MG/DL — HIGH (ref 8.4–10.5)
CALCIUM SERPL-MCNC: 9.1 MG/DL — SIGNIFICANT CHANGE UP (ref 8.5–10.1)
CHLORIDE SERPL-SCNC: 109 MMOL/L — HIGH (ref 96–108)
CHLORIDE SERPL-SCNC: 119 MMOL/L — HIGH (ref 96–108)
CO2 SERPL-SCNC: 24 MMOL/L — SIGNIFICANT CHANGE UP (ref 22–31)
CO2 SERPL-SCNC: 27 MMOL/L — SIGNIFICANT CHANGE UP (ref 22–31)
CREAT SERPL-MCNC: 1.8 MG/DL — HIGH (ref 0.5–1.3)
CREAT SERPL-MCNC: 2.04 MG/DL — HIGH (ref 0.5–1.3)
EOSINOPHIL # BLD AUTO: 0.04 K/UL — SIGNIFICANT CHANGE UP (ref 0–0.5)
EOSINOPHIL NFR BLD AUTO: 0.8 % — SIGNIFICANT CHANGE UP (ref 0–6)
GLUCOSE SERPL-MCNC: 115 MG/DL — HIGH (ref 70–99)
GLUCOSE SERPL-MCNC: 79 MG/DL — SIGNIFICANT CHANGE UP (ref 70–99)
HCT VFR BLD CALC: 25.7 % — LOW (ref 34.5–45)
HCT VFR BLD CALC: 32.2 % — LOW (ref 34.5–45)
HGB BLD-MCNC: 10.1 G/DL — LOW (ref 11.5–15.5)
HGB BLD-MCNC: 8.5 G/DL — LOW (ref 11.5–15.5)
IMM GRANULOCYTES NFR BLD AUTO: 0.6 % — SIGNIFICANT CHANGE UP (ref 0–1.5)
INR BLD: 1.24 RATIO — HIGH (ref 0.88–1.16)
LACTATE SERPL-SCNC: 1.6 MMOL/L — SIGNIFICANT CHANGE UP (ref 0.7–2)
LYMPHOCYTES # BLD AUTO: 1.48 K/UL — SIGNIFICANT CHANGE UP (ref 1–3.3)
LYMPHOCYTES # BLD AUTO: 27.9 % — SIGNIFICANT CHANGE UP (ref 13–44)
MCHC RBC-ENTMCNC: 27.6 PG — SIGNIFICANT CHANGE UP (ref 27–34)
MCHC RBC-ENTMCNC: 28.3 PG — SIGNIFICANT CHANGE UP (ref 27–34)
MCHC RBC-ENTMCNC: 31.4 GM/DL — LOW (ref 32–36)
MCHC RBC-ENTMCNC: 33.1 GM/DL — SIGNIFICANT CHANGE UP (ref 32–36)
MCV RBC AUTO: 85.7 FL — SIGNIFICANT CHANGE UP (ref 80–100)
MCV RBC AUTO: 88 FL — SIGNIFICANT CHANGE UP (ref 80–100)
MONOCYTES # BLD AUTO: 0.36 K/UL — SIGNIFICANT CHANGE UP (ref 0–0.9)
MONOCYTES NFR BLD AUTO: 6.8 % — SIGNIFICANT CHANGE UP (ref 2–14)
NEUTROPHILS # BLD AUTO: 3.39 K/UL — SIGNIFICANT CHANGE UP (ref 1.8–7.4)
NEUTROPHILS NFR BLD AUTO: 63.7 % — SIGNIFICANT CHANGE UP (ref 43–77)
NRBC # BLD: 0 /100 WBCS — SIGNIFICANT CHANGE UP (ref 0–0)
NRBC # BLD: 0 /100 WBCS — SIGNIFICANT CHANGE UP (ref 0–0)
PLATELET # BLD AUTO: 187 K/UL — SIGNIFICANT CHANGE UP (ref 150–400)
PLATELET # BLD AUTO: 212 K/UL — SIGNIFICANT CHANGE UP (ref 150–400)
POTASSIUM SERPL-MCNC: 4.8 MMOL/L — SIGNIFICANT CHANGE UP (ref 3.5–5.3)
POTASSIUM SERPL-MCNC: 5.1 MMOL/L — SIGNIFICANT CHANGE UP (ref 3.5–5.3)
POTASSIUM SERPL-SCNC: 4.8 MMOL/L — SIGNIFICANT CHANGE UP (ref 3.5–5.3)
POTASSIUM SERPL-SCNC: 5.1 MMOL/L — SIGNIFICANT CHANGE UP (ref 3.5–5.3)
PROCALCITONIN SERPL-MCNC: 0.17 NG/ML — HIGH (ref 0.02–0.1)
PROT SERPL-MCNC: 7.1 G/DL — SIGNIFICANT CHANGE UP (ref 6–8.3)
PROTHROM AB SERPL-ACNC: 14.4 SEC — HIGH (ref 10.6–13.6)
RBC # BLD: 3 M/UL — LOW (ref 3.8–5.2)
RBC # BLD: 3.66 M/UL — LOW (ref 3.8–5.2)
RBC # FLD: 19 % — HIGH (ref 10.3–14.5)
RBC # FLD: 19.3 % — HIGH (ref 10.3–14.5)
SARS-COV-2 RNA SPEC QL NAA+PROBE: SIGNIFICANT CHANGE UP
SODIUM SERPL-SCNC: 144 MMOL/L — SIGNIFICANT CHANGE UP (ref 135–145)
SODIUM SERPL-SCNC: 148 MMOL/L — HIGH (ref 135–145)
WBC # BLD: 5.31 K/UL — SIGNIFICANT CHANGE UP (ref 3.8–10.5)
WBC # BLD: 6.91 K/UL — SIGNIFICANT CHANGE UP (ref 3.8–10.5)
WBC # FLD AUTO: 5.31 K/UL — SIGNIFICANT CHANGE UP (ref 3.8–10.5)
WBC # FLD AUTO: 6.91 K/UL — SIGNIFICANT CHANGE UP (ref 3.8–10.5)

## 2020-09-25 PROCEDURE — 71045 X-RAY EXAM CHEST 1 VIEW: CPT | Mod: 26

## 2020-09-25 PROCEDURE — 71045 X-RAY EXAM CHEST 1 VIEW: CPT | Mod: 26,77

## 2020-09-25 PROCEDURE — 93010 ELECTROCARDIOGRAM REPORT: CPT

## 2020-09-25 PROCEDURE — 73501 X-RAY EXAM HIP UNI 1 VIEW: CPT | Mod: 26,59,LT

## 2020-09-25 PROCEDURE — 72125 CT NECK SPINE W/O DYE: CPT | Mod: 26

## 2020-09-25 PROCEDURE — 87040 BLOOD CULTURE FOR BACTERIA: CPT

## 2020-09-25 PROCEDURE — 86901 BLOOD TYPING SEROLOGIC RH(D): CPT

## 2020-09-25 PROCEDURE — 73700 CT LOWER EXTREMITY W/O DYE: CPT | Mod: 26,LT

## 2020-09-25 PROCEDURE — 96361 HYDRATE IV INFUSION ADD-ON: CPT

## 2020-09-25 PROCEDURE — 96376 TX/PRO/DX INJ SAME DRUG ADON: CPT

## 2020-09-25 PROCEDURE — 84145 PROCALCITONIN (PCT): CPT

## 2020-09-25 PROCEDURE — 73502 X-RAY EXAM HIP UNI 2-3 VIEWS: CPT | Mod: 26,LT

## 2020-09-25 PROCEDURE — 99285 EMERGENCY DEPT VISIT HI MDM: CPT

## 2020-09-25 PROCEDURE — 73502 X-RAY EXAM HIP UNI 2-3 VIEWS: CPT | Mod: 26,LT,77

## 2020-09-25 PROCEDURE — 70450 CT HEAD/BRAIN W/O DYE: CPT | Mod: 26

## 2020-09-25 PROCEDURE — 86900 BLOOD TYPING SEROLOGIC ABO: CPT

## 2020-09-25 PROCEDURE — 85025 COMPLETE CBC W/AUTO DIFF WBC: CPT

## 2020-09-25 PROCEDURE — 83605 ASSAY OF LACTIC ACID: CPT

## 2020-09-25 PROCEDURE — 93005 ELECTROCARDIOGRAM TRACING: CPT

## 2020-09-25 PROCEDURE — 71045 X-RAY EXAM CHEST 1 VIEW: CPT

## 2020-09-25 PROCEDURE — 80053 COMPREHEN METABOLIC PANEL: CPT

## 2020-09-25 PROCEDURE — 73552 X-RAY EXAM OF FEMUR 2/>: CPT | Mod: 26,LT

## 2020-09-25 PROCEDURE — 96365 THER/PROPH/DIAG IV INF INIT: CPT

## 2020-09-25 PROCEDURE — 99285 EMERGENCY DEPT VISIT HI MDM: CPT | Mod: 25

## 2020-09-25 PROCEDURE — 36415 COLL VENOUS BLD VENIPUNCTURE: CPT

## 2020-09-25 PROCEDURE — 73502 X-RAY EXAM HIP UNI 2-3 VIEWS: CPT

## 2020-09-25 PROCEDURE — 86850 RBC ANTIBODY SCREEN: CPT

## 2020-09-25 PROCEDURE — 96375 TX/PRO/DX INJ NEW DRUG ADDON: CPT

## 2020-09-25 RX ORDER — MAGNESIUM HYDROXIDE 400 MG/1
30 TABLET, CHEWABLE ORAL DAILY
Refills: 0 | Status: DISCONTINUED | OUTPATIENT
Start: 2020-09-25 | End: 2020-09-26

## 2020-09-25 RX ORDER — SENNA PLUS 8.6 MG/1
2 TABLET ORAL AT BEDTIME
Refills: 0 | Status: DISCONTINUED | OUTPATIENT
Start: 2020-09-25 | End: 2020-09-26

## 2020-09-25 RX ORDER — DONEPEZIL HYDROCHLORIDE 10 MG/1
10 TABLET, FILM COATED ORAL AT BEDTIME
Refills: 0 | Status: DISCONTINUED | OUTPATIENT
Start: 2020-09-25 | End: 2020-09-26

## 2020-09-25 RX ORDER — MORPHINE SULFATE 50 MG/1
2 CAPSULE, EXTENDED RELEASE ORAL ONCE
Refills: 0 | Status: DISCONTINUED | OUTPATIENT
Start: 2020-09-25 | End: 2020-09-25

## 2020-09-25 RX ORDER — LATANOPROST 0.05 MG/ML
1 SOLUTION/ DROPS OPHTHALMIC; TOPICAL AT BEDTIME
Refills: 0 | Status: DISCONTINUED | OUTPATIENT
Start: 2020-09-25 | End: 2020-09-26

## 2020-09-25 RX ORDER — SODIUM CHLORIDE 9 MG/ML
1000 INJECTION, SOLUTION INTRAVENOUS
Refills: 0 | Status: DISCONTINUED | OUTPATIENT
Start: 2020-09-25 | End: 2020-09-25

## 2020-09-25 RX ORDER — SODIUM CHLORIDE 9 MG/ML
1000 INJECTION INTRAMUSCULAR; INTRAVENOUS; SUBCUTANEOUS ONCE
Refills: 0 | Status: COMPLETED | OUTPATIENT
Start: 2020-09-25 | End: 2020-09-25

## 2020-09-25 RX ORDER — ISRADIPINE 10 MG
1 TABLET, EXTENDED RELEASE 24 HR ORAL
Qty: 0 | Refills: 0 | DISCHARGE

## 2020-09-25 RX ORDER — PIPERACILLIN AND TAZOBACTAM 4; .5 G/20ML; G/20ML
3.38 INJECTION, POWDER, LYOPHILIZED, FOR SOLUTION INTRAVENOUS ONCE
Refills: 0 | Status: COMPLETED | OUTPATIENT
Start: 2020-09-25 | End: 2020-09-25

## 2020-09-25 RX ORDER — TRAMADOL HYDROCHLORIDE 50 MG/1
50 TABLET ORAL EVERY 6 HOURS
Refills: 0 | Status: DISCONTINUED | OUTPATIENT
Start: 2020-09-25 | End: 2020-09-25

## 2020-09-25 RX ORDER — OXYCODONE HYDROCHLORIDE 5 MG/1
2.5 TABLET ORAL EVERY 6 HOURS
Refills: 0 | Status: DISCONTINUED | OUTPATIENT
Start: 2020-09-25 | End: 2020-09-26

## 2020-09-25 RX ORDER — HYDROMORPHONE HYDROCHLORIDE 2 MG/ML
0.5 INJECTION INTRAMUSCULAR; INTRAVENOUS; SUBCUTANEOUS EVERY 6 HOURS
Refills: 0 | Status: DISCONTINUED | OUTPATIENT
Start: 2020-09-25 | End: 2020-09-26

## 2020-09-25 RX ORDER — SODIUM CHLORIDE 9 MG/ML
1650 INJECTION INTRAMUSCULAR; INTRAVENOUS; SUBCUTANEOUS ONCE
Refills: 0 | Status: COMPLETED | OUTPATIENT
Start: 2020-09-25 | End: 2020-09-25

## 2020-09-25 RX ORDER — POLYETHYLENE GLYCOL 3350 17 G/17G
17 POWDER, FOR SOLUTION ORAL DAILY
Refills: 0 | Status: DISCONTINUED | OUTPATIENT
Start: 2020-09-25 | End: 2020-09-26

## 2020-09-25 RX ORDER — AMLODIPINE BESYLATE 2.5 MG/1
2.5 TABLET ORAL DAILY
Refills: 0 | Status: DISCONTINUED | OUTPATIENT
Start: 2020-09-25 | End: 2020-09-26

## 2020-09-25 RX ORDER — ACETAMINOPHEN 500 MG
650 TABLET ORAL EVERY 6 HOURS
Refills: 0 | Status: DISCONTINUED | OUTPATIENT
Start: 2020-09-25 | End: 2020-09-26

## 2020-09-25 RX ORDER — ATORVASTATIN CALCIUM 80 MG/1
10 TABLET, FILM COATED ORAL AT BEDTIME
Refills: 0 | Status: DISCONTINUED | OUTPATIENT
Start: 2020-09-25 | End: 2020-09-26

## 2020-09-25 RX ORDER — MORPHINE SULFATE 50 MG/1
2 CAPSULE, EXTENDED RELEASE ORAL EVERY 4 HOURS
Refills: 0 | Status: DISCONTINUED | OUTPATIENT
Start: 2020-09-25 | End: 2020-09-25

## 2020-09-25 RX ORDER — SODIUM CHLORIDE 9 MG/ML
1000 INJECTION, SOLUTION INTRAVENOUS
Refills: 0 | Status: DISCONTINUED | OUTPATIENT
Start: 2020-09-25 | End: 2020-09-26

## 2020-09-25 RX ORDER — IRBESARTAN 75 MG/1
0.5 TABLET ORAL
Qty: 0 | Refills: 0 | DISCHARGE

## 2020-09-25 RX ORDER — AMIODARONE HYDROCHLORIDE 400 MG/1
100 TABLET ORAL DAILY
Refills: 0 | Status: DISCONTINUED | OUTPATIENT
Start: 2020-09-25 | End: 2020-09-26

## 2020-09-25 RX ORDER — CALCITRIOL 0.5 UG/1
0.25 CAPSULE ORAL DAILY
Refills: 0 | Status: DISCONTINUED | OUTPATIENT
Start: 2020-09-25 | End: 2020-09-26

## 2020-09-25 RX ADMIN — MORPHINE SULFATE 2 MILLIGRAM(S): 50 CAPSULE, EXTENDED RELEASE ORAL at 15:49

## 2020-09-25 RX ADMIN — PIPERACILLIN AND TAZOBACTAM 3.38 GRAM(S): 4; .5 INJECTION, POWDER, LYOPHILIZED, FOR SOLUTION INTRAVENOUS at 11:35

## 2020-09-25 RX ADMIN — MORPHINE SULFATE 2 MILLIGRAM(S): 50 CAPSULE, EXTENDED RELEASE ORAL at 12:06

## 2020-09-25 RX ADMIN — SODIUM CHLORIDE 1000 MILLILITER(S): 9 INJECTION INTRAMUSCULAR; INTRAVENOUS; SUBCUTANEOUS at 10:58

## 2020-09-25 RX ADMIN — LATANOPROST 1 DROP(S): 0.05 SOLUTION/ DROPS OPHTHALMIC; TOPICAL at 22:30

## 2020-09-25 RX ADMIN — SODIUM CHLORIDE 1650 MILLILITER(S): 9 INJECTION INTRAMUSCULAR; INTRAVENOUS; SUBCUTANEOUS at 10:57

## 2020-09-25 RX ADMIN — MORPHINE SULFATE 2 MILLIGRAM(S): 50 CAPSULE, EXTENDED RELEASE ORAL at 09:24

## 2020-09-25 RX ADMIN — DONEPEZIL HYDROCHLORIDE 10 MILLIGRAM(S): 10 TABLET, FILM COATED ORAL at 22:30

## 2020-09-25 RX ADMIN — PIPERACILLIN AND TAZOBACTAM 200 GRAM(S): 4; .5 INJECTION, POWDER, LYOPHILIZED, FOR SOLUTION INTRAVENOUS at 10:57

## 2020-09-25 RX ADMIN — ATORVASTATIN CALCIUM 10 MILLIGRAM(S): 80 TABLET, FILM COATED ORAL at 22:30

## 2020-09-25 RX ADMIN — MORPHINE SULFATE 2 MILLIGRAM(S): 50 CAPSULE, EXTENDED RELEASE ORAL at 10:20

## 2020-09-25 RX ADMIN — MORPHINE SULFATE 2 MILLIGRAM(S): 50 CAPSULE, EXTENDED RELEASE ORAL at 16:19

## 2020-09-25 RX ADMIN — SENNA PLUS 2 TABLET(S): 8.6 TABLET ORAL at 22:30

## 2020-09-25 RX ADMIN — SODIUM CHLORIDE 1000 MILLILITER(S): 9 INJECTION INTRAMUSCULAR; INTRAVENOUS; SUBCUTANEOUS at 09:25

## 2020-09-25 NOTE — CONSULT NOTE ADULT - SUBJECTIVE AND OBJECTIVE BOX
FEROZ HARKINS    PLV APER 05    Patient is a 89y old  Female who presents with a chief complaint of LEFT HIP PAIN ,S/P FALL AT [facility] (25 Sep 2020 17:37)       Allergies    penicillin (Other)  sulfa drugs (Other)    Intolerances        HPI:  89 y/o Female with PMHx HTN, HLD, OA, CKD, CAD, ovarian Ca BIBA to [place] ED  from [place]  ER due to diagnosed left hip fx. As per EMS, reports patient was from [facility] in which had unwitnessed fall. pt reports pain to left hip. Pt is a poor historian and unable to describe details of fall . As per ems, pt found to be hypothermic at [place]. pt currently on Eliquis ,last dose taken in am . pt denies headache, numbness/weakness, cp, sob, fever, abd pain, or any other complains. Found to have left intertrochanteric hip fracture Seen by orthopedic team and further imaging ordered Diagnosed with hip fx and orthopedic sx evaluation called,pain managmnet and dvt prophylaxis administrated Cardiology clearance requested for surgical treatment Palliative care consult requested ,to discuss advance directives and complete MOLST      (25 Sep 2020 17:12)      PAST MEDICAL & SURGICAL HISTORY:  Afib    Glaucoma    Dementia    CKD (chronic kidney disease)    CAD (coronary artery disease)    OA (osteoarthritis)    HLD (hyperlipidemia)    HTN (hypertension)    Dementia    No significant past surgical history        FAMILY HISTORY:        MEDICATIONS   acetaminophen   Tablet .. 650 milliGRAM(s) Oral every 6 hours PRN  aMIOdarone    Tablet 100 milliGRAM(s) Oral daily  amLODIPine   Tablet 2.5 milliGRAM(s) Oral daily  atorvastatin 10 milliGRAM(s) Oral at bedtime  bisacodyl Suppository 10 milliGRAM(s) Rectal daily PRN  calcitriol   Capsule 0.25 MICROGram(s) Oral daily  donepezil 10 milliGRAM(s) Oral at bedtime  HYDROmorphone  Injectable 0.5 milliGRAM(s) IV Push every 6 hours PRN  lactated ringers. 1000 milliLiter(s) IV Continuous <Continuous>  latanoprost 0.005% Ophthalmic Solution 1 Drop(s) Both EYES at bedtime  magnesium hydroxide Suspension 30 milliLiter(s) Oral daily PRN  multivitamin 1 Tablet(s) Oral daily  oxyCODONE    IR 2.5 milliGRAM(s) Oral every 6 hours PRN  polyethylene glycol 3350 17 Gram(s) Oral daily  senna 2 Tablet(s) Oral at bedtime      Vital Signs Last 24 Hrs  T(C): 36.7 (25 Sep 2020 12:45), Max: 36.7 (25 Sep 2020 12:45)  T(F): 98 (25 Sep 2020 12:45), Max: 98 (25 Sep 2020 12:45)  HR: 71 (25 Sep 2020 12:45) (71 - 71)  BP: 113/64 (25 Sep 2020 12:45) (113/64 - 113/64)  BP(mean): --  RR: 16 (25 Sep 2020 12:45) (16 - 16)  SpO2: 98% (25 Sep 2020 12:45) (98% - 98%)            LABS:                        8.5    6.91  )-----------( 187      ( 25 Sep 2020 14:47 )             25.7     09-25    148<H>  |  119<H>  |  51<H>  ----------------------------<  79  4.8   |  24  |  1.80<H>    Ca    9.1      25 Sep 2020 14:47      PT/INR - ( 25 Sep 2020 14:47 )   PT: 14.4 sec;   INR: 1.24 ratio         PTT - ( 25 Sep 2020 14:47 )  PTT:28.2 sec          WBC:  WBC Count: 6.91 K/uL (09-25 @ 14:47)      MICROBIOLOGY:  RECENT CULTURES:              PT/INR - ( 25 Sep 2020 14:47 )   PT: 14.4 sec;   INR: 1.24 ratio         PTT - ( 25 Sep 2020 14:47 )  PTT:28.2 sec    Sodium:  Sodium, Serum: 148 mmol/L (09-25 @ 14:47)      1.80 mg/dL 09-25 @ 14:47      Hemoglobin:  Hemoglobin: 8.5 g/dL (09-25 @ 14:47)      Platelets: Platelet Count - Automated: 187 K/uL (09-25 @ 14:47)              RADIOLOGY & ADDITIONAL STUDIES:

## 2020-09-25 NOTE — CONSULT NOTE ADULT - SUBJECTIVE AND OBJECTIVE BOX
Patient is a 89y old  Female who presents with a chief complaint of LEFT HIP PAIN ,S/P FALL AT [facility] (25 Sep 2020 18:15)      HPI:  89 y/o Female with PMHx HTN, HLD, OA, CKD, CAD, ovarian Ca BIBA to [place] ED  from [place]  ER due to diagnosed left hip fx. As per EMS, reports patient was from [facility] in which had unwitnessed fall. pt reports pain to left hip. Pt is a poor historian and unable to describe details of fall . As per ems, pt found to be hypothermic at [place]. pt currently on Eliquis ,last dose taken in am . pt denies headache, numbness/weakness, cp, sob, fever, abd pain, or any other complains. Found to have left intertrochanteric hip fracture Seen by orthopedic team and further imaging ordered Diagnosed with hip fx and orthopedic sx evaluation called,pain managmnet and dvt prophylaxis administrated Cardiology clearance requested for surgical treatment Palliative care consult requested ,to discuss advance directives and complete MOLST      (25 Sep 2020 17:12)      Asked to see patient for ID Consult    PAST MEDICAL & SURGICAL HISTORY:  Afib    Glaucoma    Dementia    CKD (chronic kidney disease)    CAD (coronary artery disease)    OA (osteoarthritis)    HLD (hyperlipidemia)    HTN (hypertension)    Dementia    No significant past surgical history        Allergies    penicillin (Other)  sulfa drugs (Other)    Intolerances        REVIEW OF SYSTEMS:  No cough or SOB  No abdominal pain    HOME MEDICATIONS:    MEDICATIONS  (STANDING):  aMIOdarone    Tablet 100 milliGRAM(s) Oral daily  amLODIPine   Tablet 2.5 milliGRAM(s) Oral daily  atorvastatin 10 milliGRAM(s) Oral at bedtime  calcitriol   Capsule 0.25 MICROGram(s) Oral daily  donepezil 10 milliGRAM(s) Oral at bedtime  lactated ringers. 1000 milliLiter(s) (75 mL/Hr) IV Continuous <Continuous>  latanoprost 0.005% Ophthalmic Solution 1 Drop(s) Both EYES at bedtime  multivitamin 1 Tablet(s) Oral daily  polyethylene glycol 3350 17 Gram(s) Oral daily  senna 2 Tablet(s) Oral at bedtime    MEDICATIONS  (PRN):  acetaminophen   Tablet .. 650 milliGRAM(s) Oral every 6 hours PRN Temp greater or equal to 38C (100.4F), Mild Pain (1 - 3)  bisacodyl Suppository 10 milliGRAM(s) Rectal daily PRN Constipation  HYDROmorphone  Injectable 0.5 milliGRAM(s) IV Push every 6 hours PRN Severe Pain (7 - 10)  magnesium hydroxide Suspension 30 milliLiter(s) Oral daily PRN Constipation  oxyCODONE    IR 2.5 milliGRAM(s) Oral every 6 hours PRN Moderate Pain (4 - 6)      Vital Signs Last 24 Hrs  T(C): 37 (25 Sep 2020 19:57), Max: 37 (25 Sep 2020 19:57)  T(F): 98.6 (25 Sep 2020 19:57), Max: 98.6 (25 Sep 2020 19:57)  HR: 66 (25 Sep 2020 19:57) (66 - 71)  BP: 108/72 (25 Sep 2020 19:57) (108/72 - 113/64)  BP(mean): --  RR: 16 (25 Sep 2020 19:57) (16 - 16)  SpO2: 96% (25 Sep 2020 19:57) (96% - 98%)    PHYSICAL EXAM:  HEENT: NC/AT  Neck: Soft  Lungs: Coarse BS bilaterally; no wheezing  Heart: RRR; no murmurs.   Abdomen: Soft; no tenderness  Genital/ Rectal: No arana   Extremities: No swelling  Neurologic: Awake       I&O's Summary    LABORATORY:                          8.5    6.91  )-----------( 187      ( 25 Sep 2020 14:47 )             25.7       148<H>  |  119<H>  |  51<H>  ----------------------------<  79  4.8   |  24  |  1.80<H>    Ca    9.1      25 Sep 2020 14:47      LABORATORY:    CBC Full  -  ( 25 Sep 2020 14:47 )  WBC Count : 6.91 K/uL  RBC Count : 3.00 M/uL  Hemoglobin : 8.5 g/dL  Hematocrit : 25.7 %  Platelet Count - Automated : 187 K/uL  Mean Cell Volume : 85.7 fl  Mean Cell Hemoglobin : 28.3 pg  Mean Cell Hemoglobin Concentration : 33.1 gm/dL  Auto Neutrophil # : x  Auto Lymphocyte # : x  Auto Monocyte # : x  Auto Eosinophil # : x  Auto Basophil # : x  Auto Neutrophil % : x  Auto Lymphocyte % : x  Auto Monocyte % : x  Auto Eosinophil % : x  Auto Basophil % : x      148<H>  |  119<H>  |  51<H>  ----------------------------<  79  4.8   |  24  |  1.80<H>    Ca    9.1      25 Sep 2020 14:47    Assessment and Plan:    1. Left hip fracture from fall    , Get UA and urine culture   . No antibiotics for now.    Thank you,                                                  Patient is a 89y old  Female who presents with a chief complaint of LEFT HIP PAIN ,S/P FALL AT [facility] (25 Sep 2020 18:15)        The patient is a 89 y/o Female with a PMHx Afin on Eliquis, HTN, HLD, OA, CKD and ovarian Ca who was brought to [place] ED  from [place]  ER due to diagnosed left hip fx. As per EMS, the patient had a fall yesterday at [facility]. She was hypothermic with temp of 93.4F at [place]'s ED.  She was not hypothermic in the ED at  but she complained of feeling cold. Her evaluation revealed left interochanteric fracture. She was admitted and has been seen by orthopedics.     PAST MEDICAL & SURGICAL HISTORY:  Afib  Glaucoma  Dementia  CKD (chronic kidney disease)  CAD (coronary artery disease)  OA (osteoarthritis)  HLD (hyperlipidemia)  HTN (hypertension)  Dementia    No significant past surgical history        Allergies    penicillin (Other)  sulfa drugs (Other)    Intolerances        REVIEW OF SYSTEMS:  No cough or SOB  No abdominal pain    HOME MEDICATIONS:    MEDICATIONS  (STANDING):  aMIOdarone    Tablet 100 milliGRAM(s) Oral daily  amLODIPine   Tablet 2.5 milliGRAM(s) Oral daily  atorvastatin 10 milliGRAM(s) Oral at bedtime  calcitriol   Capsule 0.25 MICROGram(s) Oral daily  donepezil 10 milliGRAM(s) Oral at bedtime  lactated ringers. 1000 milliLiter(s) (75 mL/Hr) IV Continuous <Continuous>  latanoprost 0.005% Ophthalmic Solution 1 Drop(s) Both EYES at bedtime  multivitamin 1 Tablet(s) Oral daily  polyethylene glycol 3350 17 Gram(s) Oral daily  senna 2 Tablet(s) Oral at bedtime    MEDICATIONS  (PRN):  acetaminophen   Tablet .. 650 milliGRAM(s) Oral every 6 hours PRN Temp greater or equal to 38C (100.4F), Mild Pain (1 - 3)  bisacodyl Suppository 10 milliGRAM(s) Rectal daily PRN Constipation  HYDROmorphone  Injectable 0.5 milliGRAM(s) IV Push every 6 hours PRN Severe Pain (7 - 10)  magnesium hydroxide Suspension 30 milliLiter(s) Oral daily PRN Constipation  oxyCODONE    IR 2.5 milliGRAM(s) Oral every 6 hours PRN Moderate Pain (4 - 6)      Vital Signs Last 24 Hrs  T(C): 37 (25 Sep 2020 19:57), Max: 37 (25 Sep 2020 19:57)  T(F): 98.6 (25 Sep 2020 19:57), Max: 98.6 (25 Sep 2020 19:57)  HR: 66 (25 Sep 2020 19:57) (66 - 71)  BP: 108/72 (25 Sep 2020 19:57) (108/72 - 113/64)  BP(mean): --  RR: 16 (25 Sep 2020 19:57) (16 - 16)  SpO2: 96% (25 Sep 2020 19:57) (96% - 98%)    PHYSICAL EXAM:  HEENT: NC/AT  Neck: Soft  Lungs: Coarse BS bilaterally; no wheezing  Heart: RRR; no murmurs.   Abdomen: Soft; no tenderness  Genital/ Rectal: No arana   Extremities: No swelling. Tenderness of left hip.   Neurologic: Awake       I&O's Summary    LABORATORY:                          8.5    6.91  )-----------( 187      ( 25 Sep 2020 14:47 )             25.7       148<H>  |  119<H>  |  51<H>  ----------------------------<  79  4.8   |  24  |  1.80<H>    Ca    9.1      25 Sep 2020 14:47      LABORATORY:    CBC Full  -  ( 25 Sep 2020 14:47 )  WBC Count : 6.91 K/uL  RBC Count : 3.00 M/uL  Hemoglobin : 8.5 g/dL  Hematocrit : 25.7 %  Platelet Count - Automated : 187 K/uL  Mean Cell Volume : 85.7 fl  Mean Cell Hemoglobin : 28.3 pg  Mean Cell Hemoglobin Concentration : 33.1 gm/dL  Auto Neutrophil # : x  Auto Lymphocyte # : x  Auto Monocyte # : x  Auto Eosinophil # : x  Auto Basophil # : x  Auto Neutrophil % : x  Auto Lymphocyte % : x  Auto Monocyte % : x  Auto Eosinophil % : x  Auto Basophil % : x      148<H>  |  119<H>  |  51<H>  ----------------------------<  79  4.8   |  24  |  1.80<H>    Ca    9.1      25 Sep 2020 14:47    Assessment and Plan:    1. Left hip fracture from fall.  2. Hypothermia; r/o sepsis.     , Get UA and urine culture and blood cultures.  . Get Procalcitomnin level.  . No antibiotics for now.  . Orthopedic evaluation,     Thank you for the courtesy of this consultation.

## 2020-09-25 NOTE — INPATIENT CERTIFICATION FOR MEDICARE PATIENTS - NS ICMP TWO DAYS INPATIENT
Spoke with daughter and stated pt is in the hospital. Stated pt symptoms appeared at 4am yesterday morning and they brought pt to the hospital.    SUSANNA   Yes

## 2020-09-25 NOTE — CONSULT NOTE ADULT - ASSESSMENT
SHAHANA REDMAN [id number]  1930 [address] 2019 [address] DHEERAJ COBB            Initial evaluation/Pulmonary Critical Care consultation requested on 2020   by Dr Cobb  from Dr Estrella   Patient examined chart reviewed    HOSPITAL ADMISSION   PATIENT CAME  FROM (if information available)      SHAHANA REDMAN [id number]  1930 [address]A 2019 [address] DHEERAJ COBB     REVIEW OF SYMPTOMS      Able to give ROS  NO     PHYSICAL EXAM    HEENT Unremarkable PERRLA atraumatic   RESP Fair air entry EXP prolonged    Harsh breath sound Resp distres mild   CARDIAC S1 S2 No S3     NO JVD    ABDOMEN SOFT BS PRESENT NOT DISTENDED No hepatosplenomegaly PEDAL EDEMA present No calf tenderness  NO rash   GENERAL Not TOXIC looking    CC.     HPI/PMH.       89 f ho dementia ckd cad oa hytn hld admitted 2020 with fall and l hip fx CXR 2020 showed sm r pl effsn ivcf old fx r humerus Pulm consulted for periop eval and followup     Home meds.        OXYGENATION      2020 ra 98%    VITALS/LABS       2020 afeb 71 110/60       RELEVANT DATA    INFECTION  W 2020 w 6.9   A/R   2020 Doubt infection    PLEURAL EFFS  CXR 2020 cxr poss sm r effs ivcf   A/R   2020 Film technically subopt   Will follow If clinical status changes will get ct chest to further evaluate     RESP   A/R  Monitor po Target po 90-95%    DVT PPlx  A/R  2020 Pharmac pplx when cleared by ortho Has ivcf seen on imaging     ANEMIA   Hb 2020 Hb 8.5   A/R  2020 Monitor serially  2020 Target Hb 7 (+)     HYPERNATREMIA   Na 2020 Na 148   A/R  Monitor     BLAYNE   Cr 2020 Cr 1.8   A/R   2020 Monitor lytes    PULMONARY CLEARANCE  A/R  2020 Cleared for ortho surgery as above average risk pt  as she is in optimal pulm shape                                            TIME SPENT   Over 55 minutes aggregate care time spent on encounter; activities included   direct patient care, counseling and/or coordinating care reviewing notes, lab data/ imaging , discussion with multidisciplinary team/ patient  /family and explaining in detail risks, benefits, alternatives  of the recommendations     SHAHANA REDMAN [id number]  1930 DOA 2019 DR DHEERAJ COBB

## 2020-09-25 NOTE — H&P ADULT - COMMENTS
ROS is unobtainable due to dementia and confusion PATIENT IS CONFUSED AND IS UNABLE TO GIVE ANY/RELIABLE HISTORY OR REVIEW OF SYSTEMS PLEASE ALSO SEE UNDER HPI AND ASSESSMENT FOR OBTAINED REVIEW OF SYSTEMS

## 2020-09-25 NOTE — SWALLOW BEDSIDE ASSESSMENT ADULT - ORAL PREPARATORY PHASE
Reduced oral grading Decreased mastication ability/Reduced oral grading Decreased mastication ability Within functional limits

## 2020-09-25 NOTE — ED PROVIDER NOTE - PMH
CAD (coronary artery disease)    CKD (chronic kidney disease)    Dementia    HLD (hyperlipidemia)    HTN (hypertension)    OA (osteoarthritis)

## 2020-09-25 NOTE — CONSULT NOTE ADULT - SUBJECTIVE AND OBJECTIVE BOX
Date/Time Patient Seen:  		  Referring MD:   Data Reviewed	       Patient is a 89y old  Female who presents with a chief complaint of     Subjective/HPI  in bed  seen and examined  spoke with DTR  h and p reviewed  er provider note reviewed  labs reviewed  imaging reviewed    89y Female community ambulator presents c/o left hip pain and inability to ambulate sp mechanical fall at nursing home earlier today. Unable to provide history d/t mental status. Pt has dementia and history provided by daughter (Donna) at bedside.      non smoker  non drinker  family hx - ashd -   ros - fall  l[facility]     has children      HPI Objective Statement: 89 y/o female with PMHx HTN, HLD, OA, CKD, CAD, ovarian Ca BIBA from [place] due to diagnosed left hip fx. As per EMS, reports patient was from [facility] in which had unwitnessed fall. pt reports pain to left hip. Pt is a poor historian and unable to describe. As per ems, pt found to be hypothermic at [place]. pt currently on Eliquis. pt denies headache, numbness/weakness, cp, sob, fever, abd pain, or any other complains.  PAST MEDICAL & SURGICAL HISTORY:  CKD (chronic kidney disease)    CAD (coronary artery disease)    OA (osteoarthritis)    HLD (hyperlipidemia)    HTN (hypertension)    Dementia    No significant past surgical history          Medication list         MEDICATIONS  (STANDING):  lactated ringers. 1000 milliLiter(s) (75 mL/Hr) IV Continuous <Continuous>  morphine  - Injectable 2 milliGRAM(s) IV Push once  polyethylene glycol 3350 17 Gram(s) Oral daily  senna 2 Tablet(s) Oral at bedtime    MEDICATIONS  (PRN):  acetaminophen   Tablet .. 650 milliGRAM(s) Oral every 6 hours PRN Temp greater or equal to 38C (100.4F), Mild Pain (1 - 3)  bisacodyl Suppository 10 milliGRAM(s) Rectal daily PRN Constipation  magnesium hydroxide Suspension 30 milliLiter(s) Oral daily PRN Constipation  morphine  - Injectable 2 milliGRAM(s) IV Push every 4 hours PRN Severe Pain (7 - 10)  traMADol 50 milliGRAM(s) Oral every 6 hours PRN Moderate Pain (4 - 6)         Vitals log        ICU Vital Signs Last 24 Hrs  T(C): 36.7 (25 Sep 2020 12:45), Max: 36.7 (25 Sep 2020 12:45)  T(F): 98 (25 Sep 2020 12:45), Max: 98 (25 Sep 2020 12:45)  HR: 71 (25 Sep 2020 12:45) (71 - 71)  BP: 113/64 (25 Sep 2020 12:45) (113/64 - 113/64)  BP(mean): --  ABP: --  ABP(mean): --  RR: 16 (25 Sep 2020 12:45) (16 - 16)  SpO2: 98% (25 Sep 2020 12:45) (98% - 98%)           Input and Output:  I&O's Detail      Lab Data                  Review of Systems	    fall  pain  Objective     Physical Examination    heart s1s2  lung dec BS  abd soft      Pertinent Lab findings & Imaging      Lamar:  NO   Adequate UO     I&O's Detail           Discussed with:     Cultures:	        Radiology  imaging report pending

## 2020-09-25 NOTE — ED PROVIDER NOTE - CLINICAL SUMMARY MEDICAL DECISION MAKING FREE TEXT BOX
BIBA from [place] due to diagnosed left hip fx. As per EMS, reports patient was from [facility] in which had unwitnessed fall. pt reports pain to left hip. Pt is a poor historian and unable to describe. As per ems, pt found to be hypothermic at [place]. pt currently on Eliquis. plan includes Ct head r/o CVA, CT cervical r/o fx, ct hip evaluating fx, ortho consult, admission.

## 2020-09-25 NOTE — H&P ADULT - EXTREMITIES COMMENTS
lle+TTP over hip  Limited ROM d/t pain, unable to SLR  +TA/EHL/FHL/GS  SILT L2-S1  DP/PT 2+  Compartments soft and compressible  Calves NTTP b/l

## 2020-09-25 NOTE — H&P ADULT - ASSESSMENT
89 y/o Female with PMHx HTN, HLD, OA, CKD, CAD, ovarian Ca BIBA to [place] [facility]  from [place]  [facility] due to diagnosed left hip fx. As per EMS, reports patient was from [facility] in which had unwitnessed fall. pt reports pain to left hip. Pt is a poor historian and unable to describe details of fall . As per ems, pt found to be hypothermic at [place]. pt currently on Eliquis ,last dose taken in am . pt denies headache, numbness/weakness, cp, sob, fever, abd pain, or any other complains. Found to have left intertrochanteric hip fracture Seen by orthopedic team and further imaging ordered Diagnosed with hip fx and orthopedic sx evaluation called,pain managmnet and dvt prophylaxis administrated Cardiology clearance requested for surgical treatment Palliative care consult requested ,to discuss advance directives and complete MOLST

## 2020-09-25 NOTE — H&P ADULT - NSHPATTENDINGPLANDISCUSS_GEN_ALL_CORE
ER PHYSICIAN IN [place] AND [place] [facility] , ORTHO TEAM AND CARD CONS  ,PCP FROM [facility]

## 2020-09-25 NOTE — ED PROVIDER NOTE - PHYSICAL EXAMINATION
Constitutional: Awake, Alert, non-toxic. NAD. Well appearing, well nourished.   HEAD: Normocephalic, atraumatic.   EYES: PERRL, EOM intact, conjunctiva and sclera are clear bilaterally. No raccoon eyes.   ENT: TM's and canals normal, no nicholas sign. No rhinorrhea, normal pharynx, patent, no tonsillar exudate or enlargement, mucous membranes pink/moist, no erythema, no drooling or stridor.   NECK: Supple, non-tender; no cervical LAD, no JVD, no goiter.  BACK: No midline or paraspinal TTP of cervical/thoracic/lumbar spine, FROM. No ecchymosis or hematomas.   CARDIOVASCULAR: Normal S1, S2; regular rate and rhythm.  RESPIRATORY: Normal respiratory effort; breath sounds CTAB, no wheezes, rhonchi, or rales. Speaking in full sentences. No accessory muscle use.   ABDOMEN: Soft; non-tender, non-distended. Normal bowel sounds x 4. no palpable masses, no bruits, no CVA TTP. No guarding.   EXTREMITIES: Full passive and active ROM in all extremities; non-tender to palpation; distal pulses palpable and symmetric, no edema, no crepitus or step off  SKIN: Warm, dry; good skin turgor, no apparent lesions or rashes, no ecchymosis, brisk capillary refill.  NEURO: A&O x3. Sensory and motor functions are grossly intact. Speech is normal. Appearance and judgement seem appropriate for gender and age. No neurological deficits. Neurovascular sensation intact motor function 5/5 of upper and lower extremities, CN II-XII grossly intact, no ataxia, absent romberg and pronator drift, intact cerebellar function. Speech clear, without articulation or word-finding difficulties. Eyes- PERRL bilaterally. EOMs in tact. No nystagmus. No facial droop. Constitutional: Awake, Alert, non-toxic. NAD.   HEAD: Normocephalic, atraumatic.   EYES: PERRL, EOM intact, conjunctiva and sclera are clear bilaterally. No raccoon eyes.   ENT: No nicholas sign. No rhinorrhea, normal pharynx, patent, no tonsillar exudate or enlargement, mucous membranes pink/moist, no erythema, no drooling or stridor.   NECK: Supple, non-tender  BACK: No midline or paraspinal TTP of cervical/thoracic/lumbar spine, FROM. No ecchymosis or hematomas.   CARDIOVASCULAR: Normal S1, S2; regular rate and rhythm.  RESPIRATORY: Normal respiratory effort; breath sounds CTAB, no wheezes, rhonchi, or rales. Speaking in full sentences. No accessory muscle use.   ABDOMEN: Soft; non-tender, non-distended.  EXTREMITIES: Full passive and active ROM in all extremities; (+) left hip TTP; distal pulses palpable and symmetric, no edema, no crepitus or step off  SKIN: Warm, dry; good skin turgor, no apparent lesions or rashes, no ecchymosis, brisk capillary refill.  NEURO: A&O x1, only aware of name. Sensory and motor functions are grossly intact. Speech is normal. Neurovascular sensation intact motor function 5/5 of upper and lower extremities, CN II-XII grossly intact, absent pronator drift, intact cerebellar function. Eyes- PERRL bilaterally. EOMs in tact. No nystagmus. No facial droop.

## 2020-09-25 NOTE — SWALLOW BEDSIDE ASSESSMENT ADULT - COMMENTS
Per charting, patient is a "89 y/o female with PMHx HTN, HLD, OA, CKD, CAD, ovarian Ca BIBA from [place] due to diagnosed left hip fx. As per EMS, reports patient was from [facility] in which had unwitnessed fall. pt reports pain to left hip. Pt is a poor historian and unable to describe. As per ems, pt found to be hypothermic at [place]. pt currently on Eliquis. pt denies headache, numbness/weakness, cp, sob, fever, abd pain, or any other complains"    CT HEAD 9/25: "There is no evidence of skull fracture, intracranial hemorrhage, mass effect or herniation. There is no acute lobar infarction."    Consult received and chart reviewed. The patient was seen at bedside this afternoon for an assessment of swallow function, at which time she was arousable to awake/alert state. The patient inconsistently followed simple directives and was able to answer yes/no questions. Daughter present and reported patient tolerating regular solids and thin liquids. Per charting, patient is a "89 y/o female with PMHx HTN, HLD, OA, CKD, CAD, ovarian Ca BIBA from [place] due to diagnosed left hip fx. As per EMS, reports patient was from [facility] in which had unwitnessed fall. pt reports pain to left hip. Pt is a poor historian and unable to describe. As per ems, pt found to be hypothermic at [place]. pt currently on Eliquis. pt denies headache, numbness/weakness, cp, sob, fever, abd pain, or any other complains"    CT HEAD 9/25: "There is no evidence of skull fracture, intracranial hemorrhage, mass effect or herniation. There is no acute lobar infarction."    Consult received and chart reviewed. The patient was seen at bedside this afternoon for an assessment of swallow function, at which time she was arousable to awake/alert state. The patient inconsistently followed simple directives and was able to answer yes/no questions. Daughter present and reported patient tolerating regular solids and thin liquids. RN confirmed the patient will not be receiving a procedure today.

## 2020-09-25 NOTE — ED ADULT NURSE REASSESSMENT NOTE - NS ED NURSE REASSESS COMMENT FT1
received call from Camelia Treadwell from lab to activate ABO Rh confirmation, lab will send up collection tube, will collect specimen and send to lab.

## 2020-09-25 NOTE — ED PROVIDER NOTE - ATTENDING CONTRIBUTION TO CARE
I have personally performed a face to face diagnostic evaluation on this patient.  I have reviewed the PA note and agree with the history, exam, and plan of care, except as noted.  History and Exam by me shows patient brought in by ambulance from [place] [facility] for admission with report of unwittnessed fall, patient has dementia, poor historian, I have personally performed a face to face diagnostic evaluation on this patient.  I have reviewed the PA note and agree with the history, exam, and plan of care, except as noted.  History and Exam by me shows patient brought in by ambulance from [place] [facility] for admission with report of unwittnessed fall, patient has dementia, poor historian, with report of left hip fracture on eliquis, f/u ct head r/o trauma, call orthopedics, admit to Dr. Cobb.

## 2020-09-25 NOTE — CONSULT NOTE ADULT - PROBLEM SELECTOR RECOMMENDATION 9
89 y/o female with PMHx HTN, HLD, OA, CKD, CAD, ovarian Ca BIBA from [place] due to diagnosed left hip fx. As per EMS, reports patient was from [facility] in which had unwitnessed fall. pt reports pain to left hip.  Ortho eval in progress  Pain rx regimen  Fall prec  medical regimen and optimization -   Spoke with Dtr - pt is full code at present - HCP - is DTR Donna
CHRONIC KIDNEY DISEASE, STAGE 2: will obtained base line recent bun and cr   Serum creatinine is 1.8 , start iv fluid     No uremic symptoms. Fluid status stable.   Will continue to avoid nephrotoxic drugs.  Patient remains asymptomatic.

## 2020-09-25 NOTE — CONSULT NOTE ADULT - REASON FOR ADMISSION
LEFT HIP PAIN ,S/P FALL AT [place]

## 2020-09-25 NOTE — ED ADULT NURSE NOTE - OBJECTIVE STATEMENT
89 y/o female received aox2 via stretcher from [facility] s/p fall (unknown cause of fall) c/o left hip pain 10/10, shortening and internal rotation noted to left leg. pt unable to recount how she fell or what happened. limited info available at this time. placed on cardiac monitor, IVL started, bloods drawn and sent to lab. xray imaging done, pt  medicated for pain.

## 2020-09-25 NOTE — H&P ADULT - NSHPLABSRESULTS_GEN_ALL_CORE
< from: CT Hip No Cont, Left (09.25.20 @ 14:28) >      PROCEDURE DATE:  09/25/2020          INTERPRETATION:  EXAMINATION: CT of the bony pelvis without contrast    CLINICAL INFORMATION: Left hip fracture    TECHNIQUE: Axial CT images were obtained through the pelvis. Coronal and sagittal reformatted images were made. 3-D reconstruction images were performed to further evaluate for fracture    FINDINGS: There is an acute comminuted left intertrochanteric femur fracture with marked varus angulation. There is regional myofascial edema and hematoma. The bones are diffusely demineralized. No other fractures are demonstrated. There is lower lumbar spondylosis with grade 1 anterolisthesis at L4-L5 and L5-S1. There are no advanced arthritic changes at the hips.    An IVC filter is noted. There is diverticulosis. There are vascular calcifications. There are scattered enthesopathic bony productive changes.    IMPRESSION: Acute comminuted left intertrochanteric femur fracture with varus angulation.    < end of copied text >    < from: CT Cervical Spine No Cont (09.25.20 @ 14:28) >      CT cervical spine:    There is maintenance of the usual cervical lordosis without fracture. There is dextroscoliosis of the cervical spine. There is no significant malalignment. The vertebral body heights are maintained. There are endplate changes at C3-C4. There is multilevel disc height loss at C3-C4 and C5-C6.    The pre and paravertebral soft tissues are within limits of normal.    Evaluation of individual disc spaces as follows:    Craniocervical junction and C1-C2: Anatomic in alignment.    C2-C3: There is no disc bulge or herniation. There is no significant central canal or neural foraminal narrowing.    C3-C4: There is posterior osteophytic ridging and dorsal osteophyte. There is central canal narrowing. There is facet arthropathy to the left with uncovertebral joint spurring. There is mild to moderate left neural foraminal narrowing. The right neural foramen is mildly narrowed.    C4-C5: There is no disc bulge or herniation. There is no central canal narrowing. There is no significant neural foraminal narrowing. There is left facet arthropathy.    C5-C6: There is posterior osteophytic ridging and right central osteophyte. There is mild central canal narrowing. There is uncovertebral joint spurring and facet arthropathy asymmetric to the right. There is moderate right neural foraminal narrowing. The left neural foramen is normal.    C6-C7: There is no central disc bulge or herniation. There is no central canal narrowing. There is no significant neural foraminal narrowing.    C7-T1: There is no disc bulge or herniation. There is no central canal narrowing. There is no significant neural foraminal narrowing.      IMPRESSION:    There is no evidence of skull fracture, intracranial hemorrhage, mass effect or herniation. There is no acute lobar infarction.    Maintenance of the usual cervical lordosis with dextroscoliosis of the cervical spine without fracture or significant malalignment.    Cervical spondylotic changes with neural foraminal narrowings as described level by level.    < end of copied text >    < from: Xray Chest 1 View AP/PA (09.25.20 @ 13:56) >      The evaluation of the cardiomediastinal silhouette is limited.    There is a possible small right pleural effusion.  No pneumothorax is noted.    There is a old fracture deformity of the right proximal humerus.    < end of copied text >

## 2020-09-25 NOTE — SWALLOW BEDSIDE ASSESSMENT ADULT - ORAL PHASE
Delayed oral transit time/Decreased anterior-posterior movement of the bolus Delayed oral transit time/Decreased anterior-posterior movement of the bolus/Lingual stasis Lingual stasis/Decreased anterior-posterior movement of the bolus/Delayed oral transit time

## 2020-09-25 NOTE — ED ADULT NURSE NOTE - OBJECTIVE STATEMENT
From [facility]. BIBA from [place] due to diagnosed left hip fx. S/P unwitnessed fall attempting to use the bathroom.  Currently denies any pain or discomfort. VSS. Afebrile.  Poor historian.

## 2020-09-25 NOTE — SWALLOW BEDSIDE ASSESSMENT ADULT - SWALLOW EVAL: DIAGNOSIS
1. The patient demonstrated moderate oral dysphagia for honey thick liquid, nectar thick liquid and thin liquid textures marked by reduced oral grading, delayed bolus collection, transfer and posterior transport with suspected posterior loss of bolus over base of tongue. 2. The patient demonstrated moderate oral dysphagia for puree, mechanical soft and regular solids marked by reduced oral grading, significantly increased mastication time, delayed bolus collection, transfer and posterior transport. Trace lingual residue noted subsequent to deglutition which cleared with a liquid wash. 3. The patient demonstrated mild pharyngeal dysphagia for all consistencies trialed marked by suspect delayed initiation of swallow trigger with adequate hyolaryngeal elevation upon digital palpation without evidence of laryngeal penetration. 4. Recommend dysphagia 2 mechanical soft with thin liquids + aspiration precautions.

## 2020-09-25 NOTE — CONSULT NOTE ADULT - PROBLEM SELECTOR RECOMMENDATION 3
f/u  ECHO to evaluate LVEF,cardiology consult,continue current managmnet,O2 supply,anticoagulation plan as per cardiology consult

## 2020-09-25 NOTE — H&P ADULT - PROBLEM SELECTOR PLAN 1
-CT left hip for further characterization of fracture pattern, some concern for extension into femoral neck which would change procedure to hemiarthroplasty   -Pain control  -NPO  -IVF while NPO  -NWB LLE, bedrest  -Hold DVT ppx (Eliquis)  -FU labs/imaging

## 2020-09-25 NOTE — ED ADULT NURSE NOTE - NSIMPLEMENTINTERV_GEN_ALL_ED
Implemented All Universal Safety Interventions:  [place] to call system. Call bell, personal items and telephone within reach. Instruct patient to call for assistance. Room bathroom lighting operational. Non-slip footwear when patient is off stretcher. Physically safe environment: no spills, clutter or unnecessary equipment. Stretcher in lowest position, wheels locked, appropriate side rails in place. Implemented All Fall with Harm Risk Interventions:  [place] to call system. Call bell, personal items and telephone within reach. Instruct patient to call for assistance. Room bathroom lighting operational. Non-slip footwear when patient is off stretcher. Physically safe environment: no spills, clutter or unnecessary equipment. Stretcher in lowest position, wheels locked, appropriate side rails in place. Provide visual cue, wrist band, yellow gown, etc. Monitor gait and stability. Monitor for mental status changes and reorient to person, place, and time. Review medications for side effects contributing to fall risk. Reinforce activity limits and safety measures with patient and family. Provide visual clues: red socks.

## 2020-09-25 NOTE — ED PROVIDER NOTE - OBJECTIVE STATEMENT
Transfer from [facility] for left hip pain after unwitnessed fall.  Pt went to the bathroom and fell. pt is a poor historian, but only complains of left hip pain.  No headache, neck, chest, back or abd pain.  no other complaints.

## 2020-09-25 NOTE — CONSULT NOTE ADULT - ASSESSMENT
89 y/o Female with PMHx HTN, HLD, OA, CKD, CAD, ovarian Ca BIBA to [place] [facility]  from [place]  [facility] due to diagnosed left hip fx. As per EMS, reports patient was from [facility] in which had unwitnessed fall. pt reports pain to left hip. Pt is a poor historian and unable to describe details of fall . As per ems, pt found to be hypothermic at [place]. pt currently on Eliquis ,last dose taken in am . pt denies headache, numbness/weakness, cp, sob, fever, abd pain, or any other complains. Found to have left intertrochanteric hip fracture Seen by orthopedic team and further imaging ordered Diagnosed with hip fx and orthopedic sx evaluation called,pain managmnet and dvt prophylaxis administrated Cardiology clearance requested for surgical treatment Palliative care consult requested ,to discuss advance directives and complete MOLST      89 y/o Female with PMHx HTN, HLD, OA, CKD, CAD, ovarian Ca BIBA to [place] [facility]  from [place]  [facility] due to diagnosed left hip fx. As per EMS, reports patient was from [facility] in which had unwitnessed fall. pt reports pain to left hip. Pt is a poor historian and unable to describe details of fall . now with ckd and aiden

## 2020-09-25 NOTE — CONSULT NOTE ADULT - NUTRITIONAL ASSESSMENT
MEDICATIONS  (STANDING):  aMIOdarone    Tablet 100 milliGRAM(s) Oral daily  amLODIPine   Tablet 2.5 milliGRAM(s) Oral daily  atorvastatin 10 milliGRAM(s) Oral at bedtime  calcitriol   Capsule 0.25 MICROGram(s) Oral daily  donepezil 10 milliGRAM(s) Oral at bedtime  lactated ringers. 1000 milliLiter(s) (75 mL/Hr) IV Continuous <Continuous>  latanoprost 0.005% Ophthalmic Solution 1 Drop(s) Both EYES at bedtime  multivitamin 1 Tablet(s) Oral daily  polyethylene glycol 3350 17 Gram(s) Oral daily  senna 2 Tablet(s) Oral at bedtime    MEDICATIONS  (PRN):  acetaminophen   Tablet .. 650 milliGRAM(s) Oral every 6 hours PRN Temp greater or equal to 38C (100.4F), Mild Pain (1 - 3)  bisacodyl Suppository 10 milliGRAM(s) Rectal daily PRN Constipation  HYDROmorphone  Injectable 0.5 milliGRAM(s) IV Push every 6 hours PRN Severe Pain (7 - 10)  magnesium hydroxide Suspension 30 milliLiter(s) Oral daily PRN Constipation  oxyCODONE    IR 2.5 milliGRAM(s) Oral every 6 hours PRN Moderate Pain (4 - 6)

## 2020-09-25 NOTE — ED ADULT NURSE NOTE - CHPI ED NUR SYMPTOMS NEG
no fever/no vomiting/no tingling/no abrasion/no bleeding/no loss of consciousness/no numbness/no deformity/no confusion

## 2020-09-25 NOTE — H&P ADULT - HISTORY OF PRESENT ILLNESS
89 y/o Female with PMHx HTN, HLD, OA, CKD, CAD, ovarian Ca BIBA to [place] [facility]  from [place]  [facility] due to diagnosed left hip fx. As per EMS, reports patient was from [facility] in which had unwitnessed fall. pt reports pain to left hip. Pt is a poor historian and unable to describe details of fall . As per ems, pt found to be hypothermic at [place]. pt currently on Eliquis ,last dose taken in am . pt denies headache, numbness/weakness, cp, sob, fever, abd pain, or any other complains. Found to have left intertrochanteric hip fracture Seen by orthopedic team and further imaging ordered Diagnosed with hip fx and orthopedic sx evaluation called,pain managmnet and dvt prophylaxis administrated Cardiology clearance requested for surgical treatment Palliative care consult requested ,to discuss advance directives and complete MOLST

## 2020-09-25 NOTE — H&P ADULT - NSICDXPASTMEDICALHX_GEN_ALL_CORE_FT
PAST MEDICAL HISTORY:  Afib     CAD (coronary artery disease)     CKD (chronic kidney disease)     Dementia     Dementia     Glaucoma     HLD (hyperlipidemia)     HTN (hypertension)     OA (osteoarthritis)

## 2020-09-25 NOTE — CONSULT NOTE ADULT - ASSESSMENT
pt with fx of left hip   ashd - paf - now rsr- on eliquis  chronic renal failure   anemia  no chf   no angina   cardiac status stable low to intermediate risk for orif left hip- discussed  with daughter Donna at bed side  pt is at high risk for bleeding as pt was  on eliquis

## 2020-09-25 NOTE — H&P ADULT - ATTENDING COMMENTS
89 y/o Female with PMHx HTN, HLD, OA, CKD, CAD, ovarian Ca BIBA to [place] [facility]  from [place]  [facility] due to diagnosed left hip fx. As per EMS, reports patient was from [facility] in which had unwitnessed fall. pt reports pain to left hip. Pt is a poor historian and unable to describe details of fall . As per ems, pt found to be hypothermic at [place]. pt currently on Eliquis ,last dose taken in am . pt denies headache, numbness/weakness, cp, sob, fever, abd pain, or any other complains. Found to have left intertrochanteric hip fracture Seen by orthopedic team and further imaging ordered Diagnosed with hip fx and orthopedic sx evaluation called,pain managmnet and dvt prophylaxis administrated Cardiology clearance requested for surgical treatment Palliative care consult requested ,to discuss advance directives and complete MOLST

## 2020-09-25 NOTE — H&P ADULT - RS GEN PE MLT RESP DETAILS PC
diminished breath sounds, R/normal/airway patent/diminished breath sounds, L/respirations non-labored/breath sounds equal/good air movement

## 2020-09-25 NOTE — ED PROVIDER NOTE - OBJECTIVE STATEMENT
89 y/o female with PMHx HTN, HLD, OA, CKD, CAD, ovarian Ca BIBA from [place] due to diagnosed left hip fx. As per EMS, reports patient was from [facility] in which had unwitnessed fall. pt reports pain to left hip. Pt is a poor historian and unable to describe. As per ems, pt found to be hypothermic at [place]. pt currently on Eliquis. pt denies headache, numbness/weakness, cp, sob, fever, abd pain, or any other complains.

## 2020-09-25 NOTE — CONSULT NOTE ADULT - CONSULT REASON
L IT Fx
cardiac  evaluation pre-op
clear
fall  op  oa  goc discussion  code status
Evaluation for sepsis
ckd and aiden

## 2020-09-25 NOTE — CONSULT NOTE ADULT - SUBJECTIVE AND OBJECTIVE BOX
Patient is a 89y Female whom presented to the hospital with     PAST MEDICAL & SURGICAL HISTORY:  Afib    Glaucoma    Dementia    CKD (chronic kidney disease)    CAD (coronary artery disease)    OA (osteoarthritis)    HLD (hyperlipidemia)    HTN (hypertension)    Dementia    No significant past surgical history        MEDICATIONS  (STANDING):  aMIOdarone    Tablet 100 milliGRAM(s) Oral daily  amLODIPine   Tablet 2.5 milliGRAM(s) Oral daily  atorvastatin 10 milliGRAM(s) Oral at bedtime  calcitriol   Capsule 0.25 MICROGram(s) Oral daily  donepezil 10 milliGRAM(s) Oral at bedtime  lactated ringers. 1000 milliLiter(s) (75 mL/Hr) IV Continuous <Continuous>  latanoprost 0.005% Ophthalmic Solution 1 Drop(s) Both EYES at bedtime  multivitamin 1 Tablet(s) Oral daily  polyethylene glycol 3350 17 Gram(s) Oral daily  senna 2 Tablet(s) Oral at bedtime      Allergies    penicillin (Other)  sulfa drugs (Other)    Intolerances        SOCIAL HISTORY:  Denies ETOh,Smoking,     FAMILY HISTORY:      REVIEW OF SYSTEMS:    CONSTITUTIONAL: No weakness, fevers or chills  EYES/ENT: No visual changes;  no throat pain   NECK: No pain or stiffness  RESPIRATORY: No cough, wheezing, hemoptysis; No shortness of breath  CARDIOVASCULAR: No chest pain or palpitations  GASTROINTESTINAL: No abdominal or epigastric pain. No nausea, vomiting,     No diarrhea or constipation. No melena   GENITOURINARY: No dysuria, frequency or hematuria  NEUROLOGICAL: No numbness or weakness  SKIN: dry      VITAL:  T(C): , Max: 37 (09-25-20 @ 19:57)  T(F): , Max: 98.6 (09-25-20 @ 19:57)  HR: 65 (09-25-20 @ 20:53)  BP: 117/51 (09-25-20 @ 20:53)  BP(mean): --  RR: 16 (09-25-20 @ 20:53)  SpO2: 92% (09-25-20 @ 20:53)  Wt(kg): --    I and O's:      Weight (kg): 59 (09-25 @ 12:45)    PHYSICAL EXAM:    Constitutional: NAD  HEENT: conjunctive   clear   Neck:  No JVD  Respiratory: CTAB  Cardiovascular: S1 and S2  Gastrointestinal: BS+, soft, NT/ND  Extremities: No peripheral edema  Neurological: A/O x 3, no focal deficits  Psychiatric: Normal mood, normal affect  : No Lamar  Skin: No rashes  Access: Not applicable    LABS:                        8.5    6.91  )-----------( 187      ( 25 Sep 2020 14:47 )             25.7     09-25    148<H>  |  119<H>  |  51<H>  ----------------------------<  79  4.8   |  24  |  1.80<H>    Ca    9.1      25 Sep 2020 14:47        Urine Studies:          RADIOLOGY & ADDITIONAL STUDIES:                   Patient is a 89y Female whom presented to the hospital with ckd and aiden     PAST MEDICAL & SURGICAL HISTORY:  Afib    Glaucoma    Dementia    CKD (chronic kidney disease)    CAD (coronary artery disease)    OA (osteoarthritis)    HLD (hyperlipidemia)    HTN (hypertension)    Dementia    No significant past surgical history        MEDICATIONS  (STANDING):  aMIOdarone    Tablet 100 milliGRAM(s) Oral daily  amLODIPine   Tablet 2.5 milliGRAM(s) Oral daily  atorvastatin 10 milliGRAM(s) Oral at bedtime  calcitriol   Capsule 0.25 MICROGram(s) Oral daily  donepezil 10 milliGRAM(s) Oral at bedtime  lactated ringers. 1000 milliLiter(s) (75 mL/Hr) IV Continuous <Continuous>  latanoprost 0.005% Ophthalmic Solution 1 Drop(s) Both EYES at bedtime  multivitamin 1 Tablet(s) Oral daily  polyethylene glycol 3350 17 Gram(s) Oral daily  senna 2 Tablet(s) Oral at bedtime      Allergies    penicillin (Other)  sulfa drugs (Other)    Intolerances        SOCIAL HISTORY:  Denies ETOh,Smoking,     FAMILY HISTORY:      REVIEW OF SYSTEMS:    CONSTITUTIONAL: No fevers or chills  RESPIRATORY: No cough, wheezing, hemoptysis; No shortness of breath  CARDIOVASCULAR: No chest pain or palpitations  GASTROINTESTINAL: No abdominal or epigastric pain. No nausea, vomiting,     No diarrhea or constipation. No melena   GENITOURINARY: No dysuria, frequency or hematuria    VITAL:  T(C): , Max: 37 (09-25-20 @ 19:57)  T(F): , Max: 98.6 (09-25-20 @ 19:57)  HR: 65 (09-25-20 @ 20:53)  BP: 117/51 (09-25-20 @ 20:53)  BP(mean): --  RR: 16 (09-25-20 @ 20:53)  SpO2: 92% (09-25-20 @ 20:53)  Wt(kg): --    I and O's:      Weight (kg): 59 (09-25 @ 12:45)    PHYSICAL EXAM:    Constitutional: NAD  HEENT: conjunctive   clear   Neck:  No JVD  Respiratory: CTAB  Cardiovascular: S1 and S2  Gastrointestinal: BS+, soft,   Extremities: No peripheral edema    LABS:                        8.5    6.91  )-----------( 187      ( 25 Sep 2020 14:47 )             25.7     09-25    148<H>  |  119<H>  |  51<H>  ----------------------------<  79  4.8   |  24  |  1.80<H>    Ca    9.1      25 Sep 2020 14:47        Urine Studies:          RADIOLOGY & ADDITIONAL STUDIES:

## 2020-09-25 NOTE — GOALS OF CARE CONVERSATION - ADVANCED CARE PLANNING - CONVERSATION DETAILS
GOC reviewed with the daughter - Donna  pt is full code at the moment  will cont to follow and update family and review GOC

## 2020-09-25 NOTE — CONSULT NOTE ADULT - SUBJECTIVE AND OBJECTIVE BOX
CARDIOLOGY CONSULT NOTE    Patient is a 89y Female with a known history of : admitted with fall and fx of left hip  Prophylactic measure [Z29.9]    CKD (chronic kidney disease) [N18.9]    Hypothermia [T68.XXXA]    OA (osteoarthritis) [M19.90]    HTN (hypertension) [I10]    Glaucoma [H40.9]    Dementia [F03.90]    CAD (coronary artery disease) [I25.10]    Afib [I48.91]    Hip fracture, left [S72.002A]      HPI:  89 y/o Female with PMHx HTN, HLD, OA, CKD, CAD, ovarian Ca BIBA to [place] ED  from [place]  ER due to diagnosed left hip fx. As per EMS, reports patient was from [facility] in which had unwitnessed fall. pt reports pain to left hip. Pt is a poor historian and unable to describe details of fall . As per ems, pt found to be hypothermic at [place]. pt currently on Eliquis ,last dose taken in am . pt denies headache, numbness/weakness, cp, sob, fever, abd pain, or any other complains. Found to have left intertrochanteric hip fracture Seen by orthopedic team and further imaging ordered Diagnosed with hip fx and orthopedic sx evaluation called,pain managmnet and dvt prophylaxis administrated Cardiology clearance requested for surgical treatment Palliative care consult requested ,to discuss advance directives and complete MOLST      (25 Sep 2020 17:12)      REVIEW OF SYSTEMS:    CONSTITUTIONAL: No fever, weight loss, or fatigue  EYES: No eye pain, visual disturbances, or discharge  ENMT:  No difficulty hearing, tinnitus, vertigo; No sinus or throat pain  NECK: No pain or stiffness  BREASTS: No pain, masses, or nipple discharge  RESPIRATORY: No cough, wheezing, chills or hemoptysis; No shortness of breath  CARDIOVASCULAR: No chest pain, palpitations, dizziness, or leg swelling  GASTROINTESTINAL: No abdominal or epigastric pain. No nausea, vomiting, or hematemesis; No diarrhea or constipation. No melena or hematochezia.  GENITOURINARY: No dysuria, frequency, hematuria, or incontinence  NEUROLOGICAL: No headaches, memory loss, loss of strength, numbness, or tremors  SKIN: No itching, burning, rashes, or lesions   LYMPH NODES: No enlarged glands  ENDOCRINE: No heat or cold intolerance; No hair loss  MUSCULOSKELETAL: No joint pain or swelling; No muscle, back, or extremity pain  PSYCHIATRIC: No depression, anxiety, mood swings, or difficulty sleeping  HEME/LYMPH: No easy bruising, or bleeding gums  ALLERGY AND IMMUNOLOGIC: No hives or eczema    MEDICATIONS  (STANDING):  aMIOdarone    Tablet 100 milliGRAM(s) Oral daily  amLODIPine   Tablet 2.5 milliGRAM(s) Oral daily  calcitriol   Capsule 0.25 MICROGram(s) Oral daily  donepezil 10 milliGRAM(s) Oral at bedtime  lactated ringers. 1000 milliLiter(s) (75 mL/Hr) IV Continuous <Continuous>  latanoprost 0.005% Ophthalmic Solution 1 Drop(s) Both EYES at bedtime  multivitamin 1 Tablet(s) Oral daily  polyethylene glycol 3350 17 Gram(s) Oral daily  senna 2 Tablet(s) Oral at bedtime    MEDICATIONS  (PRN):  acetaminophen   Tablet .. 650 milliGRAM(s) Oral every 6 hours PRN Temp greater or equal to 38C (100.4F), Mild Pain (1 - 3)  bisacodyl Suppository 10 milliGRAM(s) Rectal daily PRN Constipation  HYDROmorphone  Injectable 0.5 milliGRAM(s) IV Push every 6 hours PRN Severe Pain (7 - 10)  magnesium hydroxide Suspension 30 milliLiter(s) Oral daily PRN Constipation  oxyCODONE    IR 2.5 milliGRAM(s) Oral every 6 hours PRN Moderate Pain (4 - 6)      ALLERGIES: penicillin (Other)  sulfa drugs (Other)      Social History:     FAMILY HISTORY:      PAST MEDICAL & SURGICAL HISTORY:  Afib    Glaucoma    Dementia    CKD (chronic kidney disease)    CAD (coronary artery disease)    OA (osteoarthritis)    HLD (hyperlipidemia)    HTN (hypertension)    Dementia    No significant past surgical history          PHYSICAL EXAMINATION:  -----------------------------  T(C): 36.7 (09-25-20 @ 12:45), Max: 36.7 (09-25-20 @ 12:45)  HR: 71 (09-25-20 @ 12:45) (71 - 71)  BP: 113/64 (09-25-20 @ 12:45) (113/64 - 113/64)  RR: 16 (09-25-20 @ 12:45) (16 - 16)  SpO2: 98% (09-25-20 @ 12:45) (98% - 98%)  Wt(kg): --      Weight (kg): 59 (09-25 @ 12:45)    Constitutional: well developed, normal appearance, well groomed, well nourished, no deformities and no acute distress.   Eyes: the conjunctiva exhibited no abnormalities and the eyelids demonstrated no xanthelasmas.   HEENT: normal oral mucosa, no oral pallor and no oral cyanosis.   Neck: normal jugular venous A waves present, normal jugular venous V waves present and no jugular venous kelley A waves.   Pulmonary: no respiratory distress, normal respiratory rhythm and effort, no accessory muscle use and lungs were clear to auscultation bilaterally.   Cardiovascular: heart rate and rhythm were normal, normal S1 and S2 and no murmur, gallop, rub, heave or thrill are present.   Abdomen: soft, non-tender, no hepato-splenomegaly and no abdominal mass palpated.   Musculoskeletal: the gait could not be assessed..   Extremities: no clubbing of the fingernails, no localized cyanosis, no petechial hemorrhages and no ischemic changes.   Skin: normal skin color and pigmentation, no rash, no venous stasis, no skin lesions, no skin ulcer and no xanthoma was observed.   Psychiatric: oriented to person, place, and time, the affect was normal, the mood was normal and not feeling anxious.     LABS:   --------  09-25    148<H>  |  119<H>  |  51<H>  ----------------------------<  79  4.8   |  24  |  1.80<H>    Ca    9.1      25 Sep 2020 14:47                           8.5    6.91  )-----------( 187      ( 25 Sep 2020 14:47 )             25.7     PT/INR - ( 25 Sep 2020 14:47 )   PT: 14.4 sec;   INR: 1.24 ratio         PTT - ( 25 Sep 2020 14:47 )  PTT:28.2 sec            RADIOLOGY:  -----------------        ECG:     ECHO

## 2020-09-25 NOTE — SWALLOW BEDSIDE ASSESSMENT ADULT - ASR SWALLOW ASPIRATION MONITOR
cough/oral hygiene/position upright (89Y)/fever/throat clearing/change of breathing pattern/gurgly voice/pneumonia/upper respiratory infection

## 2020-09-25 NOTE — CONSULT NOTE ADULT - SUBJECTIVE AND OBJECTIVE BOX
89y Female community ambulator presents c/o left hip pain and inability to ambulate sp mechanical fall. Unable to provide history d/t mental status.    MEWS Score    CKD (chronic kidney disease)    CAD (coronary artery disease)    OA (osteoarthritis)    HLD (hyperlipidemia)    HTN (hypertension)    Dementia    Hip fracture, left    No significant past surgical history    FROM [facility] LEFT HIP FX    Hypothermia    SysAdmin_VisitLink      PAST MEDICAL & SURGICAL HISTORY:  CKD (chronic kidney disease)    CAD (coronary artery disease)    OA (osteoarthritis)    HLD (hyperlipidemia)    HTN (hypertension)    Dementia    No significant past surgical history      MEDICATIONS  (STANDING):  lactated ringers. 1000 milliLiter(s) IV Continuous <Continuous>  morphine  - Injectable 2 milliGRAM(s) IV Push once    Allergies    penicillin (Other)  sulfa drugs (Other)    Intolerances          Vital Signs Last 24 Hrs  T(C): 36.7 (09-25-20 @ 12:45), Max: 36.7 (09-25-20 @ 12:45)  T(F): 98 (09-25-20 @ 12:45), Max: 98 (09-25-20 @ 12:45)  HR: 71 (09-25-20 @ 12:45) (71 - 71)  BP: 113/64 (09-25-20 @ 12:45) (113/64 - 113/64)  BP(mean): --  RR: 16 (09-25-20 @ 12:45) (16 - 16)  SpO2: 98% (09-25-20 @ 12:45) (98% - 98%)    Physical Exam    General: NAD, resting comfortably  LLE:   Skin intact  +TTP over hip  Limited ROM d/t pain, unable to SLR  +TA/EHL/FHL/GS  SILT L2-S1  DP/PT 2+  Compartments soft and compressible  Calves NTTP b/l    BLUE: No bony tenderness or deformity, skin intact  RLE: No bony tenderness or deformity, skin intact  Spine: No tenderness or stepoffs, skin intact                Imaging  XR L Hip: Showing L IT Fx      A/P: 89y Female with hip fracture  -Pain control  -NPO  -IVF while NPO  -NWB LLE, bedrest  -Hold DVT ppx (Eliquis)  -FU labs/imaging  -Medical clearance/optimization for OR  INCOMPLETE   89y Female community ambulator presents c/o left hip pain and inability to ambulate sp mechanical fall at nursing home earlier today. Unable to provide history d/t mental status. Pt has dementia and history provided by daughter (Donna) at bedside.     MEWS Score    CKD (chronic kidney disease)    CAD (coronary artery disease)    OA (osteoarthritis)    HLD (hyperlipidemia)    HTN (hypertension)    Dementia    Hip fracture, left    No significant past surgical history    FROM [facility] LEFT HIP FX    Hypothermia    SysAdmin_VisitLink      PAST MEDICAL & SURGICAL HISTORY:  CKD (chronic kidney disease)    CAD (coronary artery disease)    OA (osteoarthritis)    HLD (hyperlipidemia)    HTN (hypertension)    Dementia    No significant past surgical history      MEDICATIONS  (STANDING):  lactated ringers. 1000 milliLiter(s) IV Continuous <Continuous>  morphine  - Injectable 2 milliGRAM(s) IV Push once    Allergies    penicillin (Other)  sulfa drugs (Other)    Intolerances          Vital Signs Last 24 Hrs  T(C): 36.7 (09-25-20 @ 12:45), Max: 36.7 (09-25-20 @ 12:45)  T(F): 98 (09-25-20 @ 12:45), Max: 98 (09-25-20 @ 12:45)  HR: 71 (09-25-20 @ 12:45) (71 - 71)  BP: 113/64 (09-25-20 @ 12:45) (113/64 - 113/64)  BP(mean): --  RR: 16 (09-25-20 @ 12:45) (16 - 16)  SpO2: 98% (09-25-20 @ 12:45) (98% - 98%)    Physical Exam    General: confused  LLE:   Skin intact  +TTP over hip  Limited ROM d/t pain, unable to SLR  +TA/EHL/FHL/GS  SILT L2-S1  DP/PT 2+  Compartments soft and compressible  Calves NTTP b/l    Secondary Survey  Negative bony tenderness clavicles  Negative bony tenderness/pain with ROM cervical/thoracic/lumbar spine  RUE: negative bony tenderness/pain with ROM shoulder/elbow/wrist/hand  LUE: negative bony tenderness/pain with ROM shoulder/elbow/wrist/hand  Negative tenderness on pelvic compression  RLE: negative bony tenderness/pain with ROM hip/knee/ankle/foot; negative log roll, axial load    Imaging  XR L Hip: Showing L IT Fx      A/P: 89y Female with left intertrochanteric hip fracture    -Pain control  -NPO  -IVF while NPO  -NWB LLE, bedrest  -Hold DVT ppx (Eliquis)  -FU labs/imaging  -Medical clearance/optimization for OR  -Plan for OR (left hip intramedullary nailing) tomorrow    Oskar Godfrey, DO  PGY2, Orthopaedic Surgery   89y Female community ambulator presents c/o left hip pain and inability to ambulate sp mechanical fall at nursing home earlier today. Unable to provide history d/t mental status. Pt has dementia and history provided by daughter (Donna) at bedside.     MEWS Score    CKD (chronic kidney disease)    CAD (coronary artery disease)    OA (osteoarthritis)    HLD (hyperlipidemia)    HTN (hypertension)    Dementia    Hip fracture, left    No significant past surgical history    FROM [facility] LEFT HIP FX    Hypothermia    SysAdmin_VisitLink      PAST MEDICAL & SURGICAL HISTORY:  CKD (chronic kidney disease)    CAD (coronary artery disease)    OA (osteoarthritis)    HLD (hyperlipidemia)    HTN (hypertension)    Dementia    No significant past surgical history      MEDICATIONS  (STANDING):  lactated ringers. 1000 milliLiter(s) IV Continuous <Continuous>  morphine  - Injectable 2 milliGRAM(s) IV Push once    Allergies    penicillin (Other)  sulfa drugs (Other)    Intolerances          Vital Signs Last 24 Hrs  T(C): 36.7 (09-25-20 @ 12:45), Max: 36.7 (09-25-20 @ 12:45)  T(F): 98 (09-25-20 @ 12:45), Max: 98 (09-25-20 @ 12:45)  HR: 71 (09-25-20 @ 12:45) (71 - 71)  BP: 113/64 (09-25-20 @ 12:45) (113/64 - 113/64)  BP(mean): --  RR: 16 (09-25-20 @ 12:45) (16 - 16)  SpO2: 98% (09-25-20 @ 12:45) (98% - 98%)    Physical Exam    General: confused  LLE:   Skin intact  +TTP over hip  Limited ROM d/t pain, unable to SLR  +TA/EHL/FHL/GS  SILT L2-S1  DP/PT 2+  Compartments soft and compressible  Calves NTTP b/l    Secondary Survey  Negative bony tenderness clavicles  Negative bony tenderness/pain with ROM cervical/thoracic/lumbar spine  RUE: negative bony tenderness/pain with ROM shoulder/elbow/wrist/hand  LUE: negative bony tenderness/pain with ROM shoulder/elbow/wrist/hand  Negative tenderness on pelvic compression  RLE: negative bony tenderness/pain with ROM hip/knee/ankle/foot; negative log roll, axial load    Imaging  XR L Hip: Showing L IT Fx      A/P: 89y Female with left intertrochanteric hip fracture    -CT left hip for further characterization of fracture pattern, some concern for extension into femoral neck which would change procedure to hemiarthroplasty  -Pain control  -NPO  -IVF while NPO  -NWB LLE, bedrest  -Hold DVT ppx (Eliquis)  -FU labs/imaging  -Medical clearance/optimization for OR  -Plan for OR (left hip intramedullary nailing) tomorrow  -Discussed with Dr. Stein who agrees    Oskar Godfrey DO  PGY2, Orthopaedic Surgery   89y Female community ambulator presents c/o left hip pain and inability to ambulate sp mechanical fall at nursing home earlier today. Unable to provide history d/t mental status. Pt has dementia and history provided by daughter (Donna) at bedside.     MEWS Score    CKD (chronic kidney disease)    CAD (coronary artery disease)    OA (osteoarthritis)    HLD (hyperlipidemia)    HTN (hypertension)    Dementia    Hip fracture, left    No significant past surgical history    FROM [facility] LEFT HIP FX    Hypothermia    SysAdmin_VisitLink      PAST MEDICAL & SURGICAL HISTORY:  CKD (chronic kidney disease)    CAD (coronary artery disease)    OA (osteoarthritis)    HLD (hyperlipidemia)    HTN (hypertension)    Dementia    No significant past surgical history      MEDICATIONS  (STANDING):  lactated ringers. 1000 milliLiter(s) IV Continuous <Continuous>  morphine  - Injectable 2 milliGRAM(s) IV Push once    Allergies    penicillin (Other)  sulfa drugs (Other)    Intolerances          Vital Signs Last 24 Hrs  T(C): 36.7 (09-25-20 @ 12:45), Max: 36.7 (09-25-20 @ 12:45)  T(F): 98 (09-25-20 @ 12:45), Max: 98 (09-25-20 @ 12:45)  HR: 71 (09-25-20 @ 12:45) (71 - 71)  BP: 113/64 (09-25-20 @ 12:45) (113/64 - 113/64)  BP(mean): --  RR: 16 (09-25-20 @ 12:45) (16 - 16)  SpO2: 98% (09-25-20 @ 12:45) (98% - 98%)    Physical Exam    General: confused  LLE:   Skin intact  +TTP over hip  Limited ROM d/t pain, unable to SLR  +TA/EHL/FHL/GS  SILT L2-S1  DP/PT 2+  Compartments soft and compressible  Calves NTTP b/l    Secondary Survey  Negative bony tenderness clavicles  Negative bony tenderness/pain with ROM cervical/thoracic/lumbar spine  RUE: negative bony tenderness/pain with ROM shoulder/elbow/wrist/hand  LUE: negative bony tenderness/pain with ROM shoulder/elbow/wrist/hand  Negative tenderness on pelvic compression  RLE: negative bony tenderness/pain with ROM hip/knee/ankle/foot; negative log roll, axial load    Imaging  XR L Hip: Showing L IT Fx      A/P: 89y Female with left intertrochanteric hip fracture    -CT left hip reviewed - intertroch fracture, will be stabilized with intramedullary nailing  -Pain control  -NPO  -IVF while NPO  -NWB LLE, bedrest  -Hold DVT ppx (Eliquis)  -FU labs/imaging  -Medical clearance/optimization for OR  -Plan for OR (left hip intramedullary nailing) tomorrow  -Discussed with Dr. Stein who agrees    Oksar Godfrey DO  PGY2, Orthopaedic Surgery

## 2020-09-26 ENCOUNTER — TRANSCRIPTION ENCOUNTER (OUTPATIENT)
Age: 85
End: 2020-09-26

## 2020-09-26 DIAGNOSIS — Z51.5 ENCOUNTER FOR PALLIATIVE CARE: ICD-10-CM

## 2020-09-26 DIAGNOSIS — E78.5 HYPERLIPIDEMIA, UNSPECIFIED: ICD-10-CM

## 2020-09-26 DIAGNOSIS — E87.0 HYPEROSMOLALITY AND HYPERNATREMIA: ICD-10-CM

## 2020-09-26 LAB
ANION GAP SERPL CALC-SCNC: 6 MMOL/L — SIGNIFICANT CHANGE UP (ref 5–17)
ANION GAP SERPL CALC-SCNC: 7 MMOL/L — SIGNIFICANT CHANGE UP (ref 5–17)
ANION GAP SERPL CALC-SCNC: 9 MMOL/L — SIGNIFICANT CHANGE UP (ref 5–17)
APTT BLD: 32.4 SEC — SIGNIFICANT CHANGE UP (ref 27.5–35.5)
BUN SERPL-MCNC: 42 MG/DL — HIGH (ref 7–23)
BUN SERPL-MCNC: 43 MG/DL — HIGH (ref 7–23)
BUN SERPL-MCNC: 46 MG/DL — HIGH (ref 7–23)
CALCIUM SERPL-MCNC: 9.1 MG/DL — SIGNIFICANT CHANGE UP (ref 8.5–10.1)
CALCIUM SERPL-MCNC: 9.9 MG/DL — SIGNIFICANT CHANGE UP (ref 8.5–10.1)
CALCIUM SERPL-MCNC: 9.9 MG/DL — SIGNIFICANT CHANGE UP (ref 8.5–10.1)
CHLORIDE SERPL-SCNC: 117 MMOL/L — HIGH (ref 96–108)
CHLORIDE SERPL-SCNC: 117 MMOL/L — HIGH (ref 96–108)
CHLORIDE SERPL-SCNC: 118 MMOL/L — HIGH (ref 96–108)
CHOLEST SERPL-MCNC: 167 MG/DL — SIGNIFICANT CHANGE UP (ref 10–199)
CO2 SERPL-SCNC: 21 MMOL/L — LOW (ref 22–31)
CO2 SERPL-SCNC: 23 MMOL/L — SIGNIFICANT CHANGE UP (ref 22–31)
CO2 SERPL-SCNC: 23 MMOL/L — SIGNIFICANT CHANGE UP (ref 22–31)
CREAT SERPL-MCNC: 1.6 MG/DL — HIGH (ref 0.5–1.3)
CREAT SERPL-MCNC: 1.7 MG/DL — HIGH (ref 0.5–1.3)
CREAT SERPL-MCNC: 1.8 MG/DL — HIGH (ref 0.5–1.3)
GLUCOSE SERPL-MCNC: 72 MG/DL — SIGNIFICANT CHANGE UP (ref 70–99)
GLUCOSE SERPL-MCNC: 76 MG/DL — SIGNIFICANT CHANGE UP (ref 70–99)
GLUCOSE SERPL-MCNC: 79 MG/DL — SIGNIFICANT CHANGE UP (ref 70–99)
HCT VFR BLD CALC: 29 % — LOW (ref 34.5–45)
HCT VFR BLD CALC: 31.6 % — LOW (ref 34.5–45)
HCT VFR BLD CALC: 34.7 % — SIGNIFICANT CHANGE UP (ref 34.5–45)
HDLC SERPL-MCNC: 84 MG/DL — SIGNIFICANT CHANGE UP
HGB BLD-MCNC: 10.1 G/DL — LOW (ref 11.5–15.5)
HGB BLD-MCNC: 11.1 G/DL — LOW (ref 11.5–15.5)
HGB BLD-MCNC: 9.7 G/DL — LOW (ref 11.5–15.5)
INR BLD: 1.14 RATIO — SIGNIFICANT CHANGE UP (ref 0.88–1.16)
INR BLD: 1.19 RATIO — HIGH (ref 0.88–1.16)
LIPID PNL WITH DIRECT LDL SERPL: 73 MG/DL — SIGNIFICANT CHANGE UP
MCHC RBC-ENTMCNC: 28.6 PG — SIGNIFICANT CHANGE UP (ref 27–34)
MCHC RBC-ENTMCNC: 28.8 PG — SIGNIFICANT CHANGE UP (ref 27–34)
MCHC RBC-ENTMCNC: 29.4 PG — SIGNIFICANT CHANGE UP (ref 27–34)
MCHC RBC-ENTMCNC: 32 GM/DL — SIGNIFICANT CHANGE UP (ref 32–36)
MCHC RBC-ENTMCNC: 32 GM/DL — SIGNIFICANT CHANGE UP (ref 32–36)
MCHC RBC-ENTMCNC: 33.4 GM/DL — SIGNIFICANT CHANGE UP (ref 32–36)
MCV RBC AUTO: 87.9 FL — SIGNIFICANT CHANGE UP (ref 80–100)
MCV RBC AUTO: 89.5 FL — SIGNIFICANT CHANGE UP (ref 80–100)
MCV RBC AUTO: 89.9 FL — SIGNIFICANT CHANGE UP (ref 80–100)
NRBC # BLD: 0 /100 WBCS — SIGNIFICANT CHANGE UP (ref 0–0)
PLATELET # BLD AUTO: 141 K/UL — LOW (ref 150–400)
PLATELET # BLD AUTO: 154 K/UL — SIGNIFICANT CHANGE UP (ref 150–400)
PLATELET # BLD AUTO: 155 K/UL — SIGNIFICANT CHANGE UP (ref 150–400)
POTASSIUM SERPL-MCNC: 4.6 MMOL/L — SIGNIFICANT CHANGE UP (ref 3.5–5.3)
POTASSIUM SERPL-MCNC: 4.9 MMOL/L — SIGNIFICANT CHANGE UP (ref 3.5–5.3)
POTASSIUM SERPL-MCNC: 5.2 MMOL/L — SIGNIFICANT CHANGE UP (ref 3.5–5.3)
POTASSIUM SERPL-SCNC: 4.6 MMOL/L — SIGNIFICANT CHANGE UP (ref 3.5–5.3)
POTASSIUM SERPL-SCNC: 4.9 MMOL/L — SIGNIFICANT CHANGE UP (ref 3.5–5.3)
POTASSIUM SERPL-SCNC: 5.2 MMOL/L — SIGNIFICANT CHANGE UP (ref 3.5–5.3)
PROCALCITONIN SERPL-MCNC: 0.17 NG/ML — HIGH (ref 0–0.04)
PROTHROM AB SERPL-ACNC: 13.3 SEC — SIGNIFICANT CHANGE UP (ref 10.6–13.6)
PROTHROM AB SERPL-ACNC: 13.8 SEC — HIGH (ref 10.6–13.6)
RBC # BLD: 3.3 M/UL — LOW (ref 3.8–5.2)
RBC # BLD: 3.53 M/UL — LOW (ref 3.8–5.2)
RBC # BLD: 3.86 M/UL — SIGNIFICANT CHANGE UP (ref 3.8–5.2)
RBC # FLD: 19.3 % — HIGH (ref 10.3–14.5)
RBC # FLD: 19.4 % — HIGH (ref 10.3–14.5)
RBC # FLD: 19.7 % — HIGH (ref 10.3–14.5)
SARS-COV-2 IGG SERPL QL IA: POSITIVE
SARS-COV-2 IGM SERPL IA-ACNC: 47 INDEX — HIGH
SODIUM SERPL-SCNC: 146 MMOL/L — HIGH (ref 135–145)
SODIUM SERPL-SCNC: 147 MMOL/L — HIGH (ref 135–145)
SODIUM SERPL-SCNC: 148 MMOL/L — HIGH (ref 135–145)
TOTAL CHOLESTEROL/HDL RATIO MEASUREMENT: 2 RATIO — LOW (ref 3.3–7.1)
TRIGL SERPL-MCNC: 48 MG/DL — SIGNIFICANT CHANGE UP (ref 10–149)
WBC # BLD: 7.78 K/UL — SIGNIFICANT CHANGE UP (ref 3.8–10.5)
WBC # BLD: 8.52 K/UL — SIGNIFICANT CHANGE UP (ref 3.8–10.5)
WBC # BLD: 9.09 K/UL — SIGNIFICANT CHANGE UP (ref 3.8–10.5)
WBC # FLD AUTO: 7.78 K/UL — SIGNIFICANT CHANGE UP (ref 3.8–10.5)
WBC # FLD AUTO: 8.52 K/UL — SIGNIFICANT CHANGE UP (ref 3.8–10.5)
WBC # FLD AUTO: 9.09 K/UL — SIGNIFICANT CHANGE UP (ref 3.8–10.5)

## 2020-09-26 RX ORDER — HYDROMORPHONE HYDROCHLORIDE 2 MG/ML
0.5 INJECTION INTRAMUSCULAR; INTRAVENOUS; SUBCUTANEOUS EVERY 6 HOURS
Refills: 0 | Status: DISCONTINUED | OUTPATIENT
Start: 2020-09-26 | End: 2020-09-28

## 2020-09-26 RX ORDER — ONDANSETRON 8 MG/1
4 TABLET, FILM COATED ORAL ONCE
Refills: 0 | Status: DISCONTINUED | OUTPATIENT
Start: 2020-09-26 | End: 2020-09-26

## 2020-09-26 RX ORDER — OXYCODONE HYDROCHLORIDE 5 MG/1
10 TABLET ORAL EVERY 4 HOURS
Refills: 0 | Status: DISCONTINUED | OUTPATIENT
Start: 2020-09-26 | End: 2020-09-28

## 2020-09-26 RX ORDER — SODIUM CHLORIDE 9 MG/ML
1000 INJECTION, SOLUTION INTRAVENOUS
Refills: 0 | Status: DISCONTINUED | OUTPATIENT
Start: 2020-09-26 | End: 2020-09-26

## 2020-09-26 RX ORDER — DONEPEZIL HYDROCHLORIDE 10 MG/1
10 TABLET, FILM COATED ORAL AT BEDTIME
Refills: 0 | Status: DISCONTINUED | OUTPATIENT
Start: 2020-09-26 | End: 2020-09-28

## 2020-09-26 RX ORDER — ATORVASTATIN CALCIUM 80 MG/1
10 TABLET, FILM COATED ORAL AT BEDTIME
Refills: 0 | Status: DISCONTINUED | OUTPATIENT
Start: 2020-09-26 | End: 2020-09-28

## 2020-09-26 RX ORDER — FERROUS SULFATE 325(65) MG
325 TABLET ORAL DAILY
Refills: 0 | Status: DISCONTINUED | OUTPATIENT
Start: 2020-09-26 | End: 2020-09-28

## 2020-09-26 RX ORDER — ONDANSETRON 8 MG/1
4 TABLET, FILM COATED ORAL EVERY 6 HOURS
Refills: 0 | Status: DISCONTINUED | OUTPATIENT
Start: 2020-09-26 | End: 2020-09-28

## 2020-09-26 RX ORDER — CALCITRIOL 0.5 UG/1
0.25 CAPSULE ORAL DAILY
Refills: 0 | Status: DISCONTINUED | OUTPATIENT
Start: 2020-09-26 | End: 2020-09-28

## 2020-09-26 RX ORDER — OXYCODONE HYDROCHLORIDE 5 MG/1
5 TABLET ORAL EVERY 4 HOURS
Refills: 0 | Status: DISCONTINUED | OUTPATIENT
Start: 2020-09-26 | End: 2020-09-28

## 2020-09-26 RX ORDER — SODIUM CHLORIDE 9 MG/ML
1000 INJECTION, SOLUTION INTRAVENOUS
Refills: 0 | Status: DISCONTINUED | OUTPATIENT
Start: 2020-09-26 | End: 2020-09-28

## 2020-09-26 RX ORDER — LATANOPROST 0.05 MG/ML
1 SOLUTION/ DROPS OPHTHALMIC; TOPICAL AT BEDTIME
Refills: 0 | Status: DISCONTINUED | OUTPATIENT
Start: 2020-09-26 | End: 2020-09-28

## 2020-09-26 RX ORDER — ACETAMINOPHEN 500 MG
650 TABLET ORAL EVERY 6 HOURS
Refills: 0 | Status: DISCONTINUED | OUTPATIENT
Start: 2020-09-26 | End: 2020-09-28

## 2020-09-26 RX ORDER — FOLIC ACID 0.8 MG
1 TABLET ORAL DAILY
Refills: 0 | Status: DISCONTINUED | OUTPATIENT
Start: 2020-09-26 | End: 2020-09-28

## 2020-09-26 RX ORDER — ASCORBIC ACID 60 MG
500 TABLET,CHEWABLE ORAL
Refills: 0 | Status: DISCONTINUED | OUTPATIENT
Start: 2020-09-26 | End: 2020-09-28

## 2020-09-26 RX ORDER — MORPHINE SULFATE 50 MG/1
1 CAPSULE, EXTENDED RELEASE ORAL
Refills: 0 | Status: DISCONTINUED | OUTPATIENT
Start: 2020-09-26 | End: 2020-09-26

## 2020-09-26 RX ORDER — HYDROMORPHONE HYDROCHLORIDE 2 MG/ML
0.5 INJECTION INTRAMUSCULAR; INTRAVENOUS; SUBCUTANEOUS EVERY 6 HOURS
Refills: 0 | Status: DISCONTINUED | OUTPATIENT
Start: 2020-09-26 | End: 2020-09-26

## 2020-09-26 RX ORDER — APIXABAN 2.5 MG/1
1.25 TABLET, FILM COATED ORAL
Refills: 0 | Status: DISCONTINUED | OUTPATIENT
Start: 2020-09-27 | End: 2020-09-28

## 2020-09-26 RX ADMIN — SODIUM CHLORIDE 75 MILLILITER(S): 9 INJECTION, SOLUTION INTRAVENOUS at 17:56

## 2020-09-26 RX ADMIN — Medication 100 MILLIGRAM(S): at 22:16

## 2020-09-26 RX ADMIN — LATANOPROST 1 DROP(S): 0.05 SOLUTION/ DROPS OPHTHALMIC; TOPICAL at 21:19

## 2020-09-26 RX ADMIN — DONEPEZIL HYDROCHLORIDE 10 MILLIGRAM(S): 10 TABLET, FILM COATED ORAL at 21:19

## 2020-09-26 RX ADMIN — SODIUM CHLORIDE 75 MILLILITER(S): 9 INJECTION, SOLUTION INTRAVENOUS at 15:37

## 2020-09-26 RX ADMIN — ATORVASTATIN CALCIUM 10 MILLIGRAM(S): 80 TABLET, FILM COATED ORAL at 21:19

## 2020-09-26 NOTE — PROGRESS NOTE ADULT - ASSESSMENT
fx left hip  ashd  paf- now rsr  chronic renal failure  no angina - no chf  cardiac status stable low to intermediate risk for left hip surgery  at high risk bleeding - was on oac  monitor renal failure - improving

## 2020-09-26 NOTE — PROGRESS NOTE ADULT - SUBJECTIVE AND OBJECTIVE BOX
Patient is a 89y Female with a known history of : admitted with fx left hip  Palliative care encounter [Z51.5]    Stage 2 chronic kidney disease [N18.2]    Prophylactic measure [Z29.9]    CKD (chronic kidney disease) [N18.9]    Hypothermia [T68.XXXA]    OA (osteoarthritis) [M19.90]    HTN (hypertension) [I10]    Glaucoma [H40.9]    Dementia [F03.90]    CAD (coronary artery disease) [I25.10]    Afib [I48.91]    Hip fracture, left [S72.002A]      HPI:  89 y/o Female with PMHx HTN, HLD, OA, CKD, CAD, ovarian Ca BIBA to [place] [facility]  from [place]  ER due to diagnosed left hip fx. As per EMS, reports patient was from [facility] in which had unwitnessed fall. pt reports pain to left hip. Pt is a poor historian and unable to describe details of fall . As per ems, pt found to be hypothermic at [place]. pt currently on Eliquis ,last dose taken in am . pt denies headache, numbness/weakness, cp, sob, fever, abd pain, or any other complains. Found to have left intertrochanteric hip fracture Seen by orthopedic team and further imaging ordered Diagnosed with hip fx and orthopedic sx evaluation called,pain managmnet and dvt prophylaxis administrated Cardiology clearance requested for surgical treatment Palliative care consult requested ,to discuss advance directives and complete MOLST      (25 Sep 2020 17:12)      REVIEW OF SYSTEMS:    CONSTITUTIONAL: No fever, weight loss, or fatigue  EYES: No eye pain, visual disturbances, or discharge  ENMT:  No difficulty hearing, tinnitus, vertigo; No sinus or throat pain  NECK: No pain or stiffness  BREASTS: No pain, masses, or nipple discharge  RESPIRATORY: No cough, wheezing, chills or hemoptysis; No shortness of breath  CARDIOVASCULAR: No chest pain, palpitations, dizziness, or leg swelling  GASTROINTESTINAL: No abdominal or epigastric pain. No nausea, vomiting, or hematemesis; No diarrhea or constipation. No melena or hematochezia.  GENITOURINARY: No dysuria, frequency, hematuria, or incontinence  NEUROLOGICAL: No headaches, memory loss, loss of strength, numbness, or tremors  SKIN: No itching, burning, rashes, or lesions   LYMPH NODES: No enlarged glands  ENDOCRINE: No heat or cold intolerance; No hair loss  MUSCULOSKELETAL: No joint pain or swelling; No muscle, back, or extremity pain  PSYCHIATRIC: No depression, anxiety, mood swings, or difficulty sleeping  HEME/LYMPH: No easy bruising, or bleeding gums  ALLERGY AND IMMUNOLOGIC: No hives or eczema    MEDICATIONS  (STANDING):  aMIOdarone    Tablet 100 milliGRAM(s) Oral daily  amLODIPine   Tablet 2.5 milliGRAM(s) Oral daily  atorvastatin 10 milliGRAM(s) Oral at bedtime  calcitriol   Capsule 0.25 MICROGram(s) Oral daily  donepezil 10 milliGRAM(s) Oral at bedtime  lactated ringers. 1000 milliLiter(s) (75 mL/Hr) IV Continuous <Continuous>  latanoprost 0.005% Ophthalmic Solution 1 Drop(s) Both EYES at bedtime  multivitamin 1 Tablet(s) Oral daily  polyethylene glycol 3350 17 Gram(s) Oral daily  senna 2 Tablet(s) Oral at bedtime    MEDICATIONS  (PRN):  acetaminophen   Tablet .. 650 milliGRAM(s) Oral every 6 hours PRN Temp greater or equal to 38C (100.4F), Mild Pain (1 - 3)  bisacodyl Suppository 10 milliGRAM(s) Rectal daily PRN Constipation  HYDROmorphone  Injectable 0.5 milliGRAM(s) IV Push every 6 hours PRN Severe Pain (7 - 10)  magnesium hydroxide Suspension 30 milliLiter(s) Oral daily PRN Constipation  oxyCODONE    IR 2.5 milliGRAM(s) Oral every 6 hours PRN Moderate Pain (4 - 6)      ALLERGIES: penicillin (Other)  sulfa drugs (Other)      FAMILY HISTORY:      PHYSICAL EXAMINATION:  -----------------------------  T(C): 36.8 (09-26-20 @ 05:03), Max: 37 (09-25-20 @ 19:57)  HR: 79 (09-26-20 @ 05:03) (58 - 79)  BP: 142/84 (09-26-20 @ 05:03) (101/82 - 142/84)  RR: 16 (09-26-20 @ 05:03) (15 - 16)  SpO2: 94% (09-26-20 @ 05:03) (92% - 98%)  Wt(kg): --    09-25 @ 07:01  -  09-26 @ 07:00  --------------------------------------------------------  IN:  Total IN: 0 mL    OUT:    Voided (mL): 500 mL  Total OUT: 500 mL    Total NET: -500 mL          Weight (kg): 59 (09-25 @ 12:45)    VITALS  T(C): 36.8 (09-26-20 @ 05:03), Max: 37 (09-25-20 @ 19:57)  HR: 79 (09-26-20 @ 05:03) (58 - 79)  BP: 142/84 (09-26-20 @ 05:03) (101/82 - 142/84)  RR: 16 (09-26-20 @ 05:03) (15 - 16)  SpO2: 94% (09-26-20 @ 05:03) (92% - 98%)    Constitutional: well developed, normal appearance, well groomed, well nourished, no deformities and no acute distress.   Eyes: the conjunctiva exhibited no abnormalities and the eyelids demonstrated no xanthelasmas.   HEENT: normal oral mucosa, no oral pallor and no oral cyanosis.   Neck: normal jugular venous A waves present, normal jugular venous V waves present and no jugular venous kelley A waves.   Pulmonary: no respiratory distress, normal respiratory rhythm and effort, no accessory muscle use and lungs were clear to auscultation bilaterally.   Cardiovascular: heart rate and rhythm were normal, normal S1 and S2 and no murmur, gallop, rub, heave or thrill are present.   Abdomen: soft, non-tender, no hepato-splenomegaly and no abdominal mass palpated.   Musculoskeletal: the gait could not be assessed..   Extremities: no clubbing of the fingernails, no localized cyanosis, no petechial hemorrhages and no ischemic changes.   Skin: normal skin color and pigmentation, no rash, no venous stasis, no skin lesions, no skin ulcer and no xanthoma was observed.   Psychiatric: oriented to person, place, and time, the affect was normal, the mood was normal and not feeling anxious.     LABS:   --------  09-26    146<H>  |  117<H>  |  46<H>  ----------------------------<  76  4.9   |  23  |  1.70<H>    Ca    9.9      26 Sep 2020 06:08                           11.1   8.52  )-----------( 154      ( 26 Sep 2020 06:08 )             34.7     PT/INR - ( 26 Sep 2020 06:08 )   PT: 13.3 sec;   INR: 1.14 ratio         PTT - ( 26 Sep 2020 06:08 )  PTT:32.4 sec            RADIOLOGY:  -----------------    ECG:     ECHO:

## 2020-09-26 NOTE — DISCHARGE NOTE PROVIDER - CARE PROVIDER_API CALL
Brendon Garcia  ORTHOPAEDIC SURGERY  [address] [place] [address]  Phone: [phone]  Fax: [phone]  Follow Up Time:    Brendon Garcia  ORTHOPAEDIC SURGERY  [address] [place] [address]  Phone: [phone]  Fax: [phone]  Follow Up Time:     Noe Estrella  CRITICAL CARE MEDICINE  [address] [place] [address]  Phone: [phone]  Fax: [phone]  Follow Up Time: 2 weeks    Edda Grande)  Internal Medicine  [address], [address]  [place] [address]  Phone: [phone]  Fax: [phone]  Follow Up Time: 1 month    Pahlavan, Mohsen  NEPHROLOGY  [address], [address]  [place] [address]  Phone: [phone]  Fax: [phone]  Follow Up Time: Routine

## 2020-09-26 NOTE — PROGRESS NOTE ADULT - ASSESSMENT
cont rx   cont rx      WHITE FEROZ [id number]  1930 [address] 2019 DR DHEERAJ SILVERIO     REVIEW OF SYMPTOMS      Able to give ROS  NO     PHYSICAL EXAM    HEENT Unremarkable PERRLA atraumatic   RESP Fair air entry EXP prolonged    Harsh breath sound Resp distres mild   CARDIAC S1 S2 No S3     NO JVD    ABDOMEN SOFT BS PRESENT NOT DISTENDED No hepatosplenomegaly PEDAL EDEMA present No calf tenderness  NO rash   GENERAL Not TOXIC looking    PT DATA/BEST PRACTICE  ALLERGY   pncl sulfa                  WT        2020 59                             BMI                                            ADVANCED DIRECTIVE     LINES TUBES POA.  PROCEDURES.              HEAD OF BED ELEVATION Yes      DVT PROPHYLAXIS.        DYSPHAGIA EVAL .    DIET.. dys 1 puree honey ensure bid () npo after mn ()                                                                            IV F. lr 75 ()   JENKINS PROPHYLAXIS.   INFECTION PPLX                                                     OXYGENATION      2020 ra 98%    VITALS/LABS       2020 afeb 58 130/70       PATIENT SUMMARY.   89 f ho dementia ckd cad oa hytn hld admitted 2020 from [facility] home with fall and l hip fx CXR 2020 showed sm r pl effsn ivcf old fx r humerus Pulm consulted for periop eval and followup       COURSE    COVID STATUS   COVID igg 2020 igg 47 posv   COVID pcr 2020 pcr negv  Findings suggested prior covid infectn    INFECTION  W 2020 w 6.9   Pr 2020 Pr .17  A/R   2020 Doubt infection    TRANSFUSION   1 u prbc      Hb -2020 Hb 8.5 - 10         PROBLEM/ASSESSMENT/RECOMMENDATIONS.         PLEURAL EFFS  CXR 2020 cxr poss sm r effs ivcf   A/R   2020 Film technically subopt   Will follow If clinical status changes will get ct chest to further evaluate     HYPERNATREMIA   Na -2020 Na 148 - 148  A/R  Monitor     BLAYNE   Cr -2020 Cr 1.8 - 1.8   A/R   2020 Monitor lytes    PULMONARY CLEARANCE  A/R  2020 Cleared for ortho surgery as above average risk pt  as she is in optimal pulm shape     TIME SPENT   Over 25 minutes aggregate care time spent on encounter; activities included   direct patient care, counseling and/or coordinating care reviewing notes, lab data/ imaging , discussion with multidisciplinary team/ patient  /family and explaining in detail risks, benefits, alternatives  of the recommendations     SHAHANA REDMAN [id number]  1930 DOA 2019 DR DHEERAJ SILVERIO

## 2020-09-26 NOTE — BRIEF OPERATIVE NOTE - NSICDXBRIEFPROCEDURE_GEN_ALL_CORE_FT
PROCEDURES:  Insertion of locking intramedullary mateusz into left hip 26-Sep-2020 15:06:04  Oskar Godfrey

## 2020-09-26 NOTE — PROGRESS NOTE ADULT - PROBLEM SELECTOR PLAN 1
CHRONIC KIDNEY DISEASE, STAGE 2 : change iv fluid to i/2 ns 75 cc   Serum creatinine is 1.8,    No uremic symptoms.   Anemia hg 10.1 . Fluid status stable.   Will continue to avoid nephrotoxic drugs.  Patient remains asymptomatic.

## 2020-09-26 NOTE — DISCHARGE NOTE PROVIDER - NSDCCPCAREPLAN_GEN_ALL_CORE_FT
PRINCIPAL DISCHARGE DIAGNOSIS  Diagnosis: Hip fracture, left  Assessment and Plan of Treatment:       SECONDARY DISCHARGE DIAGNOSES  Diagnosis: Hypothermia  Assessment and Plan of Treatment:

## 2020-09-26 NOTE — PROGRESS NOTE ADULT - SUBJECTIVE AND OBJECTIVE BOX
Patient is a 89y Female whom presented to the hospital with ckd and aiden     PAST MEDICAL & SURGICAL HISTORY:  Afib    Glaucoma    Dementia    CKD (chronic kidney disease)    CAD (coronary artery disease)    OA (osteoarthritis)    HLD (hyperlipidemia)    HTN (hypertension)    Dementia    No significant past surgical history        MEDICATIONS  (STANDING):  aMIOdarone    Tablet 100 milliGRAM(s) Oral daily  amLODIPine   Tablet 2.5 milliGRAM(s) Oral daily  atorvastatin 10 milliGRAM(s) Oral at bedtime  calcitriol   Capsule 0.25 MICROGram(s) Oral daily  donepezil 10 milliGRAM(s) Oral at bedtime  lactated ringers. 1000 milliLiter(s) (75 mL/Hr) IV Continuous <Continuous>  latanoprost 0.005% Ophthalmic Solution 1 Drop(s) Both EYES at bedtime  multivitamin 1 Tablet(s) Oral daily  polyethylene glycol 3350 17 Gram(s) Oral daily  senna 2 Tablet(s) Oral at bedtime      Allergies    penicillin (Other)  sulfa drugs (Other)    Intolerances        SOCIAL HISTORY:  Denies ETOh,Smoking,     FAMILY HISTORY:      REVIEW OF SYSTEMS:    CONSTITUTIONAL: No fevers or chills  RESPIRATORY: No cough, wheezing, hemoptysis; No shortness of breath  CARDIOVASCULAR: No chest pain or palpitations  GASTROINTESTINAL: No abdominal or epigastric pain. No nausea, vomiting,     No diarrhea or constipation. No melena   GENITOURINARY: No dysuria, frequency or hematuria                          10.1   7.78  )-----------( 155      ( 26 Sep 2020 07:55 )             31.6       CBC Full  -  ( 26 Sep 2020 07:55 )  WBC Count : 7.78 K/uL  RBC Count : 3.53 M/uL  Hemoglobin : 10.1 g/dL  Hematocrit : 31.6 %  Platelet Count - Automated : 155 K/uL  Mean Cell Volume : 89.5 fl  Mean Cell Hemoglobin : 28.6 pg  Mean Cell Hemoglobin Concentration : 32.0 gm/dL  Auto Neutrophil # : x  Auto Lymphocyte # : x  Auto Monocyte # : x  Auto Eosinophil # : x  Auto Basophil # : x  Auto Neutrophil % : x  Auto Lymphocyte % : x  Auto Monocyte % : x  Auto Eosinophil % : x  Auto Basophil % : x      09-26    148<H>  |  118<H>  |  43<H>  ----------------------------<  72  5.2   |  23  |  1.80<H>    Ca    9.9      26 Sep 2020 07:55        CAPILLARY BLOOD GLUCOSE          Vital Signs Last 24 Hrs  T(C): 36.8 (26 Sep 2020 05:03), Max: 37 (25 Sep 2020 19:57)  T(F): 98.2 (26 Sep 2020 05:03), Max: 98.6 (25 Sep 2020 19:57)  HR: 79 (26 Sep 2020 05:03) (58 - 79)  BP: 142/84 (26 Sep 2020 05:03) (101/82 - 142/84)  BP(mean): --  RR: 16 (26 Sep 2020 05:03) (15 - 16)  SpO2: 94% (26 Sep 2020 05:03) (92% - 98%)        PT/INR - ( 26 Sep 2020 07:55 )   PT: 13.8 sec;   INR: 1.19 ratio         PTT - ( 26 Sep 2020 06:08 )  PTT:32.4 sec        PHYSICAL EXAM:    Constitutional: NAD  HEENT: conjunctive   clear   Neck:  No JVD  Respiratory: CTAB  Cardiovascular: S1 and S2  Gastrointestinal: BS+, soft,   Extremities: No peripheral edema

## 2020-09-26 NOTE — PROGRESS NOTE ADULT - ASSESSMENT
89 y/o Female with PMHx HTN, HLD, OA, CKD, CAD, ovarian Ca BIBA to [place] [facility]  from [place]  ER due to diagnosed left hip fx. As per EMS, reports patient was from [facility] in which had unwitnessed fall. pt reports pain to left hip. Pt is a poor historian and unable to describe details of fall . As per ems, pt found to be hypothermic at [place]. pt currently on Eliquis ,last dose taken in am . pt denies headache, numbness/weakness, cp, sob, fever, abd pain, or any other complains. Found to have left intertrochanteric hip fracture Seen by orthopedic team and further imaging ordered Diagnosed with hip fx and orthopedic sx evaluation called,pain managmnet and dvt prophylaxis administrated Cardiology clearance requested for surgical treatment Palliative care consult requested ,to discuss advance directives and complete MOLST

## 2020-09-26 NOTE — PROGRESS NOTE ADULT - PROBLEM SELECTOR PLAN 1
89 y/o female with PMHx HTN, HLD, OA, CKD, CAD, ovarian Ca BIBA from [place] due to diagnosed left hip fx. As per EMS, reports patient was from [facility] in which had unwitnessed fall. pt reports pain to left hip.  Ortho eval in progress - planned for OR - NPO after midnight   Pain rx regimen  Fall prec  medical regimen and optimization -   Spoke with Dtr - pt is full code at present - HCP - is DTR Donna.  SLP eval noted - Dysphagia diet recs noted

## 2020-09-26 NOTE — DISCHARGE NOTE PROVIDER - NSDCFUADDINST_GEN_ALL_CORE_FT
1.	Pain Control  2.	Walking with full weight bearing as tolerated, with assistive devices (walker/Cane as Needed)  3.	DVT Prophylaxis for 30 days  4.	PT as needed  5.	Follow up with Dr. Stein as Outpatient in 10-14 Days after Discharge from the Hospital or Rehab. Call Office For Appointment.  6.	Remove Staples Post-Op Day 14, and Remove Dressing Post-Op Day 10, with Daily Dressing Changes as Need.  7.	Ice affected area as Needed  8.	Keep Dressing  Clean and dry.

## 2020-09-26 NOTE — PROGRESS NOTE ADULT - SUBJECTIVE AND OBJECTIVE BOX
PROGRESS NOTE  Patient is a 89y old  Female who presents with a chief complaint of LEFT HIP PAIN ,S/P FALL AT [facility] (26 Sep 2020 07:30)  Chart and available morning labs /imaging are reviewed electronically , urgent issues addressed . More information  is being added upon completion of rounds , when more information is collected and management discussed with consultants , medical staff and social service/case management on the floor     OVERNIGHT  No new issues reported by medical staff . All above noted Patient is resting in a bed comfortably .Confused ,poor mentation .No distress noted   Scheduled for OR today   HPI:  89 y/o Female with PMHx HTN, HLD, OA, CKD, CAD, ovarian Ca BIBA to [place] ED  from [place]  ER due to diagnosed left hip fx. As per EMS, reports patient was from [facility] in which had unwitnessed fall. pt reports pain to left hip. Pt is a poor historian and unable to describe details of fall . As per ems, pt found to be hypothermic at [place]. pt currently on Eliquis ,last dose taken in am . pt denies headache, numbness/weakness, cp, sob, fever, abd pain, or any other complains. Found to have left intertrochanteric hip fracture Seen by orthopedic team and further imaging ordered Diagnosed with hip fx and orthopedic sx evaluation called,pain managmnet and dvt prophylaxis administrated Cardiology clearance requested for surgical treatment Palliative care consult requested ,to discuss advance directives and complete MOLST      (25 Sep 2020 17:12)    PAST MEDICAL & SURGICAL HISTORY:  Afib    Glaucoma    Dementia    CKD (chronic kidney disease)    CAD (coronary artery disease)    OA (osteoarthritis)    HLD (hyperlipidemia)    HTN (hypertension)    Dementia    No significant past surgical history        MEDICATIONS  (STANDING):  aMIOdarone    Tablet 100 milliGRAM(s) Oral daily  amLODIPine   Tablet 2.5 milliGRAM(s) Oral daily  atorvastatin 10 milliGRAM(s) Oral at bedtime  calcitriol   Capsule 0.25 MICROGram(s) Oral daily  donepezil 10 milliGRAM(s) Oral at bedtime  lactated ringers. 1000 milliLiter(s) (75 mL/Hr) IV Continuous <Continuous>  latanoprost 0.005% Ophthalmic Solution 1 Drop(s) Both EYES at bedtime  multivitamin 1 Tablet(s) Oral daily  polyethylene glycol 3350 17 Gram(s) Oral daily  senna 2 Tablet(s) Oral at bedtime    MEDICATIONS  (PRN):  acetaminophen   Tablet .. 650 milliGRAM(s) Oral every 6 hours PRN Temp greater or equal to 38C (100.4F), Mild Pain (1 - 3)  bisacodyl Suppository 10 milliGRAM(s) Rectal daily PRN Constipation  HYDROmorphone  Injectable 0.5 milliGRAM(s) IV Push every 6 hours PRN Severe Pain (7 - 10)  magnesium hydroxide Suspension 30 milliLiter(s) Oral daily PRN Constipation  oxyCODONE    IR 2.5 milliGRAM(s) Oral every 6 hours PRN Moderate Pain (4 - 6)      OBJECTIVE    T(C): 36.8 (09-26-20 @ 05:03), Max: 37 (09-25-20 @ 19:57)  HR: 79 (09-26-20 @ 05:03) (58 - 79)  BP: 142/84 (09-26-20 @ 05:03) (101/82 - 142/84)  RR: 16 (09-26-20 @ 05:03) (15 - 16)  SpO2: 94% (09-26-20 @ 05:03) (92% - 98%)  Wt(kg): --  I&O's Summary    25 Sep 2020 07:01  -  26 Sep 2020 07:00  --------------------------------------------------------  IN: 0 mL / OUT: 500 mL / NET: -500 mL          REVIEW OF SYSTEMS:  CONSTITUTIONAL: No fever, weight loss, or fatigue  EYES: No eye pain, visual disturbances, or discharge  ENMT:   No sinus or throat pain  NECK: No pain or stiffness  RESPIRATORY: No cough, wheezing, chills or hemoptysis; No shortness of breath  CARDIOVASCULAR: No chest pain, palpitations, dizziness, or leg swelling  GASTROINTESTINAL: No abdominal pain. No nausea, vomiting; No diarrhea or constipation. No melena or hematochezia.  GENITOURINARY: No dysuria, frequency, hematuria, or incontinence  NEUROLOGICAL: No headaches, memory loss, loss of strength, numbness, or tremors  SKIN: No itching, burning, rashes, or lesions   MUSCULOSKELETAL: No joint pain or swelling; No muscle, back, or extremity pain    PHYSICAL EXAM:  Appearance: NAD. VS past 24 hrs -as above   HEENT:   Moist oral mucosa. Conjunctiva clear b/l.   Neck : supple  Respiratory: Lungs CTAB.  Gastrointestinal:  Soft, nontender. No rebound. No rigidity. BS present	  Cardiovascular: RRR ,S1S2 present  Neurologic: Non-focal. Moving all extremities.  Extremities: LLE:   Skin intact  +TTP over hip  Limited ROM d/t pain, unable to SLR  +TA/EHL/FHL/GS  SILT L2-S1  DP/PT 2+  Compartments soft and compressible  Calves NTTP b/l  Skin: No rashes, No ecchymoses, No cyanosis.	  wounds ,skin lesions-See skin assesment flow sheet   LABS:                        11.1   8.52  )-----------( 154      ( 26 Sep 2020 06:08 )             34.7     09-26    146<H>  |  117<H>  |  46<H>  ----------------------------<  76  4.9   |  23  |  1.70<H>    Ca    9.9      26 Sep 2020 06:08      CAPILLARY BLOOD GLUCOSE        PT/INR - ( 26 Sep 2020 06:08 )   PT: 13.3 sec;   INR: 1.14 ratio         PTT - ( 26 Sep 2020 06:08 )  PTT:32.4 sec      RADIOLOGY & ADDITIONAL TESTS:< from: CT Hip No Cont, Left (09.25.20 @ 14:28) >  EXAM:  CT HIP ONLY LT                            PROCEDURE DATE:  09/25/2020          INTERPRETATION:  EXAMINATION: CT of the bony pelvis without contrast    CLINICAL INFORMATION: Left hip fracture    TECHNIQUE: Axial CT images were obtained through the pelvis. Coronal and sagittal reformatted images were made. 3-D reconstruction images were performed to further evaluate for fracture    FINDINGS: There is an acute comminuted left intertrochanteric femur fracture with marked varus angulation. There is regional myofascial edema and hematoma. The bones are diffusely demineralized. No other fractures are demonstrated. There is lower lumbar spondylosis with grade 1 anterolisthesis at L4-L5 and L5-S1. There are no advanced arthritic changes at the hips.    An IVC filter is noted. There is diverticulosis. There are vascular calcifications. There are scattered enthesopathic bony productive changes.    IMPRESSION: Acute comminuted left intertrochanteric femur fracture with varus angulation.    < end of copied text >     reviewed elctronically  ASSESSMENT/PLAN: 	  45minutes spent on this visit, 50% visit time spent in care co-ordination with other attendings and counselling patient  I have discussed care plan with patient and HCP,expressed understanding of problems treatment and their effect and side effects, alternatives in detail,I have asked if they have any questions and concerns and appropriately addressed them to best of my ability

## 2020-09-26 NOTE — DISCHARGE NOTE PROVIDER - HOSPITAL COURSE
·  Problem: Hip fracture, left.  Plan: -CT left hip for further characterization of fracture pattern, some concern for extension into femoral neck which would change procedure to hemiarthroplasty   -Pain control  -NPO  -IVF while NPO  -NWB LLE, bedrest  -Hold DVT ppx (Eliquis)  -FU labs/imaging.     Problem/Plan - 2:  ·  Problem: Afib.  Plan: resumed on eliquis ,cardiology consult is following     Problem/Plan - 3:  ·  Problem: CAD (coronary artery disease).  Plan: cardiology clearance for OR requested.     Problem/Plan - 4:  ·  Problem: Dementia.  Plan: continue home medications.     Problem/Plan - 5:  ·  Problem: Glaucoma.  Plan: continue home medications.     Problem/Plan - 6:  Problem: HTN (hypertension). Plan: continue current management and present  medications.    Problem/Plan - 7:  ·  Problem: OA (osteoarthritis).  Plan: pain management ,OOB TC /PT after sx.     Problem/Plan - 8:  ·  Problem: Hypothermia.  Plan: check tsh ,send septic workup.     Problem/Plan - 9:  ·  Problem: CKD (chronic kidney disease).  Plan: serial bmp , iv fluids ,nephrology consult called.     Problem/Plan - 10:  Problem: Prophylactic measure. Plan; Gastrointestinal stress ulcer prophylaxis and DVT prophylaxis administered.    Attending Attestation:   DISCHARGE TO REHAB WHEN ARRANGED BY SOCIAL SERVICE AND AFTER  CLEARED BY ORTHO TEAM.

## 2020-09-26 NOTE — PROGRESS NOTE ADULT - SUBJECTIVE AND OBJECTIVE BOX
Date/Time Patient Seen:  		  Referring MD:   Data Reviewed	       Patient is a 89y old  Female who presents with a chief complaint of LEFT HIP PAIN ,S/P FALL AT [facility] (25 Sep 2020 19:59)      Subjective/HPI     PAST MEDICAL & SURGICAL HISTORY:  Afib    Glaucoma    Dementia    CKD (chronic kidney disease)    CAD (coronary artery disease)    OA (osteoarthritis)    HLD (hyperlipidemia)    HTN (hypertension)    Dementia    No significant past surgical history          Medication list         MEDICATIONS  (STANDING):  aMIOdarone    Tablet 100 milliGRAM(s) Oral daily  amLODIPine   Tablet 2.5 milliGRAM(s) Oral daily  atorvastatin 10 milliGRAM(s) Oral at bedtime  calcitriol   Capsule 0.25 MICROGram(s) Oral daily  donepezil 10 milliGRAM(s) Oral at bedtime  lactated ringers. 1000 milliLiter(s) (75 mL/Hr) IV Continuous <Continuous>  latanoprost 0.005% Ophthalmic Solution 1 Drop(s) Both EYES at bedtime  multivitamin 1 Tablet(s) Oral daily  polyethylene glycol 3350 17 Gram(s) Oral daily  senna 2 Tablet(s) Oral at bedtime    MEDICATIONS  (PRN):  acetaminophen   Tablet .. 650 milliGRAM(s) Oral every 6 hours PRN Temp greater or equal to 38C (100.4F), Mild Pain (1 - 3)  bisacodyl Suppository 10 milliGRAM(s) Rectal daily PRN Constipation  HYDROmorphone  Injectable 0.5 milliGRAM(s) IV Push every 6 hours PRN Severe Pain (7 - 10)  magnesium hydroxide Suspension 30 milliLiter(s) Oral daily PRN Constipation  oxyCODONE    IR 2.5 milliGRAM(s) Oral every 6 hours PRN Moderate Pain (4 - 6)         Vitals log        ICU Vital Signs Last 24 Hrs  T(C): 36.8 (26 Sep 2020 05:03), Max: 37 (25 Sep 2020 19:57)  T(F): 98.2 (26 Sep 2020 05:03), Max: 98.6 (25 Sep 2020 19:57)  HR: 79 (26 Sep 2020 05:03) (58 - 79)  BP: 142/84 (26 Sep 2020 05:03) (101/82 - 142/84)  BP(mean): --  ABP: --  ABP(mean): --  RR: 16 (26 Sep 2020 05:03) (15 - 16)  SpO2: 94% (26 Sep 2020 05:03) (92% - 98%)           Input and Output:  I&O's Detail      Lab Data                        8.5    6.91  )-----------( 187      ( 25 Sep 2020 14:47 )             25.7     09-25    148<H>  |  119<H>  |  51<H>  ----------------------------<  79  4.8   |  24  |  1.80<H>    Ca    9.1      25 Sep 2020 14:47              Review of Systems	      Objective     Physical Examination    heart s1s2  lung dec BS      Pertinent Lab findings & Imaging      Brianda:  NO   Adequate UO     I&O's Detail           Discussed with:     Cultures:	        Radiology

## 2020-09-26 NOTE — PROGRESS NOTE ADULT - ASSESSMENT
89 y/o Female with PMHx HTN, HLD, OA, CKD, CAD, ovarian Ca BIBA to [place] ED  from [place]  ER due to diagnosed left hip fx. As per EMS, reports patient was from [facility] in which had unwitnessed fall. pt reports pain to left hip. Pt is a poor historian and unable to describe details of fall . now with ckd and aiden

## 2020-09-26 NOTE — DISCHARGE NOTE PROVIDER - CARE PROVIDERS DIRECT ADDRESSES
,DirectAddress_Unknown ,DirectAddress_Unknown,[email or web address],DirectAddress_Unknown,[email or web address]

## 2020-09-26 NOTE — DISCHARGE NOTE PROVIDER - NSDCMRMEDTOKEN_GEN_ALL_CORE_FT
acetaminophen 500 mg oral tablet: 2 tab(s) orally every 12 hours, As Needed  amiodarone 100 mg oral tablet: 1 tab(s) orally 2 times a week on Monday and Thursday  B-Complex with Vitamin C: 1 tab(s) orally once a day  calcitriol 0.25 mcg oral capsule: 1 cap(s) orally once a day  donepezil 10 mg oral tablet: 1 tab(s) orally once a day (in the evening)  Eliquis 2.5 mg oral tablet: 0.5 tab(s) *1.25mg* orally 2 times a day    Dosing confirmed by Margie at [facility]  ferrous sulfate 325 mg (65 mg elemental iron) oral delayed release tablet: 1 tab(s) orally once a day  irbesartan 75 mg oral tablet: 0.5 tab(s) orally every other day  isradipine 2.5 mg oral capsule: 1 cap(s) orally once a day  Kayexalate oral and rectal powder: 15 gram(s) orally every other day  latanoprost 0.005% ophthalmic solution: 1 drop(s) to each affected eye once a day (at bedtime)  multivitamin: 1 tab(s) orally once a day  pravastatin 40 mg oral tablet: 1 tab(s) orally once a day  Procrit 4000 units/mL preservative-free injectable solution: Inject 1mL subcutaneously every 2 weeks for hemoglobin less than 10   acetaminophen 325 mg oral tablet: 2 tab(s) orally every 6 hours, As needed, Temp greater or equal to 38C (100.4F)  amiodarone 200 mg oral tablet: 0.5 tab(s) orally once a day  ascorbic acid 500 mg oral tablet: 1 tab(s) orally 2 times a day  atorvastatin 10 mg oral tablet: 1 tab(s) orally once a day (at bedtime)  B-Complex with Vitamin C: 1 tab(s) orally once a day  calcitriol 0.25 mcg oral capsule: 1 cap(s) orally once a day  donepezil 10 mg oral tablet: 1 tab(s) orally once a day (at bedtime)  Eliquis 2.5 mg oral tablet: 0.5 tab(s) *1.25mg* orally 2 times a day    Dosing confirmed by Margie at [facility]  ferrous sulfate 325 mg (65 mg elemental iron) oral delayed release tablet: 1 tab(s) orally once a day  folic acid 1 mg oral tablet: 1 tab(s) orally once a day  irbesartan 75 mg oral tablet: 0.5 tab(s) orally every other day ,hold for sbp below 110  latanoprost 0.005% ophthalmic solution: 1 drop(s) to each affected eye once a day (at bedtime)  multivitamin: 1 tab(s) orally once a day  oxyCODONE 5 mg oral tablet: 1 tab(s) orally every 4 hours, As needed, moderate to severe pain   senna oral tablet: 2 tab(s) orally once a day (at bedtime)

## 2020-09-26 NOTE — PROGRESS NOTE ADULT - SUBJECTIVE AND OBJECTIVE BOX
Ortho post-op check    Pt seen and examined at bedside, resting comfortably. Tolerated procedure well without complications. Denies numbness/tingling, chest pain, SOB.    T(C): 36.1 (09-26-20 @ 17:26), Max: 37 (09-26-20 @ 12:34)  HR: 70 (09-26-20 @ 17:26) (58 - 96)  BP: 106/66 (09-26-20 @ 17:26) (104/59 - 157/93)  RR: 17 (09-26-20 @ 17:26) (13 - 18)  SpO2: 94% (09-26-20 @ 17:26) (93% - 100%)                          9.7    9.09  )-----------( 141      ( 26 Sep 2020 15:54 )             29.0     26 Sep 2020 15:54    147    |  117    |  42     ----------------------------<  79     4.6     |  21     |  1.60     Ca    9.1        26 Sep 2020 15:54        Physical Exam:  Gen: NAD, A&Ox3    LLE:  Skin intact, dressing C/D/I  SILT L2-S1  + EHL/FHL/TA/GSC  + DP/PT pulses  Compartments soft, compressible    A/P: 89yFemale s/p L Hip IMN POD0    Tolerated procedure well without complications    - Pain control  - PT/OT  - WBAT LLE  - DVT prophylaxis to start tomorrow (Eliquis)  - Perioperative antibiotics per protocol  - Encourage incentive spirometry, deep breathing exercises  - Will discuss with attending, Dr. Stein and advise if plan changes    Oskar Godfrey, DO  PGY-2, Orthopaedic Surgery

## 2020-09-26 NOTE — PROGRESS NOTE ADULT - SUBJECTIVE AND OBJECTIVE BOX
Pt seen and examined at bedside, resting comfortably. No acute events overnight. Denies numbness/tingling, chest pain, SOB.    T(C): 36.8 (09-26-20 @ 05:03), Max: 37 (09-25-20 @ 19:57)  HR: 79 (09-26-20 @ 05:03) (58 - 79)  BP: 142/84 (09-26-20 @ 05:03) (101/82 - 142/84)  RR: 16 (09-26-20 @ 05:03) (15 - 16)  SpO2: 94% (09-26-20 @ 05:03) (92% - 98%)                          10.1   7.78  )-----------( 155      ( 26 Sep 2020 07:55 )             31.6     26 Sep 2020 06:08    146    |  117    |  46     ----------------------------<  76     4.9     |  23     |  1.70     Ca    9.9        26 Sep 2020 06:08      Physical Exam    General: confused  LLE:   Skin intact  +TTP over hip  Limited ROM d/t pain, unable to SLR  +TA/EHL/FHL/GS  SILT L2-S1  DP/PT 2+  Compartments soft and compressible  Calves NTTP b/l      A/P: 89y Female with left intertrochanteric hip fracture    -CT left hip reviewed - intertroch fracture, will be stabilized with intramedullary nailing  -Pain control  -NPO  -IVF while NPO  -NWB LLE, bedrest  -Hold DVT ppx (Eliquis)  -FU labs/imaging  -Cleared for OR  -Plan for OR (left hip intramedullary nailing) today  -Discussed with Dr. Stein who agrees    Oskar Godfrey, DO  PGY2, Orthopaedic Surgery

## 2020-09-26 NOTE — DISCHARGE NOTE PROVIDER - PROVIDER TOKENS
PROVIDER:[TOKEN:[[id number]]] PROVIDER:[TOKEN:[[id number]]],PROVIDER:[TOKEN:[[id number]],FOLLOWUP:[2 weeks]],PROVIDER:[TOKEN:[[id number]],FOLLOWUP:[1 month]],PROVIDER:[TOKEN:[[id number]],FOLLOWUP:[Routine]]

## 2020-09-26 NOTE — PROGRESS NOTE ADULT - SUBJECTIVE AND OBJECTIVE BOX
FEROZ HARKINS    PLV 2NOR 238 W1    Patient is a 89y old  Female who presents with a chief complaint of LEFT HIP PAIN ,S/P FALL AT [facility] (26 Sep 2020 08:16)       Allergies    penicillin (Other)  sulfa drugs (Other)    Intolerances        HPI:  89 y/o Female with PMHx HTN, HLD, OA, CKD, CAD, ovarian Ca BIBA to [place] ED  from [place]  ER due to diagnosed left hip fx. As per EMS, reports patient was from [facility] in which had unwitnessed fall. pt reports pain to left hip. Pt is a poor historian and unable to describe details of fall . As per ems, pt found to be hypothermic at [place]. pt currently on Eliquis ,last dose taken in am . pt denies headache, numbness/weakness, cp, sob, fever, abd pain, or any other complains. Found to have left intertrochanteric hip fracture Seen by orthopedic team and further imaging ordered Diagnosed with hip fx and orthopedic sx evaluation called,pain managmnet and dvt prophylaxis administrated Cardiology clearance requested for surgical treatment Palliative care consult requested ,to discuss advance directives and complete MOLST      (25 Sep 2020 17:12)      PAST MEDICAL & SURGICAL HISTORY:  Afib    Glaucoma    Dementia    CKD (chronic kidney disease)    CAD (coronary artery disease)    OA (osteoarthritis)    HLD (hyperlipidemia)    HTN (hypertension)    Dementia    No significant past surgical history        FAMILY HISTORY:        MEDICATIONS   acetaminophen   Tablet .. 650 milliGRAM(s) Oral every 6 hours PRN  aMIOdarone    Tablet 100 milliGRAM(s) Oral daily  amLODIPine   Tablet 2.5 milliGRAM(s) Oral daily  atorvastatin 10 milliGRAM(s) Oral at bedtime  bisacodyl Suppository 10 milliGRAM(s) Rectal daily PRN  calcitriol   Capsule 0.25 MICROGram(s) Oral daily  donepezil 10 milliGRAM(s) Oral at bedtime  HYDROmorphone  Injectable 0.5 milliGRAM(s) IV Push every 6 hours PRN  lactated ringers. 1000 milliLiter(s) IV Continuous <Continuous>  latanoprost 0.005% Ophthalmic Solution 1 Drop(s) Both EYES at bedtime  magnesium hydroxide Suspension 30 milliLiter(s) Oral daily PRN  multivitamin 1 Tablet(s) Oral daily  oxyCODONE    IR 2.5 milliGRAM(s) Oral every 6 hours PRN  polyethylene glycol 3350 17 Gram(s) Oral daily  senna 2 Tablet(s) Oral at bedtime      Vital Signs Last 24 Hrs  T(C): 36.8 (26 Sep 2020 05:03), Max: 37 (25 Sep 2020 19:57)  T(F): 98.2 (26 Sep 2020 05:03), Max: 98.6 (25 Sep 2020 19:57)  HR: 79 (26 Sep 2020 05:03) (58 - 79)  BP: 142/84 (26 Sep 2020 05:03) (101/82 - 142/84)  BP(mean): --  RR: 16 (26 Sep 2020 05:03) (15 - 16)  SpO2: 94% (26 Sep 2020 05:03) (92% - 98%)      09-25-20 @ 07:01  -  09-26-20 @ 07:00  --------------------------------------------------------  IN: 0 mL / OUT: 500 mL / NET: -500 mL            LABS:                        10.1   7.78  )-----------( 155      ( 26 Sep 2020 07:55 )             31.6     09-26    148<H>  |  118<H>  |  43<H>  ----------------------------<  72  5.2   |  23  |  1.80<H>    Ca    9.9      26 Sep 2020 07:55      PT/INR - ( 26 Sep 2020 07:55 )   PT: 13.8 sec;   INR: 1.19 ratio         PTT - ( 26 Sep 2020 06:08 )  PTT:32.4 sec          WBC:  WBC Count: 7.78 K/uL (09-26 @ 07:55)  WBC Count: 8.52 K/uL (09-26 @ 06:08)  WBC Count: 6.91 K/uL (09-25 @ 14:47)      MICROBIOLOGY:  RECENT CULTURES:              PT/INR - ( 26 Sep 2020 07:55 )   PT: 13.8 sec;   INR: 1.19 ratio         PTT - ( 26 Sep 2020 06:08 )  PTT:32.4 sec    Sodium:  Sodium, Serum: 148 mmol/L (09-26 @ 07:55)  Sodium, Serum: 146 mmol/L (09-26 @ 06:08)  Sodium, Serum: 148 mmol/L (09-25 @ 14:47)      1.80 mg/dL 09-26 @ 07:55  1.70 mg/dL 09-26 @ 06:08  1.80 mg/dL 09-25 @ 14:47      Hemoglobin:  Hemoglobin: 10.1 g/dL (09-26 @ 07:55)  Hemoglobin: 11.1 g/dL (09-26 @ 06:08)  Hemoglobin: 8.5 g/dL (09-25 @ 14:47)      Platelets: Platelet Count - Automated: 155 K/uL (09-26 @ 07:55)  Platelet Count - Automated: 154 K/uL (09-26 @ 06:08)  Platelet Count - Automated: 187 K/uL (09-25 @ 14:47)              RADIOLOGY & ADDITIONAL STUDIES:

## 2020-09-27 LAB
ANION GAP SERPL CALC-SCNC: 7 MMOL/L — SIGNIFICANT CHANGE UP (ref 5–17)
BUN SERPL-MCNC: 39 MG/DL — HIGH (ref 7–23)
CALCIUM SERPL-MCNC: 9.3 MG/DL — SIGNIFICANT CHANGE UP (ref 8.5–10.1)
CHLORIDE SERPL-SCNC: 117 MMOL/L — HIGH (ref 96–108)
CO2 SERPL-SCNC: 21 MMOL/L — LOW (ref 22–31)
CREAT SERPL-MCNC: 1.5 MG/DL — HIGH (ref 0.5–1.3)
GLUCOSE SERPL-MCNC: 75 MG/DL — SIGNIFICANT CHANGE UP (ref 70–99)
HCT VFR BLD CALC: 27.7 % — LOW (ref 34.5–45)
HGB BLD-MCNC: 9.3 G/DL — LOW (ref 11.5–15.5)
MCHC RBC-ENTMCNC: 29.2 PG — SIGNIFICANT CHANGE UP (ref 27–34)
MCHC RBC-ENTMCNC: 33.6 GM/DL — SIGNIFICANT CHANGE UP (ref 32–36)
MCV RBC AUTO: 87.1 FL — SIGNIFICANT CHANGE UP (ref 80–100)
NRBC # BLD: 0 /100 WBCS — SIGNIFICANT CHANGE UP (ref 0–0)
PLATELET # BLD AUTO: 129 K/UL — LOW (ref 150–400)
POTASSIUM SERPL-MCNC: 4.9 MMOL/L — SIGNIFICANT CHANGE UP (ref 3.5–5.3)
POTASSIUM SERPL-SCNC: 4.9 MMOL/L — SIGNIFICANT CHANGE UP (ref 3.5–5.3)
RBC # BLD: 3.18 M/UL — LOW (ref 3.8–5.2)
RBC # FLD: 19.7 % — HIGH (ref 10.3–14.5)
SODIUM SERPL-SCNC: 145 MMOL/L — SIGNIFICANT CHANGE UP (ref 135–145)
WBC # BLD: 7.59 K/UL — SIGNIFICANT CHANGE UP (ref 3.8–10.5)
WBC # FLD AUTO: 7.59 K/UL — SIGNIFICANT CHANGE UP (ref 3.8–10.5)

## 2020-09-27 RX ORDER — AMIODARONE HYDROCHLORIDE 400 MG/1
100 TABLET ORAL DAILY
Refills: 0 | Status: DISCONTINUED | OUTPATIENT
Start: 2020-09-27 | End: 2020-09-28

## 2020-09-27 RX ORDER — OLANZAPINE 15 MG/1
2.5 TABLET, FILM COATED ORAL EVERY 6 HOURS
Refills: 0 | Status: DISCONTINUED | OUTPATIENT
Start: 2020-09-27 | End: 2020-09-28

## 2020-09-27 RX ORDER — SENNA PLUS 8.6 MG/1
2 TABLET ORAL AT BEDTIME
Refills: 0 | Status: DISCONTINUED | OUTPATIENT
Start: 2020-09-27 | End: 2020-09-28

## 2020-09-27 RX ORDER — OLANZAPINE 15 MG/1
2.5 TABLET, FILM COATED ORAL ONCE
Refills: 0 | Status: DISCONTINUED | OUTPATIENT
Start: 2020-09-27 | End: 2020-09-28

## 2020-09-27 RX ADMIN — OXYCODONE HYDROCHLORIDE 5 MILLIGRAM(S): 5 TABLET ORAL at 17:21

## 2020-09-27 RX ADMIN — Medication 1 TABLET(S): at 11:20

## 2020-09-27 RX ADMIN — SENNA PLUS 2 TABLET(S): 8.6 TABLET ORAL at 21:42

## 2020-09-27 RX ADMIN — Medication 500 MILLIGRAM(S): at 05:12

## 2020-09-27 RX ADMIN — DONEPEZIL HYDROCHLORIDE 10 MILLIGRAM(S): 10 TABLET, FILM COATED ORAL at 21:42

## 2020-09-27 RX ADMIN — LATANOPROST 1 DROP(S): 0.05 SOLUTION/ DROPS OPHTHALMIC; TOPICAL at 21:42

## 2020-09-27 RX ADMIN — Medication 325 MILLIGRAM(S): at 11:20

## 2020-09-27 RX ADMIN — Medication 100 MILLIGRAM(S): at 05:12

## 2020-09-27 RX ADMIN — APIXABAN 1.25 MILLIGRAM(S): 2.5 TABLET, FILM COATED ORAL at 05:12

## 2020-09-27 RX ADMIN — ATORVASTATIN CALCIUM 10 MILLIGRAM(S): 80 TABLET, FILM COATED ORAL at 21:42

## 2020-09-27 RX ADMIN — Medication 1 MILLIGRAM(S): at 11:20

## 2020-09-27 RX ADMIN — CALCITRIOL 0.25 MICROGRAM(S): 0.5 CAPSULE ORAL at 11:20

## 2020-09-27 RX ADMIN — APIXABAN 1.25 MILLIGRAM(S): 2.5 TABLET, FILM COATED ORAL at 17:15

## 2020-09-27 RX ADMIN — Medication 500 MILLIGRAM(S): at 17:15

## 2020-09-27 RX ADMIN — OXYCODONE HYDROCHLORIDE 5 MILLIGRAM(S): 5 TABLET ORAL at 18:20

## 2020-09-27 NOTE — OCCUPATIONAL THERAPY INITIAL EVALUATION ADULT - GENERAL OBSERVATIONS, REHAB EVAL
Pt received supine in bed (+) external catheter, (+) SCDs, pleasantly confused, left hip dressing C/D/I.

## 2020-09-27 NOTE — PROGRESS NOTE ADULT - ASSESSMENT
89 y/o Female with PMHx HTN, HLD, OA, CKD, CAD, ovarian Ca BIBA to [place] ED  from [place]  ER due to diagnosed left hip fx. As per EMS, reports patient was from [facility] in which had unwitnessed fall. pt reports pain to left hip. Pt is a poor historian and unable to describe details of fall . As per ems, pt found to be hypothermic at [place]. pt currently on Eliquis ,last dose taken in am . pt denies headache, numbness/weakness, cp, sob, fever, abd pain, or any other complains. Found to have left intertrochanteric hip fracture Seen by orthopedic team and further imaging ordered Diagnosed with hip fx and orthopedic sx evaluation called,pain managmnet and dvt prophylaxis administrated Cardiology clearance requested for surgical treatment Palliative care consult requested ,to discuss advance directives and complete MOLST

## 2020-09-27 NOTE — PROGRESS NOTE ADULT - PROBLEM SELECTOR PLAN 1
s/p Insertion of locking intramedullary mateusz into left hip 26-Sep-2020  89 y/o female with PMHx HTN, HLD, OA, CKD, CAD, ovarian Ca BIBA from [place] due to diagnosed left hip fx. As per EMS, reports patient was from [facility] in which had unwitnessed fall. pt reports pain to left hip.  Pain rx regimen  Fall prec  medical regimen and optimization -   Spoke with Dtr - pt is full code at present - HCP - is DTR Donna.  SLP eval noted - Dysphagia diet recs noted.

## 2020-09-27 NOTE — PROGRESS NOTE ADULT - NSHPATTENDINGPLANDISCUSS_GEN_ALL_CORE
MED STAFF ON 2 N , ORTHO TEAM AND CARD CONS  ,PCP FROM [facility]  MED STAFF ON 2 N , ORTHO TEAM AND CARD CONS  ,PCP FROM [facility]  ,SPOKE TO THE DAUGHTER KYUNG ON A PHONE  ,UPDATE IS GIVEN ,MONICA PLACEMENT DISCUSSED

## 2020-09-27 NOTE — PROGRESS NOTE ADULT - SUBJECTIVE AND OBJECTIVE BOX
Interval History:    CENTRAL LINE:   [  ] YES       [  ] NO  OG:                 [  ] YES       [  ] NO         REVIEW OF SYSTEMS:  All Systems below were reviewed and are negative [  ]  HEENT:  ID:  Pulmonary:  Cardiac:  GI:  Renal:  Musculoskeletal:  All other systems above were reviewed and are negative   [  ]      MEDICATIONS  (STANDING):  aMIOdarone    Tablet 100 milliGRAM(s) Oral daily  apixaban 1.25 milliGRAM(s) Oral two times a day  ascorbic acid 500 milliGRAM(s) Oral two times a day  atorvastatin 10 milliGRAM(s) Oral at bedtime  calcitriol   Capsule 0.25 MICROGram(s) Oral daily  donepezil 10 milliGRAM(s) Oral at bedtime  ferrous    sulfate 325 milliGRAM(s) Oral daily  folic acid 1 milliGRAM(s) Oral daily  lactated ringers. 1000 milliLiter(s) (75 mL/Hr) IV Continuous <Continuous>  latanoprost 0.005% Ophthalmic Solution 1 Drop(s) Both EYES at bedtime  multivitamin 1 Tablet(s) Oral daily  OLANZapine Injectable 2.5 milliGRAM(s) IntraMuscular once  senna 2 Tablet(s) Oral at bedtime    MEDICATIONS  (PRN):  acetaminophen   Tablet .. 650 milliGRAM(s) Oral every 6 hours PRN Temp greater or equal to 38C (100.4F)  HYDROmorphone  Injectable 0.5 milliGRAM(s) IV Push every 6 hours PRN Severe Pain (7 - 10)  OLANZapine Injectable 2.5 milliGRAM(s) IntraMuscular every 6 hours PRN agitation  ondansetron Injectable 4 milliGRAM(s) IV Push every 6 hours PRN Nausea and/or Vomiting  oxyCODONE    IR 10 milliGRAM(s) Oral every 4 hours PRN Moderate Pain (4 - 6)  oxyCODONE    IR 5 milliGRAM(s) Oral every 4 hours PRN Mild Pain (1 - 3)      Vital Signs Last 24 Hrs  T(C): 36.8 (27 Sep 2020 15:57), Max: 36.8 (27 Sep 2020 15:57)  T(F): 98.3 (27 Sep 2020 15:57), Max: 98.3 (27 Sep 2020 15:57)  HR: 93 (27 Sep 2020 15:57) (57 - 125)  BP: 125/76 (27 Sep 2020 15:57) (112/69 - 149/53)  BP(mean): --  RR: 17 (27 Sep 2020 15:57) (17 - 18)  SpO2: 93% (27 Sep 2020 15:57) (92% - 100%)    I&O's Summary    26 Sep 2020 07:01  -  27 Sep 2020 07:00  --------------------------------------------------------  IN: 900 mL / OUT: 150 mL / NET: 750 mL    27 Sep 2020 07:01  -  27 Sep 2020 19:30  --------------------------------------------------------  IN: 240 mL / OUT: 110 mL / NET: 130 mL        PHYSICAL EXAM:  HEENT: NC/AT; PERRLA  Neck: Soft; no tenderness  Lungs: CTA bilaterally; no wheezing.   Heart:  Abdomen:  Genital/ Rectal:  Extremities:  Neurologic:  Vascular:      LABORATORY:    CBC Full  -  ( 27 Sep 2020 06:59 )  WBC Count : 7.59 K/uL  RBC Count : 3.18 M/uL  Hemoglobin : 9.3 g/dL  Hematocrit : 27.7 %  Platelet Count - Automated : 129 K/uL  Mean Cell Volume : 87.1 fl  Mean Cell Hemoglobin : 29.2 pg  Mean Cell Hemoglobin Concentration : 33.6 gm/dL  Auto Neutrophil # : x  Auto Lymphocyte # : x  Auto Monocyte # : x  Auto Eosinophil # : x  Auto Basophil # : x  Auto Neutrophil % : x  Auto Lymphocyte % : x  Auto Monocyte % : x  Auto Eosinophil % : x  Auto Basophil % : x      ESR:                   09-26 @ 07:55  --    C-Reactive Protein:     09-26 @ 07:55  --    Procalcitonin:           09-26 @ 07:55   0.17      09-27    145  |  117<H>  |  39<H>  ----------------------------<  75  4.9   |  21<L>  |  1.50<H>    Ca    9.3      27 Sep 2020 06:59            Assessment and Plan:          Arturo Avilez MD   [phone].   She is comfortable  No fevers or hypothermia noted.     MEDICATIONS  (STANDING):  aMIOdarone    Tablet 100 milliGRAM(s) Oral daily  apixaban 1.25 milliGRAM(s) Oral two times a day  ascorbic acid 500 milliGRAM(s) Oral two times a day  atorvastatin 10 milliGRAM(s) Oral at bedtime  calcitriol   Capsule 0.25 MICROGram(s) Oral daily  donepezil 10 milliGRAM(s) Oral at bedtime  ferrous    sulfate 325 milliGRAM(s) Oral daily  folic acid 1 milliGRAM(s) Oral daily  lactated ringers. 1000 milliLiter(s) (75 mL/Hr) IV Continuous <Continuous>  latanoprost 0.005% Ophthalmic Solution 1 Drop(s) Both EYES at bedtime  multivitamin 1 Tablet(s) Oral daily  OLANZapine Injectable 2.5 milliGRAM(s) IntraMuscular once  senna 2 Tablet(s) Oral at bedtime    MEDICATIONS  (PRN):  acetaminophen   Tablet .. 650 milliGRAM(s) Oral every 6 hours PRN Temp greater or equal to 38C (100.4F)  HYDROmorphone  Injectable 0.5 milliGRAM(s) IV Push every 6 hours PRN Severe Pain (7 - 10)  OLANZapine Injectable 2.5 milliGRAM(s) IntraMuscular every 6 hours PRN agitation  ondansetron Injectable 4 milliGRAM(s) IV Push every 6 hours PRN Nausea and/or Vomiting  oxyCODONE    IR 10 milliGRAM(s) Oral every 4 hours PRN Moderate Pain (4 - 6)  oxyCODONE    IR 5 milliGRAM(s) Oral every 4 hours PRN Mild Pain (1 - 3)      Vital Signs Last 24 Hrs  T(C): 36.8 (27 Sep 2020 15:57), Max: 36.8 (27 Sep 2020 15:57)  T(F): 98.3 (27 Sep 2020 15:57), Max: 98.3 (27 Sep 2020 15:57)  HR: 93 (27 Sep 2020 15:57) (57 - 125)  BP: 125/76 (27 Sep 2020 15:57) (112/69 - 149/53)  BP(mean): --  RR: 17 (27 Sep 2020 15:57) (17 - 18)  SpO2: 93% (27 Sep 2020 15:57) (92% - 100%)    I&O's Summary    26 Sep 2020 07:01  -  27 Sep 2020 07:00  --------------------------------------------------------  IN: 900 mL / OUT: 150 mL / NET: 750 mL    27 Sep 2020 07:01  -  27 Sep 2020 19:30  --------------------------------------------------------  IN: 240 mL / OUT: 110 mL / NET: 130 mL      PHYSICAL EXAM:  HEENT: NC/AT; PERRLA  Neck: Soft; no tenderness  Lungs: Coarse BSbilaterally; no wheezing.   Heart: RRR; no murmurs  Abdomen: Soft; no tenderness,   Genital/ Rectal: No arana   Extremities: No edema. Left hip wound with clean dressing.   Neurologic: Awake.     LABORATORY:    CBC Full  -  ( 27 Sep 2020 06:59 )  WBC Count : 7.59 K/uL  RBC Count : 3.18 M/uL  Hemoglobin : 9.3 g/dL  Hematocrit : 27.7 %  Platelet Count - Automated : 129 K/uL  Mean Cell Volume : 87.1 fl  Mean Cell Hemoglobin : 29.2 pg  Mean Cell Hemoglobin Concentration : 33.6 gm/dL  Auto Neutrophil # : x  Auto Lymphocyte # : x  Auto Monocyte # : x  Auto Eosinophil # : x  Auto Basophil # : x  Auto Neutrophil % : x  Auto Lymphocyte % : x  Auto Monocyte % : x  Auto Eosinophil % : x  Auto Basophil % : x      ESR:                   09-26 @ 07:55  --    C-Reactive Protein:     09-26 @ 07:55  --    Procalcitonin:           09-26 @ 07:55   0.17      09-27    145  |  117<H>  |  39<H>  ----------------------------<  75  4.9   |  21<L>  |  1.50<H>    Ca    9.3      27 Sep 2020 06:59    Assessment and Plan:      1. Left hip fracture from fall, s/p ORIF.   2. Hypothermia;.     , No active infections noted.   . Monitor off antibiotics.  . Supportive care.,     Arturo Avilez MD   [phone].

## 2020-09-27 NOTE — OCCUPATIONAL THERAPY INITIAL EVALUATION ADULT - PERTINENT HX OF CURRENT PROBLEM, REHAB EVAL
Pt is a 89 y/o male BIBA to [place] ED from [place] ER due to diagnosed left hip fx. As per EMS, reports patient was from [facility] in which had unwitnessed fall. pt reports pain to left hip. Pt is a poor historian and unable to describe details of fall. As per ems, pt found to be hypothermic at [place]. S/p Left hip IMN 9/26/2020.

## 2020-09-27 NOTE — OCCUPATIONAL THERAPY INITIAL EVALUATION ADULT - ANTICIPATED DISCHARGE DISPOSITION, OT EVAL
After Visit Summary   1/25/2017    Constantine Padilla    MRN: [id number]           Patient Information     Date Of Birth          2016        Visit Information        Provider Department      1/25/2017 11:00 AM Fidencio Stone MD [facility]         Follow-ups after your visit        Your next 10 appointments already scheduled     May 03, 2017  9:30 AM   New Genetic Visit with Betty Araujo MD   Peds Genetics ([facility])    [facility]  [address]  [address] [facility] [address] [phone]            May 07, 2017 10:00 AM   Genetic Counseling with MARLEY [facility] ([facility])    [facility]  [address]  [address] [facility] [address] [phone]              Who to contact     If you have questions or need follow up information about today's clinic visit or your schedule please contact [facility] directly at [phone].  Normal or non-critical lab and imaging results will be communicated to you by MyChart, letter or phone within 4 business days after the clinic has received the results. If you do not hear from us within 7 days, please contact the clinic through MyChart or phone. If you have a critical or abnormal lab result, we will notify you by phone as soon as possible.  Submit refill requests through [employer] or call your pharmacy and they will forward the refill request to us. Please allow 3 business days for your refill to be completed.          Additional Information About Your Visit        MyChart Information     Boxxet is an electronic gateway that provides easy, online access to your medical records. With Boxxet, you can request a clinic appointment, read your test results, renew a prescription or communicate with your care team.     To sign up for [employer]xet, please contact your [facility] or call [phone] for assistance.       "     Care EveryWhere ID     This is your Care EveryWhere ID. This could be used by other organizations to access your [place] medical records  [id number]        Your Vitals Were     Height BMI (Body Mass Index)                0.69 m (2' 3.17\") 17.19 kg/m2           Blood Pressure from Last 3 Encounters:   12/09/16 74/50   12/02/16 99/64   11/18/16 99/68    Weight from Last 3 Encounters:   01/25/17 8.185 kg (18 lb 0.7 oz) (32.29 %*)   12/09/16 7.4 kg (16 lb 5 oz) (20.74 %*)   12/02/16 7.56 kg (16 lb 10.7 oz) (29.93 %*)     * Growth percentiles are based on WHO (Boys, 0-2 years) data.              Today, you had the following     No orders found for display         Today's Medication Changes          These changes are accurate as of: 1/25/17 11:55 AM.  If you have any questions, ask your nurse or doctor.               Stop taking these medicines if you haven't already. Please contact your care team if you have questions.     oxyCODONE 5 MG/5ML solution   Commonly known as:  ROXICODONE                    Primary Care Provider Office Phone # Fax #    Ramiro K Day [phone]       [place] [address]        Thank you!     Thank you for choosing [facility]  for your care. Our goal is always to provide you with excellent care. Hearing back from our patients is one way we can continue to improve our services. Please take a few minutes to complete the written survey that you may receive in the mail after your visit with us. Thank you!             Your Updated Medication List - Protect others around you: Learn how to safely use, store and throw away your medicines at www.disposemymeds.org.          This list is accurate as of: 1/25/17 11:55 AM.  Always use your most recent med list.                   Brand Name Dispense Instructions for use    cholecalciferol 400 UNIT/ML Liqd liquid    vitamin D/D-VI-SOL    15 mL    0.5 mLs (200 Units) by " Oral or Feeding Tube route daily       order for DME     1 Device    Equipment being ordered: AMT mini one gastrostomy tube button size 14 french x 1.5 cm and right angle extensions as needed.       pantoprazole 2 mg/mL suspension    PROTONIX    150 mL    Take 2.5 mLs (5 mg) by mouth 2 times daily          [facility]/rehabilitation facility independent

## 2020-09-27 NOTE — PROGRESS NOTE ADULT - ASSESSMENT
89 y/o Female with PMHx HTN, HLD, OA, CKD, CAD, ovarian Ca BIBA to [facility]  from [place]  ER due to diagnosed left hip fx. As per EMS, reports patient was from [facility] in which had unwitnessed fall. pt reports pain to left hip. Pt is a poor historian and unable to describe details of fall . now with ckd and aiden     MEDICATIONS  (STANDING):  aMIOdarone    Tablet 100 milliGRAM(s) Oral daily  apixaban 1.25 milliGRAM(s) Oral two times a day  ascorbic acid 500 milliGRAM(s) Oral two times a day  atorvastatin 10 milliGRAM(s) Oral at bedtime  calcitriol   Capsule 0.25 MICROGram(s) Oral daily  donepezil 10 milliGRAM(s) Oral at bedtime  ferrous    sulfate 325 milliGRAM(s) Oral daily  folic acid 1 milliGRAM(s) Oral daily  lactated ringers. 1000 milliLiter(s) (75 mL/Hr) IV Continuous <Continuous>  latanoprost 0.005% Ophthalmic Solution 1 Drop(s) Both EYES at bedtime  multivitamin 1 Tablet(s) Oral daily  OLANZapine Injectable 2.5 milliGRAM(s) IntraMuscular once  senna 2 Tablet(s) Oral at bedtime    MEDICATIONS  (PRN):  acetaminophen   Tablet .. 650 milliGRAM(s) Oral every 6 hours PRN Temp greater or equal to 38C (100.4F)  HYDROmorphone  Injectable 0.5 milliGRAM(s) IV Push every 6 hours PRN Severe Pain (7 - 10)  OLANZapine Injectable 2.5 milliGRAM(s) IntraMuscular every 6 hours PRN agitation  ondansetron Injectable 4 milliGRAM(s) IV Push every 6 hours PRN Nausea and/or Vomiting  oxyCODONE    IR 10 milliGRAM(s) Oral every 4 hours PRN Moderate Pain (4 - 6)  oxyCODONE    IR 5 milliGRAM(s) Oral every 4 hours PRN Mild Pain (1 - 3)

## 2020-09-27 NOTE — PROGRESS NOTE ADULT - PROBLEM SELECTOR PLAN 2
hypernatremia improved   continue with iv fluid  high pth on calcitriol   Capsule 0.25 MICROGram(s) Oral daily

## 2020-09-27 NOTE — PROGRESS NOTE ADULT - SUBJECTIVE AND OBJECTIVE BOX
PROGRESS NOTE  Patient is a 89y old  Female who presents with a chief complaint of LEFT HIP PAIN ,S/P FALL AT [facility] (27 Sep 2020 11:59)  Chart and available morning labs /imaging are reviewed electronically , urgent issues addressed . More information  is being added upon completion of rounds , when more information is collected and management discussed with consultants , medical staff and social service/case management on the floor   LATE ENTRY- patient was seen and examined earlier today . Plan of care was discussed with med staff and unit coordinator .   OVERNIGHT  No new issues reported by medical staff . All above noted Patient is resting in a bed comfortably .Confused ,poor mentation .No distress noted   HPI:  89 y/o Female with PMHx HTN, HLD, OA, CKD, CAD, ovarian Ca BIBA to [facility]  from [place]  ER due to diagnosed left hip fx. As per EMS, reports patient was from [facility] in which had unwitnessed fall. pt reports pain to left hip. Pt is a poor historian and unable to describe details of fall . As per ems, pt found to be hypothermic at [place]. pt currently on Eliquis ,last dose taken in am . pt denies headache, numbness/weakness, cp, sob, fever, abd pain, or any other complains. Found to have left intertrochanteric hip fracture Seen by orthopedic team and further imaging ordered Diagnosed with hip fx and orthopedic sx evaluation called,pain managmnet and dvt prophylaxis administrated Cardiology clearance requested for surgical treatment Palliative care consult requested ,to discuss advance directives and complete MOLST      (25 Sep 2020 17:12)    PAST MEDICAL & SURGICAL HISTORY:  Afib    Glaucoma    Dementia    CKD (chronic kidney disease)    CAD (coronary artery disease)    OA (osteoarthritis)    HLD (hyperlipidemia)    HTN (hypertension)    Dementia    No significant past surgical history        MEDICATIONS  (STANDING):  apixaban 1.25 milliGRAM(s) Oral two times a day  ascorbic acid 500 milliGRAM(s) Oral two times a day  atorvastatin 10 milliGRAM(s) Oral at bedtime  calcitriol   Capsule 0.25 MICROGram(s) Oral daily  donepezil 10 milliGRAM(s) Oral at bedtime  ferrous    sulfate 325 milliGRAM(s) Oral daily  folic acid 1 milliGRAM(s) Oral daily  lactated ringers. 1000 milliLiter(s) (75 mL/Hr) IV Continuous <Continuous>  latanoprost 0.005% Ophthalmic Solution 1 Drop(s) Both EYES at bedtime  multivitamin 1 Tablet(s) Oral daily  OLANZapine Injectable 2.5 milliGRAM(s) IntraMuscular once  senna 2 Tablet(s) Oral at bedtime    MEDICATIONS  (PRN):  acetaminophen   Tablet .. 650 milliGRAM(s) Oral every 6 hours PRN Temp greater or equal to 38C (100.4F)  HYDROmorphone  Injectable 0.5 milliGRAM(s) IV Push every 6 hours PRN Severe Pain (7 - 10)  ondansetron Injectable 4 milliGRAM(s) IV Push every 6 hours PRN Nausea and/or Vomiting  oxyCODONE    IR 10 milliGRAM(s) Oral every 4 hours PRN Moderate Pain (4 - 6)  oxyCODONE    IR 5 milliGRAM(s) Oral every 4 hours PRN Mild Pain (1 - 3)      OBJECTIVE    T(C): 36.4 (09-27-20 @ 10:37), Max: 36.9 (09-26-20 @ 13:25)  HR: 113 (09-27-20 @ 10:37) (57 - 113)  BP: 113/77 (09-27-20 @ 10:37) (104/59 - 157/93)  RR: 18 (09-27-20 @ 10:37) (13 - 18)  SpO2: 97% (09-27-20 @ 10:37) (92% - 100%)  Wt(kg): --  I&O's Summary    26 Sep 2020 07:01  -  27 Sep 2020 07:00  --------------------------------------------------------  IN: 900 mL / OUT: 150 mL / NET: 750 mL          REVIEW OF SYSTEMS:  CONSTITUTIONAL: No fever, weight loss, or fatigue  ROS is unobtainable due to dementia and confusion PATIENT IS CONFUSED AND IS UNABLE TO GIVE ANY/RELIABLE HISTORY OR REVIEW OF SYSTEMS PLEASE ALSO SEE UNDER HPI AND ASSESSMENT FOR OBTAINED REVIEW OF SYSTEMS     PHYSICAL EXAM:  Appearance: NAD. VS past 24 hrs -as above   HEENT:   Moist oral mucosa. Conjunctiva clear b/l.   Neck : supple  Respiratory: Lungs CTAB.  Gastrointestinal:  Soft, nontender. No rebound. No rigidity. BS present	  Cardiovascular: RRR ,S1S2 present  Neurologic: Non-focal. Moving all extremities.  Extremities: LLE:  Skin intact, dressing C/D/I  Motor/sensory exam limited due to mental status but moving extremity spontaneously  + DP/PT pulses  Compartments soft, compressible  Skin: No rashes, No ecchymoses, No cyanosis.	  wounds ,skin lesions-See skin assesment flow sheet   LABS:                        9.3    7.59  )-----------( 129      ( 27 Sep 2020 06:59 )             27.7     09-27    145  |  117<H>  |  39<H>  ----------------------------<  75  4.9   |  21<L>  |  1.50<H>    Ca    9.3      27 Sep 2020 06:59      CAPILLARY BLOOD GLUCOSE        PT/INR - ( 26 Sep 2020 07:55 )   PT: 13.8 sec;   INR: 1.19 ratio         PTT - ( 26 Sep 2020 06:08 )  PTT:32.4 sec      Culture - Blood (collected 26 Sep 2020 05:24)  Source: .Blood Blood  Preliminary Report (27 Sep 2020 06:01):    No growth to date.      RADIOLOGY & ADDITIONAL TESTS:   reviewed elctronically  ASSESSMENT/PLAN: 	    45minutes spent on this visit, 50% visit time spent in care co-ordination with other attendings and counselling patient  I have discussed care plan with patient and HCP,expressed understanding of problems treatment and their effect and side effects, alternatives in detail,I have asked if they have any questions and concerns and appropriately addressed them to best of my ability

## 2020-09-27 NOTE — PROGRESS NOTE ADULT - PROBLEM SELECTOR PLAN 1
Serum creatinine is  impriving 15      , approximating GFR at   ml/min.   There is no progression . No uremic symptoms  Fluid status stable.  Will continue to avoid nephrotoxic drugs.  Patient remains asymptomatic.   Continue current therapy.  continue with iv fluid

## 2020-09-27 NOTE — PROGRESS NOTE ADULT - SUBJECTIVE AND OBJECTIVE BOX
Date/Time Patient Seen:  		  Referring MD:   Data Reviewed	       Patient is a 89y old  Female who presents with a chief complaint of LEFT HIP PAIN ,S/P FALL AT [facility] (26 Sep 2020 21:52)      Subjective/HPI     PAST MEDICAL & SURGICAL HISTORY:  Afib    Glaucoma    Dementia    CKD (chronic kidney disease)    CAD (coronary artery disease)    OA (osteoarthritis)    HLD (hyperlipidemia)    HTN (hypertension)    Dementia    No significant past surgical history          Medication list         MEDICATIONS  (STANDING):  apixaban 1.25 milliGRAM(s) Oral two times a day  ascorbic acid 500 milliGRAM(s) Oral two times a day  atorvastatin 10 milliGRAM(s) Oral at bedtime  calcitriol   Capsule 0.25 MICROGram(s) Oral daily  donepezil 10 milliGRAM(s) Oral at bedtime  ferrous    sulfate 325 milliGRAM(s) Oral daily  folic acid 1 milliGRAM(s) Oral daily  lactated ringers. 1000 milliLiter(s) (75 mL/Hr) IV Continuous <Continuous>  latanoprost 0.005% Ophthalmic Solution 1 Drop(s) Both EYES at bedtime  multivitamin 1 Tablet(s) Oral daily    MEDICATIONS  (PRN):  acetaminophen   Tablet .. 650 milliGRAM(s) Oral every 6 hours PRN Temp greater or equal to 38C (100.4F)  HYDROmorphone  Injectable 0.5 milliGRAM(s) IV Push every 6 hours PRN Severe Pain (7 - 10)  ondansetron Injectable 4 milliGRAM(s) IV Push every 6 hours PRN Nausea and/or Vomiting  oxyCODONE    IR 10 milliGRAM(s) Oral every 4 hours PRN Moderate Pain (4 - 6)  oxyCODONE    IR 5 milliGRAM(s) Oral every 4 hours PRN Mild Pain (1 - 3)         Vitals log        ICU Vital Signs Last 24 Hrs  T(C): 36.4 (27 Sep 2020 05:16), Max: 37 (26 Sep 2020 12:34)  T(F): 97.6 (27 Sep 2020 05:16), Max: 98.6 (26 Sep 2020 12:34)  HR: 65 (27 Sep 2020 05:16) (57 - 96)  BP: 139/85 (27 Sep 2020 05:16) (104/59 - 157/93)  BP(mean): --  ABP: --  ABP(mean): --  RR: 18 (27 Sep 2020 05:16) (13 - 18)  SpO2: 100% (27 Sep 2020 05:16) (92% - 100%)           Input and Output:  I&O's Detail    25 Sep 2020 07:01  -  26 Sep 2020 07:00  --------------------------------------------------------  IN:  Total IN: 0 mL    OUT:    Voided (mL): 500 mL  Total OUT: 500 mL    Total NET: -500 mL      26 Sep 2020 07:01  -  27 Sep 2020 06:13  --------------------------------------------------------  IN:  Total IN: 0 mL    OUT:    Voided (mL): 150 mL  Total OUT: 150 mL    Total NET: -150 mL          Lab Data                        9.7    9.09  )-----------( 141      ( 26 Sep 2020 15:54 )             29.0     09-26    147<H>  |  117<H>  |  42<H>  ----------------------------<  79  4.6   |  21<L>  |  1.60<H>    Ca    9.1      26 Sep 2020 15:54              Review of Systems	      Objective     Physical Examination    heart s1s2  lung dec BS  abd soft      Pertinent Lab findings & Imaging      Brianda:  NO   Adequate UO     I&O's Detail    25 Sep 2020 07:01  -  26 Sep 2020 07:00  --------------------------------------------------------  IN:  Total IN: 0 mL    OUT:    Voided (mL): 500 mL  Total OUT: 500 mL    Total NET: -500 mL      26 Sep 2020 07:01  -  27 Sep 2020 06:13  --------------------------------------------------------  IN:  Total IN: 0 mL    OUT:    Voided (mL): 150 mL  Total OUT: 150 mL    Total NET: -150 mL               Discussed with:     Cultures:	        Radiology

## 2020-09-27 NOTE — OCCUPATIONAL THERAPY INITIAL EVALUATION ADULT - IADL RETRAINING, OT EVAL
Patient will increase standing tolerance to 1-2 minutes with RW in prep for safe toileting at [facility] within 3-5 sessions

## 2020-09-27 NOTE — DIETITIAN INITIAL EVALUATION ADULT. - SIGNS/SYMPTOMS
as evidenced by moderate depletion noted to temporal, orbital and buccal areas as evidenced by Stage II pressure ulcers

## 2020-09-27 NOTE — PHYSICAL THERAPY INITIAL EVALUATION ADULT - PERTINENT HX OF CURRENT PROBLEM, REHAB EVAL
89 y/o female with PMHx HTN, HLD, OA, CKD, CAD, ovarian Ca, As per EMS, reports patient was from [facility] in which had unwitnessed fall. pt reports pain to left hip. Pt is a poor historian and unable to describe. As per ems, pt found to be hypothermic at [place]. pt currently on Eliquis. pt denies headache, numbness/weakness, cp, sob, fever, abd pain, or any other complains.

## 2020-09-27 NOTE — DIETITIAN INITIAL EVALUATION ADULT. - OTHER INFO
89 y/o female adm from [facility] with left hip fx. S/p OR 9/26. PMH afib, dementia, CKD, CAD, OA, HLD, HTN. Pt visited at bedside this am. Pt answered questions, however noted to be slightly confused. Pt feeding self breakfast meal. As per pt, pt eats well at home. Pt likes Ensure supplement. SLP de completed on 9/25 which rx dysphagia 2 mech soft diet with thin liquids. Pt states that her ht is 5'4", however appears to be shorter. Wts noted to vary (both on 9/25 108.2# and 130#). Pt appears closer to 108.2#.

## 2020-09-27 NOTE — DIETITIAN INITIAL EVALUATION ADULT. - ETIOLOGY
related to probable inadequate energy/nutrient intake related to increased nutrient needs for wound healing

## 2020-09-27 NOTE — PROGRESS NOTE ADULT - ASSESSMENT
cont rx   cont rx      WHITE FEROZ [phone]  1930 DOA 2019 DR DHEERAJ SILVERIO     REVIEW OF SYMPTOMS      Able to give ROS  NO     PHYSICAL EXAM    HEENT Unremarkable PERRLA atraumatic   RESP Fair air entry EXP prolonged    Harsh breath sound Resp distres mild   CARDIAC S1 S2 No S3     NO JVD    ABDOMEN SOFT BS PRESENT NOT DISTENDED No hepatosplenomegaly PEDAL EDEMA present No calf tenderness  NO rash   GENERAL Not TOXIC looking    PT DATA/BEST PRACTICE  ALLERGY   pncl sulfa                  WT        2020 59                             BMI                                            ADVANCED DIRECTIVE     LINES TUBES POA.  PROCEDURES.             2020  insertion of locking intramedullary mateusz in l hip for it fx l hip on 2020 Dr Brendon Barrett   HEAD OF BED ELEVATION Yes      DVT PROPHYLAXIS.   apixaban 1.25x2 (nv af) (Dr Hernandez)      DYSPHAGIA EVAL .    DIET.. dys 1 puree honey ensure bid ()    --> dys 2 mech soft thin ()                                                     IV F. lr 75 ()   JENKINS PROPHYLAXIS.   INFECTION PPLX         OXYGENATION      -2020 ra 98% - 2l 97%     VITALS/LABS       2020 afeb 111 110/70       PATIENT SUMMARY.   89 f ho dementia ckd cad oa hytn hld paf admitted [facility] from [facility] with fall and l hip fx CXR 2020 showed sm r pl effsn ivcf old fx r humerus Pulm consulted for periop eval and followup     Pt was given 1 u prbc transfusion  as Hb was 8.5   She had insertion of locking intramedullary mateusz in l hip for it fx l hip on 2020 Dr Brendon Stein Barrett    home meds   amiodarone 100 ashleigh t calcitriol donepezil 10 irbesartan 37.5 pravastat 40 kayexalate eod eliquis 1.25x2       PROBLEM/ASSESSMENT/RECOMMENDATIONS.         PLEURAL EFFS  CXR 2020 cxr poss sm r effs ivcf   A/R   2020 Film technically subopt   Will follow If clinical status changes will get ct chest to further evaluate     BLAYNE   Cr --2020 Cr 1.8 - 1.8 - 1.5   A/R   2020 Monitor lytes    STATUS POST HIP SURGERY   2020  insertion of locking intramedullary mateusz in l hip for it fx l hip on 2020 Dr Brendon Barrett     DVT PPl  APIXABAN apixaban 1.25x2 (nv af) (Dr Hernandez)      PAF  APIXABAN apixaban 1.25x2 (nv af) (Dr Hernandez)     AMIODARION amiodarine 100 () (dw Dr Grande)       TIME SPENT   Over 25 minutes aggregate care time spent on encounter; activities included   direct patient care, counseling and/or coordinating care reviewing notes, lab data/ imaging , discussion with multidisciplinary team/ patient  /family and explaining in detail risks, benefits, alternatives  of the recommendations     SHAHANA REDMAN [phone]  1930 DOA 2019 DR DHEERAJ SILVERIO

## 2020-09-27 NOTE — DIETITIAN INITIAL EVALUATION ADULT. - ADD RECOMMEND
continue MVI, vit C; encourage po intake at meals; provide assistance if needed; honor pt's food preferences.

## 2020-09-27 NOTE — PROGRESS NOTE ADULT - SUBJECTIVE AND OBJECTIVE BOX
FEROZ HARKINS    PLV 2EAS 214 W1    Patient is a 89y old  Female who presents with a chief complaint of LEFT HIP PAIN ,S/P FALL AT [facility] (27 Sep 2020 09:45)       Allergies    penicillin (Other)  sulfa drugs (Other)    Intolerances        HPI:  89 y/o Female with PMHx HTN, HLD, OA, CKD, CAD, ovarian Ca BIBA to [place] [facility]  from [place]  [facility] due to diagnosed left hip fx. As per EMS, reports patient was from [facility] in which had unwitnessed fall. pt reports pain to left hip. Pt is a poor historian and unable to describe details of fall . As per ems, pt found to be hypothermic at [facility]. pt currently on Eliquis ,last dose taken in am . pt denies headache, numbness/weakness, cp, sob, fever, abd pain, or any other complains. Found to have left intertrochanteric hip fracture Seen by orthopedic team and further imaging ordered Diagnosed with hip fx and orthopedic sx evaluation called,pain managmnet and dvt prophylaxis administrated Cardiology clearance requested for surgical treatment Palliative care consult requested ,to discuss advance directives and complete MOLST      (25 Sep 2020 17:12)      PAST MEDICAL & SURGICAL HISTORY:  Afib    Glaucoma    Dementia    CKD (chronic kidney disease)    CAD (coronary artery disease)    OA (osteoarthritis)    HLD (hyperlipidemia)    HTN (hypertension)    Dementia    No significant past surgical history        FAMILY HISTORY:        MEDICATIONS   acetaminophen   Tablet .. 650 milliGRAM(s) Oral every 6 hours PRN  apixaban 1.25 milliGRAM(s) Oral two times a day  ascorbic acid 500 milliGRAM(s) Oral two times a day  atorvastatin 10 milliGRAM(s) Oral at bedtime  calcitriol   Capsule 0.25 MICROGram(s) Oral daily  donepezil 10 milliGRAM(s) Oral at bedtime  ferrous    sulfate 325 milliGRAM(s) Oral daily  folic acid 1 milliGRAM(s) Oral daily  HYDROmorphone  Injectable 0.5 milliGRAM(s) IV Push every 6 hours PRN  lactated ringers. 1000 milliLiter(s) IV Continuous <Continuous>  latanoprost 0.005% Ophthalmic Solution 1 Drop(s) Both EYES at bedtime  multivitamin 1 Tablet(s) Oral daily  OLANZapine Injectable 2.5 milliGRAM(s) IntraMuscular once  ondansetron Injectable 4 milliGRAM(s) IV Push every 6 hours PRN  oxyCODONE    IR 10 milliGRAM(s) Oral every 4 hours PRN  oxyCODONE    IR 5 milliGRAM(s) Oral every 4 hours PRN  senna 2 Tablet(s) Oral at bedtime      Vital Signs Last 24 Hrs  T(C): 36.4 (27 Sep 2020 10:37), Max: 37 (26 Sep 2020 12:34)  T(F): 97.6 (27 Sep 2020 10:37), Max: 98.6 (26 Sep 2020 12:34)  HR: 113 (27 Sep 2020 10:37) (57 - 113)  BP: 113/77 (27 Sep 2020 10:37) (104/59 - 157/93)  BP(mean): --  RR: 18 (27 Sep 2020 10:37) (13 - 18)  SpO2: 97% (27 Sep 2020 10:37) (92% - 100%)      09-26-20 @ 07:01  -  09-27-20 @ 07:00  --------------------------------------------------------  IN: 900 mL / OUT: 150 mL / NET: 750 mL            LABS:                        9.3    7.59  )-----------( 129      ( 27 Sep 2020 06:59 )             27.7     09-27    145  |  117<H>  |  39<H>  ----------------------------<  75  4.9   |  21<L>  |  1.50<H>    Ca    9.3      27 Sep 2020 06:59      PT/INR - ( 26 Sep 2020 07:55 )   PT: 13.8 sec;   INR: 1.19 ratio         PTT - ( 26 Sep 2020 06:08 )  PTT:32.4 sec          WBC:  WBC Count: 7.59 K/uL (09-27 @ 06:59)  WBC Count: 9.09 K/uL (09-26 @ 15:54)  WBC Count: 7.78 K/uL (09-26 @ 07:55)  WBC Count: 8.52 K/uL (09-26 @ 06:08)  WBC Count: 6.91 K/uL (09-25 @ 14:47)      MICROBIOLOGY:  RECENT CULTURES:  09-26 .Blood Blood XXXX XXXX   No growth to date.                PT/INR - ( 26 Sep 2020 07:55 )   PT: 13.8 sec;   INR: 1.19 ratio         PTT - ( 26 Sep 2020 06:08 )  PTT:32.4 sec    Sodium:  Sodium, Serum: 145 mmol/L (09-27 @ 06:59)  Sodium, Serum: 147 mmol/L (09-26 @ 15:54)  Sodium, Serum: 148 mmol/L (09-26 @ 07:55)  Sodium, Serum: 146 mmol/L (09-26 @ 06:08)  Sodium, Serum: 148 mmol/L (09-25 @ 14:47)      1.50 mg/dL 09-27 @ 06:59  1.60 mg/dL 09-26 @ 15:54  1.80 mg/dL 09-26 @ 07:55  1.70 mg/dL 09-26 @ 06:08  1.80 mg/dL 09-25 @ 14:47      Hemoglobin:  Hemoglobin: 9.3 g/dL (09-27 @ 06:59)  Hemoglobin: 9.7 g/dL (09-26 @ 15:54)  Hemoglobin: 10.1 g/dL (09-26 @ 07:55)  Hemoglobin: 11.1 g/dL (09-26 @ 06:08)  Hemoglobin: 8.5 g/dL (09-25 @ 14:47)      Platelets: Platelet Count - Automated: 129 K/uL (09-27 @ 06:59)  Platelet Count - Automated: 141 K/uL (09-26 @ 15:54)  Platelet Count - Automated: 155 K/uL (09-26 @ 07:55)  Platelet Count - Automated: 154 K/uL (09-26 @ 06:08)  Platelet Count - Automated: 187 K/uL (09-25 @ 14:47)              RADIOLOGY & ADDITIONAL STUDIES:

## 2020-09-27 NOTE — OCCUPATIONAL THERAPY INITIAL EVALUATION ADULT - RANGE OF MOTION EXAMINATION, UPPER EXTREMITY
except B/L shoulders minimal active flexion noted, PROM approx 0-90. BUE FMC limited, arthritic changes noted/bilateral UE Active ROM was WFL  (within functional limits)

## 2020-09-27 NOTE — PROGRESS NOTE ADULT - SUBJECTIVE AND OBJECTIVE BOX
Patient is a 89y Female with a known history of :  Hypernatremia [E87.0]    HLD (hyperlipidemia) [E78.5]    Palliative care encounter [Z51.5]    Stage 2 chronic kidney disease [N18.2]    Prophylactic measure [Z29.9]    CKD (chronic kidney disease) [N18.9]    Hypothermia [T68.XXXA]    OA (osteoarthritis) [M19.90]    HTN (hypertension) [I10]    Glaucoma [H40.9]    Dementia [F03.90]    CAD (coronary artery disease) [I25.10]    Afib [I48.91]    Hip fracture, left [S72.002A]      HPI:  89 y/o Female with PMHx HTN, HLD, OA, CKD, CAD, ovarian Ca BIBA to [place] [facility]  from [place]  [facility] due to diagnosed left hip fx. As per EMS, reports patient was from [facility] in which had unwitnessed fall. pt reports pain to left hip. Pt is a poor historian and unable to describe details of fall . As per ems, pt found to be hypothermic at [place]. pt currently on Eliquis ,last dose taken in am . pt denies headache, numbness/weakness, cp, sob, fever, abd pain, or any other complains. Found to have left intertrochanteric hip fracture Seen by orthopedic team and further imaging ordered Diagnosed with hip fx and orthopedic sx evaluation called,pain managmnet and dvt prophylaxis administrated Cardiology clearance requested for surgical treatment Palliative care consult requested ,to discuss advance directives and complete MOLST      (25 Sep 2020 17:12)      REVIEW OF SYSTEMS:    CONSTITUTIONAL: No fever, weight loss, or fatigue  EYES: No eye pain, visual disturbances, or discharge  ENMT:  No difficulty hearing, tinnitus, vertigo; No sinus or throat pain  NECK: No pain or stiffness  BREASTS: No pain, masses, or nipple discharge  RESPIRATORY: No cough, wheezing, chills or hemoptysis; No shortness of breath  CARDIOVASCULAR: No chest pain, palpitations, dizziness, or leg swelling  GASTROINTESTINAL: No abdominal or epigastric pain. No nausea, vomiting, or hematemesis; No diarrhea or constipation. No melena or hematochezia.  GENITOURINARY: No dysuria, frequency, hematuria, or incontinence  NEUROLOGICAL: No headaches, memory loss, loss of strength, numbness, or tremors  SKIN: No itching, burning, rashes, or lesions   LYMPH NODES: No enlarged glands  ENDOCRINE: No heat or cold intolerance; No hair loss  MUSCULOSKELETAL: No joint pain or swelling; No muscle, back, or extremity pain  PSYCHIATRIC: No depression, anxiety, mood swings, or difficulty sleeping  HEME/LYMPH: No easy bruising, or bleeding gums  ALLERGY AND IMMUNOLOGIC: No hives or eczema    MEDICATIONS  (STANDING):  apixaban 1.25 milliGRAM(s) Oral two times a day  ascorbic acid 500 milliGRAM(s) Oral two times a day  atorvastatin 10 milliGRAM(s) Oral at bedtime  calcitriol   Capsule 0.25 MICROGram(s) Oral daily  donepezil 10 milliGRAM(s) Oral at bedtime  ferrous    sulfate 325 milliGRAM(s) Oral daily  folic acid 1 milliGRAM(s) Oral daily  lactated ringers. 1000 milliLiter(s) (75 mL/Hr) IV Continuous <Continuous>  latanoprost 0.005% Ophthalmic Solution 1 Drop(s) Both EYES at bedtime  multivitamin 1 Tablet(s) Oral daily  senna 2 Tablet(s) Oral at bedtime    MEDICATIONS  (PRN):  acetaminophen   Tablet .. 650 milliGRAM(s) Oral every 6 hours PRN Temp greater or equal to 38C (100.4F)  HYDROmorphone  Injectable 0.5 milliGRAM(s) IV Push every 6 hours PRN Severe Pain (7 - 10)  ondansetron Injectable 4 milliGRAM(s) IV Push every 6 hours PRN Nausea and/or Vomiting  oxyCODONE    IR 10 milliGRAM(s) Oral every 4 hours PRN Moderate Pain (4 - 6)  oxyCODONE    IR 5 milliGRAM(s) Oral every 4 hours PRN Mild Pain (1 - 3)      ALLERGIES: penicillin (Other)  sulfa drugs (Other)      FAMILY HISTORY:      PHYSICAL EXAMINATION:  -----------------------------  T(C): 36.4 (09-27-20 @ 05:16), Max: 37 (09-26-20 @ 12:34)  HR: 65 (09-27-20 @ 05:16) (57 - 96)  BP: 139/85 (09-27-20 @ 05:16) (104/59 - 157/93)  RR: 18 (09-27-20 @ 05:16) (13 - 18)  SpO2: 100% (09-27-20 @ 05:16) (92% - 100%)  Wt(kg): --    09-26 @ 07:01  -  09-27 @ 07:00  --------------------------------------------------------  IN:    Lactated Ringers: 900 mL  Total IN: 900 mL    OUT:    Voided (mL): 150 mL  Total OUT: 150 mL    Total NET: 750 mL            VITALS  T(C): 36.4 (09-27-20 @ 05:16), Max: 37 (09-26-20 @ 12:34)  HR: 65 (09-27-20 @ 05:16) (57 - 96)  BP: 139/85 (09-27-20 @ 05:16) (104/59 - 157/93)  RR: 18 (09-27-20 @ 05:16) (13 - 18)  SpO2: 100% (09-27-20 @ 05:16) (92% - 100%)    Constitutional: well developed, normal appearance, well groomed, well nourished, no deformities and no acute distress.   Eyes: the conjunctiva exhibited no abnormalities and the eyelids demonstrated no xanthelasmas.   HEENT: normal oral mucosa, no oral pallor and no oral cyanosis.   Neck: normal jugular venous A waves present, normal jugular venous V waves present and no jugular venous kelley A waves.   Pulmonary: no respiratory distress, normal respiratory rhythm and effort, no accessory muscle use and lungs were clear to auscultation bilaterally.   Cardiovascular: heart rate and rhythm were normal, normal S1 and S2 and no murmur, gallop, rub, heave or thrill are present.   Abdomen: soft, non-tender, no hepato-splenomegaly and no abdominal mass palpated.   Musculoskeletal: the gait could not be assessed..   Extremities: no clubbing of the fingernails, no localized cyanosis, no petechial hemorrhages and no ischemic changes.   Skin: normal skin color and pigmentation, no rash, no venous stasis, no skin lesions, no skin ulcer and no xanthoma was observed.   Psychiatric: oriented to person, place, and time, the affect was normal, the mood was normal and not feeling anxious.     LABS:   --------  09-27    145  |  117<H>  |  39<H>  ----------------------------<  75  4.9   |  21<L>  |  1.50<H>    Ca    9.3      27 Sep 2020 06:59                           9.3    7.59  )-----------( 129      ( 27 Sep 2020 06:59 )             27.7     PT/INR - ( 26 Sep 2020 07:55 )   PT: 13.8 sec;   INR: 1.19 ratio         PTT - ( 26 Sep 2020 06:08 )  PTT:32.4 sec      167 mg/dL, 73 mg/dL, 84 mg/dL, 48 mg/dL    Culture Results:   No growth to date. (09-26 @ 05:24)      RADIOLOGY:  -----------------    ECG:     ECHO:

## 2020-09-27 NOTE — PROGRESS NOTE ADULT - SUBJECTIVE AND OBJECTIVE BOX
Pt seen and examined at bedside, resting comfortably. No acute events overnight. ROS limited due to mental status.    Vital Signs Last 24 Hrs  T(C): 36.4 (27 Sep 2020 05:16), Max: 37 (26 Sep 2020 12:34)  T(F): 97.6 (27 Sep 2020 05:16), Max: 98.6 (26 Sep 2020 12:34)  HR: 65 (27 Sep 2020 05:16) (57 - 96)  BP: 139/85 (27 Sep 2020 05:16) (104/59 - 157/93)  BP(mean): --  RR: 18 (27 Sep 2020 05:16) (13 - 18)  SpO2: 100% (27 Sep 2020 05:16) (92% - 100%)        Physical Exam:  Gen: NAD, confused    LLE:  Skin intact, dressing C/D/I  Motor/sensory exam limited due to mental status but moving extremity spontaneously  + DP/PT pulses  Compartments soft, compressible    A/P: 89yFemale s/p L Hip IMN POD1    - Pain control  - PT/OT  - WBAT LLE  - DVT prophylaxis (Eliquis)  - Encourage incentive spirometry, deep breathing exercises  - Ortho stable for discharge  - Will discuss with attending, Dr. Stein and advise if plan changes    Oskar Godfrey, DO  PGY-2, Orthopaedic Surgery

## 2020-09-27 NOTE — CHART NOTE - TREATMENT: THE FOLLOWING DIET HAS BEEN RECOMMENDED
Diet, Dysphagia 2 Mechanical Soft-Thin Liquids:   Supplement Feeding Modality:  Oral  Ensure Enlive Servings Per Day:  1       Frequency:  Two Times a day (09-27-20 @ 08:40) [Active]    moderate depletion noted to temporal, orbital and buccal areas    continue current diet order with Ensure supplement  continue MVI and vit C for wound healing    honor pt's food preferences  provide encouragement at meal times  provide meal assistance when needed.

## 2020-09-27 NOTE — PROGRESS NOTE ADULT - ASSESSMENT
fx left hip  ashd  paf- now rsr  chronic renal failure  no angina - no chf  cardiac status stable low to intermediate risk for left hip surgery  at high risk bleeding - was on oac  monitor renal failure - improving  pt tolerated orif of left hip 9/26

## 2020-09-28 ENCOUNTER — TRANSCRIPTION ENCOUNTER (OUTPATIENT)
Age: 85
End: 2020-09-28

## 2020-09-28 VITALS
SYSTOLIC BLOOD PRESSURE: 96 MMHG | TEMPERATURE: 98 F | RESPIRATION RATE: 17 BRPM | DIASTOLIC BLOOD PRESSURE: 65 MMHG | HEART RATE: 80 BPM | OXYGEN SATURATION: 90 %

## 2020-09-28 LAB
ANION GAP SERPL CALC-SCNC: 7 MMOL/L — SIGNIFICANT CHANGE UP (ref 5–17)
BUN SERPL-MCNC: 41 MG/DL — HIGH (ref 7–23)
CALCIUM SERPL-MCNC: 9.6 MG/DL — SIGNIFICANT CHANGE UP (ref 8.4–10.5)
CALCIUM SERPL-MCNC: 9.6 MG/DL — SIGNIFICANT CHANGE UP (ref 8.5–10.1)
CHLORIDE SERPL-SCNC: 115 MMOL/L — HIGH (ref 96–108)
CO2 SERPL-SCNC: 24 MMOL/L — SIGNIFICANT CHANGE UP (ref 22–31)
CREAT SERPL-MCNC: 1.6 MG/DL — HIGH (ref 0.5–1.3)
GLUCOSE SERPL-MCNC: 75 MG/DL — SIGNIFICANT CHANGE UP (ref 70–99)
HCT VFR BLD CALC: 28.4 % — LOW (ref 34.5–45)
HGB BLD-MCNC: 9.4 G/DL — LOW (ref 11.5–15.5)
MCHC RBC-ENTMCNC: 28.8 PG — SIGNIFICANT CHANGE UP (ref 27–34)
MCHC RBC-ENTMCNC: 33.1 GM/DL — SIGNIFICANT CHANGE UP (ref 32–36)
MCV RBC AUTO: 87.1 FL — SIGNIFICANT CHANGE UP (ref 80–100)
NRBC # BLD: 0 /100 WBCS — SIGNIFICANT CHANGE UP (ref 0–0)
PHOSPHATE SERPL-MCNC: 3.1 MG/DL — SIGNIFICANT CHANGE UP (ref 2.5–4.5)
PLATELET # BLD AUTO: 144 K/UL — LOW (ref 150–400)
POTASSIUM SERPL-MCNC: 4.8 MMOL/L — SIGNIFICANT CHANGE UP (ref 3.5–5.3)
POTASSIUM SERPL-SCNC: 4.8 MMOL/L — SIGNIFICANT CHANGE UP (ref 3.5–5.3)
PTH-INTACT FLD-MCNC: 127 PG/ML — HIGH (ref 15–65)
RBC # BLD: 3.26 M/UL — LOW (ref 3.8–5.2)
RBC # FLD: 20.3 % — HIGH (ref 10.3–14.5)
SARS-COV-2 RNA SPEC QL NAA+PROBE: SIGNIFICANT CHANGE UP
SODIUM SERPL-SCNC: 146 MMOL/L — HIGH (ref 135–145)
WBC # BLD: 5.67 K/UL — SIGNIFICANT CHANGE UP (ref 3.8–10.5)
WBC # FLD AUTO: 5.67 K/UL — SIGNIFICANT CHANGE UP (ref 3.8–10.5)

## 2020-09-28 PROCEDURE — 86900 BLOOD TYPING SEROLOGIC ABO: CPT

## 2020-09-28 PROCEDURE — 86923 COMPATIBILITY TEST ELECTRIC: CPT

## 2020-09-28 PROCEDURE — 97166 OT EVAL MOD COMPLEX 45 MIN: CPT

## 2020-09-28 PROCEDURE — 80061 LIPID PANEL: CPT

## 2020-09-28 PROCEDURE — 86901 BLOOD TYPING SEROLOGIC RH(D): CPT

## 2020-09-28 PROCEDURE — 99285 EMERGENCY DEPT VISIT HI MDM: CPT | Mod: 25

## 2020-09-28 PROCEDURE — 85027 COMPLETE CBC AUTOMATED: CPT

## 2020-09-28 PROCEDURE — U0003: CPT

## 2020-09-28 PROCEDURE — 97116 GAIT TRAINING THERAPY: CPT

## 2020-09-28 PROCEDURE — 76000 FLUOROSCOPY <1 HR PHYS/QHP: CPT

## 2020-09-28 PROCEDURE — 86850 RBC ANTIBODY SCREEN: CPT

## 2020-09-28 PROCEDURE — 87040 BLOOD CULTURE FOR BACTERIA: CPT

## 2020-09-28 PROCEDURE — 36430 TRANSFUSION BLD/BLD COMPNT: CPT

## 2020-09-28 PROCEDURE — 86769 SARS-COV-2 COVID-19 ANTIBODY: CPT

## 2020-09-28 PROCEDURE — 97530 THERAPEUTIC ACTIVITIES: CPT

## 2020-09-28 PROCEDURE — P9016: CPT

## 2020-09-28 PROCEDURE — 85610 PROTHROMBIN TIME: CPT

## 2020-09-28 PROCEDURE — 92610 EVALUATE SWALLOWING FUNCTION: CPT

## 2020-09-28 PROCEDURE — 73700 CT LOWER EXTREMITY W/O DYE: CPT

## 2020-09-28 PROCEDURE — 80048 BASIC METABOLIC PNL TOTAL CA: CPT

## 2020-09-28 PROCEDURE — 82310 ASSAY OF CALCIUM: CPT

## 2020-09-28 PROCEDURE — 70450 CT HEAD/BRAIN W/O DYE: CPT

## 2020-09-28 PROCEDURE — 73552 X-RAY EXAM OF FEMUR 2/>: CPT

## 2020-09-28 PROCEDURE — 93005 ELECTROCARDIOGRAM TRACING: CPT

## 2020-09-28 PROCEDURE — 85730 THROMBOPLASTIN TIME PARTIAL: CPT

## 2020-09-28 PROCEDURE — 83970 ASSAY OF PARATHORMONE: CPT

## 2020-09-28 PROCEDURE — C1713: CPT

## 2020-09-28 PROCEDURE — 73502 X-RAY EXAM HIP UNI 2-3 VIEWS: CPT

## 2020-09-28 PROCEDURE — 73501 X-RAY EXAM HIP UNI 1 VIEW: CPT

## 2020-09-28 PROCEDURE — 97535 SELF CARE MNGMENT TRAINING: CPT

## 2020-09-28 PROCEDURE — 84145 PROCALCITONIN (PCT): CPT

## 2020-09-28 PROCEDURE — 36415 COLL VENOUS BLD VENIPUNCTURE: CPT

## 2020-09-28 PROCEDURE — 84100 ASSAY OF PHOSPHORUS: CPT

## 2020-09-28 PROCEDURE — 71045 X-RAY EXAM CHEST 1 VIEW: CPT

## 2020-09-28 PROCEDURE — 72125 CT NECK SPINE W/O DYE: CPT

## 2020-09-28 PROCEDURE — 97110 THERAPEUTIC EXERCISES: CPT

## 2020-09-28 PROCEDURE — 97162 PT EVAL MOD COMPLEX 30 MIN: CPT

## 2020-09-28 RX ORDER — ACETAMINOPHEN 500 MG
2 TABLET ORAL
Qty: 0 | Refills: 0 | DISCHARGE
Start: 2020-09-28

## 2020-09-28 RX ORDER — OXYCODONE HYDROCHLORIDE 5 MG/1
1 TABLET ORAL
Qty: 0 | Refills: 0 | DISCHARGE
Start: 2020-09-28

## 2020-09-28 RX ORDER — SENNA PLUS 8.6 MG/1
2 TABLET ORAL
Qty: 0 | Refills: 0 | DISCHARGE
Start: 2020-09-28

## 2020-09-28 RX ORDER — ASCORBIC ACID 60 MG
1 TABLET,CHEWABLE ORAL
Qty: 0 | Refills: 0 | DISCHARGE
Start: 2020-09-28

## 2020-09-28 RX ORDER — ATORVASTATIN CALCIUM 80 MG/1
1 TABLET, FILM COATED ORAL
Qty: 0 | Refills: 0 | DISCHARGE
Start: 2020-09-28

## 2020-09-28 RX ORDER — DONEPEZIL HYDROCHLORIDE 10 MG/1
1 TABLET, FILM COATED ORAL
Qty: 0 | Refills: 0 | DISCHARGE
Start: 2020-09-28

## 2020-09-28 RX ORDER — APIXABAN 2.5 MG/1
2.5 TABLET, FILM COATED ORAL
Refills: 0 | Status: DISCONTINUED | OUTPATIENT
Start: 2020-09-28 | End: 2020-09-28

## 2020-09-28 RX ORDER — FOLIC ACID 0.8 MG
1 TABLET ORAL
Qty: 0 | Refills: 0 | DISCHARGE
Start: 2020-09-28

## 2020-09-28 RX ORDER — AMIODARONE HYDROCHLORIDE 400 MG/1
0.5 TABLET ORAL
Qty: 0 | Refills: 0 | DISCHARGE
Start: 2020-09-28

## 2020-09-28 RX ADMIN — Medication 325 MILLIGRAM(S): at 11:36

## 2020-09-28 RX ADMIN — OXYCODONE HYDROCHLORIDE 5 MILLIGRAM(S): 5 TABLET ORAL at 05:32

## 2020-09-28 RX ADMIN — OXYCODONE HYDROCHLORIDE 5 MILLIGRAM(S): 5 TABLET ORAL at 10:03

## 2020-09-28 RX ADMIN — OXYCODONE HYDROCHLORIDE 5 MILLIGRAM(S): 5 TABLET ORAL at 11:05

## 2020-09-28 RX ADMIN — Medication 1 MILLIGRAM(S): at 11:36

## 2020-09-28 RX ADMIN — Medication 1 TABLET(S): at 11:36

## 2020-09-28 RX ADMIN — APIXABAN 1.25 MILLIGRAM(S): 2.5 TABLET, FILM COATED ORAL at 05:32

## 2020-09-28 RX ADMIN — Medication 500 MILLIGRAM(S): at 05:32

## 2020-09-28 RX ADMIN — CALCITRIOL 0.25 MICROGRAM(S): 0.5 CAPSULE ORAL at 11:36

## 2020-09-28 RX ADMIN — AMIODARONE HYDROCHLORIDE 100 MILLIGRAM(S): 400 TABLET ORAL at 05:32

## 2020-09-28 NOTE — PROGRESS NOTE ADULT - NSHPATTENDINGPLANDISCUSS_GEN_ALL_CORE
MED STAFF ON 2 N , ORTHO TEAM AND CARD CONS  ,PCP FROM [facility]  ,SPOKE TO THE DAUGHTER KYUNG ON A PHONE  ,UPDATE IS GIVEN ,MONICA PLACEMENT DISCUSSED

## 2020-09-28 NOTE — PROGRESS NOTE ADULT - SUBJECTIVE AND OBJECTIVE BOX
Patient is a 89y Female whom presented to the hospital with ckd and aiden     PAST MEDICAL & SURGICAL HISTORY:  Afib    Glaucoma    Dementia    CKD (chronic kidney disease)    CAD (coronary artery disease)    OA (osteoarthritis)    HLD (hyperlipidemia)    HTN (hypertension)    Dementia    No significant past surgical history        MEDICATIONS  (STANDING):  aMIOdarone    Tablet 100 milliGRAM(s) Oral daily  amLODIPine   Tablet 2.5 milliGRAM(s) Oral daily  atorvastatin 10 milliGRAM(s) Oral at bedtime  calcitriol   Capsule 0.25 MICROGram(s) Oral daily  donepezil 10 milliGRAM(s) Oral at bedtime  lactated ringers. 1000 milliLiter(s) (75 mL/Hr) IV Continuous <Continuous>  latanoprost 0.005% Ophthalmic Solution 1 Drop(s) Both EYES at bedtime  multivitamin 1 Tablet(s) Oral daily  polyethylene glycol 3350 17 Gram(s) Oral daily  senna 2 Tablet(s) Oral at bedtime      Allergies    penicillin (Other)  sulfa drugs (Other)    Intolerances        SOCIAL HISTORY:  Denies ETOh,Smoking,     FAMILY HISTORY:      REVIEW OF SYSTEMS:    CONSTITUTIONAL: No fevers or chills  RESPIRATORY: No cough, wheezing, hemoptysis; No shortness of breath  CARDIOVASCULAR: No chest pain or palpitations  GASTROINTESTINAL: No abdominal or epigastric pain. No nausea, vomiting,     No diarrhea or constipation. No melena   GENITOURINARY: No dysuria, frequency or hematuria                               9.3    7.59  )-----------( 129      ( 27 Sep 2020 06:59 )             27.7       CBC Full  -  ( 27 Sep 2020 06:59 )  WBC Count : 7.59 K/uL  RBC Count : 3.18 M/uL  Hemoglobin : 9.3 g/dL  Hematocrit : 27.7 %  Platelet Count - Automated : 129 K/uL  Mean Cell Volume : 87.1 fl  Mean Cell Hemoglobin : 29.2 pg  Mean Cell Hemoglobin Concentration : 33.6 gm/dL  Auto Neutrophil # : x  Auto Lymphocyte # : x  Auto Monocyte # : x  Auto Eosinophil # : x  Auto Basophil # : x  Auto Neutrophil % : x  Auto Lymphocyte % : x  Auto Monocyte % : x  Auto Eosinophil % : x  Auto Basophil % : x      09-27    145  |  117<H>  |  39<H>  ----------------------------<  75  4.9   |  21<L>  |  1.50<H>    Ca    9.3      27 Sep 2020 06:59        CAPILLARY BLOOD GLUCOSE          Vital Signs Last 24 Hrs  T(C): 36.4 (27 Sep 2020 13:47), Max: 36.5 (26 Sep 2020 15:48)  T(F): 97.6 (27 Sep 2020 13:47), Max: 97.7 (26 Sep 2020 15:48)  HR: 125 (27 Sep 2020 13:47) (57 - 125)  BP: 127/78 (27 Sep 2020 13:47) (104/59 - 149/53)  BP(mean): --  RR: 18 (27 Sep 2020 13:47) (13 - 18)  SpO2: 97% (27 Sep 2020 13:47) (92% - 100%)        PT/INR - ( 26 Sep 2020 07:55 )   PT: 13.8 sec;   INR: 1.19 ratio         PTT - ( 26 Sep 2020 06:08 )  PTT:32.4 sec  PHYSICAL EXAM:    Constitutional: NAD  HEENT: conjunctive   clear   Neck:  No JVD  Respiratory: CTAB  Cardiovascular: S1 and S2  Gastrointestinal: BS+, soft,   Extremities: No peripheral edema

## 2020-09-28 NOTE — PROGRESS NOTE ADULT - ASSESSMENT
89 y/o Female with PMHx HTN, HLD, OA, CKD, CAD, ovarian Ca BIBA to [place] [facility]  from [place]  [facility] due to diagnosed left hip fx. As per EMS, reports patient was from [facility] in which had unwitnessed fall. pt reports pain to left hip. Pt is a poor historian and unable to describe details of fall . now with ckd and aiden     MEDICATIONS  (STANDING):  aMIOdarone    Tablet 100 milliGRAM(s) Oral daily  apixaban 1.25 milliGRAM(s) Oral two times a day  ascorbic acid 500 milliGRAM(s) Oral two times a day  atorvastatin 10 milliGRAM(s) Oral at bedtime  calcitriol   Capsule 0.25 MICROGram(s) Oral daily  donepezil 10 milliGRAM(s) Oral at bedtime  ferrous    sulfate 325 milliGRAM(s) Oral daily  folic acid 1 milliGRAM(s) Oral daily  lactated ringers. 1000 milliLiter(s) (75 mL/Hr) IV Continuous <Continuous>  latanoprost 0.005% Ophthalmic Solution 1 Drop(s) Both EYES at bedtime  multivitamin 1 Tablet(s) Oral daily  OLANZapine Injectable 2.5 milliGRAM(s) IntraMuscular once  senna 2 Tablet(s) Oral at bedtime    MEDICATIONS  (PRN):  acetaminophen   Tablet .. 650 milliGRAM(s) Oral every 6 hours PRN Temp greater or equal to 38C (100.4F)  HYDROmorphone  Injectable 0.5 milliGRAM(s) IV Push every 6 hours PRN Severe Pain (7 - 10)  OLANZapine Injectable 2.5 milliGRAM(s) IntraMuscular every 6 hours PRN agitation  ondansetron Injectable 4 milliGRAM(s) IV Push every 6 hours PRN Nausea and/or Vomiting  oxyCODONE    IR 10 milliGRAM(s) Oral every 4 hours PRN Moderate Pain (4 - 6)  oxyCODONE    IR 5 milliGRAM(s) Oral every 4 hours PRN Mild Pain (1 - 3)   89 y/o Female with PMHx HTN, HLD, OA, CKD, CAD, ovarian Ca BIBA to [place] [facility]  from [place]  [facility] due to diagnosed left hip fx. As per EMS, reports patient was from [facility] in which had unwitnessed fall. pt reports pain to left hip. Pt is a poor historian and unable to describe details of fall . now with ckd and aiden     MEDICATIONS  (STANDING):  MEDICATIONS  (STANDING):  aMIOdarone    Tablet 100 milliGRAM(s) Oral daily  apixaban 2.5 milliGRAM(s) Oral two times a day  ascorbic acid 500 milliGRAM(s) Oral two times a day  atorvastatin 10 milliGRAM(s) Oral at bedtime  calcitriol   Capsule 0.25 MICROGram(s) Oral daily  donepezil 10 milliGRAM(s) Oral at bedtime  ferrous    sulfate 325 milliGRAM(s) Oral daily  folic acid 1 milliGRAM(s) Oral daily  latanoprost 0.005% Ophthalmic Solution 1 Drop(s) Both EYES at bedtime  multivitamin 1 Tablet(s) Oral daily  OLANZapine Injectable 2.5 milliGRAM(s) IntraMuscular once  senna 2 Tablet(s) Oral at bedtime    MEDICATIONS  (PRN):  acetaminophen   Tablet .. 650 milliGRAM(s) Oral every 6 hours PRN Temp greater or equal to 38C (100.4F)  bisacodyl Suppository 10 milliGRAM(s) Rectal daily PRN If no bowel movement  HYDROmorphone  Injectable 0.5 milliGRAM(s) IV Push every 6 hours PRN Severe Pain (7 - 10)  OLANZapine Injectable 2.5 milliGRAM(s) IntraMuscular every 6 hours PRN agitation  ondansetron Injectable 4 milliGRAM(s) IV Push every 6 hours PRN Nausea and/or Vomiting  oxyCODONE    IR 10 milliGRAM(s) Oral every 4 hours PRN Moderate Pain (4 - 6)  oxyCODONE    IR 5 milliGRAM(s) Oral every 4 hours PRN Mild Pain (1 - 3)

## 2020-09-28 NOTE — PROGRESS NOTE ADULT - PROBLEM SELECTOR PROBLEM 1
Palliative care encounter
Hip fracture, left
Stage 2 chronic kidney disease

## 2020-09-28 NOTE — PROGRESS NOTE ADULT - NUTRITIONAL ASSESSMENT
This patient has been assessed with a concern for Malnutrition and has been determined to have a diagnosis/diagnoses of Moderate protein-calorie malnutrition and Underweight/BMI < 19.    This patient is being managed with:   Diet Dysphagia 2 Mechanical Soft-Thin Liquids-  Supplement Feeding Modality:  Oral  Ensure Enlive Servings Per Day:  1       Frequency:  Two Times a day  Entered: Sep 27 2020  8:39AM    
MEDICATIONS  (STANDING):  aMIOdarone    Tablet 100 milliGRAM(s) Oral daily  amLODIPine   Tablet 2.5 milliGRAM(s) Oral daily  atorvastatin 10 milliGRAM(s) Oral at bedtime  calcitriol   Capsule 0.25 MICROGram(s) Oral daily  donepezil 10 milliGRAM(s) Oral at bedtime  lactated ringers. 1000 milliLiter(s) (75 mL/Hr) IV Continuous <Continuous>  latanoprost 0.005% Ophthalmic Solution 1 Drop(s) Both EYES at bedtime  multivitamin 1 Tablet(s) Oral daily  polyethylene glycol 3350 17 Gram(s) Oral daily  senna 2 Tablet(s) Oral at bedtime    MEDICATIONS  (PRN):  acetaminophen   Tablet .. 650 milliGRAM(s) Oral every 6 hours PRN Temp greater or equal to 38C (100.4F), Mild Pain (1 - 3)  bisacodyl Suppository 10 milliGRAM(s) Rectal daily PRN Constipation  HYDROmorphone  Injectable 0.5 milliGRAM(s) IV Push every 6 hours PRN Severe Pain (7 - 10)  magnesium hydroxide Suspension 30 milliLiter(s) Oral daily PRN Constipation  oxyCODONE    IR 2.5 milliGRAM(s) Oral every 6 hours PRN Moderate Pain (4 - 6)
This patient has been assessed with a concern for Malnutrition and has been determined to have a diagnosis/diagnoses of Moderate protein-calorie malnutrition and Underweight/BMI < 19.    This patient is being managed with:   Diet Dysphagia 2 Mechanical Soft-Thin Liquids-  Supplement Feeding Modality:  Oral  Ensure Enlive Servings Per Day:  1       Frequency:  Two Times a day  Entered: Sep 27 2020  8:39AM    

## 2020-09-28 NOTE — PROGRESS NOTE ADULT - SUBJECTIVE AND OBJECTIVE BOX
Date/Time Patient Seen:  		  Referring MD:   Data Reviewed	       Patient is a 89y old  Female who presents with a chief complaint of LEFT HIP PAIN ,S/P FALL AT [facility] (27 Sep 2020 19:29)      Subjective/HPI     PAST MEDICAL & SURGICAL HISTORY:  Afib    Glaucoma    Dementia    CKD (chronic kidney disease)    CAD (coronary artery disease)    OA (osteoarthritis)    HLD (hyperlipidemia)    HTN (hypertension)    Dementia    No significant past surgical history          Medication list         MEDICATIONS  (STANDING):  aMIOdarone    Tablet 100 milliGRAM(s) Oral daily  apixaban 1.25 milliGRAM(s) Oral two times a day  ascorbic acid 500 milliGRAM(s) Oral two times a day  atorvastatin 10 milliGRAM(s) Oral at bedtime  calcitriol   Capsule 0.25 MICROGram(s) Oral daily  donepezil 10 milliGRAM(s) Oral at bedtime  ferrous    sulfate 325 milliGRAM(s) Oral daily  folic acid 1 milliGRAM(s) Oral daily  lactated ringers. 1000 milliLiter(s) (75 mL/Hr) IV Continuous <Continuous>  latanoprost 0.005% Ophthalmic Solution 1 Drop(s) Both EYES at bedtime  multivitamin 1 Tablet(s) Oral daily  OLANZapine Injectable 2.5 milliGRAM(s) IntraMuscular once  senna 2 Tablet(s) Oral at bedtime    MEDICATIONS  (PRN):  acetaminophen   Tablet .. 650 milliGRAM(s) Oral every 6 hours PRN Temp greater or equal to 38C (100.4F)  bisacodyl Suppository 10 milliGRAM(s) Rectal daily PRN If no bowel movement  HYDROmorphone  Injectable 0.5 milliGRAM(s) IV Push every 6 hours PRN Severe Pain (7 - 10)  OLANZapine Injectable 2.5 milliGRAM(s) IntraMuscular every 6 hours PRN agitation  ondansetron Injectable 4 milliGRAM(s) IV Push every 6 hours PRN Nausea and/or Vomiting  oxyCODONE    IR 10 milliGRAM(s) Oral every 4 hours PRN Moderate Pain (4 - 6)  oxyCODONE    IR 5 milliGRAM(s) Oral every 4 hours PRN Mild Pain (1 - 3)         Vitals log        ICU Vital Signs Last 24 Hrs  T(C): 36.8 (28 Sep 2020 04:31), Max: 37.6 (27 Sep 2020 20:01)  T(F): 98.3 (28 Sep 2020 04:31), Max: 99.7 (27 Sep 2020 20:01)  HR: 89 (28 Sep 2020 04:31) (73 - 125)  BP: 125/72 (28 Sep 2020 04:31) (105/70 - 127/78)  BP(mean): --  ABP: --  ABP(mean): --  RR: 16 (28 Sep 2020 04:31) (16 - 18)  SpO2: 94% (28 Sep 2020 04:31) (93% - 97%)           Input and Output:  I&O's Detail    26 Sep 2020 07:01  -  27 Sep 2020 07:00  --------------------------------------------------------  IN:    Lactated Ringers: 900 mL  Total IN: 900 mL    OUT:    Voided (mL): 150 mL  Total OUT: 150 mL    Total NET: 750 mL      27 Sep 2020 07:01  -  28 Sep 2020 06:13  --------------------------------------------------------  IN:    Oral Fluid: 240 mL  Total IN: 240 mL    OUT:    Voided (mL): 410 mL  Total OUT: 410 mL    Total NET: -170 mL          Lab Data                        9.3    7.59  )-----------( 129      ( 27 Sep 2020 06:59 )             27.7     09-27    145  |  117<H>  |  39<H>  ----------------------------<  75  4.9   |  21<L>  |  1.50<H>    Ca    9.3      27 Sep 2020 06:59              Review of Systems	      Objective     Physical Examination    heart s1s2  lung dec BS      Pertinent Lab findings & Imaging      Brianda:  NO   Adequate UO     I&O's Detail    26 Sep 2020 07:01  -  27 Sep 2020 07:00  --------------------------------------------------------  IN:    Lactated Ringers: 900 mL  Total IN: 900 mL    OUT:    Voided (mL): 150 mL  Total OUT: 150 mL    Total NET: 750 mL      27 Sep 2020 07:01  -  28 Sep 2020 06:13  --------------------------------------------------------  IN:    Oral Fluid: 240 mL  Total IN: 240 mL    OUT:    Voided (mL): 410 mL  Total OUT: 410 mL    Total NET: -170 mL               Discussed with:     Cultures:	        Radiology

## 2020-09-28 NOTE — PROGRESS NOTE ADULT - PROBLEM SELECTOR PLAN 2
hypernatremia improved   continue with iv fluid  high pth on calcitriol   Capsule 0.25 MICROGram(s) Oral daily is improving   will f/u bmp

## 2020-09-28 NOTE — PROGRESS NOTE ADULT - REASON FOR ADMISSION
LEFT HIP PAIN ,S/P FALL AT [facility]

## 2020-09-28 NOTE — PROGRESS NOTE ADULT - PROBLEM SELECTOR PLAN 1
Serum creatinine is  impriving 15      , approximating GFR at   ml/min.   There is no progression . No uremic symptoms  Fluid status stable.  Will continue to avoid nephrotoxic drugs.  Patient remains asymptomatic.   Continue current therapy.  continue with iv fluid gfr is improve   will f/u with bun and cr

## 2020-09-28 NOTE — PROGRESS NOTE ADULT - PROBLEM SELECTOR PLAN 1
s/p Insertion of locking intramedullary mateusz into left hip 26-Sep-2020  89 y/o female with PMHx HTN, HLD, OA, CKD, CAD, ovarian Ca BIBA from [place] due to diagnosed left hip fx. As per EMS, reports patient was from [facility] in which had unwitnessed fall. pt reports pain to left hip.  Pain rx regimen  Fall prec  medical regimen and optimization -   Spoke with Dtr - pt is full code at present - HCP - is DTR Donna.  SLP eval noted - Dysphagia diet recs noted.

## 2020-09-28 NOTE — DISCHARGE NOTE NURSING/CASE MANAGEMENT/SOCIAL WORK - PATIENT PORTAL LINK FT
You can access the FollowMyHealth Patient Portal offered by [facility] by registering at the following website: [email or web address]. By joining [facility]’s FollowMyHealth portal, you will also be able to view your health information using other applications (apps) compatible with our system.

## 2020-09-28 NOTE — ADVANCED PRACTICE NURSE CONSULT - ASSESSMENT
RN assessed patient with a stage 2 pressure injury  RN educated in the care of a patient with a stage 1 and stage 2 pressure injury

## 2020-09-28 NOTE — PROGRESS NOTE ADULT - ASSESSMENT
89 y/o Female with PMHx HTN, HLD, OA, CKD, CAD, ovarian Ca BIBA to [place] [facility]  from [place]  [facility] due to diagnosed left hip fx. As per EMS, reports patient was from [facility] in which had unwitnessed fall. pt reports pain to left hip. Pt is a poor historian and unable to describe details of fall . As per ems, pt found to be hypothermic at [place]. pt currently on Eliquis ,last dose taken in am . pt denies headache, numbness/weakness, cp, sob, fever, abd pain, or any other complains. Found to have left intertrochanteric hip fracture Seen by orthopedic team and further imaging ordered Diagnosed with hip fx and orthopedic sx evaluation called,pain managmnet and dvt prophylaxis administrated Cardiology clearance requested for surgical treatment Palliative care consult requested ,to discuss advance directives and complete MOLST

## 2020-09-28 NOTE — PROGRESS NOTE ADULT - SUBJECTIVE AND OBJECTIVE BOX
Pt seen and examined at bedside, resting comfortably. No acute events overnight. ROS limited due to mental status.    Vital Signs Last 24 Hrs  T(C): 36.8 (28 Sep 2020 04:31), Max: 37.6 (27 Sep 2020 20:01)  T(F): 98.3 (28 Sep 2020 04:31), Max: 99.7 (27 Sep 2020 20:01)  HR: 89 (28 Sep 2020 04:31) (73 - 125)  BP: 125/72 (28 Sep 2020 04:31) (105/70 - 127/78)  BP(mean): --  RR: 16 (28 Sep 2020 04:31) (16 - 18)  SpO2: 94% (28 Sep 2020 04:31) (93% - 97%)    Physical Exam:  Gen: NAD, confused    LLE:  Skin intact, dressing C/D/I  Motor/sensory exam limited due to mental status but moving extremity spontaneously  + DP/PT pulses  Compartments soft, compressible    A/P: 89yFemale s/p L Hip IMN POD2    - Pain control  - PT/OT  - WBAT LLE  - DVT prophylaxis (Eliquis)  - Encourage incentive spirometry, deep breathing exercises  - Ortho stable for discharge  - Will discuss with attending, Dr. Stein and advise if plan changes    Oskar Godfrey, DO  PGY-2, Orthopaedic Surgery

## 2020-09-28 NOTE — PROGRESS NOTE ADULT - PROVIDER SPECIALTY LIST ADULT
Cardiology
Hospitalist
Infectious Disease
Nephrology
Orthopedics
Palliative Care
Palliative Care
Pulmonology
Palliative Care

## 2020-09-28 NOTE — PROGRESS NOTE ADULT - SUBJECTIVE AND OBJECTIVE BOX
FEROZ HARKINS    PLV 2EAS 214 W1    Patient is a 89y old  Female who presents with a chief complaint of LEFT HIP PAIN ,S/P FALL AT [facility] (28 Sep 2020 08:58)       Allergies    penicillin (Other)  sulfa drugs (Other)    Intolerances        HPI:  89 y/o Female with PMHx HTN, HLD, OA, CKD, CAD, ovarian Ca BIBA to [place] ED  from [place]  ER due to diagnosed left hip fx. As per EMS, reports patient was from [facility] in which had unwitnessed fall. pt reports pain to left hip. Pt is a poor historian and unable to describe details of fall . As per ems, pt found to be hypothermic at [place]. pt currently on Eliquis ,last dose taken in am . pt denies headache, numbness/weakness, cp, sob, fever, abd pain, or any other complains. Found to have left intertrochanteric hip fracture Seen by orthopedic team and further imaging ordered Diagnosed with hip fx and orthopedic sx evaluation called,pain managmnet and dvt prophylaxis administrated Cardiology clearance requested for surgical treatment Palliative care consult requested ,to discuss advance directives and complete MOLST      (25 Sep 2020 17:12)      PAST MEDICAL & SURGICAL HISTORY:  Afib    Glaucoma    Dementia    CKD (chronic kidney disease)    CAD (coronary artery disease)    OA (osteoarthritis)    HLD (hyperlipidemia)    HTN (hypertension)    Dementia    No significant past surgical history        FAMILY HISTORY:        MEDICATIONS   acetaminophen   Tablet .. 650 milliGRAM(s) Oral every 6 hours PRN  aMIOdarone    Tablet 100 milliGRAM(s) Oral daily  apixaban 1.25 milliGRAM(s) Oral two times a day  ascorbic acid 500 milliGRAM(s) Oral two times a day  atorvastatin 10 milliGRAM(s) Oral at bedtime  bisacodyl Suppository 10 milliGRAM(s) Rectal daily PRN  calcitriol   Capsule 0.25 MICROGram(s) Oral daily  donepezil 10 milliGRAM(s) Oral at bedtime  ferrous    sulfate 325 milliGRAM(s) Oral daily  folic acid 1 milliGRAM(s) Oral daily  HYDROmorphone  Injectable 0.5 milliGRAM(s) IV Push every 6 hours PRN  latanoprost 0.005% Ophthalmic Solution 1 Drop(s) Both EYES at bedtime  multivitamin 1 Tablet(s) Oral daily  OLANZapine Injectable 2.5 milliGRAM(s) IntraMuscular every 6 hours PRN  OLANZapine Injectable 2.5 milliGRAM(s) IntraMuscular once  ondansetron Injectable 4 milliGRAM(s) IV Push every 6 hours PRN  oxyCODONE    IR 10 milliGRAM(s) Oral every 4 hours PRN  oxyCODONE    IR 5 milliGRAM(s) Oral every 4 hours PRN  senna 2 Tablet(s) Oral at bedtime      Vital Signs Last 24 Hrs  T(C): 36.8 (28 Sep 2020 04:31), Max: 37.6 (27 Sep 2020 20:01)  T(F): 98.3 (28 Sep 2020 04:31), Max: 99.7 (27 Sep 2020 20:01)  HR: 89 (28 Sep 2020 04:31) (73 - 125)  BP: 125/72 (28 Sep 2020 04:31) (105/70 - 127/78)  BP(mean): --  RR: 16 (28 Sep 2020 04:31) (16 - 18)  SpO2: 94% (28 Sep 2020 04:31) (93% - 97%)      09-27-20 @ 07:01  -  09-28-20 @ 07:00  --------------------------------------------------------  IN: 1140 mL / OUT: 410 mL / NET: 730 mL            LABS:                        9.4    5.67  )-----------( 144      ( 28 Sep 2020 06:37 )             28.4     09-28    146<H>  |  115<H>  |  41<H>  ----------------------------<  75  4.8   |  24  |  1.60<H>    Ca    9.6      28 Sep 2020 06:37  Phos  3.1     09-28                WBC:  WBC Count: 5.67 K/uL (09-28 @ 06:37)  WBC Count: 7.59 K/uL (09-27 @ 06:59)  WBC Count: 9.09 K/uL (09-26 @ 15:54)  WBC Count: 7.78 K/uL (09-26 @ 07:55)  WBC Count: 8.52 K/uL (09-26 @ 06:08)  WBC Count: 6.91 K/uL (09-25 @ 14:47)      MICROBIOLOGY:  RECENT CULTURES:  09-26 .Blood Blood XXXX XXXX   No growth to date.                    Sodium:  Sodium, Serum: 146 mmol/L (09-28 @ 06:37)  Sodium, Serum: 145 mmol/L (09-27 @ 06:59)  Sodium, Serum: 147 mmol/L (09-26 @ 15:54)  Sodium, Serum: 148 mmol/L (09-26 @ 07:55)  Sodium, Serum: 146 mmol/L (09-26 @ 06:08)  Sodium, Serum: 148 mmol/L (09-25 @ 14:47)      1.60 mg/dL 09-28 @ 06:37  1.50 mg/dL 09-27 @ 06:59  1.60 mg/dL 09-26 @ 15:54  1.80 mg/dL 09-26 @ 07:55  1.70 mg/dL 09-26 @ 06:08  1.80 mg/dL 09-25 @ 14:47      Hemoglobin:  Hemoglobin: 9.4 g/dL (09-28 @ 06:37)  Hemoglobin: 9.3 g/dL (09-27 @ 06:59)  Hemoglobin: 9.7 g/dL (09-26 @ 15:54)  Hemoglobin: 10.1 g/dL (09-26 @ 07:55)  Hemoglobin: 11.1 g/dL (09-26 @ 06:08)  Hemoglobin: 8.5 g/dL (09-25 @ 14:47)      Platelets: Platelet Count - Automated: 144 K/uL (09-28 @ 06:37)  Platelet Count - Automated: 129 K/uL (09-27 @ 06:59)  Platelet Count - Automated: 141 K/uL (09-26 @ 15:54)  Platelet Count - Automated: 155 K/uL (09-26 @ 07:55)  Platelet Count - Automated: 154 K/uL (09-26 @ 06:08)  Platelet Count - Automated: 187 K/uL (09-25 @ 14:47)              RADIOLOGY & ADDITIONAL STUDIES:

## 2020-09-28 NOTE — PROGRESS NOTE ADULT - ASSESSMENT
cont rx   cont rx      WHITE FEROZ [id number]  1930 DOA 2019 DR DHEERAJ SILVERIO     REVIEW OF SYMPTOMS      Able to give ROS  NO     PHYSICAL EXAM    HEENT Unremarkable PERRLA atraumatic   RESP Fair air entry EXP prolonged    Harsh breath sound Resp distres mild   CARDIAC S1 S2 No S3     NO JVD    ABDOMEN SOFT BS PRESENT NOT DISTENDED No hepatosplenomegaly PEDAL EDEMA present No calf tenderness  NO rash   GENERAL Not TOXIC looking      OXYGENATION      -2020 ra 98% - 2l 97%     VITALS/LABS       2020 afeb80 96/65       PATIENT SUMMARY.   89 f ho dementia ckd cad oa hytn hld paf admitted [facility] from [facility] with fall and l hip fx CXR 2020 showed sm r pl effsn ivcf old fx r humerus Pulm consulted for periop eval and followup     Pt was given 1 u prbc transfusion  as Hb was 8.5   She had insertion of locking intramedullary mateusz in l hip for it fx l hip on 2020 Dr Brendon Barrett    home meds   amiodarone 100 ashleigh t calcitriol donepezil 10 irbesartan 37.5 pravastat 40 kayexalate eod eliquis 1.25x2       PROBLEM/ASSESSMENT/RECOMMENDATIONS.         PLEURAL EFFS  CXR 2020 cxr poss sm r effs ivcf   A/R   2020 Film technically subopt   Will follow If clinical status changes will get ct chest to further evaluate     BLAYNE   Cr --2020 Cr 1.8 - 1.8 - 1.5   A/R   2020 Monitor lytes    STATUS POST HIP SURGERY   2020  insertion of locking intramedullary mateusz in l hip for it fx l hip on 2020 Dr Brendon Barrett     DVT PPl  APIXABAN apixaban 1.25x2 (nv af) (Dr Hernandez)      PAF  APIXABAN apixaban 1.25x2 (nv af) (Dr Hernandez)     AMIODARION amiodarine 100 () (dw Dr Grande)   Apixaban dose to be increased 2.5x2 as per pharmacy     TIME SPENT   Over 25 minutes aggregate care time spent on encounter; activities included   direct patient care, counseling and/or coordinating care reviewing notes, lab data/ imaging , discussion with multidisciplinary team/ patient  /family and explaining in detail risks, benefits, alternatives  of the recommendations     SHAHANA REDMAN [id number]  1930 DOA 2019 DR DHEERAJ SILVERIO

## 2020-09-28 NOTE — PROGRESS NOTE ADULT - SUBJECTIVE AND OBJECTIVE BOX
Patient is a 89y Female with a known history of :  Hypernatremia [E87.0]    HLD (hyperlipidemia) [E78.5]    Palliative care encounter [Z51.5]    Stage 2 chronic kidney disease [N18.2]    Prophylactic measure [Z29.9]    CKD (chronic kidney disease) [N18.9]    Hypothermia [T68.XXXA]    OA (osteoarthritis) [M19.90]    HTN (hypertension) [I10]    Glaucoma [H40.9]    Dementia [F03.90]    CAD (coronary artery disease) [I25.10]    Afib [I48.91]    Hip fracture, left [S72.002A]      HPI:  89 y/o Female with PMHx HTN, HLD, OA, CKD, CAD, ovarian Ca BIBA to [place] ED  from [place]  ER due to diagnosed left hip fx. As per EMS, reports patient was from [facility] in which had unwitnessed fall. pt reports pain to left hip. Pt is a poor historian and unable to describe details of fall . As per ems, pt found to be hypothermic at [place]. pt currently on Eliquis ,last dose taken in am . pt denies headache, numbness/weakness, cp, sob, fever, abd pain, or any other complains. Found to have left intertrochanteric hip fracture Seen by orthopedic team and further imaging ordered Diagnosed with hip fx and orthopedic sx evaluation called,pain managmnet and dvt prophylaxis administrated Cardiology clearance requested for surgical treatment Palliative care consult requested ,to discuss advance directives and complete MOLST      (25 Sep 2020 17:12)      REVIEW OF SYSTEMS:    CONSTITUTIONAL: No fever, weight loss, or fatigue  EYES: No eye pain, visual disturbances, or discharge  ENMT:  No difficulty hearing, tinnitus, vertigo; No sinus or throat pain  NECK: No pain or stiffness  BREASTS: No pain, masses, or nipple discharge  RESPIRATORY: No cough, wheezing, chills or hemoptysis; No shortness of breath  CARDIOVASCULAR: No chest pain, palpitations, dizziness, or leg swelling  GASTROINTESTINAL: No abdominal or epigastric pain. No nausea, vomiting, or hematemesis; No diarrhea or constipation. No melena or hematochezia.  GENITOURINARY: No dysuria, frequency, hematuria, or incontinence  NEUROLOGICAL: No headaches, memory loss, loss of strength, numbness, or tremors  SKIN: No itching, burning, rashes, or lesions   LYMPH NODES: No enlarged glands  ENDOCRINE: No heat or cold intolerance; No hair loss  MUSCULOSKELETAL: No joint pain or swelling; No muscle, back, or extremity pain  PSYCHIATRIC: No depression, anxiety, mood swings, or difficulty sleeping  HEME/LYMPH: No easy bruising, or bleeding gums  ALLERGY AND IMMUNOLOGIC: No hives or eczema    MEDICATIONS  (STANDING):  aMIOdarone    Tablet 100 milliGRAM(s) Oral daily  apixaban 1.25 milliGRAM(s) Oral two times a day  ascorbic acid 500 milliGRAM(s) Oral two times a day  atorvastatin 10 milliGRAM(s) Oral at bedtime  calcitriol   Capsule 0.25 MICROGram(s) Oral daily  donepezil 10 milliGRAM(s) Oral at bedtime  ferrous    sulfate 325 milliGRAM(s) Oral daily  folic acid 1 milliGRAM(s) Oral daily  latanoprost 0.005% Ophthalmic Solution 1 Drop(s) Both EYES at bedtime  multivitamin 1 Tablet(s) Oral daily  OLANZapine Injectable 2.5 milliGRAM(s) IntraMuscular once  senna 2 Tablet(s) Oral at bedtime    MEDICATIONS  (PRN):  acetaminophen   Tablet .. 650 milliGRAM(s) Oral every 6 hours PRN Temp greater or equal to 38C (100.4F)  bisacodyl Suppository 10 milliGRAM(s) Rectal daily PRN If no bowel movement  HYDROmorphone  Injectable 0.5 milliGRAM(s) IV Push every 6 hours PRN Severe Pain (7 - 10)  OLANZapine Injectable 2.5 milliGRAM(s) IntraMuscular every 6 hours PRN agitation  ondansetron Injectable 4 milliGRAM(s) IV Push every 6 hours PRN Nausea and/or Vomiting  oxyCODONE    IR 10 milliGRAM(s) Oral every 4 hours PRN Moderate Pain (4 - 6)  oxyCODONE    IR 5 milliGRAM(s) Oral every 4 hours PRN Mild Pain (1 - 3)      ALLERGIES: penicillin (Other)  sulfa drugs (Other)      FAMILY HISTORY:      PHYSICAL EXAMINATION:  -----------------------------  T(C): 36.8 (09-28-20 @ 04:31), Max: 37.6 (09-27-20 @ 20:01)  HR: 89 (09-28-20 @ 04:31) (73 - 125)  BP: 125/72 (09-28-20 @ 04:31) (105/70 - 127/78)  RR: 16 (09-28-20 @ 04:31) (16 - 18)  SpO2: 94% (09-28-20 @ 04:31) (93% - 97%)  Wt(kg): --    09-27 @ 07:01  -  09-28 @ 07:00  --------------------------------------------------------  IN:    Lactated Ringers: 900 mL    Oral Fluid: 240 mL  Total IN: 1140 mL    OUT:    Voided (mL): 410 mL  Total OUT: 410 mL    Total NET: 730 mL            VITALS  T(C): 36.8 (09-28-20 @ 04:31), Max: 37.6 (09-27-20 @ 20:01)  HR: 89 (09-28-20 @ 04:31) (73 - 125)  BP: 125/72 (09-28-20 @ 04:31) (105/70 - 127/78)  RR: 16 (09-28-20 @ 04:31) (16 - 18)  SpO2: 94% (09-28-20 @ 04:31) (93% - 97%)    Constitutional: well developed, normal appearance, well groomed, well nourished, no deformities and no acute distress.   Eyes: the conjunctiva exhibited no abnormalities and the eyelids demonstrated no xanthelasmas.   HEENT: normal oral mucosa, no oral pallor and no oral cyanosis.   Neck: normal jugular venous A waves present, normal jugular venous V waves present and no jugular venous kelley A waves.   Pulmonary: no respiratory distress, normal respiratory rhythm and effort, no accessory muscle use and lungs were clear to auscultation bilaterally.   Cardiovascular: heart rate and rhythm were normal, normal S1 and S2 and no murmur, gallop, rub, heave or thrill are present.   Abdomen: soft, non-tender, no hepato-splenomegaly and no abdominal mass palpated.   Musculoskeletal: the gait could not be assessed..   Extremities: no clubbing of the fingernails, no localized cyanosis, no petechial hemorrhages and no ischemic changes.   Skin: normal skin color and pigmentation, no rash, no venous stasis, no skin lesions, no skin ulcer and no xanthoma was observed.   Psychiatric: oriented to person, place, and time, the affect was normal, the mood was normal and not feeling anxious.     LABS:   --------  09-28    146<H>  |  115<H>  |  41<H>  ----------------------------<  75  4.8   |  24  |  1.60<H>    Ca    9.6      28 Sep 2020 06:37  Phos  3.1     09-28                           9.4    5.67  )-----------( 144      ( 28 Sep 2020 06:37 )             28.4           167 mg/dL, 73 mg/dL, 84 mg/dL, 48 mg/dL        RADIOLOGY:  -----------------    ECG:     ECHO:

## 2020-09-28 NOTE — PROGRESS NOTE ADULT - SUBJECTIVE AND OBJECTIVE BOX
PROGRESS NOTE  Patient is a 89y old  Female who presents with a chief complaint of LEFT HIP PAIN ,S/P FALL AT [facility] (28 Sep 2020 09:40)  Chart and available morning labs /imaging are reviewed electronically , urgent issues addressed . More information  is being added upon completion of rounds , when more information is collected and management discussed with consultants , medical staff and social service/case management on the floor   LATE ENTRY- patient was seen and examined earlier today . Plan of care was discussed with med staff and unit coordinator .   OVERNIGHT  No new issues reported by medical staff . All above noted Patient is resting in a bed comfortably .Confused ,poor mentation .No distress noted   Cleared by ortho for rehab placement   HPI:  89 y/o Female with PMHx HTN, HLD, OA, CKD, CAD, ovarian Ca BIBA to [facility]  from [place]  [facility] due to diagnosed left hip fx. As per EMS, reports patient was from [facility] in which had unwitnessed fall. pt reports pain to left hip. Pt is a poor historian and unable to describe details of fall . As per ems, pt found to be hypothermic at [place]. pt currently on Eliquis ,last dose taken in am . pt denies headache, numbness/weakness, cp, sob, fever, abd pain, or any other complains. Found to have left intertrochanteric hip fracture Seen by orthopedic team and further imaging ordered Diagnosed with hip fx and orthopedic sx evaluation called,pain managmnet and dvt prophylaxis administrated Cardiology clearance requested for surgical treatment Palliative care consult requested ,to discuss advance directives and complete MOLST      (25 Sep 2020 17:12)    PAST MEDICAL & SURGICAL HISTORY:  Afib    Glaucoma    Dementia    CKD (chronic kidney disease)    CAD (coronary artery disease)    OA (osteoarthritis)    HLD (hyperlipidemia)    HTN (hypertension)    Dementia    No significant past surgical history        MEDICATIONS  (STANDING):  aMIOdarone    Tablet 100 milliGRAM(s) Oral daily  apixaban 1.25 milliGRAM(s) Oral two times a day  ascorbic acid 500 milliGRAM(s) Oral two times a day  atorvastatin 10 milliGRAM(s) Oral at bedtime  calcitriol   Capsule 0.25 MICROGram(s) Oral daily  donepezil 10 milliGRAM(s) Oral at bedtime  ferrous    sulfate 325 milliGRAM(s) Oral daily  folic acid 1 milliGRAM(s) Oral daily  latanoprost 0.005% Ophthalmic Solution 1 Drop(s) Both EYES at bedtime  multivitamin 1 Tablet(s) Oral daily  OLANZapine Injectable 2.5 milliGRAM(s) IntraMuscular once  senna 2 Tablet(s) Oral at bedtime    MEDICATIONS  (PRN):  acetaminophen   Tablet .. 650 milliGRAM(s) Oral every 6 hours PRN Temp greater or equal to 38C (100.4F)  bisacodyl Suppository 10 milliGRAM(s) Rectal daily PRN If no bowel movement  HYDROmorphone  Injectable 0.5 milliGRAM(s) IV Push every 6 hours PRN Severe Pain (7 - 10)  OLANZapine Injectable 2.5 milliGRAM(s) IntraMuscular every 6 hours PRN agitation  ondansetron Injectable 4 milliGRAM(s) IV Push every 6 hours PRN Nausea and/or Vomiting  oxyCODONE    IR 10 milliGRAM(s) Oral every 4 hours PRN Moderate Pain (4 - 6)  oxyCODONE    IR 5 milliGRAM(s) Oral every 4 hours PRN Mild Pain (1 - 3)      OBJECTIVE    T(C): 36.8 (09-28-20 @ 04:31), Max: 37.6 (09-27-20 @ 20:01)  HR: 89 (09-28-20 @ 04:31) (73 - 125)  BP: 125/72 (09-28-20 @ 04:31) (105/70 - 127/78)  RR: 16 (09-28-20 @ 04:31) (16 - 18)  SpO2: 95% (09-28-20 @ 09:44) (93% - 97%)  Wt(kg): --  I&O's Summary    27 Sep 2020 07:01  -  28 Sep 2020 07:00  --------------------------------------------------------  IN: 1140 mL / OUT: 410 mL / NET: 730 mL          REVIEW OF SYSTEMS:  CONSTITUTIONAL: No fever, weight loss, or fatigue  ROS is unobtainable due to dementia and confusion PATIENT IS CONFUSED AND IS UNABLE TO GIVE ANY/RELIABLE HISTORY OR REVIEW OF SYSTEMS PLEASE ALSO SEE UNDER HPI AND ASSESSMENT FOR OBTAINED REVIEW OF SYSTEMS     PHYSICAL EXAM:  Appearance: NAD. VS past 24 hrs -as above   HEENT:   Moist oral mucosa. Conjunctiva clear b/l.   Neck : supple  Respiratory: Lungs CTAB.  Gastrointestinal:  Soft, nontender. No rebound. No rigidity. BS present	  Cardiovascular: RRR ,S1S2 present  Neurologic: Non-focal. Moving all extremities.  Extremities: left hip dressing is intact   Skin: No rashes, No ecchymoses, No cyanosis.	  wounds ,skin lesions-See skin assesment flow sheet   LABS:                        9.4    5.67  )-----------( 144      ( 28 Sep 2020 06:37 )             28.4     09-28    146<H>  |  115<H>  |  41<H>  ----------------------------<  75  4.8   |  24  |  1.60<H>    Ca    9.6      28 Sep 2020 06:37  Phos  3.1     09-28      CAPILLARY BLOOD GLUCOSE              Culture - Blood (collected 26 Sep 2020 05:24)  Source: .Blood Blood  Preliminary Report (27 Sep 2020 06:01):    No growth to date.      RADIOLOGY & ADDITIONAL TESTS:   reviewed elctronically  ASSESSMENT/PLAN: 	  Patient was seen and examined on a day of discharge . Plan of care , discharge medications and recommendations discussed with consultants and clearance for discharge obtained .Social service , case management  and medical staff are aware of plan. Family is notified. Discharge summary  is  prepared electronically 75minutes spent on this visit, 50% visit time spent in care co-ordination with other attendings and counselling patient  I have discussed care plan with patient and HCP,expressed understanding of problems treatment and their effect and side effects, alternatives in detail,I have asked if they have any questions and concerns and appropriately addressed them to best of my ability

## 2020-09-29 RX ORDER — ACETAMINOPHEN 500 MG
2 TABLET ORAL
Qty: 0 | Refills: 0 | DISCHARGE

## 2020-09-29 RX ORDER — ISRADIPINE 10 MG
1 TABLET, EXTENDED RELEASE 24 HR ORAL
Qty: 0 | Refills: 0 | DISCHARGE

## 2020-09-29 RX ORDER — ERYTHROPOIETIN 10000 [IU]/ML
0 INJECTION, SOLUTION INTRAVENOUS; SUBCUTANEOUS
Qty: 0 | Refills: 0 | DISCHARGE

## 2020-09-29 RX ORDER — IRBESARTAN 75 MG/1
0.5 TABLET ORAL
Qty: 0 | Refills: 0 | DISCHARGE

## 2020-09-29 RX ORDER — AMIODARONE HYDROCHLORIDE 400 MG/1
1 TABLET ORAL
Qty: 0 | Refills: 0 | DISCHARGE

## 2020-09-29 RX ORDER — DONEPEZIL HYDROCHLORIDE 10 MG/1
1 TABLET, FILM COATED ORAL
Qty: 0 | Refills: 0 | DISCHARGE

## 2020-09-29 RX ORDER — SODIUM POLYSTYRENE SULFONATE 4.1 MEQ/G
15 POWDER, FOR SUSPENSION ORAL
Qty: 0 | Refills: 0 | DISCHARGE

## 2020-10-01 LAB
CULTURE RESULTS: SIGNIFICANT CHANGE UP
SPECIMEN SOURCE: SIGNIFICANT CHANGE UP

## 2020-10-08 ENCOUNTER — OUTPATIENT (OUTPATIENT)
Dept: OUTPATIENT SERVICES | Facility: HOSPITAL | Age: 85
LOS: 1 days | Discharge: HOME | End: 2020-10-08
Payer: MEDICARE

## 2020-10-08 DIAGNOSIS — S72.002D FRACTURE OF UNSPECIFIED PART OF NECK OF LEFT FEMUR, SUBSEQUENT ENCOUNTER FOR CLOSED FRACTURE WITH ROUTINE HEALING: ICD-10-CM

## 2020-10-08 DIAGNOSIS — Z95.828 PRESENCE OF OTHER VASCULAR IMPLANTS AND GRAFTS: Chronic | ICD-10-CM

## 2020-10-08 PROBLEM — F03.90 UNSPECIFIED DEMENTIA WITHOUT BEHAVIORAL DISTURBANCE: Chronic | Status: ACTIVE | Noted: 2020-09-25

## 2020-10-08 PROBLEM — E78.5 HYPERLIPIDEMIA, UNSPECIFIED: Chronic | Status: ACTIVE | Noted: 2020-09-25

## 2020-10-08 PROBLEM — I48.91 UNSPECIFIED ATRIAL FIBRILLATION: Chronic | Status: ACTIVE | Noted: 2020-09-25

## 2020-10-08 PROBLEM — I10 ESSENTIAL (PRIMARY) HYPERTENSION: Chronic | Status: ACTIVE | Noted: 2020-09-25

## 2020-10-08 PROBLEM — N18.9 CHRONIC KIDNEY DISEASE, UNSPECIFIED: Chronic | Status: ACTIVE | Noted: 2020-09-25

## 2020-10-08 PROBLEM — M19.90 UNSPECIFIED OSTEOARTHRITIS, UNSPECIFIED SITE: Chronic | Status: ACTIVE | Noted: 2020-09-25

## 2020-10-08 PROBLEM — H40.9 UNSPECIFIED GLAUCOMA: Chronic | Status: ACTIVE | Noted: 2020-09-25

## 2020-10-08 PROBLEM — I25.10 ATHEROSCLEROTIC HEART DISEASE OF NATIVE CORONARY ARTERY WITHOUT ANGINA PECTORIS: Chronic | Status: ACTIVE | Noted: 2020-09-25

## 2020-10-08 PROCEDURE — 73502 X-RAY EXAM HIP UNI 2-3 VIEWS: CPT | Mod: 26,LT

## 2020-10-20 ENCOUNTER — OUTPATIENT (OUTPATIENT)
Dept: OUTPATIENT SERVICES | Facility: HOSPITAL | Age: 85
LOS: 1 days | Discharge: HOME | End: 2020-10-20
Payer: MEDICARE

## 2020-10-20 DIAGNOSIS — M79.642 PAIN IN LEFT HAND: ICD-10-CM

## 2020-10-20 DIAGNOSIS — Z95.828 PRESENCE OF OTHER VASCULAR IMPLANTS AND GRAFTS: Chronic | ICD-10-CM

## 2020-10-20 PROCEDURE — 73130 X-RAY EXAM OF HAND: CPT | Mod: 26,LT

## 2020-10-20 NOTE — H&P ADULT. - VENOUS THROMBOEMBOLISM
Patient: Evie Lopez Date: 10/20/2020   : 2017    3 year old female      OUTPATIENT HYPERBARIC CONSULT NOTE    Supervising Wound Care / Hyperbaric Medicine Physician: Not Applicable  Consulting Provider:  Modesto Guerra MD  Date of Consultation/Last Comprehensive Exam:  10/20/20  Referring  Provider:  Dr. Shepard at [facility]    SUBJECTIVE:    Chief Complaint:  Necrotizing invasive fungal sinusitis    Wound/Ulcer Present:    Necrotizing soft tissue infection    Additional Wound Category:  None     Maximum Baseline Ambulatory Status:  Unable to assess    History of Present Illness:  This is a 3 year old non-diabetic girl with a history of single febrile seizure 20.  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  She began induction chemotherapy but was admitted to [facility] on 20 with hyponatremia and hyperglycemia secondary to steroids.  Became febrile with blood culture positive for Strep mitis and treated with course of Cefepime.  Also found to have Staph epi and treated with course of Vancomycin.  Recurrent fever with development of adherent crusty debris in left inferior and medial turbinates prompted ENT evaluation; significant concern for invasive fungal sinusitis.  Underwent debridement of bilateral middle turbinates, left inferior turbinate, left uncinate process, and right mid nasal septal mucosa on 20.  Cultures positive for Alternaria species in both nares and Curvularia species in left nare.  Underwent repeat endoscopies with debridements on 10/2, 10/4, 10/8, and 10/12.  Most recent nasal endoscopy on 10/19/20 along with placement of PE tubes.  ENT reportedly pleased with progress in appearance of sinuses.    Further workup done with chest CT on 10/15/20; no specific findings to indicate fungal infection but nearly occlusive thrombus associated with the right chest port.  Started on therapeutic Lovenox.    MRI Brain 10/13/20 with leptomeningeal enhancement in the parasagittal cortical  sulci bilaterally and scattered foci of abnormal parenchymal postcontrast enhancement in bilateral inferior basal ganglia region and anterior temporal lobes.  Findings suggestive of meningoencephalitis.  Diffuse paranasal sinus disease involving the bilateral ethmoid and maxillary sinuses.  No clear direct intracranial extension of fungal sinusitis.  LP 10/14/20 with CSF culture - no growth to date.    Currently on antifungal treatment with voriconazole and amphotericin, as well as pneumocystis prophylaxis with Bactrim.    Specific chemotherapy history:  (Day 1 is 9/12/20)  Days 1-14: Dexamethasone BID (LD 9/25 PM)  Days 1 and 4: IT Cytarabine  Days 1, 8, 15 and 22: Vincristine and Daunorubicin (last dose 10/3)   Day 4: PEG  Days 8 and 28: IT Methotrexate  Will need LP on day 1 of next cycle; currently planning for 10/23   Tentative plan to begin Blinatumomab on 10/23    Last negative COVID test 10/18/20.    We were contacted by [facility] for consideration of hyperbaric treatment given invasive fungal sinusitis requiring multiple debridements in setting of B-cell ALL urgently requiring further treatment.    Current Treatment Regimen:  Dressing:  None   Frequency:  Not applicable   Changed by:  Not applicable    Review of Systems:  Pertinent items are noted in HPI (history of present illness).    No past medical history on file.  No past surgical history on file.  Social History     Socioeconomic History   • Marital status: Single     Spouse name: Not on file   • Number of children: Not on file   • Years of education: Not on file   • Highest education level: Not on file   Occupational History   • Not on file   Social Needs   • Financial resource strain: Not on file   • Food insecurity     Worry: Not on file     Inability: Not on file   • Transportation needs     Medical: Not on file     Non-medical: Not on file   Tobacco Use   • Smoking status: Not on file   Substance and Sexual Activity   • Alcohol use: Not on file   •  Drug use: Not on file   • Sexual activity: Not on file   Lifestyle   • Physical activity     Days per week: Not on file     Minutes per session: Not on file   • Stress: Not on file   Relationships   • Social connections     Talks on phone: Not on file     Gets together: Not on file     Attends Yarsanism service: Not on file     Active member of club or organization: Not on file     Attends meetings of clubs or organizations: Not on file     Relationship status: Not on file   • Intimate partner violence     Fear of current or ex partner: Not on file     Emotionally abused: Not on file     Physically abused: Not on file     Forced sexual activity: Not on file   Other Topics Concern   • Not on file   Social History Narrative   • Not on file     No family history on file.    No current outpatient medications on file.     Current Facility-Administered Medications   Medication   • dextrose 5% / sodium chloride 0.9% 1,000 mL with magnesium sulfate 1 g, potassium CHLORIDE 50 mEq infusion      ALLERGIES:  Patient has no known allergies.    OBJECTIVE:  Vital Signs:    Visit Vitals  BP 95/62 (BP Location: LUE - Left upper extremity, Patient Position: Sitting)   Pulse 131   Temp 99.1 °F (37.3 °C) (Temporal)   Resp 30         Physical Exam:  General appearance: Appears stated age, Alert, well-developed, well-nourished, in no distress and cooperative.   HEENT: Head is normocephalic, atraumatic.  NG tube in place.  Eyes: EOM intact, no icterus.  Ears: Bilateral PE tubes.  Nose: Externally without trauma or lesions.  Mouth: Oral mucosa is pink and moist.  Neck: Trachea is midline.  Resp: Equal and nonlabored, no accessory muscle use.  Extremities: Upper extremities are without wounds.    Wound Bed Quality:  NA      Mago-wound Quality:    None    Additional Descriptors:  none    Wound Measurements Per Flowsheet:           PROCEDURE:  None performed  Not indicated    Procedure was Performed by:  Not applicable    Laboratory  assessments reviewed:  No results found for: PAB   No results found for: ALBUMIN   No results available in last 24 hours    No results found for: WBC, GLUCOSE, HGBA1C, CRP, RESR, CREATININE, GFRA, GFRNA     Culture results:  No results found for: SDES No results found for: CULT     Diagnostic Assessments Reviewed:  MRI  and CT Scan      CT Chest  10/15/20  COMPARISON:  CT chest 9/29/2020  PROCEDURE:  Pediatric dose CT of the chest was performed following the administration of intravenous contrast.  SUPPORT DEVICES:  Right chest port and left upper extremity approach PICC each terminate within the right atrium. Transesophageal enteric tube terminus is in this stomach.  LUNG PARENCHYMA/PLEURA:  No consolidations, pleural effusions, pulmonary nodules, or significant bronchial wall thickening. Minimal scattered groundglass opacification predominantly within the right lung may indicate mild small airways inflammation.  TRACHEA AND CENTRAL AIRWAYS:  Normal.  PERIPHERAL BRONCHI:  Normal.  MEDIASTINUM:  Normal.  LYMPH NODES:  Normal.  VASCULATURE:  The filling defect within the right internal jugular vein extends from the thoracic inlet beyond the level of the mandible and the field-of-view.  UPPER ABDOMEN:  Normal.  BONES:  No suspicious findings.  IMPRESSION:  1. No specific findings to indicate fungal infection.  2. Nearly occlusive thrombus associated with the right chest port.  3. Support apparatus as above.    MRI  Brain 10/13/20  IMPRESSION:  1. Extensive susceptibility signal with leptomeningeal enhancement is seen in the parasagittal cortical sulci bilaterally.  2. Scattered foci of abnormal parenchymal postcontrast enhancement in bilateral inferior basal ganglia region and anterior temporal lobes, as described above.   3. Overall these findings are suggestive of meningoencephalitis. Further correlation with CSF analysis is suggested.  4. Diffuse paranasal sinus disease involving the bilateral ethmoid and maxillary  sinuses.    Nutritional Assessment:  Prealbumin and/or Albumin reviewed    Wound treatment goals are palliative:  No    DIAGNOSES:  Necrotizing invasive fungal sinusitis   B cell ALL  History of single febrile seizure    Medical Decision Making:   Discussed risks and benefits with patient's mother, Colette. [phone].  She wished that we proceed.  All questions answered.  Consent witnessed and signed by 2 providers.    Risk assessment:  No asthma or other lung disease, pneumothorax, implanted electronic devices, CHF, claustrophobia, COPD with hypercarbia, congenital spherocytosis, or medication patches.   Episode of single febrile seizure 2/20/20.  Will increase air breaks to 10 minutes to mitigate risk.  Chemotherapy agent of potential concern is daunorubicin.  Last dose 10/3/20.  Verified with oncology that greater than 5 half-lives have passed.  Bard Power Port implanted in chest.  Rated to 6 THOMAS.    RISKS AND BENEFITS OF HYPERBARIC THERAPY (HBO2): Risks and benefits of hyperbaric oxygen therapy were discussed with the other, as well as expectations such as expected outcome and expected duration of therapy. The benefits including raising tissue oxygen tensions to supranormal levels to enhance healing, and the potential risks of otic/pulmonary overpressure, oxygen toxicity, visual changes and chamber fire were discussed and explained in detail. The patient expressed an understanding of this discussion and was given the opportunity to have questions answered. Additional educational teaching concerning the hyperbaric chamber environment and operations will be accomplished by the nursing and technical staff. Verbal acknowledgment of this understanding and consent prior to treatment was obtained.     INDICATION FOR HYPERBARIC OXYGEN THERAPY (HBO2): Necrotizing mixed matty (aerobic and anaerobic) infections usually occur in traumatic or surgical wounds or around foreign bodies, and affect patients who are  medically compromised by diabetes mellitus, vascular insufficiency or both. Infections typically cause local tissue hypoxia, and in the case of necrotizing infections this is exaggerated by an infection-induced occlusive endarteritis. Clinical signs of mixed aerobic-anaerobic soft tissue infection are tissue necrosis, a putrid discharge, gas production, the tendency of infectious process to burrow through soft tissue and fascial planes, and interestingly oftentimes the absence of classical signs of tissue inflammation. The principal treatment for necrotizing soft tissue infections is surgical debridement and systemic antibiotics. Hyperbaric oxygen is recommended as an adjunct only in those settings where mortality and morbidity are expected to be high despite aggressive standard treatment, or where such treatment is failing. HBO2 therapy has been used because of its benefit in abating the synergistic interaction present in mixed infections. The improved tissue oxygen tension can decrease anaerobic bacterial growth by direct toxic mechanisms and enhance tissue oxidation-reduction potential.  Even with aerobic organisms, adjunctive HBO2 can increase bacterial killing by leukocyte-dependent mechanisms.  HBO2 therapy can be adjunctive to antibiotic and surgical treatment in the management of all necrotizing soft tissue infections. Adjunctive HBO2 is clearly recommended for compromised hosts with crepitant anaerobic cellulitis and necrotizing fasciitis, including Guevara's disease, as well as for patients with progressive bacterial gangrene and non-clostridial myonecrosis due to the associated extreme morbidity and mortality risk.     The initial treatment frequency is chosen by the Hyperbaric Medicine physician based primarily on presenting acuity, from BID or 3 times in the 1st 24 hours for a fulminant necrotizing fasciitis to daily for a subacute progressive necrotizing cellulitis.  Based on patient response and  contextual factors, this may be adjusted to more or less frequently based on the judgement of the treating physician.    Plan of Care:  Advanced Wound Care Recommendations:    Percent Wound Closure from consult:    Care plan to augment wound closure:  Not applicable.  Initial consult    Patient stable. All questions were answered.     Greater than 180 minutes spent in reviewing records and coordinating care for patient, including 2 involved phone calls with her current oncologist at [facility], Dr. Shepard.    Modesto Guerra MD  [phone] ([phone] (p)         no

## 2020-10-27 ENCOUNTER — INPATIENT (INPATIENT)
Facility: HOSPITAL | Age: 85
LOS: 6 days | Discharge: NURSING, REHAB OR LONG-TERM CARE | End: 2020-11-03
Attending: INTERNAL MEDICINE | Admitting: INTERNAL MEDICINE
Payer: MEDICARE

## 2020-10-27 VITALS
DIASTOLIC BLOOD PRESSURE: 103 MMHG | WEIGHT: 98.33 LBS | HEART RATE: 114 BPM | TEMPERATURE: 98 F | SYSTOLIC BLOOD PRESSURE: 123 MMHG | HEIGHT: 63 IN

## 2020-10-27 DIAGNOSIS — I48.91 UNSPECIFIED ATRIAL FIBRILLATION: ICD-10-CM

## 2020-10-27 DIAGNOSIS — E83.52 HYPERCALCEMIA: ICD-10-CM

## 2020-10-27 DIAGNOSIS — E78.2 MIXED HYPERLIPIDEMIA: ICD-10-CM

## 2020-10-27 DIAGNOSIS — N17.9 ACUTE KIDNEY FAILURE, UNSPECIFIED: ICD-10-CM

## 2020-10-27 DIAGNOSIS — F03.90 UNSPECIFIED DEMENTIA WITHOUT BEHAVIORAL DISTURBANCE: ICD-10-CM

## 2020-10-27 DIAGNOSIS — Z95.828 PRESENCE OF OTHER VASCULAR IMPLANTS AND GRAFTS: Chronic | ICD-10-CM

## 2020-10-27 DIAGNOSIS — E87.5 HYPERKALEMIA: ICD-10-CM

## 2020-10-27 DIAGNOSIS — I10 ESSENTIAL (PRIMARY) HYPERTENSION: ICD-10-CM

## 2020-10-27 LAB
ALBUMIN SERPL ELPH-MCNC: 3 G/DL — LOW (ref 3.3–5)
ALP SERPL-CCNC: 106 U/L — SIGNIFICANT CHANGE UP (ref 40–120)
ALT FLD-CCNC: 15 U/L — SIGNIFICANT CHANGE UP (ref 12–78)
ANION GAP SERPL CALC-SCNC: 7 MMOL/L — SIGNIFICANT CHANGE UP (ref 5–17)
ANION GAP SERPL CALC-SCNC: 7 MMOL/L — SIGNIFICANT CHANGE UP (ref 5–17)
ANION GAP SERPL CALC-SCNC: 8 MMOL/L — SIGNIFICANT CHANGE UP (ref 5–17)
APPEARANCE UR: ABNORMAL
APTT BLD: 35.5 SEC — SIGNIFICANT CHANGE UP (ref 27.5–35.5)
AST SERPL-CCNC: 4 U/L — LOW (ref 15–37)
BACTERIA # UR AUTO: ABNORMAL
BASE EXCESS BLDV CALC-SCNC: -2.7 MMOL/L — LOW (ref -2–2)
BASOPHILS # BLD AUTO: 0.03 K/UL — SIGNIFICANT CHANGE UP (ref 0–0.2)
BASOPHILS NFR BLD AUTO: 0.2 % — SIGNIFICANT CHANGE UP (ref 0–2)
BILIRUB SERPL-MCNC: 0.3 MG/DL — SIGNIFICANT CHANGE UP (ref 0.2–1.2)
BILIRUB UR-MCNC: NEGATIVE — SIGNIFICANT CHANGE UP
BLOOD GAS COMMENTS, VENOUS: SIGNIFICANT CHANGE UP
BUN SERPL-MCNC: 158 MG/DL — HIGH (ref 7–23)
BUN SERPL-MCNC: 198 MG/DL — HIGH (ref 7–23)
BUN SERPL-MCNC: 202 MG/DL — HIGH (ref 7–23)
CALCIUM SERPL-MCNC: 10.4 MG/DL — HIGH (ref 8.5–10.1)
CALCIUM SERPL-MCNC: 12 MG/DL — HIGH (ref 8.5–10.1)
CALCIUM SERPL-MCNC: 12.1 MG/DL — HIGH (ref 8.5–10.1)
CHLORIDE SERPL-SCNC: 118 MMOL/L — HIGH (ref 96–108)
CHLORIDE SERPL-SCNC: 121 MMOL/L — HIGH (ref 96–108)
CHLORIDE SERPL-SCNC: 122 MMOL/L — HIGH (ref 96–108)
CK SERPL-CCNC: 80 U/L — SIGNIFICANT CHANGE UP (ref 26–192)
CO2 SERPL-SCNC: 23 MMOL/L — SIGNIFICANT CHANGE UP (ref 22–31)
CO2 SERPL-SCNC: 24 MMOL/L — SIGNIFICANT CHANGE UP (ref 22–31)
CO2 SERPL-SCNC: 25 MMOL/L — SIGNIFICANT CHANGE UP (ref 22–31)
COLOR SPEC: YELLOW — SIGNIFICANT CHANGE UP
CREAT SERPL-MCNC: 5.11 MG/DL — HIGH (ref 0.5–1.3)
CREAT SERPL-MCNC: 6.05 MG/DL — HIGH (ref 0.5–1.3)
CREAT SERPL-MCNC: 6.34 MG/DL — HIGH (ref 0.5–1.3)
DIFF PNL FLD: ABNORMAL
EOSINOPHIL # BLD AUTO: 0.06 K/UL — SIGNIFICANT CHANGE UP (ref 0–0.5)
EOSINOPHIL NFR BLD AUTO: 0.5 % — SIGNIFICANT CHANGE UP (ref 0–6)
EPI CELLS # UR: SIGNIFICANT CHANGE UP
GAS PNL BLDV: SIGNIFICANT CHANGE UP
GLUCOSE BLDC GLUCOMTR-MCNC: 113 MG/DL — HIGH (ref 70–99)
GLUCOSE BLDC GLUCOMTR-MCNC: 144 MG/DL — HIGH (ref 70–99)
GLUCOSE BLDC GLUCOMTR-MCNC: 172 MG/DL — HIGH (ref 70–99)
GLUCOSE BLDC GLUCOMTR-MCNC: 77 MG/DL — SIGNIFICANT CHANGE UP (ref 70–99)
GLUCOSE BLDC GLUCOMTR-MCNC: 77 MG/DL — SIGNIFICANT CHANGE UP (ref 70–99)
GLUCOSE SERPL-MCNC: 122 MG/DL — HIGH (ref 70–99)
GLUCOSE SERPL-MCNC: 124 MG/DL — HIGH (ref 70–99)
GLUCOSE SERPL-MCNC: 44 MG/DL — CRITICAL LOW (ref 70–99)
GLUCOSE UR QL: NEGATIVE MG/DL — SIGNIFICANT CHANGE UP
HCO3 BLDV-SCNC: 22 MMOL/L — SIGNIFICANT CHANGE UP (ref 21–29)
HCT VFR BLD CALC: 29.8 % — LOW (ref 34.5–45)
HGB BLD-MCNC: 9 G/DL — LOW (ref 11.5–15.5)
HOROWITZ INDEX BLDV+IHG-RTO: 28 — SIGNIFICANT CHANGE UP
IMM GRANULOCYTES NFR BLD AUTO: 0.6 % — SIGNIFICANT CHANGE UP (ref 0–1.5)
INR BLD: 1.47 RATIO — HIGH (ref 0.88–1.16)
KETONES UR-MCNC: NEGATIVE — SIGNIFICANT CHANGE UP
LEUKOCYTE ESTERASE UR-ACNC: ABNORMAL
LYMPHOCYTES # BLD AUTO: 1.26 K/UL — SIGNIFICANT CHANGE UP (ref 1–3.3)
LYMPHOCYTES # BLD AUTO: 10.1 % — LOW (ref 13–44)
MAGNESIUM SERPL-MCNC: 2.9 MG/DL — HIGH (ref 1.6–2.6)
MAGNESIUM SERPL-MCNC: 3.3 MG/DL — HIGH (ref 1.6–2.6)
MCHC RBC-ENTMCNC: 28.1 PG — SIGNIFICANT CHANGE UP (ref 27–34)
MCHC RBC-ENTMCNC: 30.2 GM/DL — LOW (ref 32–36)
MCV RBC AUTO: 93.1 FL — SIGNIFICANT CHANGE UP (ref 80–100)
MONOCYTES # BLD AUTO: 0.81 K/UL — SIGNIFICANT CHANGE UP (ref 0–0.9)
MONOCYTES NFR BLD AUTO: 6.5 % — SIGNIFICANT CHANGE UP (ref 2–14)
NEUTROPHILS # BLD AUTO: 10.27 K/UL — HIGH (ref 1.8–7.4)
NEUTROPHILS NFR BLD AUTO: 82.1 % — HIGH (ref 43–77)
NITRITE UR-MCNC: NEGATIVE — SIGNIFICANT CHANGE UP
NRBC # BLD: 0 /100 WBCS — SIGNIFICANT CHANGE UP (ref 0–0)
PCO2 BLDV: 37 MMHG — SIGNIFICANT CHANGE UP (ref 35–50)
PH BLDV: 7.38 — SIGNIFICANT CHANGE UP (ref 7.35–7.45)
PH UR: 6 — SIGNIFICANT CHANGE UP (ref 5–8)
PHOSPHATE SERPL-MCNC: 2.6 MG/DL — SIGNIFICANT CHANGE UP (ref 2.5–4.5)
PHOSPHATE SERPL-MCNC: 3.3 MG/DL — SIGNIFICANT CHANGE UP (ref 2.5–4.5)
PLATELET # BLD AUTO: 260 K/UL — SIGNIFICANT CHANGE UP (ref 150–400)
PO2 BLDV: 54 MMHG — HIGH (ref 25–45)
POTASSIUM SERPL-MCNC: 5.5 MMOL/L — HIGH (ref 3.5–5.3)
POTASSIUM SERPL-MCNC: 6.4 MMOL/L — CRITICAL HIGH (ref 3.5–5.3)
POTASSIUM SERPL-MCNC: 7.3 MMOL/L — CRITICAL HIGH (ref 3.5–5.3)
POTASSIUM SERPL-SCNC: 5.5 MMOL/L — HIGH (ref 3.5–5.3)
POTASSIUM SERPL-SCNC: 6.4 MMOL/L — CRITICAL HIGH (ref 3.5–5.3)
POTASSIUM SERPL-SCNC: 7.3 MMOL/L — CRITICAL HIGH (ref 3.5–5.3)
PROT SERPL-MCNC: 7.3 GM/DL — SIGNIFICANT CHANGE UP (ref 6–8.3)
PROT UR-MCNC: 100 MG/DL
PROTHROM AB SERPL-ACNC: 16.7 SEC — HIGH (ref 10.6–13.6)
RBC # BLD: 3.2 M/UL — LOW (ref 3.8–5.2)
RBC # FLD: 20.5 % — HIGH (ref 10.3–14.5)
RBC CASTS # UR COMP ASSIST: ABNORMAL /HPF (ref 0–4)
SAO2 % BLDV: 85 % — SIGNIFICANT CHANGE UP (ref 67–88)
SARS-COV-2 RNA SPEC QL NAA+PROBE: SIGNIFICANT CHANGE UP
SODIUM SERPL-SCNC: 151 MMOL/L — HIGH (ref 135–145)
SODIUM SERPL-SCNC: 152 MMOL/L — HIGH (ref 135–145)
SODIUM SERPL-SCNC: 152 MMOL/L — HIGH (ref 135–145)
SP GR SPEC: 1.01 — SIGNIFICANT CHANGE UP (ref 1.01–1.02)
TROPONIN I SERPL-MCNC: <.015 NG/ML — SIGNIFICANT CHANGE UP (ref 0.01–0.04)
UROBILINOGEN FLD QL: NEGATIVE MG/DL — SIGNIFICANT CHANGE UP
WBC # BLD: 12.5 K/UL — HIGH (ref 3.8–10.5)
WBC # FLD AUTO: 12.5 K/UL — HIGH (ref 3.8–10.5)
WBC UR QL: >50

## 2020-10-27 PROCEDURE — 76775 US EXAM ABDO BACK WALL LIM: CPT | Mod: 26

## 2020-10-27 PROCEDURE — 99291 CRITICAL CARE FIRST HOUR: CPT

## 2020-10-27 PROCEDURE — 74176 CT ABD & PELVIS W/O CONTRAST: CPT | Mod: 26

## 2020-10-27 PROCEDURE — 71045 X-RAY EXAM CHEST 1 VIEW: CPT | Mod: 26

## 2020-10-27 PROCEDURE — 93010 ELECTROCARDIOGRAM REPORT: CPT

## 2020-10-27 PROCEDURE — 99291 CRITICAL CARE FIRST HOUR: CPT | Mod: CS

## 2020-10-27 PROCEDURE — 99223 1ST HOSP IP/OBS HIGH 75: CPT | Mod: AI

## 2020-10-27 RX ORDER — SODIUM ZIRCONIUM CYCLOSILICATE 10 G/10G
5 POWDER, FOR SUSPENSION ORAL EVERY 8 HOURS
Refills: 0 | Status: DISCONTINUED | OUTPATIENT
Start: 2020-10-27 | End: 2020-10-29

## 2020-10-27 RX ORDER — DEXTROSE 50 % IN WATER 50 %
50 SYRINGE (ML) INTRAVENOUS ONCE
Refills: 0 | Status: COMPLETED | OUTPATIENT
Start: 2020-10-27 | End: 2020-10-27

## 2020-10-27 RX ORDER — ASCORBIC ACID 60 MG
500 TABLET,CHEWABLE ORAL DAILY
Refills: 0 | Status: DISCONTINUED | OUTPATIENT
Start: 2020-10-27 | End: 2020-10-27

## 2020-10-27 RX ORDER — SENNA PLUS 8.6 MG/1
2 TABLET ORAL AT BEDTIME
Refills: 0 | Status: DISCONTINUED | OUTPATIENT
Start: 2020-10-27 | End: 2020-11-03

## 2020-10-27 RX ORDER — CALCITRIOL 0.5 UG/1
0.25 CAPSULE ORAL DAILY
Refills: 0 | Status: DISCONTINUED | OUTPATIENT
Start: 2020-10-27 | End: 2020-10-27

## 2020-10-27 RX ORDER — ISRADIPINE 10 MG
1 TABLET, EXTENDED RELEASE 24 HR ORAL
Qty: 0 | Refills: 0 | DISCHARGE

## 2020-10-27 RX ORDER — INSULIN HUMAN 100 [IU]/ML
10 INJECTION, SOLUTION SUBCUTANEOUS ONCE
Refills: 0 | Status: COMPLETED | OUTPATIENT
Start: 2020-10-27 | End: 2020-10-27

## 2020-10-27 RX ORDER — CHLORHEXIDINE GLUCONATE 213 G/1000ML
1 SOLUTION TOPICAL
Refills: 0 | Status: COMPLETED | OUTPATIENT
Start: 2020-10-27 | End: 2020-10-31

## 2020-10-27 RX ORDER — ALBUTEROL 90 UG/1
2.5 AEROSOL, METERED ORAL
Refills: 0 | Status: COMPLETED | OUTPATIENT
Start: 2020-10-27 | End: 2020-10-27

## 2020-10-27 RX ORDER — IRBESARTAN 75 MG/1
0.5 TABLET ORAL
Qty: 0 | Refills: 0 | DISCHARGE

## 2020-10-27 RX ORDER — INSULIN HUMAN 100 [IU]/ML
5 INJECTION, SOLUTION SUBCUTANEOUS ONCE
Refills: 0 | Status: COMPLETED | OUTPATIENT
Start: 2020-10-27 | End: 2020-10-27

## 2020-10-27 RX ORDER — SODIUM ZIRCONIUM CYCLOSILICATE 10 G/10G
10 POWDER, FOR SUSPENSION ORAL ONCE
Refills: 0 | Status: COMPLETED | OUTPATIENT
Start: 2020-10-27 | End: 2020-10-27

## 2020-10-27 RX ORDER — LATANOPROST 0.05 MG/ML
1 SOLUTION/ DROPS OPHTHALMIC; TOPICAL AT BEDTIME
Refills: 0 | Status: DISCONTINUED | OUTPATIENT
Start: 2020-10-27 | End: 2020-11-03

## 2020-10-27 RX ORDER — FOLIC ACID 0.8 MG
1 TABLET ORAL DAILY
Refills: 0 | Status: DISCONTINUED | OUTPATIENT
Start: 2020-10-27 | End: 2020-11-03

## 2020-10-27 RX ORDER — ACETAMINOPHEN 500 MG
650 TABLET ORAL EVERY 6 HOURS
Refills: 0 | Status: DISCONTINUED | OUTPATIENT
Start: 2020-10-27 | End: 2020-11-03

## 2020-10-27 RX ORDER — AMIODARONE HYDROCHLORIDE 400 MG/1
100 TABLET ORAL DAILY
Refills: 0 | Status: DISCONTINUED | OUTPATIENT
Start: 2020-10-27 | End: 2020-10-29

## 2020-10-27 RX ORDER — SODIUM BICARBONATE 1 MEQ/ML
0.34 SYRINGE (ML) INTRAVENOUS
Qty: 150 | Refills: 0 | Status: DISCONTINUED | OUTPATIENT
Start: 2020-10-27 | End: 2020-10-27

## 2020-10-27 RX ORDER — HEPARIN SODIUM 5000 [USP'U]/ML
5000 INJECTION INTRAVENOUS; SUBCUTANEOUS EVERY 12 HOURS
Refills: 0 | Status: DISCONTINUED | OUTPATIENT
Start: 2020-10-27 | End: 2020-11-03

## 2020-10-27 RX ORDER — SODIUM CHLORIDE 9 MG/ML
500 INJECTION INTRAMUSCULAR; INTRAVENOUS; SUBCUTANEOUS ONCE
Refills: 0 | Status: COMPLETED | OUTPATIENT
Start: 2020-10-27 | End: 2020-10-27

## 2020-10-27 RX ORDER — CEFTRIAXONE 500 MG/1
1000 INJECTION, POWDER, FOR SOLUTION INTRAMUSCULAR; INTRAVENOUS ONCE
Refills: 0 | Status: DISCONTINUED | OUTPATIENT
Start: 2020-10-27 | End: 2020-10-27

## 2020-10-27 RX ORDER — SODIUM CHLORIDE 9 MG/ML
1000 INJECTION, SOLUTION INTRAVENOUS
Refills: 0 | Status: DISCONTINUED | OUTPATIENT
Start: 2020-10-27 | End: 2020-10-28

## 2020-10-27 RX ORDER — SODIUM CHLORIDE 9 MG/ML
1000 INJECTION INTRAMUSCULAR; INTRAVENOUS; SUBCUTANEOUS
Refills: 0 | Status: DISCONTINUED | OUTPATIENT
Start: 2020-10-27 | End: 2020-10-27

## 2020-10-27 RX ORDER — SODIUM CHLORIDE 9 MG/ML
1000 INJECTION, SOLUTION INTRAVENOUS ONCE
Refills: 0 | Status: COMPLETED | OUTPATIENT
Start: 2020-10-27 | End: 2020-10-27

## 2020-10-27 RX ORDER — OXYCODONE HYDROCHLORIDE 5 MG/1
5 TABLET ORAL ONCE
Refills: 0 | Status: DISCONTINUED | OUTPATIENT
Start: 2020-10-27 | End: 2020-10-29

## 2020-10-27 RX ORDER — SODIUM POLYSTYRENE SULFONATE 4.1 MEQ/G
15 POWDER, FOR SUSPENSION ORAL
Qty: 0 | Refills: 0 | DISCHARGE

## 2020-10-27 RX ORDER — AMIODARONE HYDROCHLORIDE 400 MG/1
100 TABLET ORAL
Refills: 0 | Status: DISCONTINUED | OUTPATIENT
Start: 2020-10-27 | End: 2020-10-29

## 2020-10-27 RX ORDER — FERROUS SULFATE 325(65) MG
0 TABLET ORAL
Qty: 0 | Refills: 0 | DISCHARGE

## 2020-10-27 RX ORDER — CALCIUM GLUCONATE 100 MG/ML
2 VIAL (ML) INTRAVENOUS ONCE
Refills: 0 | Status: COMPLETED | OUTPATIENT
Start: 2020-10-27 | End: 2020-10-27

## 2020-10-27 RX ORDER — AMIODARONE HYDROCHLORIDE 400 MG/1
1 TABLET ORAL
Qty: 0 | Refills: 0 | DISCHARGE

## 2020-10-27 RX ORDER — ATORVASTATIN CALCIUM 80 MG/1
10 TABLET, FILM COATED ORAL AT BEDTIME
Refills: 0 | Status: DISCONTINUED | OUTPATIENT
Start: 2020-10-27 | End: 2020-11-03

## 2020-10-27 RX ADMIN — SODIUM CHLORIDE 1000 MILLILITER(S): 9 INJECTION, SOLUTION INTRAVENOUS at 12:06

## 2020-10-27 RX ADMIN — SODIUM CHLORIDE 100 MILLILITER(S): 9 INJECTION INTRAMUSCULAR; INTRAVENOUS; SUBCUTANEOUS at 12:07

## 2020-10-27 RX ADMIN — SODIUM ZIRCONIUM CYCLOSILICATE 5 GRAM(S): 10 POWDER, FOR SUSPENSION ORAL at 21:51

## 2020-10-27 RX ADMIN — SODIUM CHLORIDE 100 MILLILITER(S): 9 INJECTION INTRAMUSCULAR; INTRAVENOUS; SUBCUTANEOUS at 16:36

## 2020-10-27 RX ADMIN — ALBUTEROL 2.5 MILLIGRAM(S): 90 AEROSOL, METERED ORAL at 10:58

## 2020-10-27 RX ADMIN — SODIUM CHLORIDE 1000 MILLILITER(S): 9 INJECTION, SOLUTION INTRAVENOUS at 17:41

## 2020-10-27 RX ADMIN — Medication 200 GRAM(S): at 10:59

## 2020-10-27 RX ADMIN — Medication 50 MILLILITER(S): at 21:16

## 2020-10-27 RX ADMIN — INSULIN HUMAN 10 UNIT(S): 100 INJECTION, SOLUTION SUBCUTANEOUS at 18:20

## 2020-10-27 RX ADMIN — Medication 1 MILLIGRAM(S): at 18:23

## 2020-10-27 RX ADMIN — SODIUM ZIRCONIUM CYCLOSILICATE 10 GRAM(S): 10 POWDER, FOR SUSPENSION ORAL at 11:14

## 2020-10-27 RX ADMIN — SODIUM CHLORIDE 100 MILLILITER(S): 9 INJECTION, SOLUTION INTRAVENOUS at 22:54

## 2020-10-27 RX ADMIN — INSULIN HUMAN 5 UNIT(S): 100 INJECTION, SOLUTION SUBCUTANEOUS at 11:03

## 2020-10-27 RX ADMIN — HEPARIN SODIUM 5000 UNIT(S): 5000 INJECTION INTRAVENOUS; SUBCUTANEOUS at 18:31

## 2020-10-27 RX ADMIN — INSULIN HUMAN 10 UNIT(S): 100 INJECTION, SOLUTION SUBCUTANEOUS at 21:16

## 2020-10-27 RX ADMIN — ALBUTEROL 2.5 MILLIGRAM(S): 90 AEROSOL, METERED ORAL at 11:21

## 2020-10-27 RX ADMIN — Medication 50 MILLILITER(S): at 18:20

## 2020-10-27 RX ADMIN — SODIUM CHLORIDE 500 MILLILITER(S): 9 INJECTION INTRAMUSCULAR; INTRAVENOUS; SUBCUTANEOUS at 11:10

## 2020-10-27 RX ADMIN — Medication 500 MILLIGRAM(S): at 18:23

## 2020-10-27 RX ADMIN — ALBUTEROL 2.5 MILLIGRAM(S): 90 AEROSOL, METERED ORAL at 11:29

## 2020-10-27 RX ADMIN — Medication 50 MILLILITER(S): at 11:00

## 2020-10-27 RX ADMIN — Medication 1 TABLET(S): at 18:24

## 2020-10-27 NOTE — ED PROVIDER NOTE - PHYSICAL EXAMINATION
Gen: Alert, Well appearing. NAD    Head: NC, AT, PERRL, normal lids/conjunctiva   ENT: Bilateral TM WNL, patent oropharynx without erythema/exudate, uvula midline  Neck: supple, no tenderness/meningismus  Pulm: Bilateral clear BS, normal resp effort  CV: RRR, no M/R/G, +dist pulses   Abd: soft, NT/ND, +BS, no guarding/rebound tenderness  Mskel: extremities x4 with normal ROM. no ctl spine ttp. no edema/erythema/cyanosis   Skin: no rash, no bruising  Neuro: AAOx3, no sensory/motor deficits, CN 2-12 intact Gen: Alert, moaning  Head: NC, AT, PERRL, normal lids/conjunctiva   ENT: patent oropharynx without erythema/exudate, uvula midline  Neck: supple, no tenderness/meningismus  Pulm: Bilateral clear BS, normal resp effort  CV: RRR, no M/R/G, +dist pulses   Abd: soft, NT/ND, +BS, no guarding/rebound tenderness  Mskel: o ctl spine ttp. no edema/erythema/cyanosis , + diffusely tender down b/l leg, no edema, warmth  Skin: no rash, no bruising  Neuro: AAOx1, no sensory/motor deficits, Gen: Alert, moaning, poor skin turgor, very dry mucous membranes  Head: NC, AT, PERRL, normal lids/conjunctiva   ENT: patent oropharynx without erythema/exudate, uvula midline  Neck: supple, no tenderness/meningismus  Pulm: Bilateral clear BS, normal resp effort  CV: RRR, no M/R/G, +dist pulses   Abd: soft, NT/ND, +BS, no guarding/rebound tenderness  Mskel: o ctl spine ttp. no edema/erythema/cyanosis , + diffusely tender down b/l leg, no edema, warmth  Skin: no rash, no bruising  Neuro: AAOx1, no sensory/motor deficits,

## 2020-10-27 NOTE — ED ADULT NURSE NOTE - CHIEF COMPLAINT QUOTE
pt sent to ED from [facility] for hyperkalemia by Dr. Briggs, K-7.2, pt s/p left hip fx 9/26/2020. pt denies chest pain, sob.

## 2020-10-27 NOTE — H&P ADULT - NSICDXPASTMEDICALHX_GEN_ALL_CORE_FT
PAST MEDICAL HISTORY:  Afib     Anemia Iron Deficiency     Arteriosclerotic heart disease (ASHD)     Arthritis     CAD (coronary artery disease)     CAD (Coronary Artery Disease)     Carpal Tunnel Syndrome     CKD (chronic kidney disease)     CKD (chronic kidney disease)     Dementia     Dementia     Dementia     DVT (deep venous thrombosis)     Dyslipidemia     GI (gastrointestinal bleed)     Glaucoma     Glaucoma     H/o Diverticulosis     HLD (hyperlipidemia)     HTN (hypertension)     HTN (Hypertension)     OA (Osteoarthritis)     OA (osteoarthritis)     Trigger finger

## 2020-10-27 NOTE — ED PROVIDER NOTE - OBJECTIVE STATEMENT
89yo female from [facility] with pmh HTN, HLD, OA, CKD, CAD, Afib on Amiodoronie and eliquis, recent left hip fx with surgery s/p fall 1 month ago presents with hyperkalemia of 7.2.  Pt is AOx1 and does not provide history. Pt on NC. Pt does appear to be moaning. HR of 110s, noted however goes to 90s when pt not disturbed or yelling

## 2020-10-27 NOTE — CONSULT NOTE ADULT - SUBJECTIVE AND OBJECTIVE BOX
[facility] NEPHROLOGY SERVICES, [facility]  NEPHROLOGY AND HYPERTENSION  [address]  [address]  [place] [address]  [phone]    MD MATT KRUGER, MD ARTHUR IBARRA MD YELENA ROSENBERG, MD KRISTOPHER LUO, MD ANDREEA CARVALHO MD      Information from chart:      · Chief Complaint: The patient is a 89y Female complaining of  · HPI Objective Statement: 89yo female from [facility] with pmh HTN, HLD, OA, CKD, CAD, Afib on Amiodoronie and eliquis, recent left hip fx with surgery s/p fall 1 month ago presents with hyperkalemia of 7.2.  Pt is AOx1 and does not provide history. Pt on NC. Pt does appear to be moaning. HR of 110s, noted however goes to 90s when pt not disturbed or yelling        Family at bedside. Patient with recent admission in 2020 for hip fx and repair. Discharged to [facility] rehab;  Noted BLAYNE; severe hyperkalemia BLAYNE  noted infected urine     She hasn't seen her nephrologist in over 1 year.     Trend Bun/Cr  10-27-20 @ 10:17  BUN/CR -  202<H> / 6.34<H>  20 @ 06:37  BUN/CR -  41<H> / 1.60<H>  20 @ 06:59  BUN/CR -  39<H> / 1.50<H>  20 @ 15:54  BUN/CR -  42<H> / 1.60<H>  20 @ 07:55  BUN/CR -  43<H> / 1.80<H>  20 @ 06:08  BUN/CR -  46<H> / 1.70<H>  20 @ 14:47  BUN/CR -  51<H> / 1.80<H>  20 @ 09:10  BUN/CR -  57<H> / 2.04<H>  20 @ 14:13  BUN/CR -  50<H> / 1.87<H>  19 @ 07:01  BUN/CR -  41<H> / 1.60<H>  19 @ 07:18  BUN/CR -  38<H> / 1.60<H>  19 @ 08:04  BUN/CR -  45<H> / 1.60<H>      PAST MEDICAL & SURGICAL HISTORY:        Afib    Glaucoma    Dementia    CKD (chronic kidney disease)    CAD (coronary artery disease)    OA (osteoarthritis)    HLD (hyperlipidemia)    HTN (hypertension)    Dementia    Arteriosclerotic heart disease (ASHD)    Arthritis    Dementia    Trigger finger    CKD (chronic kidney disease)    DVT (deep venous thrombosis)    GI (gastrointestinal bleed)    Anemia Iron Deficiency    H/o Diverticulosis    Carpal Tunnel Syndrome    Dyslipidemia    Glaucoma    OA (Osteoarthritis)    HTN (Hypertension)    CAD (Coronary Artery Disease)    No significant past surgical history    Ariel filter in place  placed     H/o Ovarian Cyst    Cataract  bilateral 2008    Knee Arthroplasty right 2009 &amp; left 2010      FAMILY HISTORY:  No pertinent family history in first degree relatives      Allergies    PCN, Sulfa (Urticaria)  penicillin (Other)  penicillin (Unknown)  sulfa drugs (Other)  sulfa drugs (Unknown)    Intolerances    Bananas (Other (Mild to Mod))  Potatoes (Other (Mild to Mod))    Home Medications:  acetaminophen 325 mg oral tablet: 2 tab(s) orally every 6 hours, As needed, Temp greater or equal to 38C (100.4F) (29 Sep 2020 13:50)  amiodarone 100 mg oral tablet: 1 tab(s) orally 2 times a day on  /  (25 Sep 2020 09:06)  amiodarone 200 mg oral tablet: 0.5 tab(s) orally once a day (29 Sep 2020 13:50)  apixaban 2.5 mg oral tablet: 0.5 tab(s) orally 2 times a day (25 Sep 2020 09:06)  ascorbic acid 500 mg oral tablet: 1 tab(s) orally 2 times a day (29 Sep 2020 13:50)  atorvastatin 10 mg oral tablet: 1 tab(s) orally once a day (at bedtime) (29 Sep 2020 13:50)  B-Complex with Vitamin C: 1 tab(s) orally once a day (29 Sep 2020 13:50)  calcitriol 0.25 mcg oral capsule: 1 cap(s) orally once a day (25 Sep 2020 09:06)  calcitriol 0.25 mcg oral capsule: 1 cap(s) orally once a day (29 Sep 2020 13:50)  donepezil 10 mg oral tablet: 1 tab(s) orally once a day (at bedtime) (25 Sep 2020 09:)  donepezil 10 mg oral tablet: 1 tab(s) orally once a day (at bedtime) (29 Sep 2020 13:50)  Eliquis 2.5 mg oral tablet: 0.5 tab(s) *1.25mg* orally 2 times a day    Dosing confirmed by Margie at [facility] (29 Sep 2020 13:50)  ferrous sulfate 324 mg (65 mg elemental iron) oral tablet: orally once a day (25 Sep 2020 09:)  ferrous sulfate 325 mg (65 mg elemental iron) oral delayed release tablet: 1 tab(s) orally once a day (29 Sep 2020 13:50)  folic acid 1 mg oral tablet: 1 tab(s) orally once a day (29 Sep 2020 13:50)  irbesartan 75 mg oral tablet: 0.5 tab(s) orally every other day (25 Sep 2020 09:)  isradipine 2.5 mg oral capsule: 1 cap(s) orally once a day (at bedtime) (25 Sep 2020 09:)  latanoprost 0.005% ophthalmic solution: 1 drop(s) to each affected eye once a day (at bedtime) (29 Sep 2020 13:50)  Multiple Vitamins oral capsule: 1 cap(s) orally once a day (25 Sep 2020 09:)  multivitamin: 1 tab(s) orally once a day (29 Sep 2020 13:50)  oxyCODONE 5 mg oral tablet: 1 tab(s) orally every 4 hours, As needed, moderate to severe pain  (29 Sep 2020 13:50)  pravastatin 40 mg oral tablet: 1 tab(s) orally once a day (at bedtime) (25 Sep 2020 09:06)  senna oral tablet: 2 tab(s) orally once a day (at bedtime) (29 Sep 2020 13:50)  sodium polystyrene sulfonate: 15 gram(s) orally every other day (25 Sep 2020 09:06)  Total B with C oral tablet: 1 tab(s) orally once a day (25 Sep 2020 09:06)    MEDICATIONS  (STANDING):  sodium chloride 0.9%. 1000 milliLiter(s) (100 mL/Hr) IV Continuous <Continuous>    MEDICATIONS  (PRN):    Vital Signs Last 24 Hrs  T(C): 36.7 (27 Oct 2020 11:28), Max: 36.9 (27 Oct 2020 09:24)  T(F): 98.1 (27 Oct 2020 11:28), Max: 98.4 (27 Oct 2020 09:24)  HR: 101 (27 Oct 2020 11:28) (99 - 114)  BP: 104/78 (27 Oct 2020 11:28) (104/78 - 123/103)  BP(mean): --  RR: 17 (27 Oct 2020 11:28) (17 - 17)  SpO2: 99% (27 Oct 2020 11:28) (95% - 99%)    Daily Height in cm: 160.02 (27 Oct 2020 09:24)    Daily     CAPILLARY BLOOD GLUCOSE      POCT Blood Glucose.: 172 mg/dL (27 Oct 2020 10:58)    PHYSICAL EXAM:      T(C): 36.7 (10-27-20 @ 11:28), Max: 36.9 (10-27-20 @ 09:24)  HR: 101 (10-27-20 @ 11:28) (99 - 114)  BP: 104/78 (10-27-20 @ 11:28) (104/78 - 123/103)  RR: 17 (10-27-20 @ 11:28) (17 - 17)  SpO2: 99% (10-27-20 @ 11:28) (95% - 99%)  Wt(kg): --  Lungs clear  Heart S1S2  Abd soft NT ND  Extremities:   tr edema              10-27    151<H>  |  118<H>  |  202<H>  ----------------------------<  122<H>  7.3<HH>   |  25  |  6.34<H>    Ca    12.1<H>      27 Oct 2020 10:17  Phos  3.3     10-27  Mg     3.3     10-27    TPro  7.3  /  Alb  3.0<L>  /  TBili  0.3  /  DBili  x   /  AST  4<L>  /  ALT  15  /  AlkPhos  106  10-27                          9.0    12.50 )-----------( 260      ( 27 Oct 2020 10:17 )             29.8     Creatinine Trend: 6.34<--, 1.60<--  Urinalysis Basic - ( 27 Oct 2020 10:17 )    Color: Yellow / Appearance: very cloudy / S.010 / pH: x  Gluc: x / Ketone: Negative  / Bili: Negative / Urobili: Negative mg/dL   Blood: x / Protein: 100 mg/dL / Nitrite: Negative   Leuk Esterase: Moderate / RBC: 6-10 /HPF / WBC >50   Sq Epi: x / Non Sq Epi: Few / Bacteria: Many            Assessment   BLAYNE pre renal azotemia; risk for infectious AIN    Plan  IVF bolus; IV abx  Urine eos;   Medical rx K  Discussed in detail with family at bedside regarding goals of care:  NO hemodialysis  Agreed to DNR  Continue maximal medical management       Bartolome Burns MD [facility] NEPHROLOGY SERVICES, [facility]  NEPHROLOGY AND HYPERTENSION  [address]  [address]  [place] [address]  [phone]    MD MATT KRUGER, MD ARTHUR IBARRA MD YELENA ROSENBERG, MD KRISTOPHER LUO, MD ANDREEA CARVALHO MD      Information from chart:      · Chief Complaint: The patient is a 89y Female complaining of  · HPI Objective Statement: 89yo female from [facility] with pmh HTN, HLD, OA, CKD, CAD, Afib on Amiodoronie and eliquis, recent left hip fx with surgery s/p fall 1 month ago presents with hyperkalemia of 7.2.  Pt is AOx1 and does not provide history. Pt on NC. Pt does appear to be moaning. HR of 110s, noted however goes to 90s when pt not disturbed or yelling        Family at bedside. Patient with recent admission in 2020 for hip fx and repair. Discharged to [facility] rehab;  Noted BLAYNE; severe hyperkalemia BLAYNE  noted infected urine     She hasn't seen her nephrologist in over 1 year.     Trend Bun/Cr  10-27-20 @ 10:17  BUN/CR -  202<H> / 6.34<H>  20 @ 06:37  BUN/CR -  41<H> / 1.60<H>  20 @ 06:59  BUN/CR -  39<H> / 1.50<H>  20 @ 15:54  BUN/CR -  42<H> / 1.60<H>  20 @ 07:55  BUN/CR -  43<H> / 1.80<H>  20 @ 06:08  BUN/CR -  46<H> / 1.70<H>  20 @ 14:47  BUN/CR -  51<H> / 1.80<H>  20 @ 09:10  BUN/CR -  57<H> / 2.04<H>  20 @ 14:13  BUN/CR -  50<H> / 1.87<H>  19 @ 07:01  BUN/CR -  41<H> / 1.60<H>  19 @ 07:18  BUN/CR -  38<H> / 1.60<H>  19 @ 08:04  BUN/CR -  45<H> / 1.60<H>      PAST MEDICAL & SURGICAL HISTORY:        Afib    Glaucoma    Dementia    CKD (chronic kidney disease)    CAD (coronary artery disease)    OA (osteoarthritis)    HLD (hyperlipidemia)    HTN (hypertension)    Dementia    Arteriosclerotic heart disease (ASHD)    Arthritis    Dementia    Trigger finger    CKD (chronic kidney disease)    DVT (deep venous thrombosis)    GI (gastrointestinal bleed)    Anemia Iron Deficiency    H/o Diverticulosis    Carpal Tunnel Syndrome    Dyslipidemia    Glaucoma    OA (Osteoarthritis)    HTN (Hypertension)    CAD (Coronary Artery Disease)    No significant past surgical history    Ariel filter in place  placed     H/o Ovarian Cyst    Cataract  bilateral 2008    Knee Arthroplasty right 2009 &amp; left 2010      FAMILY HISTORY:  No pertinent family history in first degree relatives      Allergies    PCN, Sulfa (Urticaria)  penicillin (Other)  penicillin (Unknown)  sulfa drugs (Other)  sulfa drugs (Unknown)    Intolerances    Bananas (Other (Mild to Mod))  Potatoes (Other (Mild to Mod))    Home Medications:  acetaminophen 325 mg oral tablet: 2 tab(s) orally every 6 hours, As needed, Temp greater or equal to 38C (100.4F) (29 Sep 2020 13:50)  amiodarone 100 mg oral tablet: 1 tab(s) orally 2 times a day on  /  (25 Sep 2020 09:06)  amiodarone 200 mg oral tablet: 0.5 tab(s) orally once a day (29 Sep 2020 13:50)  apixaban 2.5 mg oral tablet: 0.5 tab(s) orally 2 times a day (25 Sep 2020 09:06)  ascorbic acid 500 mg oral tablet: 1 tab(s) orally 2 times a day (29 Sep 2020 13:50)  atorvastatin 10 mg oral tablet: 1 tab(s) orally once a day (at bedtime) (29 Sep 2020 13:50)  B-Complex with Vitamin C: 1 tab(s) orally once a day (29 Sep 2020 13:50)  calcitriol 0.25 mcg oral capsule: 1 cap(s) orally once a day (25 Sep 2020 09:06)  calcitriol 0.25 mcg oral capsule: 1 cap(s) orally once a day (29 Sep 2020 13:50)  donepezil 10 mg oral tablet: 1 tab(s) orally once a day (at bedtime) (25 Sep 2020 09:)  donepezil 10 mg oral tablet: 1 tab(s) orally once a day (at bedtime) (29 Sep 2020 13:50)  Eliquis 2.5 mg oral tablet: 0.5 tab(s) *1.25mg* orally 2 times a day    Dosing confirmed by Margie at [facility] (29 Sep 2020 13:50)  ferrous sulfate 324 mg (65 mg elemental iron) oral tablet: orally once a day (25 Sep 2020 09:)  ferrous sulfate 325 mg (65 mg elemental iron) oral delayed release tablet: 1 tab(s) orally once a day (29 Sep 2020 13:50)  folic acid 1 mg oral tablet: 1 tab(s) orally once a day (29 Sep 2020 13:50)  irbesartan 75 mg oral tablet: 0.5 tab(s) orally every other day (25 Sep 2020 09:)  isradipine 2.5 mg oral capsule: 1 cap(s) orally once a day (at bedtime) (25 Sep 2020 09:)  latanoprost 0.005% ophthalmic solution: 1 drop(s) to each affected eye once a day (at bedtime) (29 Sep 2020 13:50)  Multiple Vitamins oral capsule: 1 cap(s) orally once a day (25 Sep 2020 09:)  multivitamin: 1 tab(s) orally once a day (29 Sep 2020 13:50)  oxyCODONE 5 mg oral tablet: 1 tab(s) orally every 4 hours, As needed, moderate to severe pain  (29 Sep 2020 13:50)  pravastatin 40 mg oral tablet: 1 tab(s) orally once a day (at bedtime) (25 Sep 2020 09:06)  senna oral tablet: 2 tab(s) orally once a day (at bedtime) (29 Sep 2020 13:50)  sodium polystyrene sulfonate: 15 gram(s) orally every other day (25 Sep 2020 09:06)  Total B with C oral tablet: 1 tab(s) orally once a day (25 Sep 2020 09:06)    MEDICATIONS  (STANDING):  sodium chloride 0.9%. 1000 milliLiter(s) (100 mL/Hr) IV Continuous <Continuous>    MEDICATIONS  (PRN):    Vital Signs Last 24 Hrs  T(C): 36.7 (27 Oct 2020 11:28), Max: 36.9 (27 Oct 2020 09:24)  T(F): 98.1 (27 Oct 2020 11:28), Max: 98.4 (27 Oct 2020 09:24)  HR: 101 (27 Oct 2020 11:28) (99 - 114)  BP: 104/78 (27 Oct 2020 11:28) (104/78 - 123/103)  BP(mean): --  RR: 17 (27 Oct 2020 11:28) (17 - 17)  SpO2: 99% (27 Oct 2020 11:28) (95% - 99%)    Daily Height in cm: 160.02 (27 Oct 2020 09:24)    Daily     CAPILLARY BLOOD GLUCOSE      POCT Blood Glucose.: 172 mg/dL (27 Oct 2020 10:58)    PHYSICAL EXAM:      T(C): 36.7 (10-27-20 @ 11:28), Max: 36.9 (10-27-20 @ 09:24)  HR: 101 (10-27-20 @ 11:28) (99 - 114)  BP: 104/78 (10-27-20 @ 11:28) (104/78 - 123/103)  RR: 17 (10-27-20 @ 11:28) (17 - 17)  SpO2: 99% (10-27-20 @ 11:28) (95% - 99%)  Wt(kg): --  Lungs clear  Heart S1S2  Abd soft NT ND  Extremities:   tr edema              10-27    151<H>  |  118<H>  |  202<H>  ----------------------------<  122<H>  7.3<HH>   |  25  |  6.34<H>    Ca    12.1<H>      27 Oct 2020 10:17  Phos  3.3     10-27  Mg     3.3     10-27    TPro  7.3  /  Alb  3.0<L>  /  TBili  0.3  /  DBili  x   /  AST  4<L>  /  ALT  15  /  AlkPhos  106  10-27                          9.0    12.50 )-----------( 260      ( 27 Oct 2020 10:17 )             29.8     Creatinine Trend: 6.34<--, 1.60<--  Urinalysis Basic - ( 27 Oct 2020 10:17 )    Color: Yellow / Appearance: very cloudy / S.010 / pH: x  Gluc: x / Ketone: Negative  / Bili: Negative / Urobili: Negative mg/dL   Blood: x / Protein: 100 mg/dL / Nitrite: Negative   Leuk Esterase: Moderate / RBC: 6-10 /HPF / WBC >50   Sq Epi: x / Non Sq Epi: Few / Bacteria: Many            Assessment   BLAYNE pre renal azotemia; risk for infectious AIN  Hypercalcemia suspected related to intravascular depletion    Plan  IVF bolus; then  ml hr; IV abx levaquin  Hold ARB  Urine eos;   Medical rx K  Discussed in detail with family at bedside regarding goals of care:  NO hemodialysis  Agreed to DNR  Continue maximal medical management       Bartolome Burns MD [facility] NEPHROLOGY SERVICES, [facility]  NEPHROLOGY AND HYPERTENSION  [address]  [address]  [place] [address]  [phone]    MD MATT KRUGER, MD ARTHUR IBARRA MD YELENA ROSENBERG, MD KRISTOPHER LUO, MD ANDREEA CARVALHO MD      Information from chart:      · Chief Complaint: The patient is a 89y Female complaining of  · HPI Objective Statement: 89yo female from [facility] with pmh HTN, HLD, OA, CKD, CAD, Afib on Amiodoronie and eliquis, recent left hip fx with surgery s/p fall 1 month ago presents with hyperkalemia of 7.2.  Pt is AOx1 and does not provide history. Pt on NC. Pt does appear to be moaning. HR of 110s, noted however goes to 90s when pt not disturbed or yelling        Family at bedside. Patient with recent admission in 2020 for hip fx and repair. Discharged to [facility] rehab;  Noted BLAYNE; severe hyperkalemia BLAYNE  noted infected urine     She hasn't seen her nephrologist in over 1 year.     Trend Bun/Cr  10-27-20 @ 10:17  BUN/CR -  202<H> / 6.34<H>  20 @ 06:37  BUN/CR -  41<H> / 1.60<H>  20 @ 06:59  BUN/CR -  39<H> / 1.50<H>  20 @ 15:54  BUN/CR -  42<H> / 1.60<H>  20 @ 07:55  BUN/CR -  43<H> / 1.80<H>  20 @ 06:08  BUN/CR -  46<H> / 1.70<H>  20 @ 14:47  BUN/CR -  51<H> / 1.80<H>  20 @ 09:10  BUN/CR -  57<H> / 2.04<H>  20 @ 14:13  BUN/CR -  50<H> / 1.87<H>  19 @ 07:01  BUN/CR -  41<H> / 1.60<H>  19 @ 07:18  BUN/CR -  38<H> / 1.60<H>  19 @ 08:04  BUN/CR -  45<H> / 1.60<H>      PAST MEDICAL & SURGICAL HISTORY:        Afib    Glaucoma    Dementia    CKD (chronic kidney disease)    CAD (coronary artery disease)    OA (osteoarthritis)    HLD (hyperlipidemia)    HTN (hypertension)    Dementia    Arteriosclerotic heart disease (ASHD)    Arthritis    Dementia    Trigger finger    CKD (chronic kidney disease)    DVT (deep venous thrombosis)    GI (gastrointestinal bleed)    Anemia Iron Deficiency    H/o Diverticulosis    Carpal Tunnel Syndrome    Dyslipidemia    Glaucoma    OA (Osteoarthritis)    HTN (Hypertension)    CAD (Coronary Artery Disease)    No significant past surgical history    Ariel filter in place  placed     H/o Ovarian Cyst    Cataract  bilateral 2008    Knee Arthroplasty right 2009 &amp; left 2010      FAMILY HISTORY:  No pertinent family history in first degree relatives      Allergies    PCN, Sulfa (Urticaria)  penicillin (Other)  penicillin (Unknown)  sulfa drugs (Other)  sulfa drugs (Unknown)    Intolerances    Bananas (Other (Mild to Mod))  Potatoes (Other (Mild to Mod))    Home Medications:  acetaminophen 325 mg oral tablet: 2 tab(s) orally every 6 hours, As needed, Temp greater or equal to 38C (100.4F) (29 Sep 2020 13:50)  amiodarone 100 mg oral tablet: 1 tab(s) orally 2 times a day on  /  (25 Sep 2020 09:06)  amiodarone 200 mg oral tablet: 0.5 tab(s) orally once a day (29 Sep 2020 13:50)  apixaban 2.5 mg oral tablet: 0.5 tab(s) orally 2 times a day (25 Sep 2020 09:06)  ascorbic acid 500 mg oral tablet: 1 tab(s) orally 2 times a day (29 Sep 2020 13:50)  atorvastatin 10 mg oral tablet: 1 tab(s) orally once a day (at bedtime) (29 Sep 2020 13:50)  B-Complex with Vitamin C: 1 tab(s) orally once a day (29 Sep 2020 13:50)  calcitriol 0.25 mcg oral capsule: 1 cap(s) orally once a day (25 Sep 2020 09:06)  calcitriol 0.25 mcg oral capsule: 1 cap(s) orally once a day (29 Sep 2020 13:50)  donepezil 10 mg oral tablet: 1 tab(s) orally once a day (at bedtime) (25 Sep 2020 09:)  donepezil 10 mg oral tablet: 1 tab(s) orally once a day (at bedtime) (29 Sep 2020 13:50)  Eliquis 2.5 mg oral tablet: 0.5 tab(s) *1.25mg* orally 2 times a day    Dosing confirmed by Margie at [facility] (29 Sep 2020 13:50)  ferrous sulfate 324 mg (65 mg elemental iron) oral tablet: orally once a day (25 Sep 2020 09:)  ferrous sulfate 325 mg (65 mg elemental iron) oral delayed release tablet: 1 tab(s) orally once a day (29 Sep 2020 13:50)  folic acid 1 mg oral tablet: 1 tab(s) orally once a day (29 Sep 2020 13:50)  irbesartan 75 mg oral tablet: 0.5 tab(s) orally every other day (25 Sep 2020 09:)  isradipine 2.5 mg oral capsule: 1 cap(s) orally once a day (at bedtime) (25 Sep 2020 09:)  latanoprost 0.005% ophthalmic solution: 1 drop(s) to each affected eye once a day (at bedtime) (29 Sep 2020 13:50)  Multiple Vitamins oral capsule: 1 cap(s) orally once a day (25 Sep 2020 09:)  multivitamin: 1 tab(s) orally once a day (29 Sep 2020 13:50)  oxyCODONE 5 mg oral tablet: 1 tab(s) orally every 4 hours, As needed, moderate to severe pain  (29 Sep 2020 13:50)  pravastatin 40 mg oral tablet: 1 tab(s) orally once a day (at bedtime) (25 Sep 2020 09:06)  senna oral tablet: 2 tab(s) orally once a day (at bedtime) (29 Sep 2020 13:50)  sodium polystyrene sulfonate: 15 gram(s) orally every other day (25 Sep 2020 09:06)  Total B with C oral tablet: 1 tab(s) orally once a day (25 Sep 2020 09:06)    MEDICATIONS  (STANDING):  sodium chloride 0.9%. 1000 milliLiter(s) (100 mL/Hr) IV Continuous <Continuous>    MEDICATIONS  (PRN):    Vital Signs Last 24 Hrs  T(C): 36.7 (27 Oct 2020 11:28), Max: 36.9 (27 Oct 2020 09:24)  T(F): 98.1 (27 Oct 2020 11:28), Max: 98.4 (27 Oct 2020 09:24)  HR: 101 (27 Oct 2020 11:28) (99 - 114)  BP: 104/78 (27 Oct 2020 11:28) (104/78 - 123/103)  BP(mean): --  RR: 17 (27 Oct 2020 11:28) (17 - 17)  SpO2: 99% (27 Oct 2020 11:28) (95% - 99%)    Daily Height in cm: 160.02 (27 Oct 2020 09:24)    Daily     CAPILLARY BLOOD GLUCOSE      POCT Blood Glucose.: 172 mg/dL (27 Oct 2020 10:58)    PHYSICAL EXAM:      T(C): 36.7 (10-27-20 @ 11:28), Max: 36.9 (10-27-20 @ 09:24)  HR: 101 (10-27-20 @ 11:28) (99 - 114)  BP: 104/78 (10-27-20 @ 11:28) (104/78 - 123/103)  RR: 17 (10-27-20 @ 11:28) (17 - 17)  SpO2: 99% (10-27-20 @ 11:28) (95% - 99%)  Wt(kg): --  Lungs clear  Heart S1S2  Abd soft NT ND  Extremities:   tr edema              10-27    151<H>  |  118<H>  |  202<H>  ----------------------------<  122<H>  7.3<HH>   |  25  |  6.34<H>    Ca    12.1<H>      27 Oct 2020 10:17  Phos  3.3     10-27  Mg     3.3     10-27    TPro  7.3  /  Alb  3.0<L>  /  TBili  0.3  /  DBili  x   /  AST  4<L>  /  ALT  15  /  AlkPhos  106  10-27                          9.0    12.50 )-----------( 260      ( 27 Oct 2020 10:17 )             29.8     Creatinine Trend: 6.34<--, 1.60<--  Urinalysis Basic - ( 27 Oct 2020 10:17 )    Color: Yellow / Appearance: very cloudy / S.010 / pH: x  Gluc: x / Ketone: Negative  / Bili: Negative / Urobili: Negative mg/dL   Blood: x / Protein: 100 mg/dL / Nitrite: Negative   Leuk Esterase: Moderate / RBC: 6-10 /HPF / WBC >50   Sq Epi: x / Non Sq Epi: Few / Bacteria: Many            Assessment   BLAYNE pre renal azotemia; risk for infectious AIN  Hypercalcemia suspected related to intravascular depletion    Plan  IVF bolus; then  ml hr; IV abx levaquin   Hold ARB  Urine eos;   Medical rx K  Discussed in detail with family at bedside regarding goals of care:  NO hemodialysis  Agreed to DNR  ID evaluation as PCN allergy and Levaquin interactions  Continue maximal medical management       Bartolome Burns MD

## 2020-10-27 NOTE — PROVIDER CONTACT NOTE (CRITICAL VALUE NOTIFICATION) - SITUATION
Patient was diagnosed with hyperkalemia and acute kidney injury. Pt was given 10 units of regular insulin and D50 to treat elevated potassium.

## 2020-10-27 NOTE — CONSULT NOTE ADULT - SUBJECTIVE AND OBJECTIVE BOX
Ms. Melchor is a 89 year old female with a PMH of Dementia, HTN, HLD, CAD, afib on eliquis, DVT?, CKD, OA and s/p fall with recent hip fx who presented to [facility] ED in the morning of 10/27 from [facility] for hyperkalemia. Per ED physician, pt noted with k of 7.2 at [facility], which as been confirmed as 7.3 by our lab. Pt was in rehab at [facility] s/p hip fall ~1 month prior with brisk decline in both cognitive and physical function per her daughter since the fall, in addition to newer onset dysphagia. In ED pt noted to be at her "new" neurological baseline, HDS and without respiratory distress. CXR consistent with dehydration per radiology. UTI noted on urine sample. While in ED, pt tx medically for hyperkalemia. ICU team consulted. Nephrology on board.       Allergies  PCN, Sulfa (Urticaria)  penicillin (Other)  penicillin (Unknown)  sulfa drugs (Other)  sulfa drugs (Unknown)    Intolerances  Bananas (Other (Mild to Mod))  Potatoes (Other (Mild to Mod))      MEDICATIONS  (STANDING):  sodium chloride 0.9%. 1000 milliLiter(s) (100 mL/Hr) IV Continuous <Continuous>      Daily Height in cm: 160.02 (27 Oct 2020 09:24)    Daily       Drug Dosing Weight  Height (cm): 160 (27 Oct 2020 09:24)  Weight (kg): 44.6 (27 Oct 2020 09:24)  BMI (kg/m2): 17.4 (27 Oct 2020 09:24)  BSA (m2): 1.43 (27 Oct 2020 09:24)      PAST MEDICAL & SURGICAL HISTORY:  Afib  Glaucoma  Dementia  CKD (chronic kidney disease)  CAD (coronary artery disease)  OA (osteoarthritis)  HLD (hyperlipidemia)  HTN (hypertension)  Dementia  Arthritis  Dementia  Trigger finger  CKD (chronic kidney disease)  DVT (deep venous thrombosis)  GI (gastrointestinal bleed)  Anemia Iron Deficiency  H/o Diverticulosis  Carpal Tunnel Syndrome  Dyslipidemia  Glaucoma  OA (Osteoarthritis)  HTN (Hypertension)  CAD (Coronary Artery Disease)  No significant past surgical history  [place] filter in place  placed   H/o Ovarian Cyst  Cataract  bilateral   Knee Arthroplasty right 2009 &amp; left 2010      FAMILY HISTORY:  No pertinent family history in first degree relatives      ADVANCE DIRECTIVES: Daughter addressing GOC in ED, Stating wants DNR, no aggressive measures, no HD, no NGT insertion.       REVIEW OF SYSTEMS:  CONSTITUTIONAL: No fever; recent steep decline in both functional and neurological status per daughter.  Otherwise, unable to obtain ROS from patient as she is near non-verbal on my exam.      ICU Vital Signs Last 24 Hrs  T(C): 36.7 (27 Oct 2020 11:28), Max: 36.9 (27 Oct 2020 09:24)  T(F): 98.1 (27 Oct 2020 11:28), Max: 98.4 (27 Oct 2020 09:24)  HR: 101 (27 Oct 2020 11:28) (99 - 114)  BP: 104/78 (27 Oct 2020 11:28) (104/78 - 123/103)  RR: 17 (27 Oct 2020 11:28) (17 - 17)  SpO2: 99% (27 Oct 2020 11:28) (95% - 99%)      PHYSICAL EXAM:  GENERAL: NAD, appears malnourished and dehydrated, nontoxic in appearance, responds to voice and light touch; non toxic appearing; pain upon touch per daughter pt has been very sensitive and everything hurts her   HEAD:  Atraumatic, Normocephalic  EYES: EOMI, PERRL  NERVOUS SYSTEM: Responds to voice and light touch (current new baseline per daughter); LOO x4  CHEST/LUNG: Clear to percussion bilaterally; No rales, rhonchi, wheezing, or rubs, normal wob noted, equal chest expansion  HEART: s1/s2 noted  ABDOMEN: Soft, Nontender, Nondistended; Bowel sounds present  EXTREMITIES:  2+ Peripheral Pulses, No clubbing, cyanosis, or edema  LYMPH: No lymphadenopathy noted  SKIN: No rashes       LABS:  CBC Full  -  ( 27 Oct 2020 10:17 )  WBC Count : 12.50 K/uL  RBC Count : 3.20 M/uL  Hemoglobin : 9.0 g/dL  Hematocrit : 29.8 %  Platelet Count - Automated : 260 K/uL  Mean Cell Volume : 93.1 fl  Mean Cell Hemoglobin : 28.1 pg  Mean Cell Hemoglobin Concentration : 30.2 gm/dL  Auto Neutrophil # : 10.27 K/uL  Auto Lymphocyte # : 1.26 K/uL  Auto Monocyte # : 0.81 K/uL  Auto Eosinophil # : 0.06 K/uL  Auto Basophil # : 0.03 K/uL  Auto Neutrophil % : 82.1 %  Auto Lymphocyte % : 10.1 %  Auto Monocyte % : 6.5 %  Auto Eosinophil % : 0.5 %  Auto Basophil % : 0.2 %    10-27    151<H>  |  118<H>  |  202<H>  ----------------------------<  122<H>  7.3<HH>   |  25  |  6.34<H>    Ca    12.1<H>      27 Oct 2020 10:17  Phos  3.3     10-27  Mg     3.3     10-27    TPro  7.3  /  Alb  3.0<L>  /  TBili  0.3  /  DBili  x   /  AST  4<L>  /  ALT  15  /  AlkPhos  106  10-27    CAPILLARY BLOOD GLUCOSE      POCT Blood Glucose.: 172 mg/dL (27 Oct 2020 10:58)    PT/INR - ( 27 Oct 2020 10:17 )   PT: 16.7 sec;   INR: 1.47 ratio    PTT - ( 27 Oct 2020 10:17 )  PTT:35.5 sec    Urinalysis Basic - ( 27 Oct 2020 10:17 )  Color: Yellow / Appearance: very cloudy / S.010 / pH: x  Gluc: x / Ketone: Negative  / Bili: Negative / Urobili: Negative mg/dL   Blood: x / Protein: 100 mg/dL / Nitrite: Negative   Leuk Esterase: Moderate / RBC: 6-10 /HPF / WBC >50   Sq Epi: x / Non Sq Epi: Few / Bacteria: Many      CARDIAC MARKERS ( 27 Oct 2020 10:17 )  <.015 ng/mL / x     / 80 U/L / x     / x          EKG: No hyperacute t waves noted, or st segment changes      RADIOLOGY:   Xray Chest 1 View- PORTABLE-Urgent (Xray Chest 1 View- PORTABLE-Urgent .) (10.27.20 @ 11:26)   IMPRESSION: Heart enlargement and dehydration.      CRITICAL CARE TIME SPENT: 40 minutes

## 2020-10-27 NOTE — H&P ADULT - ASSESSMENT
89 years old female with multiple medical problems  with urinary tract infection and acute kidney injury from not eating or drinking and with hyperkalemia

## 2020-10-27 NOTE — CONSULT NOTE ADULT - ASSESSMENT
89 yo female with multiple medical problems presenting to ED with acute on chronic renal failure, hyperkalemia, dehydration, UTI, end stage dementia, and failure to thrive s/p recent fall with sustained hip fx. Patient s/p medical tx for hyperkalemia and abx for UTI and mild rehydration. Per patient daughter, she does not want to further cause any suffering to patient given quality of life, current status and age; she does not want to aggressively treat her mother. She wants to focus on keeping her mother comfortable and is OK with some medical tx. Patient remains HDS and without respiratory distress and given scenario does not require ICU level of care at this time. However, we are recommending the following:    --s/p hyperkalemia medical tx in ED, added fup bmp for 1330 and 1 liter LR for rehydration efforts  --continued GOC discussion with family; ED physician and Nephrology on board and aware of currently expressed wishes  --tx pain as needed; would start with IV tylenol for now and assess response  --daughter OK with medical hyperkalemia tx with IV meds etc. for now; verbalized understanding and educated that she may change her mind at anytime    Discussed above with ED attending physician, ICU attending physician and attending nephrologist attending; all agree with current plan. Please call back ICU if any change should occur! Thanks!

## 2020-10-27 NOTE — H&P ADULT - NSHPPHYSICALEXAM_GEN_ALL_CORE
ICU Vital Signs Last 24 Hrs  T(C): 36.7 (27 Oct 2020 11:28), Max: 36.9 (27 Oct 2020 09:24)  T(F): 98.1 (27 Oct 2020 11:28), Max: 98.4 (27 Oct 2020 09:24)  HR: 101 (27 Oct 2020 11:28) (99 - 114)  BP: 104/78 (27 Oct 2020 11:28) (104/78 - 123/103)  BP(mean): --  ABP: --  ABP(mean): --  RR: 17 (27 Oct 2020 11:28) (17 - 17)  SpO2: 99% (27 Oct 2020 11:28) (95% - 99%)  GENERAL: NAD well-developed  HEAD:  Atraumatic, Normocephalic  EYES: EOMI, PERRLA, conjunctiva and sclera clear  ENMT: No tonsillar erythema, exudates, or enlargement; Moist mucous membranes, Good dentition, No lesions  NECK: Supple, No JVD, Normal thyroid  NERVOUS SYSTEM:  Alert & nonverbal  CHEST/LUNG: Clear to percussion bilaterally; No rales, rhonchi, wheezing, or rubs  HEART: Regular rate and rhythm; No murmurs, rubs, or gallops  ABDOMEN: Soft, Nontender, Nondistended; Bowel sounds present  EXTREMITIES:  2+ Peripheral Pulses, No clubbing, cyanosis, or edema  LYMPH: No lymphadenopathy   SKIN: No rashes or lesions

## 2020-10-27 NOTE — ED ADULT TRIAGE NOTE - CHIEF COMPLAINT QUOTE
pt sent to ED from [facility] for hyperkalemia by Dr. Briggs, K-7.2, pt s/p left hip fx 9/26/2020. pt denies chest pain, sob.

## 2020-10-27 NOTE — H&P ADULT - NSICDXPASTSURGICALHX_GEN_ALL_CORE_FT
PAST SURGICAL HISTORY:  Cataract  bilateral 2008     [place] filter in place placed 2012    H/o Ovarian Cyst     Knee Arthroplasty right 8/2009 & left 9/2010     No significant past surgical history

## 2020-10-27 NOTE — ED PROVIDER NOTE - CLINICAL SUMMARY MEDICAL DECISION MAKING FREE TEXT BOX
Pt seen by ICU, not for dialysis/DNR. seen by nephrology, potassium improved, lizbet [place] for admission.

## 2020-10-27 NOTE — ED PROVIDER NOTE - CARE PLAN
Principal Discharge DX:	Hyperkalemia  Secondary Diagnosis:	BLAYNE (acute kidney injury)  Secondary Diagnosis:	Dehydration

## 2020-10-27 NOTE — ED ADULT NURSE NOTE - PSH
Cataract  bilateral 2008    [place] filter in place  placed 2012  H/o Ovarian Cyst    Knee Arthroplasty right 8/2009 & left 9/2010    No significant past surgical history

## 2020-10-28 LAB
ANION GAP SERPL CALC-SCNC: 5 MMOL/L — SIGNIFICANT CHANGE UP (ref 5–17)
ANION GAP SERPL CALC-SCNC: 6 MMOL/L — SIGNIFICANT CHANGE UP (ref 5–17)
BUN SERPL-MCNC: 134 MG/DL — HIGH (ref 7–23)
BUN SERPL-MCNC: 156 MG/DL — HIGH (ref 7–23)
CALCIUM SERPL-MCNC: 10 MG/DL — SIGNIFICANT CHANGE UP (ref 8.5–10.1)
CALCIUM SERPL-MCNC: 9.3 MG/DL — SIGNIFICANT CHANGE UP (ref 8.5–10.1)
CHLORIDE SERPL-SCNC: 119 MMOL/L — HIGH (ref 96–108)
CHLORIDE SERPL-SCNC: 120 MMOL/L — HIGH (ref 96–108)
CHLORIDE UR-SCNC: 41 MMOL/L — SIGNIFICANT CHANGE UP
CO2 SERPL-SCNC: 22 MMOL/L — SIGNIFICANT CHANGE UP (ref 22–31)
CO2 SERPL-SCNC: 24 MMOL/L — SIGNIFICANT CHANGE UP (ref 22–31)
CREAT ?TM UR-MCNC: 47 MG/DL — SIGNIFICANT CHANGE UP
CREAT SERPL-MCNC: 4.38 MG/DL — HIGH (ref 0.5–1.3)
CREAT SERPL-MCNC: 4.89 MG/DL — HIGH (ref 0.5–1.3)
EOSINOPHIL NFR URNS MANUAL: POSITIVE
GLUCOSE SERPL-MCNC: 110 MG/DL — HIGH (ref 70–99)
GLUCOSE SERPL-MCNC: 128 MG/DL — HIGH (ref 70–99)
MAGNESIUM SERPL-MCNC: 2.4 MG/DL — SIGNIFICANT CHANGE UP (ref 1.6–2.6)
PHOSPHATE SERPL-MCNC: 2.9 MG/DL — SIGNIFICANT CHANGE UP (ref 2.5–4.5)
POTASSIUM SERPL-MCNC: 4.8 MMOL/L — SIGNIFICANT CHANGE UP (ref 3.5–5.3)
POTASSIUM SERPL-MCNC: 5.8 MMOL/L — HIGH (ref 3.5–5.3)
POTASSIUM SERPL-SCNC: 4.8 MMOL/L — SIGNIFICANT CHANGE UP (ref 3.5–5.3)
POTASSIUM SERPL-SCNC: 5.8 MMOL/L — HIGH (ref 3.5–5.3)
SARS-COV-2 IGG SERPL QL IA: POSITIVE
SARS-COV-2 IGM SERPL IA-ACNC: 2.47 INDEX — HIGH
SODIUM SERPL-SCNC: 146 MMOL/L — HIGH (ref 135–145)
SODIUM SERPL-SCNC: 150 MMOL/L — HIGH (ref 135–145)
SODIUM UR-SCNC: 47 MMOL/L — SIGNIFICANT CHANGE UP

## 2020-10-28 PROCEDURE — 99233 SBSQ HOSP IP/OBS HIGH 50: CPT

## 2020-10-28 RX ORDER — INFLUENZA VIRUS VACCINE 15; 15; 15; 15 UG/.5ML; UG/.5ML; UG/.5ML; UG/.5ML
0.5 SUSPENSION INTRAMUSCULAR ONCE
Refills: 0 | Status: DISCONTINUED | OUTPATIENT
Start: 2020-10-28 | End: 2020-11-03

## 2020-10-28 RX ORDER — SODIUM CHLORIDE 9 MG/ML
1000 INJECTION, SOLUTION INTRAVENOUS
Refills: 0 | Status: DISCONTINUED | OUTPATIENT
Start: 2020-10-28 | End: 2020-10-29

## 2020-10-28 RX ORDER — GENTAMICIN SULFATE 40 MG/ML
60 VIAL (ML) INJECTION ONCE
Refills: 0 | Status: COMPLETED | OUTPATIENT
Start: 2020-10-29 | End: 2020-10-30

## 2020-10-28 RX ADMIN — Medication 1 TABLET(S): at 11:46

## 2020-10-28 RX ADMIN — SODIUM CHLORIDE 100 MILLILITER(S): 9 INJECTION, SOLUTION INTRAVENOUS at 09:02

## 2020-10-28 RX ADMIN — SODIUM CHLORIDE 150 MILLILITER(S): 9 INJECTION, SOLUTION INTRAVENOUS at 14:07

## 2020-10-28 RX ADMIN — HEPARIN SODIUM 5000 UNIT(S): 5000 INJECTION INTRAVENOUS; SUBCUTANEOUS at 17:59

## 2020-10-28 RX ADMIN — Medication 1 MILLIGRAM(S): at 11:45

## 2020-10-28 RX ADMIN — SODIUM ZIRCONIUM CYCLOSILICATE 5 GRAM(S): 10 POWDER, FOR SUSPENSION ORAL at 14:07

## 2020-10-28 RX ADMIN — SENNA PLUS 2 TABLET(S): 8.6 TABLET ORAL at 23:01

## 2020-10-28 RX ADMIN — LATANOPROST 1 DROP(S): 0.05 SOLUTION/ DROPS OPHTHALMIC; TOPICAL at 23:02

## 2020-10-28 RX ADMIN — CHLORHEXIDINE GLUCONATE 1 APPLICATION(S): 213 SOLUTION TOPICAL at 04:55

## 2020-10-28 RX ADMIN — SODIUM ZIRCONIUM CYCLOSILICATE 5 GRAM(S): 10 POWDER, FOR SUSPENSION ORAL at 23:02

## 2020-10-28 RX ADMIN — HEPARIN SODIUM 5000 UNIT(S): 5000 INJECTION INTRAVENOUS; SUBCUTANEOUS at 05:51

## 2020-10-28 RX ADMIN — ATORVASTATIN CALCIUM 10 MILLIGRAM(S): 80 TABLET, FILM COATED ORAL at 23:01

## 2020-10-28 RX ADMIN — SODIUM ZIRCONIUM CYCLOSILICATE 5 GRAM(S): 10 POWDER, FOR SUSPENSION ORAL at 05:51

## 2020-10-28 NOTE — PROGRESS NOTE ADULT - SUBJECTIVE AND OBJECTIVE BOX
Subjective: Patient very lethargic and not able to answer much questions.     MEDICATIONS  (STANDING):  aMIOdarone    Tablet 100 milliGRAM(s) Oral <User Schedule>  aMIOdarone    Tablet 100 milliGRAM(s) Oral daily  atorvastatin 10 milliGRAM(s) Oral at bedtime  chlorhexidine 4% Liquid 1 Application(s) Topical <User Schedule>  dextrose 5% + sodium chloride 0.45%. 1000 milliLiter(s) (100 mL/Hr) IV Continuous <Continuous>  folic acid 1 milliGRAM(s) Oral daily  heparin   Injectable 5000 Unit(s) SubCutaneous every 12 hours  latanoprost 0.005% Ophthalmic Solution 1 Drop(s) Both EYES at bedtime  multivitamin 1 Tablet(s) Oral daily  senna 2 Tablet(s) Oral at bedtime  sodium zirconium cyclosilicate 5 Gram(s) Oral every 8 hours    MEDICATIONS  (PRN):  acetaminophen   Tablet .. 650 milliGRAM(s) Oral every 6 hours PRN Temp greater or equal to 38C (100.4F), Mild Pain (1 - 3)  oxyCODONE    IR 5 milliGRAM(s) Oral Once PRN Severe Pain (7 - 10)      Allergies    PCN, Sulfa (Urticaria)  penicillin (Other)  penicillin (Unknown)  sulfa drugs (Other)  sulfa drugs (Unknown)    Intolerances    Bananas (Other (Mild to Mod))  Potatoes (Other (Mild to Mod))      Vital Signs Last 24 Hrs  T(C): 36.2 (28 Oct 2020 08:30), Max: 36.4 (27 Oct 2020 16:21)  T(F): 97.2 (28 Oct 2020 08:30), Max: 97.6 (28 Oct 2020 04:23)  HR: 55 (28 Oct 2020 12:15) (51 - 84)  BP: 115/55 (28 Oct 2020 12:15) (79/41 - 118/50)  BP(mean): --  RR: 14 (28 Oct 2020 12:15) (12 - 16)  SpO2: 99% (28 Oct 2020 12:15) (99% - 100%)    PHYSICAL EXAM:  GENERAL: NAD, Cachetic appearing; Lethargic AO x 1  HEAD:  Atraumatic, Normocephalic  ENMT: Dry  NECK: Supple, No JVD, Normal thyroid  NERVOUS SYSTEM:  Weak and unable to follow commands.   CHEST/LUNG: Clear to auscultation bilaterally; No rales, rhonchi, wheezing, or rubs  HEART: Regular rate and rhythm; No murmurs, rubs, or gallops  ABDOMEN: Soft, Nontender, Nondistended; Bowel sounds present  EXTREMITIES:  2+ Peripheral Pulses, No clubbing, cyanosis, or edema      LABS:    28 Oct 2020 06:28    150    |  120    |  156    ----------------------------<  110    5.8     |  24     |  4.89     Ca    10.0       28 Oct 2020 06:28  Phos  2.6       27 Oct 2020 13:22  Mg     2.9       27 Oct 2020 13:22      PT/INR - ( 27 Oct 2020 10:17 )   PT: 16.7 sec;   INR: 1.47 ratio         PTT - ( 27 Oct 2020 10:17 )  PTT:35.5 sec  Urinalysis Basic - ( 27 Oct 2020 10:17 )    Color: Yellow / Appearance: very cloudy / S.010 / pH: x  Gluc: x / Ketone: Negative  / Bili: Negative / Urobili: Negative mg/dL   Blood: x / Protein: 100 mg/dL / Nitrite: Negative   Leuk Esterase: Moderate / RBC: 6-10 /HPF / WBC >50   Sq Epi: x / Non Sq Epi: Few / Bacteria: Many      CAPILLARY BLOOD GLUCOSE      POCT Blood Glucose.: 77 mg/dL (27 Oct 2020 23:51)  POCT Blood Glucose.: 113 mg/dL (27 Oct 2020 22:32)  POCT Blood Glucose.: 77 mg/dL (27 Oct 2020 21:10)  POCT Blood Glucose.: 144 mg/dL (27 Oct 2020 13:48)      RADIOLOGY & ADDITIONAL TESTS:    Imaging Personally Reviewed:  [ ] YES     Consultant(s) Notes Reviewed:      Care Discussed with Consultants/Other Providers:    Advanced Directives: [ ] DNR  [ ] No feeding tube  [ ] MOLST in chart  [ ] MOLST completed today  [ ] Unknown

## 2020-10-28 NOTE — GOALS OF CARE CONVERSATION - ADVANCED CARE PLANNING - CONVERSATION DETAILS
Referral received for home hospice services. Spoke with Dr. Ch in the ED about as per his knowledge the pt's family is in agreement with home hospice. I was unable to reach the pt's dtr Donna by the number listed in contact. Will cont to f/u in the AM.       Bethany Carroll Rn

## 2020-10-28 NOTE — PROGRESS NOTE ADULT - ASSESSMENT
89F with HTN, HLD, CKD IV, CAD and Atrial Fibrillation admitted for Hyperkalemia and Lethargy.     Hyperkalemia  Suspect Severe Dehydration is the cause for most of her lab abnormalities including Renal Failure  Improving with IVF; Switch Fluids to D5W at 150cc/hour  BMP every 6 hours     Acute Renal Failure on CKD IV  Baseline Creatinine 1.7-1.8  Currently Cr 6.34 and patient is making Urine   Not Acidotic; Doubt Dialysis would be appropriate here but have consulted Nephrology for their support   Will continue aggressive hydration to correct free water deficit with D5W  Check Urine Electrolytes and monitor BMP every 6 hours    Hypernatremia  See above     Acute Metabolic Encephalopathy   Elevated WBC and UA is negative  Doubt infection and think more along the lines of hemoconcentration  Has baseline dementia and has had severe decline in cognition and function since her fall and fracture last month   Monitor for now and no need for sedatives as patient is very lethargic     CAD / Atrial Fibrillation / HTN / HLD   Will hold medications for now  Continue Amiodarone for now    Diet  Suspect some dysphagia given overall condition  Dysphagia 3 for now and will get speech and swallow when mental status improves    DVT Prophylaxis  Heparin    Disposition  DNR/ Inpatient  Spoke to daughter and had [facility] converstation and is open to discussing options for Hospice   Discharge planning pending hospital course

## 2020-10-28 NOTE — PROGRESS NOTE ADULT - SUBJECTIVE AND OBJECTIVE BOX
[facility] NEPHROLOGY SERVICES, [facility]  NEPHROLOGY AND HYPERTENSION  [address]  [address]  [place] [address]  [phone]    MD MATT KRUGER, MD ARTHUR IBARRA MD YELENA ROSENBERG, MD KRISTOPHER LUO, MD ANDREEA CARVALHO MD          Patient events noted; slightly, more alert     MEDICATIONS  (STANDING):  aMIOdarone    Tablet 100 milliGRAM(s) Oral <User Schedule>  aMIOdarone    Tablet 100 milliGRAM(s) Oral daily  atorvastatin 10 milliGRAM(s) Oral at bedtime  chlorhexidine 4% Liquid 1 Application(s) Topical <User Schedule>  dextrose 5%. 1000 milliLiter(s) (150 mL/Hr) IV Continuous <Continuous>  folic acid 1 milliGRAM(s) Oral daily  heparin   Injectable 5000 Unit(s) SubCutaneous every 12 hours  influenza   Vaccine 0.5 milliLiter(s) IntraMuscular once  latanoprost 0.005% Ophthalmic Solution 1 Drop(s) Both EYES at bedtime  multivitamin 1 Tablet(s) Oral daily  senna 2 Tablet(s) Oral at bedtime  sodium zirconium cyclosilicate 5 Gram(s) Oral every 8 hours    MEDICATIONS  (PRN):  acetaminophen   Tablet .. 650 milliGRAM(s) Oral every 6 hours PRN Temp greater or equal to 38C (100.4F), Mild Pain (1 - 3)  oxyCODONE    IR 5 milliGRAM(s) Oral Once PRN Severe Pain (7 - 10)      10-27-20 @ 07:01  -  10-28-20 @ 07:00  --------------------------------------------------------  IN: 1200 mL / OUT: 300 mL / NET: 900 mL      PHYSICAL EXAM:      T(C): 34.6 (10-28-20 @ 18:25), Max: 36.4 (10-28-20 @ 04:23)  HR: 56 (10-28-20 @ 18:25) (50 - 76)  BP: 105/42 (10-28-20 @ 18:25) (84/41 - 118/50)  RR: 16 (10-28-20 @ 18:25) (12 - 18)  SpO2: 99% (10-28-20 @ 16:36) (99% - 100%)  Wt(kg): --  Lungs clear  Heart S1S2  Abd soft NT ND  Extremities:   tr edema                                    9.0    12.50 )-----------( 260      ( 27 Oct 2020 10:17 )             29.8     10-28    146<H>  |  119<H>  |  134<H>  ----------------------------<  128<H>  4.8   |  22  |  4.38<H>    Ca    9.3      28 Oct 2020 19:55  Phos  2.9     10-28  Mg     2.4     10-28    TPro  7.3  /  Alb  3.0<L>  /  TBili  0.3  /  DBili  x   /  AST  4<L>  /  ALT  15  /  AlkPhos  106  10-27      LIVER FUNCTIONS - ( 27 Oct 2020 10:17 )  Alb: 3.0 g/dL / Pro: 7.3 gm/dL / ALK PHOS: 106 U/L / ALT: 15 U/L / AST: 4 U/L / GGT: x           Creatinine Trend: 4.38<--, 4.89<--, 5.11<--, 6.05<--, 6.34<--      [facility] NEPHROLOGY SERVICES, [facility]  NEPHROLOGY AND HYPERTENSION  [address]  [address] [place] [address]  [phone]    MD MATT KRUGER MD ANDREY GONCHARUK, MD MADHU KORRAPATI, MD YELENA ROSENBERG, MD BINNY KOSHY, MD CHRISTOPHER CAPUTO, MD EDWARD BOVER, MD          Patient events noted    MEDICATIONS  (STANDING):  aMIOdarone    Tablet 100 milliGRAM(s) Oral <User Schedule>  aMIOdarone    Tablet 100 milliGRAM(s) Oral daily  atorvastatin 10 milliGRAM(s) Oral at bedtime  chlorhexidine 4% Liquid 1 Application(s) Topical <User Schedule>  dextrose 5%. 1000 milliLiter(s) (150 mL/Hr) IV Continuous <Continuous>  folic acid 1 milliGRAM(s) Oral daily  heparin   Injectable 5000 Unit(s) SubCutaneous every 12 hours  influenza   Vaccine 0.5 milliLiter(s) IntraMuscular once  latanoprost 0.005% Ophthalmic Solution 1 Drop(s) Both EYES at bedtime  multivitamin 1 Tablet(s) Oral daily  senna 2 Tablet(s) Oral at bedtime  sodium zirconium cyclosilicate 5 Gram(s) Oral every 8 hours    MEDICATIONS  (PRN):  acetaminophen   Tablet .. 650 milliGRAM(s) Oral every 6 hours PRN Temp greater or equal to 38C (100.4F), Mild Pain (1 - 3)  oxyCODONE    IR 5 milliGRAM(s) Oral Once PRN Severe Pain (7 - 10)      10-27-20 @ 07:01  -  10-28-20 @ 07:00  --------------------------------------------------------  IN: 1200 mL / OUT: 300 mL / NET: 900 mL      PHYSICAL EXAM:      T(C): 34.6 (10-28-20 @ 18:25), Max: 36.4 (10-28-20 @ 04:23)  HR: 56 (10-28-20 @ 18:25) (50 - 76)  BP: 105/42 (10-28-20 @ 18:25) (84/41 - 118/50)  RR: 16 (10-28-20 @ 18:25) (12 - 18)  SpO2: 99% (10-28-20 @ 16:36) (99% - 100%)  Wt(kg): --  Lungs clear  Heart S1S2  Abd soft NT ND  Extremities:   tr edema                                    9.0    12.50 )-----------( 260      ( 27 Oct 2020 10:17 )             29.8     10-28    146<H>  |  119<H>  |  134<H>  ----------------------------<  128<H>  4.8   |  22  |  4.38<H>    Ca    9.3      28 Oct 2020 19:55  Phos  2.9     10-28  Mg     2.4     10-28    TPro  7.3  /  Alb  3.0<L>  /  TBili  0.3  /  DBili  x   /  AST  4<L>  /  ALT  15  /  AlkPhos  106  10-27      LIVER FUNCTIONS - ( 27 Oct 2020 10:17 )  Alb: 3.0 g/dL / Pro: 7.3 gm/dL / ALK PHOS: 106 U/L / ALT: 15 U/L / AST: 4 U/L / GGT: x           Creatinine Trend: 4.38<--, 4.89<--, 5.11<--, 6.05<--, 6.34<--      Eosinophil Count, Urine (10.27.20 @ 22:28)    Eosinophil Count, Urine: Positive        Assessment   BLAYNE pre renal azotemia; risk for infectious AIN  Hypercalcemia suspected related to intravascular depletion  Levaquin given yesterday    Plan  IVF;   Urine eos;   Will dose Gentamicin X1;  ID eval re: Abx options    Bartolome Burns MD

## 2020-10-29 LAB
-  AMIKACIN: SIGNIFICANT CHANGE UP
-  AMOXICILLIN/CLAVULANIC ACID: SIGNIFICANT CHANGE UP
-  AMPICILLIN/SULBACTAM: SIGNIFICANT CHANGE UP
-  AMPICILLIN: SIGNIFICANT CHANGE UP
-  AZTREONAM: SIGNIFICANT CHANGE UP
-  CEFAZOLIN: SIGNIFICANT CHANGE UP
-  CEFEPIME: SIGNIFICANT CHANGE UP
-  CEFOXITIN: SIGNIFICANT CHANGE UP
-  CEFTRIAXONE: SIGNIFICANT CHANGE UP
-  CIPROFLOXACIN: SIGNIFICANT CHANGE UP
-  ERTAPENEM: SIGNIFICANT CHANGE UP
-  GENTAMICIN: SIGNIFICANT CHANGE UP
-  IMIPENEM: SIGNIFICANT CHANGE UP
-  LEVOFLOXACIN: SIGNIFICANT CHANGE UP
-  MEROPENEM: SIGNIFICANT CHANGE UP
-  NITROFURANTOIN: SIGNIFICANT CHANGE UP
-  PIPERACILLIN/TAZOBACTAM: SIGNIFICANT CHANGE UP
-  TIGECYCLINE: SIGNIFICANT CHANGE UP
-  TOBRAMYCIN: SIGNIFICANT CHANGE UP
-  TRIMETHOPRIM/SULFAMETHOXAZOLE: SIGNIFICANT CHANGE UP
ANION GAP SERPL CALC-SCNC: 8 MMOL/L — SIGNIFICANT CHANGE UP (ref 5–17)
BLD GP AB SCN SERPL QL: SIGNIFICANT CHANGE UP
BUN SERPL-MCNC: 120 MG/DL — HIGH (ref 7–23)
CALCIUM SERPL-MCNC: 10 MG/DL — SIGNIFICANT CHANGE UP (ref 8.5–10.1)
CHLORIDE SERPL-SCNC: 114 MMOL/L — HIGH (ref 96–108)
CO2 SERPL-SCNC: 21 MMOL/L — LOW (ref 22–31)
CREAT SERPL-MCNC: 4.11 MG/DL — HIGH (ref 0.5–1.3)
CULTURE RESULTS: SIGNIFICANT CHANGE UP
FERRITIN SERPL-MCNC: 722 NG/ML — HIGH (ref 15–150)
GLUCOSE BLDC GLUCOMTR-MCNC: 128 MG/DL — HIGH (ref 70–99)
GLUCOSE BLDC GLUCOMTR-MCNC: 133 MG/DL — HIGH (ref 70–99)
GLUCOSE SERPL-MCNC: 111 MG/DL — HIGH (ref 70–99)
HCT VFR BLD CALC: 20.8 % — CRITICAL LOW (ref 34.5–45)
HCT VFR BLD CALC: 21.8 % — LOW (ref 34.5–45)
HGB BLD-MCNC: 6.6 G/DL — CRITICAL LOW (ref 11.5–15.5)
HGB BLD-MCNC: 6.7 G/DL — CRITICAL LOW (ref 11.5–15.5)
IRON SATN MFR SERPL: 24 % — SIGNIFICANT CHANGE UP (ref 14–50)
IRON SATN MFR SERPL: 42 UG/DL — SIGNIFICANT CHANGE UP (ref 30–160)
MCHC RBC-ENTMCNC: 28.5 PG — SIGNIFICANT CHANGE UP (ref 27–34)
MCHC RBC-ENTMCNC: 29.2 PG — SIGNIFICANT CHANGE UP (ref 27–34)
MCHC RBC-ENTMCNC: 30.7 GM/DL — LOW (ref 32–36)
MCHC RBC-ENTMCNC: 31.7 GM/DL — LOW (ref 32–36)
MCV RBC AUTO: 92 FL — SIGNIFICANT CHANGE UP (ref 80–100)
MCV RBC AUTO: 92.8 FL — SIGNIFICANT CHANGE UP (ref 80–100)
METHOD TYPE: SIGNIFICANT CHANGE UP
NRBC # BLD: 0 /100 WBCS — SIGNIFICANT CHANGE UP (ref 0–0)
NRBC # BLD: 0 /100 WBCS — SIGNIFICANT CHANGE UP (ref 0–0)
ORGANISM # SPEC MICROSCOPIC CNT: SIGNIFICANT CHANGE UP
ORGANISM # SPEC MICROSCOPIC CNT: SIGNIFICANT CHANGE UP
PLATELET # BLD AUTO: 153 K/UL — SIGNIFICANT CHANGE UP (ref 150–400)
PLATELET # BLD AUTO: 161 K/UL — SIGNIFICANT CHANGE UP (ref 150–400)
POTASSIUM SERPL-MCNC: 5 MMOL/L — SIGNIFICANT CHANGE UP (ref 3.5–5.3)
POTASSIUM SERPL-SCNC: 5 MMOL/L — SIGNIFICANT CHANGE UP (ref 3.5–5.3)
RBC # BLD: 2.26 M/UL — LOW (ref 3.8–5.2)
RBC # BLD: 2.35 M/UL — LOW (ref 3.8–5.2)
RBC # FLD: 19.7 % — HIGH (ref 10.3–14.5)
RBC # FLD: 19.9 % — HIGH (ref 10.3–14.5)
SODIUM SERPL-SCNC: 143 MMOL/L — SIGNIFICANT CHANGE UP (ref 135–145)
SPECIMEN SOURCE: SIGNIFICANT CHANGE UP
TIBC SERPL-MCNC: 172 UG/DL — LOW (ref 220–430)
UIBC SERPL-MCNC: 130 UG/DL — SIGNIFICANT CHANGE UP (ref 110–370)
WBC # BLD: 7.63 K/UL — SIGNIFICANT CHANGE UP (ref 3.8–10.5)
WBC # BLD: 7.69 K/UL — SIGNIFICANT CHANGE UP (ref 3.8–10.5)
WBC # FLD AUTO: 7.63 K/UL — SIGNIFICANT CHANGE UP (ref 3.8–10.5)
WBC # FLD AUTO: 7.69 K/UL — SIGNIFICANT CHANGE UP (ref 3.8–10.5)

## 2020-10-29 PROCEDURE — 99233 SBSQ HOSP IP/OBS HIGH 50: CPT

## 2020-10-29 RX ORDER — SODIUM CHLORIDE 9 MG/ML
1000 INJECTION, SOLUTION INTRAVENOUS
Refills: 0 | Status: DISCONTINUED | OUTPATIENT
Start: 2020-10-29 | End: 2020-10-31

## 2020-10-29 RX ADMIN — HEPARIN SODIUM 5000 UNIT(S): 5000 INJECTION INTRAVENOUS; SUBCUTANEOUS at 19:22

## 2020-10-29 RX ADMIN — SODIUM CHLORIDE 150 MILLILITER(S): 9 INJECTION, SOLUTION INTRAVENOUS at 11:55

## 2020-10-29 RX ADMIN — SODIUM CHLORIDE 100 MILLILITER(S): 9 INJECTION, SOLUTION INTRAVENOUS at 21:36

## 2020-10-29 RX ADMIN — Medication 1 MILLIGRAM(S): at 16:29

## 2020-10-29 RX ADMIN — Medication 1 TABLET(S): at 16:29

## 2020-10-29 RX ADMIN — HEPARIN SODIUM 5000 UNIT(S): 5000 INJECTION INTRAVENOUS; SUBCUTANEOUS at 05:09

## 2020-10-29 RX ADMIN — SODIUM ZIRCONIUM CYCLOSILICATE 5 GRAM(S): 10 POWDER, FOR SUSPENSION ORAL at 05:12

## 2020-10-29 RX ADMIN — ATORVASTATIN CALCIUM 10 MILLIGRAM(S): 80 TABLET, FILM COATED ORAL at 21:36

## 2020-10-29 RX ADMIN — CHLORHEXIDINE GLUCONATE 1 APPLICATION(S): 213 SOLUTION TOPICAL at 05:11

## 2020-10-29 RX ADMIN — SODIUM CHLORIDE 150 MILLILITER(S): 9 INJECTION, SOLUTION INTRAVENOUS at 05:15

## 2020-10-29 RX ADMIN — SENNA PLUS 2 TABLET(S): 8.6 TABLET ORAL at 21:36

## 2020-10-29 RX ADMIN — LATANOPROST 1 DROP(S): 0.05 SOLUTION/ DROPS OPHTHALMIC; TOPICAL at 22:05

## 2020-10-29 RX ADMIN — AMIODARONE HYDROCHLORIDE 100 MILLIGRAM(S): 400 TABLET ORAL at 05:08

## 2020-10-29 NOTE — CHART NOTE - NSCHARTNOTEFT_GEN_A_CORE
Medicine Hospitalist PA - Blood Transfusion Consent     Called by RN to obtain consent for 2 unit PRBC blood transfusion. Discussed the reasoning and the risk and benefits of having a blood transfusion with daughter Donna. Pt states understanding of the risks of acute reactions such as fever, chills, allergic reaction, lower back pain, shortness of breath, dyspnea, tachycardia, and death. Explained that pt will be continuously monitored before, during and after transfusion and to ask for assistance if onset of any new symptoms. Questions answered, concerns addressed. Emotional support given. Consent signed, date and timed. Yanni RN signed as witness. Consent placed in pt's chart. Type and screen complete. Pt is hemodynamically stable. No active or acute signs or symptoms of hemorrhage or bleed. RN to continue to monitor, call provider for worsening condition.

## 2020-10-29 NOTE — CHART NOTE - TREATMENT: THE FOLLOWING DIET HAS BEEN RECOMMENDED
Diet, Dysphagia 1 Pureed-Thin Liquids:   No Carb Prosource (1pkg = 15gms Protein)     Qty per Day:  daily  Supplement Feeding Modality:  Oral  Su[place] Cans or Servings Per Day:  1       Frequency:  Daily (10-29-20 @ 14:22) [Pending Verification By Attending]  Diet, Dysphagia 3 Soft-Nectar Consistency Fluid (10-28-20 @ 13:49) [Active]

## 2020-10-29 NOTE — PROVIDER CONTACT NOTE (CRITICAL VALUE NOTIFICATION) - ACTION/TREATMENT ORDERED:
no new orders received.
Patient's blood glucose was re-checked, result was 77. House PA made aware, instructions given to maintain pt on D5 in 0.45 saline at 100ml/Hr.
Pt is getting blood

## 2020-10-29 NOTE — PROGRESS NOTE ADULT - SUBJECTIVE AND OBJECTIVE BOX
NEPHROLOGY PROGRESS NOTE    CHIEF COMPLAINT:  BLAYNE/CKD    HPI:  Making copious pale urine with slow decline in BUN/Cr and correction of Na/K.  She is getting 2 units PRBC today.  SBP running in low 100's.    EXAM:  T(F): 98.1 (10-29-20 @ 10:13)  HR: 83 (10-29-20 @ 10:13)  BP: 112/42 (10-29-20 @ 10:13)  RR: 18 (10-29-20 @ 10:13)  SpO2: 93% (10-29-20 @ 10:13)    Conversant, in no apparent distress  Normal respiratory effort, lungs clear bilaterally  Heart RRR with no murmur, no peripheral edema         LABS                             6.6    7.69  )-----------( 161      ( 29 Oct 2020 09:56 )             20.8          10-29    143  |  114<H>  |  120<H>  ----------------------------<  111<H>  5.0   |  21<L>  |  4.11<H>    Ca    10.0      29 Oct 2020 07:33  Phos  2.9     10-28  Mg     2.4     10-28    Urine Na 41 meq/L;  Urine Eos positive         Assessment   BLAYNE pre renal azotemia, improved  Positive urine eosinophils of uncertain significance  Hypercalcemia suspected related to intravascular depletion, resolved  E. Coli UTI  Acute/chronic anemia    Plan  Continue IV fluid;  may switch to 1/2 NS or NS or LR

## 2020-10-29 NOTE — SWALLOW BEDSIDE ASSESSMENT ADULT - SLP PERTINENT HISTORY OF CURRENT PROBLEM
CNA reported pt eats slow, and difficulty with mastication/soft solid. no coughing/choking while eating and

## 2020-10-29 NOTE — DIETITIAN INITIAL EVALUATION ADULT. - ETIOLOGY
Increased energy/protein intake inadequate/energy protein needs related to Dementia ; Pressure Ulcer

## 2020-10-29 NOTE — PROGRESS NOTE ADULT - SUBJECTIVE AND OBJECTIVE BOX
Subjective: Patient lying in bed comfortably.  Doing well with no overnight events.  Minimal complaints.  Had Speech and Swallow earlier today and diet modified.      MEDICATIONS  (STANDING):  aMIOdarone    Tablet 100 milliGRAM(s) Oral <User Schedule>  aMIOdarone    Tablet 100 milliGRAM(s) Oral daily  atorvastatin 10 milliGRAM(s) Oral at bedtime  chlorhexidine 4% Liquid 1 Application(s) Topical <User Schedule>  dextrose 5%. 1000 milliLiter(s) (150 mL/Hr) IV Continuous <Continuous>  folic acid 1 milliGRAM(s) Oral daily  gentamicin   IVPB 60 milliGRAM(s) IV Intermittent once  heparin   Injectable 5000 Unit(s) SubCutaneous every 12 hours  influenza   Vaccine 0.5 milliLiter(s) IntraMuscular once  latanoprost 0.005% Ophthalmic Solution 1 Drop(s) Both EYES at bedtime  multivitamin 1 Tablet(s) Oral daily  senna 2 Tablet(s) Oral at bedtime  sodium zirconium cyclosilicate 5 Gram(s) Oral every 8 hours    MEDICATIONS  (PRN):  acetaminophen   Tablet .. 650 milliGRAM(s) Oral every 6 hours PRN Temp greater or equal to 38C (100.4F), Mild Pain (1 - 3)  oxyCODONE    IR 5 milliGRAM(s) Oral Once PRN Severe Pain (7 - 10)      Allergies    PCN, Sulfa (Urticaria)  penicillin (Other)  penicillin (Unknown)  sulfa drugs (Other)  sulfa drugs (Unknown)    Intolerances    Bananas (Other (Mild to Mod))  Potatoes (Other (Mild to Mod))      Vital Signs Last 24 Hrs  T(C): 36.7 (29 Oct 2020 10:13), Max: 37.2 (29 Oct 2020 03:08)  T(F): 98.1 (29 Oct 2020 10:13), Max: 99 (29 Oct 2020 03:08)  HR: 83 (29 Oct 2020 10:13) (50 - 83)  BP: 112/42 (29 Oct 2020 10:13) (90/50 - 124/72)  BP(mean): --  RR: 18 (29 Oct 2020 10:13) (14 - 18)  SpO2: 93% (29 Oct 2020 10:13) (90% - 100%)    PHYSICAL EXAM:  GENERAL: NAD, Cachetic appearing; Lethargic AO x 1  HEAD:  Atraumatic, Normocephalic  ENMT: Dry  NECK: Supple, No JVD, Normal thyroid  NERVOUS SYSTEM:  Weak and unable to follow commands.   CHEST/LUNG: Clear to auscultation bilaterally; No rales, rhonchi, wheezing, or rubs  HEART: Regular rate and rhythm; No murmurs, rubs, or gallops  ABDOMEN: Soft, Nontender, Nondistended; Bowel sounds present  EXTREMITIES:  2+ Peripheral Pulses, No clubbing, cyanosis, or edema      LABS:                        6.6    7.69  )-----------( 161      ( 29 Oct 2020 09:56 )             20.8     29 Oct 2020 07:33    143    |  114    |  120    ----------------------------<  111    5.0     |  21     |  4.11     Ca    10.0       29 Oct 2020 07:33  Phos  2.9       28 Oct 2020 19:55  Mg     2.4       28 Oct 2020 19:55          CAPILLARY BLOOD GLUCOSE      POCT Blood Glucose.: 133 mg/dL (29 Oct 2020 08:21)  POCT Blood Glucose.: 128 mg/dL (29 Oct 2020 03:12)      RADIOLOGY & ADDITIONAL TESTS:    Imaging Personally Reviewed:  [ ] YES     Consultant(s) Notes Reviewed:      Care Discussed with Consultants/Other Providers:    Advanced Directives: [ ] DNR  [ ] No feeding tube  [ ] MOLST in chart  [ ] MOLST completed today  [ ] Unknown

## 2020-10-29 NOTE — SWALLOW BEDSIDE ASSESSMENT ADULT - H & P REVIEW
yes/MsTereza Melchor is a 89 year old female with a PMH of Dementia, HTN, HLD, CAD, afib on eliquis, DVT?, CKD, OA and s/p fall with recent hip fx who presented to [facility] ED in the morning of 10/27 from [facility] for hyperkalemia. Per ED physician, pt noted with k of 7.2 at [facility], which as been confirmed as 7.3 by our lab. Pt was in rehab at [facility] s/p hip fall ~1 month prior with brisk decline in both cognitive and physical function per her daughter since the fall, in addition to newer onset dysphagia. In ED pt noted to be at her "new" neurological baseline, HDS and without respiratory distress. CXR consistent with dehydration per radiology. UTI noted on urine sample. While in ED, pt tx medically for hyperkalemia. ICU team consulted. Nephrology on board  pt does not require ICU level of care at this time, pt admitted to regular floor

## 2020-10-29 NOTE — DIETITIAN INITIAL EVALUATION ADULT. - OTHER INFO
Pt w/ hx HTN ; HLD ; CKD 4; CAD & A-Fib. Adm for hyperkalemia & lethargy ; acute blood loss anemia; acute renal failure ( baseline creat 1.7 - 1.8) creat trending down & pt is making urine; renal failure resolved w/ IVF ; hypernatremia resolved ; acute metabolic encephalopathy DNR/Inpatient ; Hospice Eval in progress. 10/29 - s/p SLP w/ recommendations for pureed diet w/ thin liquids.  Pt w/ intolerance to bananas & potatoes. Will liberalize diet to promote p.o. intake. Pt w/ hx HTN ; HLD ; CKD 4; CAD & A-Fib. Adm for hyperkalemia & lethargy ; acute blood loss anemia; acute renal failure ( baseline creat 1.7 - 1.8) creat trending down & pt is making urine; renal failure resolved w/ IVF ; hypernatremia resolved ; acute metabolic encephalopathy DNR/Inpatient ; Hospice Eval in progress. 10/29 - s/p SLP w/ recommendations for pureed diet w/ thin liquids.  Pt w/ intolerance to bananas & potatoes. Will liberalize diet to promote p.o. intake. Pt w/ Pressure injury -> ( R & L inner knee stage 2 ; L & R buttocks stage 2).

## 2020-10-29 NOTE — DIETITIAN INITIAL EVALUATION ADULT. - PERTINENT LABORATORY DATA
10-29 Na143 mmol/L Glu 111 mg/dL<H> K+ 5.0 mmol/L Cr  4.11 mg/dL<H>  mg/dL<H> 10-28 Phos 2.9 mg/dL 10-27 Alb 3.0 g/dL<L>10-27 ALT 15 U/L AST 4 U/L<L> Alkaline Phosphatase 106 U/L

## 2020-10-29 NOTE — SWALLOW BEDSIDE ASSESSMENT ADULT - SLP GENERAL OBSERVATIONS
pt seen bedside, alert and oriented to self. pt verbalizing, pleasantly confused and tangential. [facility] and responded to questions regarding simple needs. noted raspy vocal quality.

## 2020-10-29 NOTE — DIETITIAN INITIAL EVALUATION ADULT. - PERTINENT MEDS FT
MEDICATIONS  (STANDING):  atorvastatin 10 milliGRAM(s) Oral at bedtime  chlorhexidine 4% Liquid 1 Application(s) Topical <User Schedule>  dextrose 5%. 1000 milliLiter(s) (150 mL/Hr) IV Continuous <Continuous>  folic acid 1 milliGRAM(s) Oral daily  gentamicin   IVPB 60 milliGRAM(s) IV Intermittent once  heparin   Injectable 5000 Unit(s) SubCutaneous every 12 hours  influenza   Vaccine 0.5 milliLiter(s) IntraMuscular once  lactated ringers. 1000 milliLiter(s) (100 mL/Hr) IV Continuous <Continuous>  latanoprost 0.005% Ophthalmic Solution 1 Drop(s) Both EYES at bedtime  multivitamin 1 Tablet(s) Oral daily  senna 2 Tablet(s) Oral at bedtime    MEDICATIONS  (PRN):  acetaminophen   Tablet .. 650 milliGRAM(s) Oral every 6 hours PRN Temp greater or equal to 38C (100.4F), Mild Pain (1 - 3)

## 2020-10-29 NOTE — PROGRESS NOTE ADULT - ASSESSMENT
89F with HTN, HLD, CKD IV, CAD and Atrial Fibrillation admitted for Hyperkalemia and Lethargy.     Acute Blood Loss Anemia  Will get FOBT, check Iron studies  Transfuse 2U PRBC after type and screen   Check Hgb Every 8 hours and transfuse if <7.0    Acute Renal Failure on CKD IV  Baseline Creatinine 1.7-1.8  Currently Cr 6.34 and patient is making Urine   Not Acidotic; Doubt Dialysis would be appropriate here but have consulted Nephrology for their support   Will continue aggressive hydration to correct free water deficit with D5W  Check Urine Electrolytes and monitor BMP every 6 hours    Hyperkalemia  Severe Dehydration is the cause for most of her lab abnormalities including Renal Failure  Resolved with IVF; Continue D5W at 150cc/hour  BMP every 6 hours     Hypernatremia  Resolved; See above     Acute Metabolic Encephalopathy   Elevated WBC and UA is negative  Doubt infection and think more along the lines of hemoconcentration  Has baseline dementia and has had severe decline in cognition and function since her fall and fracture last month   Monitor for now and no need for sedatives as she is calm    CAD / Atrial Fibrillation / HTN / HLD   Will hold medications for now  Continue Amiodarone for now    Diet  Modified diet as per speech consultation    DVT Prophylaxis  Heparin    Disposition  DNR/ Inpatient  Hospice evaluation called and in progress   Discharge planning pending hospital course      89F with HTN, HLD, CKD IV, CAD and Atrial Fibrillation admitted for Hyperkalemia and Lethargy.     Acute Blood Loss Anemia  Will get FOBT, check Iron studies  Transfuse 2U PRBC after type and screen   Check Hgb Every 8 hours and transfuse if <7.0    Acute Renal Failure on CKD IV  Baseline Creatinine 1.7-1.8  Cr trending down and Currently Cr 4.11 and patient is making Urine   Doubt Dialysis would be appropriate here but have consulted Nephrology for their support   Will continue aggressive hydration with D5W and switch to LR tonight  Monitor BMP every 6 hours    Hyperkalemia  Severe Dehydration is the cause for most of her lab abnormalities including Renal Failure  Resolved with IVF; Continue D5W at 150cc/hour and switch to LR tonight    Hypernatremia  Resolved; See above     Acute Metabolic Encephalopathy   Elevated WBC and UA is negative  Doubt infection and think more along the lines of hemoconcentration  Has baseline dementia and has had severe decline in cognition and function since her fall and fracture last month   Monitor for now and no need for sedatives as she is calm    CAD / Atrial Fibrillation / HTN / HLD   Will hold medications for now  Continue Amiodarone for now    Diet  Modified diet as per speech consultation    DVT Prophylaxis  Heparin    Disposition  DNR/ Inpatient  Hospice evaluation called and in progress   Discharge planning pending hospital course

## 2020-10-29 NOTE — GOALS OF CARE CONVERSATION - ADVANCED CARE PLANNING - CONVERSATION DETAILS
Spoke with pt's dtr regarding home hospice services. She declines the hospice services at this time. She would like for the medical team to continue with treatment and once stable to return to back to the group home. I left her with my contact info and she will contact me when hospice services are needed.       Bethany Carroll

## 2020-10-29 NOTE — SWALLOW BEDSIDE ASSESSMENT ADULT - COMMENTS
CXR 10/27/2020Heart is enlarged. The aorta and great vessels are ectatic.Lungs are under circulated but otherwise unremarkable.  No infiltrates are seen on September 25.Posttraumatic deformity upper humerus seen on both films.  IMPRESSION: Heart enlargement and dehydration.

## 2020-10-29 NOTE — SWALLOW BEDSIDE ASSESSMENT ADULT - SWALLOW EVAL: DIAGNOSIS
pt presented with oropharyngeal phases of swallow phases of swallow marked by fair oral opening for spoon- suspect behavioral, trace increased oral transit times with delay in swallow trigger and reduced laryngeal elevation.  no overt signs of aspiration

## 2020-10-29 NOTE — DIETITIAN INITIAL EVALUATION ADULT. - DIET TYPE
dysphagia 1, pureed, thin liquids/supplement (specify)/Suplena 1 can daily=425 calories, 10 grams protein  & 1 pkg no carb pro source daily = 15g pro & 60 kcal

## 2020-10-30 LAB
ANION GAP SERPL CALC-SCNC: 8 MMOL/L — SIGNIFICANT CHANGE UP (ref 5–17)
BUN SERPL-MCNC: 93 MG/DL — HIGH (ref 7–23)
CALCIUM SERPL-MCNC: 9.8 MG/DL — SIGNIFICANT CHANGE UP (ref 8.5–10.1)
CHLORIDE SERPL-SCNC: 118 MMOL/L — HIGH (ref 96–108)
CO2 SERPL-SCNC: 19 MMOL/L — LOW (ref 22–31)
CREAT SERPL-MCNC: 3.5 MG/DL — HIGH (ref 0.5–1.3)
GLUCOSE BLDC GLUCOMTR-MCNC: 143 MG/DL — HIGH (ref 70–99)
GLUCOSE BLDC GLUCOMTR-MCNC: 64 MG/DL — LOW (ref 70–99)
GLUCOSE BLDC GLUCOMTR-MCNC: 67 MG/DL — LOW (ref 70–99)
GLUCOSE BLDC GLUCOMTR-MCNC: 72 MG/DL — SIGNIFICANT CHANGE UP (ref 70–99)
GLUCOSE BLDC GLUCOMTR-MCNC: 74 MG/DL — SIGNIFICANT CHANGE UP (ref 70–99)
GLUCOSE BLDC GLUCOMTR-MCNC: 76 MG/DL — SIGNIFICANT CHANGE UP (ref 70–99)
GLUCOSE SERPL-MCNC: 69 MG/DL — LOW (ref 70–99)
HCT VFR BLD CALC: 32.4 % — LOW (ref 34.5–45)
HGB BLD-MCNC: 10.7 G/DL — LOW (ref 11.5–15.5)
MCHC RBC-ENTMCNC: 28.7 PG — SIGNIFICANT CHANGE UP (ref 27–34)
MCHC RBC-ENTMCNC: 33 GM/DL — SIGNIFICANT CHANGE UP (ref 32–36)
MCV RBC AUTO: 86.9 FL — SIGNIFICANT CHANGE UP (ref 80–100)
NRBC # BLD: 0 /100 WBCS — SIGNIFICANT CHANGE UP (ref 0–0)
PLATELET # BLD AUTO: 132 K/UL — LOW (ref 150–400)
POTASSIUM SERPL-MCNC: 4.4 MMOL/L — SIGNIFICANT CHANGE UP (ref 3.5–5.3)
POTASSIUM SERPL-SCNC: 4.4 MMOL/L — SIGNIFICANT CHANGE UP (ref 3.5–5.3)
RBC # BLD: 3.73 M/UL — LOW (ref 3.8–5.2)
RBC # FLD: 17.9 % — HIGH (ref 10.3–14.5)
SODIUM SERPL-SCNC: 145 MMOL/L — SIGNIFICANT CHANGE UP (ref 135–145)
WBC # BLD: 8.2 K/UL — SIGNIFICANT CHANGE UP (ref 3.8–10.5)
WBC # FLD AUTO: 8.2 K/UL — SIGNIFICANT CHANGE UP (ref 3.8–10.5)

## 2020-10-30 PROCEDURE — 99232 SBSQ HOSP IP/OBS MODERATE 35: CPT

## 2020-10-30 RX ORDER — SODIUM CHLORIDE 9 MG/ML
1000 INJECTION, SOLUTION INTRAVENOUS
Refills: 0 | Status: DISCONTINUED | OUTPATIENT
Start: 2020-10-30 | End: 2020-10-31

## 2020-10-30 RX ADMIN — SODIUM CHLORIDE 75 MILLILITER(S): 9 INJECTION, SOLUTION INTRAVENOUS at 11:43

## 2020-10-30 RX ADMIN — ATORVASTATIN CALCIUM 10 MILLIGRAM(S): 80 TABLET, FILM COATED ORAL at 21:31

## 2020-10-30 RX ADMIN — LATANOPROST 1 DROP(S): 0.05 SOLUTION/ DROPS OPHTHALMIC; TOPICAL at 21:31

## 2020-10-30 RX ADMIN — HEPARIN SODIUM 5000 UNIT(S): 5000 INJECTION INTRAVENOUS; SUBCUTANEOUS at 18:33

## 2020-10-30 RX ADMIN — CHLORHEXIDINE GLUCONATE 1 APPLICATION(S): 213 SOLUTION TOPICAL at 05:09

## 2020-10-30 RX ADMIN — Medication 100 MILLIGRAM(S): at 01:54

## 2020-10-30 RX ADMIN — Medication 1 MILLIGRAM(S): at 11:31

## 2020-10-30 RX ADMIN — SODIUM CHLORIDE 100 MILLILITER(S): 9 INJECTION, SOLUTION INTRAVENOUS at 09:33

## 2020-10-30 RX ADMIN — HEPARIN SODIUM 5000 UNIT(S): 5000 INJECTION INTRAVENOUS; SUBCUTANEOUS at 05:09

## 2020-10-30 RX ADMIN — Medication 1 TABLET(S): at 11:32

## 2020-10-30 RX ADMIN — SENNA PLUS 2 TABLET(S): 8.6 TABLET ORAL at 21:30

## 2020-10-30 NOTE — PROGRESS NOTE ADULT - ASSESSMENT
89 yo F with history HTN, HLD, CKD IV, CAD, Dementia, DVT, Gastrointestinal bleed, Glaucoma, Iron Deficiency Anemia, Diverticulosis, Osteoarthritis and Atrial Fibrillation on Eliquis, s/p fall with recent hip fx who was admitted for Hyperkalemia and Lethargy from [facility].       Acute to subacute Blood Loss Anemia  - suspect patient cam in hemoconcentrated and Hgb of 6.7 on on 10/29 was actually her real Hgb that was revealed w/ IVF - doubt that she had acute blood loss  - FOBT  - Iron panel c/w anemia of chronic disease  - status post 2unit pPRBC       Acute Renal Failure on CKD IV  - Baseline Creatinine 1.7-1.8  - likely prerenal   - improving w/ IVF  - CT showed an indeterminate hypodense lesion in the right kidney, Renal ultrasound did not show it, but did show bilaterally increased renal cortical echogenicity compatible w/ medical renal disease    Grossly stable 2.1 cm hypodense lesion in the pancreatic head  - will inform daughter    Hyperkalemia  - likely due to BLAYNE  - resolved     Hypernatremia  - likely due to hypovolemia from dehydration  - resolved     Acute Metabolic Encephalopathy w/ underlying dementia   - likely due to dehydration w/ BLAYNE    E. coli in urine  - likely colonization rather than infection as patient has no symptoms of UTI     Atrial Fibrillation  - c/w Amiodarone & Eliquis on hold due to anemia and BLAYNE    HTN  - hold home meds as patient is normotensive    HLD   - c/w Lipitor    Prophylaxis:  DVT: Heparin  GI: pepcid    Disposition: As per hospice note, the pt's daughter declines hospice services.    DNR

## 2020-10-30 NOTE — PROGRESS NOTE ADULT - SUBJECTIVE AND OBJECTIVE BOX
89 yo F with history HTN, HLD, CKD IV, CAD, Dementia, DVT, Gastrointestinal bleed, Glaucoma, Iron Deficiency Anemia, Diverticulosis, Osteoarthritis and Atrial Fibrillation on Eliquis, s/p fall with recent hip fx who was admitted for Hyperkalemia and Lethargy from [facility]. She is lying in bed in NAD.    MEDICATIONS  (STANDING):  atorvastatin 10 milliGRAM(s) Oral at bedtime  chlorhexidine 4% Liquid 1 Application(s) Topical <User Schedule>  dextrose 5%. 1000 milliLiter(s) (150 mL/Hr) IV Continuous <Continuous>  dextrose 5%. 1000 milliLiter(s) (75 mL/Hr) IV Continuous <Continuous>  folic acid 1 milliGRAM(s) Oral daily  heparin   Injectable 5000 Unit(s) SubCutaneous every 12 hours  influenza   Vaccine 0.5 milliLiter(s) IntraMuscular once  lactated ringers. 1000 milliLiter(s) (100 mL/Hr) IV Continuous <Continuous>  latanoprost 0.005% Ophthalmic Solution 1 Drop(s) Both EYES at bedtime  multivitamin 1 Tablet(s) Oral daily  senna 2 Tablet(s) Oral at bedtime    MEDICATIONS  (PRN):  acetaminophen   Tablet .. 650 milliGRAM(s) Oral every 6 hours PRN Temp greater or equal to 38C (100.4F), Mild Pain (1 - 3)      Allergies    PCN, Sulfa (Urticaria)  penicillin (Other)  penicillin (Unknown)  sulfa drugs (Other)  sulfa drugs (Unknown)    Intolerances    Bananas (Other (Mild to Mod))  Potatoes (Other (Mild to Mod))      Vital Signs Last 24 Hrs  T(C): 36.4 (30 Oct 2020 16:25), Max: 36.9 (30 Oct 2020 05:38)  T(F): 97.5 (30 Oct 2020 16:25), Max: 98.4 (30 Oct 2020 05:38)  HR: 55 (30 Oct 2020 16:25) (55 - 89)  BP: 137/61 (30 Oct 2020 16:25) (104/81 - 140/61)  BP(mean): --  RR: 16 (30 Oct 2020 16:25) (16 - 18)  SpO2: 94% (30 Oct 2020 16:25) (94% - 100%)    PHYSICAL EXAM:  GENERAL: NAD, Cachetic appearing; Lethargic AO x 1  HEAD:  Atraumatic, Normocephalic  ENMT: Dry  NECK: Supple, No JVD, Normal thyroid  NERVOUS SYSTEM:  Weak and unable to follow commands.   CHEST/LUNG: Clear to auscultation bilaterally; No rales, rhonchi, wheezing, or rubs  HEART: Regular rate and rhythm; No murmurs, rubs, or gallops  ABDOMEN: Soft, Nontender, Nondistended; Bowel sounds present  EXTREMITIES: No clubbing, cyanosis, or edema    LABS:                        10.7   8.20  )-----------( 132      ( 30 Oct 2020 07:56 )             32.4     10-30    145  |  118<H>  |  93<H>  ----------------------------<  69<L>  4.4   |  19<L>  |  3.50<H>    Ca    9.8      30 Oct 2020 07:56          CAPILLARY BLOOD GLUCOSE      POCT Blood Glucose.: 143 mg/dL (30 Oct 2020 18:38)  POCT Blood Glucose.: 76 mg/dL (30 Oct 2020 12:13)  POCT Blood Glucose.: 72 mg/dL (30 Oct 2020 11:58)  POCT Blood Glucose.: 64 mg/dL (30 Oct 2020 11:20)  POCT Blood Glucose.: 67 mg/dL (30 Oct 2020 11:19)  POCT Blood Glucose.: 74 mg/dL (30 Oct 2020 08:04)      Culture - Urine (collected 27 Oct 2020 13:54)  Source: .Urine Clean Catch (Midstream)  Final Report (29 Oct 2020 17:29):    >100,000 CFU/ml Escherichia coli  Organism: Escherichia coli (29 Oct 2020 17:29)  Organism: Escherichia coli (29 Oct 2020 17:29)      RADIOLOGY & ADDITIONAL TESTS:    10-29-20 @ 07:01  -  10-30-20 @ 07:00  --------------------------------------------------------  IN:    IV PiggyBack: 50 mL    Lactated Ringers: 900 mL    PRBCs (Packed Red Blood Cells): 300 mL  Total IN: 1250 mL    OUT:    Indwelling Catheter - Urethral (mL): 1250 mL    Voided (mL): 500 mL  Total OUT: 1750 mL    Total NET: -500 mL      10-30-20 @ 07:01  -  10-30-20 @ 20:37  --------------------------------------------------------  IN:    Oral Fluid: 40 mL  Total IN: 40 mL    OUT:    Incontinent per Collection Bag (mL): 500 mL    Voided (mL): 600 mL  Total OUT: 1100 mL    Total NET: -1060 mL

## 2020-10-30 NOTE — PROGRESS NOTE ADULT - SUBJECTIVE AND OBJECTIVE BOX
[facility] NEPHROLOGY SERVICES, [facility]  NEPHROLOGY AND HYPERTENSION  [address]  [address]  [place] [address]  [phone]    MD MATT KRUGER, MD AGUSTIN SAMSON, MD ARTHUR TORIBIO, MD ATIYA SLOAN, MD KRISTOPHER LUO, MD ANDREEA CARVALHO MD          Patient events noted  More alert    MEDICATIONS  (STANDING):  atorvastatin 10 milliGRAM(s) Oral at bedtime  chlorhexidine 4% Liquid 1 Application(s) Topical <User Schedule>  dextrose 5%. 1000 milliLiter(s) (150 mL/Hr) IV Continuous <Continuous>  dextrose 5%. 1000 milliLiter(s) (75 mL/Hr) IV Continuous <Continuous>  folic acid 1 milliGRAM(s) Oral daily  heparin   Injectable 5000 Unit(s) SubCutaneous every 12 hours  influenza   Vaccine 0.5 milliLiter(s) IntraMuscular once  lactated ringers. 1000 milliLiter(s) (100 mL/Hr) IV Continuous <Continuous>  latanoprost 0.005% Ophthalmic Solution 1 Drop(s) Both EYES at bedtime  multivitamin 1 Tablet(s) Oral daily  senna 2 Tablet(s) Oral at bedtime    MEDICATIONS  (PRN):  acetaminophen   Tablet .. 650 milliGRAM(s) Oral every 6 hours PRN Temp greater or equal to 38C (100.4F), Mild Pain (1 - 3)      10-29-20 @ 07:01  -  10-30-20 @ 07:00  --------------------------------------------------------  IN: 1250 mL / OUT: 1750 mL / NET: -500 mL    10-30-20 @ 07:01  -  10-30-20 @ 21:10  --------------------------------------------------------  IN: 40 mL / OUT: 1100 mL / NET: -1060 mL      PHYSICAL EXAM:      T(C): 36.4 (10-30-20 @ 16:25), Max: 36.9 (10-30-20 @ 05:38)  HR: 55 (10-30-20 @ 16:25) (55 - 89)  BP: 137/61 (10-30-20 @ 16:25) (104/81 - 140/61)  RR: 16 (10-30-20 @ 16:25) (16 - 18)  SpO2: 94% (10-30-20 @ 16:25) (94% - 100%)  Wt(kg): --  Lungs clear  Heart S1S2  Abd soft NT ND  Extremities:   tr edema                                    10.7   8.20  )-----------( 132      ( 30 Oct 2020 07:56 )             32.4     10-30    145  |  118<H>  |  93<H>  ----------------------------<  69<L>  4.4   |  19<L>  |  3.50<H>    Ca    9.8      30 Oct 2020 07:56          Creatinine Trend: 3.50<--, 4.11<--, 4.38<--, 4.89<--, 5.11<--, 6.05<--        Assessment   BLAYNE pre renal azotemia, suspected infectious AIN  Hypercalcemia suspected related to intravascular depletion, resolved  E. Coli UTI  Acute/chronic anemia    Plan  Continue IV fluid for 24 hrs      Bartolome Burns MD

## 2020-10-31 LAB
ANION GAP SERPL CALC-SCNC: 9 MMOL/L — SIGNIFICANT CHANGE UP (ref 5–17)
BUN SERPL-MCNC: 70 MG/DL — HIGH (ref 7–23)
CALCIUM SERPL-MCNC: 9.7 MG/DL — SIGNIFICANT CHANGE UP (ref 8.5–10.1)
CHLORIDE SERPL-SCNC: 115 MMOL/L — HIGH (ref 96–108)
CO2 SERPL-SCNC: 19 MMOL/L — LOW (ref 22–31)
CREAT SERPL-MCNC: 2.76 MG/DL — HIGH (ref 0.5–1.3)
GLUCOSE SERPL-MCNC: 67 MG/DL — LOW (ref 70–99)
HCT VFR BLD CALC: 38 % — SIGNIFICANT CHANGE UP (ref 34.5–45)
HGB BLD-MCNC: 12.6 G/DL — SIGNIFICANT CHANGE UP (ref 11.5–15.5)
MAGNESIUM SERPL-MCNC: 1.8 MG/DL — SIGNIFICANT CHANGE UP (ref 1.6–2.6)
MCHC RBC-ENTMCNC: 29 PG — SIGNIFICANT CHANGE UP (ref 27–34)
MCHC RBC-ENTMCNC: 33.2 GM/DL — SIGNIFICANT CHANGE UP (ref 32–36)
MCV RBC AUTO: 87.6 FL — SIGNIFICANT CHANGE UP (ref 80–100)
NRBC # BLD: 0 /100 WBCS — SIGNIFICANT CHANGE UP (ref 0–0)
PHOSPHATE SERPL-MCNC: 3.6 MG/DL — SIGNIFICANT CHANGE UP (ref 2.5–4.5)
PLATELET # BLD AUTO: 138 K/UL — LOW (ref 150–400)
POTASSIUM SERPL-MCNC: 4.5 MMOL/L — SIGNIFICANT CHANGE UP (ref 3.5–5.3)
POTASSIUM SERPL-SCNC: 4.5 MMOL/L — SIGNIFICANT CHANGE UP (ref 3.5–5.3)
RBC # BLD: 4.34 M/UL — SIGNIFICANT CHANGE UP (ref 3.8–5.2)
RBC # FLD: 17.7 % — HIGH (ref 10.3–14.5)
SODIUM SERPL-SCNC: 143 MMOL/L — SIGNIFICANT CHANGE UP (ref 135–145)
WBC # BLD: 8.2 K/UL — SIGNIFICANT CHANGE UP (ref 3.8–10.5)
WBC # FLD AUTO: 8.2 K/UL — SIGNIFICANT CHANGE UP (ref 3.8–10.5)

## 2020-10-31 PROCEDURE — 99232 SBSQ HOSP IP/OBS MODERATE 35: CPT

## 2020-10-31 RX ORDER — SODIUM CHLORIDE 9 MG/ML
1000 INJECTION, SOLUTION INTRAVENOUS
Refills: 0 | Status: DISCONTINUED | OUTPATIENT
Start: 2020-10-31 | End: 2020-11-02

## 2020-10-31 RX ADMIN — Medication 1 MILLIGRAM(S): at 11:25

## 2020-10-31 RX ADMIN — SODIUM CHLORIDE 50 MILLILITER(S): 9 INJECTION, SOLUTION INTRAVENOUS at 19:50

## 2020-10-31 RX ADMIN — HEPARIN SODIUM 5000 UNIT(S): 5000 INJECTION INTRAVENOUS; SUBCUTANEOUS at 05:21

## 2020-10-31 RX ADMIN — HEPARIN SODIUM 5000 UNIT(S): 5000 INJECTION INTRAVENOUS; SUBCUTANEOUS at 17:08

## 2020-10-31 RX ADMIN — SENNA PLUS 2 TABLET(S): 8.6 TABLET ORAL at 21:18

## 2020-10-31 RX ADMIN — Medication 1 TABLET(S): at 11:25

## 2020-10-31 RX ADMIN — ATORVASTATIN CALCIUM 10 MILLIGRAM(S): 80 TABLET, FILM COATED ORAL at 21:17

## 2020-10-31 RX ADMIN — LATANOPROST 1 DROP(S): 0.05 SOLUTION/ DROPS OPHTHALMIC; TOPICAL at 21:17

## 2020-10-31 RX ADMIN — CHLORHEXIDINE GLUCONATE 1 APPLICATION(S): 213 SOLUTION TOPICAL at 05:21

## 2020-10-31 NOTE — PROGRESS NOTE ADULT - SUBJECTIVE AND OBJECTIVE BOX
Subjective: no complaints.       MEDICATIONS  (STANDING):  atorvastatin 10 milliGRAM(s) Oral at bedtime  dextrose 5%. 1000 milliLiter(s) (150 mL/Hr) IV Continuous <Continuous>  dextrose 5%. 1000 milliLiter(s) (75 mL/Hr) IV Continuous <Continuous>  folic acid 1 milliGRAM(s) Oral daily  heparin   Injectable 5000 Unit(s) SubCutaneous every 12 hours  influenza   Vaccine 0.5 milliLiter(s) IntraMuscular once  lactated ringers. 1000 milliLiter(s) (100 mL/Hr) IV Continuous <Continuous>  latanoprost 0.005% Ophthalmic Solution 1 Drop(s) Both EYES at bedtime  multivitamin 1 Tablet(s) Oral daily  senna 2 Tablet(s) Oral at bedtime    MEDICATIONS  (PRN):  acetaminophen   Tablet .. 650 milliGRAM(s) Oral every 6 hours PRN Temp greater or equal to 38C (100.4F), Mild Pain (1 - 3)          T(C): 36.7 (10-31-20 @ 05:09), Max: 36.8 (10-30-20 @ 11:53)  HR: 73 (10-31-20 @ 05:09) (55 - 89)  BP: 111/86 (10-31-20 @ 05:09) (110/68 - 145/75)  RR: 16 (10-31-20 @ 05:09) (16 - 18)  SpO2: 100% (10-31-20 @ 05:09) (94% - 100%)  Wt(kg): --        I&O's Detail    30 Oct 2020 07:01  -  31 Oct 2020 07:00  --------------------------------------------------------  IN:    Oral Fluid: 40 mL  Total IN: 40 mL    OUT:    Incontinent per Collection Bag (mL): 500 mL    Voided (mL): 600 mL  Total OUT: 1100 mL    Total NET: -1060 mL               PHYSICAL EXAM:    GENERAL: NAD  NECK: Supple, no inc in JVP  CHEST/LUNG: Clear  HEART: S1S2  ABDOMEN: Soft, Nontender, Nondistended; Bowel sounds present  EXTREMITIES:  no edema  NEURO: no asterixis      LABS:  CBC Full  -  ( 30 Oct 2020 07:56 )  WBC Count : 8.20 K/uL  RBC Count : 3.73 M/uL  Hemoglobin : 10.7 g/dL  Hematocrit : 32.4 %  Platelet Count - Automated : 132 K/uL  Mean Cell Volume : 86.9 fl  Mean Cell Hemoglobin : 28.7 pg  Mean Cell Hemoglobin Concentration : 33.0 gm/dL  Auto Neutrophil # : x  Auto Lymphocyte # : x  Auto Monocyte # : x  Auto Eosinophil # : x  Auto Basophil # : x  Auto Neutrophil % : x  Auto Lymphocyte % : x  Auto Monocyte % : x  Auto Eosinophil % : x  Auto Basophil % : x    10-30    145  |  118<H>  |  93<H>  ----------------------------<  69<L>  4.4   |  19<L>  |  3.50<H>    Ca    9.8      30 Oct 2020 07:56            Assessment   BLAYNE pre renal azotemia, infectious AIN  Hypercalcemia suspected related to intravascular depletion, resolved  Severe hyperK -- controlled.   E. Coli UTI  Acute/chronic anemia    Plan  Daily BMP  Taper IVFs.

## 2020-10-31 NOTE — PROGRESS NOTE ADULT - SUBJECTIVE AND OBJECTIVE BOX
89 yo F with history HTN, HLD, CKD IV, CAD, Dementia, DVT, Gastrointestinal bleed, Glaucoma, Iron Deficiency Anemia, Diverticulosis, Osteoarthritis and Atrial Fibrillation on Eliquis, s/p fall with recent hip fx who was admitted for Hyperkalemia and Lethargy from [facility]. She is lying in bed in NAD.     MEDICATIONS  (STANDING):  atorvastatin 10 milliGRAM(s) Oral at bedtime  dextrose 5%. 1000 milliLiter(s) (150 mL/Hr) IV Continuous   dextrose 5%. 1000 milliLiter(s) (75 mL/Hr) IV Continuous   folic acid 1 milliGRAM(s) Oral daily  heparin   Injectable 5000 Unit(s) SubCutaneous every 12 hours  influenza   Vaccine 0.5 milliLiter(s) IntraMuscular once  latanoprost 0.005% Ophthalmic Solution 1 Drop(s) Both EYES at bedtime  multivitamin 1 Tablet(s) Oral daily  senna 2 Tablet(s) Oral at bedtime    MEDICATIONS  (PRN):  acetaminophen   Tablet .. 650 milliGRAM(s) Oral every 6 hours PRN Temp greater or equal to 38C (100.4F), Mild Pain (1 - 3)    Allergies    PCN, Sulfa (Urticaria)  penicillin (Other)  penicillin (Unknown)  sulfa drugs (Other)  sulfa drugs (Unknown)    Intolerances    Bananas (Other (Mild to Mod))  Potatoes (Other (Mild to Mod))      Vital Signs Last 24 Hrs  T(C): 36.7 (31 Oct 2020 16:40), Max: 36.7 (31 Oct 2020 05:09)  T(F): 98 (31 Oct 2020 16:40), Max: 98.1 (31 Oct 2020 05:09)  HR: 61 (31 Oct 2020 16:40) (61 - 73)  BP: 129/67 (31 Oct 2020 16:40) (111/86 - 152/76)  BP(mean): 86 (31 Oct 2020 16:40) (86 - 101)  RR: 19 (31 Oct 2020 16:40) (16 - 20)  SpO2: 96% (31 Oct 2020 16:40) (96% - 100%)    PHYSICAL EXAM:  GENERAL: NAD, Cachetic appearing; Lethargic AO x 1  HEAD:  Atraumatic, Normocephalic  ENMT: Dry  NECK: Supple, No JVD, Normal thyroid  NERVOUS SYSTEM:  Weak and unable to follow commands.   CHEST/LUNG: Clear to auscultation bilaterally; No rales, rhonchi, wheezing, or rubs  HEART: Regular rate and rhythm; No murmurs, rubs, or gallops  ABDOMEN: Soft, Nontender, Nondistended; Bowel sounds present  EXTREMITIES: No clubbing, cyanosis, or edema    LABS:                        12.6   8.20  )-----------( 138      ( 31 Oct 2020 14:37 )             38.0     10-31    143  |  115<H>  |  70<H>  ----------------------------<  67<L>  4.5   |  19<L>  |  2.76<H>    Ca    9.7      31 Oct 2020 14:37  Phos  3.6     10-31  Mg     1.8     10-31       Culture - Urine (collected 27 Oct 2020 13:54)  Source: .Urine Clean Catch (Midstream)  Final Report (29 Oct 2020 17:29):    >100,000 CFU/ml Escherichia coli  Organism: Escherichia coli (29 Oct 2020 17:29)  Organism: Escherichia coli (29 Oct 2020 17:29)      RADIOLOGY & ADDITIONAL TESTS:    10-30-20 @ 07:01  -  10-31-20 @ 07:00  --------------------------------------------------------  IN:    Oral Fluid: 40 mL  Total IN: 40 mL    OUT:    Incontinent per Collection Bag (mL): 500 mL    Voided (mL): 600 mL  Total OUT: 1100 mL    Total NET: -1060 mL      10-31-20 @ 08:01  -  10-31-20 @ 19:01  --------------------------------------------------------  IN:  Total IN: 0 mL    OUT:    Voided (mL): 1 mL  Total OUT: 1 mL    Total NET: -1 mL

## 2020-10-31 NOTE — PROGRESS NOTE ADULT - ASSESSMENT
89 yo F with history HTN, HLD, CKD IV, CAD, Dementia, DVT, Gastrointestinal bleed, Glaucoma, Iron Deficiency Anemia, Diverticulosis, Osteoarthritis and Atrial Fibrillation on Eliquis, s/p fall with recent hip fx who was admitted for Hyperkalemia and Lethargy from [facility].       Acute to subacute Blood Loss Anemia  - suspect patient cam in hemoconcentrated and Hgb of 6.7 on on 10/29 was actually her real Hgb that was revealed w/ IVF - doubt that she had acute blood loss  - FOBT  - Iron panel c/w anemia of chronic disease  - status post 2unit pPRBC       Acute Renal Failure on CKD IV  - Baseline Creatinine 1.7-1.8  - likely prerenal   - improving w/ IVF  - CT showed an indeterminate hypodense lesion in the right kidney, Renal ultrasound did not show it, but did show bilaterally increased renal cortical echogenicity compatible w/ medical renal disease  - renal following    Grossly stable 2.1 cm hypodense lesion in the pancreatic head  - will inform daughter    Hyperkalemia  - likely due to BLAYNE  - resolved     Hypernatremia  - likely due to hypovolemia from dehydration  - resolved     Acute Metabolic Encephalopathy w/ underlying dementia   - likely due to dehydration w/ BLAYNE    E. coli in urine  - likely colonization rather than infection as patient has no symptoms of UTI     Atrial Fibrillation  - c/w Amiodarone & Eliquis on hold due to anemia and BLAYNE    HTN  - hold home meds as patient is normotensive    HLD   - c/w Lipitor    Prophylaxis:  DVT: Heparin  GI: pepcid    Disposition: As per hospice note, the pt's daughter declines hospice services.    DNR

## 2020-11-01 LAB
ANION GAP SERPL CALC-SCNC: 8 MMOL/L — SIGNIFICANT CHANGE UP (ref 5–17)
BUN SERPL-MCNC: 61 MG/DL — HIGH (ref 7–23)
CALCIUM SERPL-MCNC: 9.9 MG/DL — SIGNIFICANT CHANGE UP (ref 8.5–10.1)
CHLORIDE SERPL-SCNC: 112 MMOL/L — HIGH (ref 96–108)
CO2 SERPL-SCNC: 22 MMOL/L — SIGNIFICANT CHANGE UP (ref 22–31)
CREAT SERPL-MCNC: 2.57 MG/DL — HIGH (ref 0.5–1.3)
GLUCOSE SERPL-MCNC: 83 MG/DL — SIGNIFICANT CHANGE UP (ref 70–99)
HCT VFR BLD CALC: 37 % — SIGNIFICANT CHANGE UP (ref 34.5–45)
HGB BLD-MCNC: 12.3 G/DL — SIGNIFICANT CHANGE UP (ref 11.5–15.5)
MAGNESIUM SERPL-MCNC: 1.8 MG/DL — SIGNIFICANT CHANGE UP (ref 1.6–2.6)
MCHC RBC-ENTMCNC: 29.1 PG — SIGNIFICANT CHANGE UP (ref 27–34)
MCHC RBC-ENTMCNC: 33.2 GM/DL — SIGNIFICANT CHANGE UP (ref 32–36)
MCV RBC AUTO: 87.5 FL — SIGNIFICANT CHANGE UP (ref 80–100)
NRBC # BLD: 0 /100 WBCS — SIGNIFICANT CHANGE UP (ref 0–0)
OB PNL STL: NEGATIVE — SIGNIFICANT CHANGE UP
PHOSPHATE SERPL-MCNC: 3.4 MG/DL — SIGNIFICANT CHANGE UP (ref 2.5–4.5)
PLATELET # BLD AUTO: 149 K/UL — LOW (ref 150–400)
POTASSIUM SERPL-MCNC: 4.1 MMOL/L — SIGNIFICANT CHANGE UP (ref 3.5–5.3)
POTASSIUM SERPL-SCNC: 4.1 MMOL/L — SIGNIFICANT CHANGE UP (ref 3.5–5.3)
RBC # BLD: 4.23 M/UL — SIGNIFICANT CHANGE UP (ref 3.8–5.2)
RBC # FLD: 17.7 % — HIGH (ref 10.3–14.5)
SODIUM SERPL-SCNC: 142 MMOL/L — SIGNIFICANT CHANGE UP (ref 135–145)
WBC # BLD: 8.06 K/UL — SIGNIFICANT CHANGE UP (ref 3.8–10.5)
WBC # FLD AUTO: 8.06 K/UL — SIGNIFICANT CHANGE UP (ref 3.8–10.5)

## 2020-11-01 PROCEDURE — 99232 SBSQ HOSP IP/OBS MODERATE 35: CPT

## 2020-11-01 RX ADMIN — HEPARIN SODIUM 5000 UNIT(S): 5000 INJECTION INTRAVENOUS; SUBCUTANEOUS at 05:24

## 2020-11-01 RX ADMIN — SENNA PLUS 2 TABLET(S): 8.6 TABLET ORAL at 21:12

## 2020-11-01 RX ADMIN — Medication 1 MILLIGRAM(S): at 11:41

## 2020-11-01 RX ADMIN — HEPARIN SODIUM 5000 UNIT(S): 5000 INJECTION INTRAVENOUS; SUBCUTANEOUS at 17:10

## 2020-11-01 RX ADMIN — LATANOPROST 1 DROP(S): 0.05 SOLUTION/ DROPS OPHTHALMIC; TOPICAL at 21:12

## 2020-11-01 RX ADMIN — Medication 1 TABLET(S): at 11:41

## 2020-11-01 RX ADMIN — ATORVASTATIN CALCIUM 10 MILLIGRAM(S): 80 TABLET, FILM COATED ORAL at 21:11

## 2020-11-01 NOTE — PROGRESS NOTE ADULT - ASSESSMENT
89 yo F with history HTN, HLD, CKD IV, CAD, Dementia, DVT, Gastrointestinal bleed, Glaucoma, Iron Deficiency Anemia, Diverticulosis, Osteoarthritis and Atrial Fibrillation on Eliquis, s/p fall with recent hip fx who was admitted for Hyperkalemia and Lethargy from [facility].       Acute to subacute Blood Loss Anemia  - suspect patient cam in hemoconcentrated and Hgb of 6.7 on on 10/29 was actually her real Hgb that was revealed w/ IVF - doubt that she had acute blood loss  - FOBT  - Iron panel c/w anemia of chronic disease  - status post 2unit pPRBC       Acute Renal Failure on CKD IV  - Baseline Creatinine 1.7-1.8  - likely prerenal   - improving w/ IVF  - CT showed an indeterminate hypodense lesion in the right kidney, Renal ultrasound did not show it, but did show bilaterally increased renal cortical echogenicity compatible w/ medical renal disease  - renal following    Grossly stable 2.1 cm hypodense lesion in the pancreatic head  - will inform daughter    Hyperkalemia  - likely due to BLAYNE  - resolved     Hypernatremia  - likely due to hypovolemia from dehydration  - resolved     Acute Metabolic Encephalopathy w/ underlying dementia   - likely due to dehydration w/ BLAYNE    E. coli in urine  - likely colonization rather than infection as patient has no symptoms of UTI     Atrial Fibrillation  - c/w Amiodarone & Eliquis on hold due to anemia and BLANYE    HTN  - hold home meds as patient is normotensive    HLD   - c/w Lipitor    Prophylaxis:  DVT: Heparin  GI: pepcid    Disposition: As per hospice note, the pt's daughter declines hospice services.    DNR

## 2020-11-01 NOTE — PROGRESS NOTE ADULT - SUBJECTIVE AND OBJECTIVE BOX
89 yo F with history HTN, HLD, CKD IV, CAD, Dementia, DVT, Gastrointestinal bleed, Glaucoma, Iron Deficiency Anemia, Diverticulosis, Osteoarthritis and Atrial Fibrillation on Eliquis, s/p fall with recent hip fx who was admitted for Hyperkalemia and Lethargy from [facility]. She is lying in bed in NAD.      MEDICATIONS  (STANDING):  atorvastatin 10 milliGRAM(s) Oral at bedtime  dextrose 5%. 1000 milliLiter(s) (50 mL/Hr) IV Continuous <Continuous>  folic acid 1 milliGRAM(s) Oral daily  heparin   Injectable 5000 Unit(s) SubCutaneous every 12 hours  influenza   Vaccine 0.5 milliLiter(s) IntraMuscular once  latanoprost 0.005% Ophthalmic Solution 1 Drop(s) Both EYES at bedtime  multivitamin 1 Tablet(s) Oral daily  senna 2 Tablet(s) Oral at bedtime    MEDICATIONS  (PRN):  acetaminophen   Tablet .. 650 milliGRAM(s) Oral every 6 hours PRN Temp greater or equal to 38C (100.4F), Mild Pain (1 - 3)      Allergies    PCN, Sulfa (Urticaria)  penicillin (Other)  penicillin (Unknown)  sulfa drugs (Other)  sulfa drugs (Unknown)    Intolerances    Bananas (Other (Mild to Mod))  Potatoes (Other (Mild to Mod))      Vital Signs Last 24 Hrs  T(C): 36.4 (01 Nov 2020 16:44), Max: 37.3 (31 Oct 2020 23:42)  T(F): 97.5 (01 Nov 2020 16:44), Max: 99.2 (31 Oct 2020 23:42)  HR: 52 (01 Nov 2020 16:44) (52 - 68)  BP: 128/81 (01 Nov 2020 16:44) (110/55 - 146/81)  BP(mean): 96 (01 Nov 2020 16:44) (96 - 96)  RR: 19 (01 Nov 2020 16:44) (16 - 19)  SpO2: 95% (01 Nov 2020 16:44) (95% - 99%)    PHYSICAL EXAM:  GENERAL: NAD, Cachetic appearing; Lethargic AO x 1  HEAD:  Atraumatic, Normocephalic  ENMT: Dry  NECK: Supple, No JVD, Normal thyroid  NERVOUS SYSTEM:  Weak and unable to follow commands.   CHEST/LUNG: Clear to auscultation bilaterally; No rales, rhonchi, wheezing, or rubs  HEART: Regular rate and rhythm; No murmurs, rubs, or gallops  ABDOMEN: Soft, Nontender, Nondistended; Bowel sounds present  EXTREMITIES: No clubbing, cyanosis, or edema    LABS:                        12.3   8.06  )-----------( 149      ( 01 Nov 2020 07:00 )             37.0     11-01    142  |  112<H>  |  61<H>  ----------------------------<  83  4.1   |  22  |  2.57<H>    Ca    9.9      01 Nov 2020 07:00  Phos  3.4     11-01  Mg     1.8     11-01    RADIOLOGY & ADDITIONAL TESTS:    10-31-20 @ 08:01  -  11-01-20 @ 07:00  --------------------------------------------------------  IN:    dextrose 5%: 450 mL  Total IN: 450 mL    OUT:    Voided (mL): 1001 mL  Total OUT: 1001 mL    Total NET: -551 mL      11-01-20 @ 07:01  -  11-01-20 @ 20:33  --------------------------------------------------------  IN:    Oral Fluid: 118 mL  Total IN: 118 mL    OUT:  Total OUT: 0 mL    Total NET: 118 mL

## 2020-11-01 NOTE — PROGRESS NOTE ADULT - SUBJECTIVE AND OBJECTIVE BOX
Subjective: no change in status.       MEDICATIONS  (STANDING):  atorvastatin 10 milliGRAM(s) Oral at bedtime  dextrose 5%. 1000 milliLiter(s) (50 mL/Hr) IV Continuous <Continuous>  folic acid 1 milliGRAM(s) Oral daily  heparin   Injectable 5000 Unit(s) SubCutaneous every 12 hours  influenza   Vaccine 0.5 milliLiter(s) IntraMuscular once  latanoprost 0.005% Ophthalmic Solution 1 Drop(s) Both EYES at bedtime  multivitamin 1 Tablet(s) Oral daily  senna 2 Tablet(s) Oral at bedtime    MEDICATIONS  (PRN):  acetaminophen   Tablet .. 650 milliGRAM(s) Oral every 6 hours PRN Temp greater or equal to 38C (100.4F), Mild Pain (1 - 3)          T(C): 36.6 (11-01-20 @ 04:58), Max: 37.3 (10-31-20 @ 23:42)  HR: 55 (11-01-20 @ 04:58) (55 - 62)  BP: 146/81 (11-01-20 @ 04:58) (129/67 - 152/76)  RR: 16 (11-01-20 @ 04:58) (16 - 20)  SpO2: 96% (11-01-20 @ 04:58) (96% - 99%)  Wt(kg): --        I&O's Detail    31 Oct 2020 08:01  -  01 Nov 2020 07:00  --------------------------------------------------------  IN:    dextrose 5%: 450 mL  Total IN: 450 mL    OUT:    Voided (mL): 1001 mL  Total OUT: 1001 mL    Total NET: -551 mL               PHYSICAL EXAM:    GENERAL: NAD  NECK: Supple, no inc in JVP  CHEST/LUNG: Clear  HEART: S1S2  ABDOMEN: Soft, Nontender, Nondistended; Bowel sounds present  EXTREMITIES:  no edema  NEURO: no asterixis      LABS:  CBC Full  -  ( 01 Nov 2020 07:00 )  WBC Count : 8.06 K/uL  RBC Count : 4.23 M/uL  Hemoglobin : 12.3 g/dL  Hematocrit : 37.0 %  Platelet Count - Automated : 149 K/uL  Mean Cell Volume : 87.5 fl  Mean Cell Hemoglobin : 29.1 pg  Mean Cell Hemoglobin Concentration : 33.2 gm/dL  Auto Neutrophil # : x  Auto Lymphocyte # : x  Auto Monocyte # : x  Auto Eosinophil # : x  Auto Basophil # : x  Auto Neutrophil % : x  Auto Lymphocyte % : x  Auto Monocyte % : x  Auto Eosinophil % : x  Auto Basophil % : x    11-01    142  |  112<H>  |  61<H>  ----------------------------<  83  4.1   |  22  |  2.57<H>    Ca    9.9      01 Nov 2020 07:00  Phos  3.4     11-01  Mg     1.8     11-01            Assessment   BLAYNE pre renal azotemia, infectious AIN  Hypercalcemia most likely related to intravascular depletion, resolved  Severe hyperK -- controlled.   E. Coli UTI  Acute/chronic anemia    Plan  Daily BMP  Taper IVFs.

## 2020-11-02 LAB — SARS-COV-2 RNA SPEC QL NAA+PROBE: SIGNIFICANT CHANGE UP

## 2020-11-02 PROCEDURE — 99232 SBSQ HOSP IP/OBS MODERATE 35: CPT

## 2020-11-02 RX ADMIN — ATORVASTATIN CALCIUM 10 MILLIGRAM(S): 80 TABLET, FILM COATED ORAL at 21:12

## 2020-11-02 RX ADMIN — Medication 1 TABLET(S): at 11:08

## 2020-11-02 RX ADMIN — SENNA PLUS 2 TABLET(S): 8.6 TABLET ORAL at 21:11

## 2020-11-02 RX ADMIN — HEPARIN SODIUM 5000 UNIT(S): 5000 INJECTION INTRAVENOUS; SUBCUTANEOUS at 05:09

## 2020-11-02 RX ADMIN — LATANOPROST 1 DROP(S): 0.05 SOLUTION/ DROPS OPHTHALMIC; TOPICAL at 21:12

## 2020-11-02 RX ADMIN — Medication 1 MILLIGRAM(S): at 11:08

## 2020-11-02 RX ADMIN — HEPARIN SODIUM 5000 UNIT(S): 5000 INJECTION INTRAVENOUS; SUBCUTANEOUS at 17:41

## 2020-11-02 NOTE — PHYSICAL THERAPY INITIAL EVALUATION ADULT - BED MOBILITY TRAINING, PT EVAL
Pt will perform all tasks of bed mobility independently within 6 weeks to prevent pressure injuries and deconditioning.

## 2020-11-02 NOTE — PROGRESS NOTE ADULT - SUBJECTIVE AND OBJECTIVE BOX
[facility] NEPHROLOGY SERVICES, [facility]  NEPHROLOGY AND HYPERTENSION  [address]  [address]  [place] [address]  [phone]    MD MATT KRUGER, MD AGUSTIN SAMSON, MD ARTHUR TORIBIO, MD ATIYA SLOAN, MD KRISTOPHER LUO, MD ANDREEA CARVALHO MD          Patient events noted; comfortable    MEDICATIONS  (STANDING):  atorvastatin 10 milliGRAM(s) Oral at bedtime  dextrose 5%. 1000 milliLiter(s) (50 mL/Hr) IV Continuous <Continuous>  folic acid 1 milliGRAM(s) Oral daily  heparin   Injectable 5000 Unit(s) SubCutaneous every 12 hours  influenza   Vaccine 0.5 milliLiter(s) IntraMuscular once  latanoprost 0.005% Ophthalmic Solution 1 Drop(s) Both EYES at bedtime  multivitamin 1 Tablet(s) Oral daily  senna 2 Tablet(s) Oral at bedtime    MEDICATIONS  (PRN):  acetaminophen   Tablet .. 650 milliGRAM(s) Oral every 6 hours PRN Temp greater or equal to 38C (100.4F), Mild Pain (1 - 3)      11-01-20 @ 07:01  -  11-02-20 @ 07:00  --------------------------------------------------------  IN: 118 mL / OUT: 950 mL / NET: -832 mL    11-02-20 @ 07:01  -  11-02-20 @ 20:02  --------------------------------------------------------  IN: 420 mL / OUT: 200 mL / NET: 220 mL      PHYSICAL EXAM:      T(C): 36.9 (11-02-20 @ 15:47), Max: 37.1 (11-02-20 @ 11:56)  HR: 64 (11-02-20 @ 15:47) (52 - 77)  BP: 127/83 (11-02-20 @ 15:47) (102/61 - 139/59)  RR: 18 (11-02-20 @ 15:47) (17 - 18)  SpO2: 97% (11-02-20 @ 15:47) (95% - 98%)  Wt(kg): --  Lungs clear  Heart S1S2  Abd soft NT ND  Extremities:   tr edema                                    12.3   8.06  )-----------( 149      ( 01 Nov 2020 07:00 )             37.0     11-01    142  |  112<H>  |  61<H>  ----------------------------<  83  4.1   |  22  |  2.57<H>    Ca    9.9      01 Nov 2020 07:00  Phos  3.4     11-01  Mg     1.8     11-01          Creatinine Trend: 2.57<--, 2.76<--, 3.50<--, 4.11<--, 4.38<--, 4.89<--        Assessment   BLAYNE pre renal azotemia, infectious AIN  Hypercalcemia most likely related to intravascular depletion, resolved  Severe hyperK -- controlled.   E. Coli UTI  Acute/chronic anemia    Plan  D/C IVF    Bartolome Burns MD

## 2020-11-02 NOTE — CHART NOTE - NSCHARTNOTEFT_GEN_A_CORE
Pt w/ acute subacute blood loss anemia ; acute renal failure on CKD ; Grossly stable 2.1 cm hypodense lesion in the pancreatic head; acute metabolic encephalopathy w/ underlying dementia; E. Coli in urine ; A - Fib ; HTN ; H:D ; DNR.     Factors impacting intake: [ ] none [ ] nausea  [ ] vomiting [ ] diarrhea [ ] constipation  [ ]chewing problems [ ] swallowing issues  [x ] other: Dementia    10/29 - Diet Prescription: Diet, Dysphagia 1 Pureed-Thin Liquids:   No Carb Prosource (1pkg = 15gms Protein)     Qty per Day:  daily  Supplement Feeding Modality:  Oral  Suplena Cans or Servings Per Day:  1       Frequency:  Daily (10-29-20 @ 14:22)    Intake: < 50 % . Drinking Suplena 1 can daily=425 calories, 10 grams protein  and consuming 1 pg no carb pro source daily = 15 g pro & 60 kcal.      Current Weight: Weight (kg): 11/2 - 108.4 (49.2 kg) Edema - 1+ (L) arm ; 10/27 - 107.2 (48.6 kg)   % Weight Change - 1.1 % / .6 kg gain x 6 days w/ noted 1+ edema & IV fluids continue.       Pertinent Medications: MEDICATIONS  (STANDING):  atorvastatin 10 milliGRAM(s) Oral at bedtime  dextrose 5%. 1000 milliLiter(s) (50 mL/Hr) IV Continuous <Continuous>  folic acid 1 milliGRAM(s) Oral daily  heparin   Injectable 5000 Unit(s) SubCutaneous every 12 hours  influenza   Vaccine 0.5 milliLiter(s) IntraMuscular once  latanoprost 0.005% Ophthalmic Solution 1 Drop(s) Both EYES at bedtime  multivitamin 1 Tablet(s) Oral daily  senna 2 Tablet(s) Oral at bedtime    MEDICATIONS  (PRN):  acetaminophen   Tablet .. 650 milliGRAM(s) Oral every 6 hours PRN Temp greater or equal to 38C (100.4F), Mild Pain (1 - 3)    Pertinent Labs: 11-01 Na142 mmol/L Glu 83 mg/dL K+ 4.1 mmol/L Cr  2.57 mg/dL<H> BUN 61 mg/dL<H> 11-01 Phos 3.4 mg/dL 10-27 Alb 3.0 g/dL<L>10-27 ALT 15 U/L AST 4 U/L<L> Alkaline Phosphatase 106 U/L     CAPILLARY BLOOD GLUCOSE        Skin: L & R inner knee stage 2         sacral stage 2         R buttock stage 2         L first toe suspected DTI         L knee Stage 2    Estimated Needs:   [x ] no change since previous assessment (10/30/20)  [ ] recalculated:     Nutrition Diagnosis:    Nutrition Diagnosis:  Nutrition diagnosis yes... Nutrition Diagnostic #1.     Nutrition Diagnostic Terminology #1 Malnutrition...     Malnutrition Severe Malnutrition in the context of Chronic illness.     Etiology Increased energy/protein intake inadequate/energy protein needs related to Dementia ; Pressure Ulcer.     Signs/Symptoms Severe Muscle loss ; & 10 % wt loss in 1 month.     Goal/Expected Outcome Pt will Meet calorie and protein needs > 50% via meals/supplements during Length Of Stay -> NOT MET      Nutrition Diagnosis is [x ] ongoing  [ ] resolved [ ] not applicable     New Nutrition Diagnosis: [x ] not applicable       Interventions:   Recommend  [ x] Continue current Diet as Rx'd - Dysphagia 1 pureed thin liquids  [ ] Change Diet To:  [ ] Nutrition Supplement - 1 pkg no carb pro source daily = 15 g pro and 60 kcal & Suplena 1 can daily=425 calories, 10 grams protein   [ ] Nutrition Support  [ ] Other:     Monitoring and Evaluation:   [ ] PO intake [ x ] Tolerance to diet prescription [ x ] weights [ x ] labs[ x ] follow up per protocol  [ ] other: Pt w/ acute subacute blood loss anemia ; acute renal failure on CKD ; Grossly stable 2.1 cm hypodense lesion in the pancreatic head; acute metabolic encephalopathy w/ underlying dementia; E. Coli in urine ; A - Fib ; HTN ; H:D ; DNR.     Factors impacting intake: [ ] none [ ] nausea  [ ] vomiting [ ] diarrhea [ ] constipation  [ ]chewing problems [ ] swallowing issues  [x ] other: Dementia    10/29 - Diet Prescription: Diet, Dysphagia 1 Pureed-Thin Liquids:   No Carb Prosource (1pkg = 15gms Protein)     Qty per Day:  daily  Supplement Feeding Modality:  Oral  Suplena Cans or Servings Per Day:  1       Frequency:  Daily (10-29-20 @ 14:22)    Intake: < 50 % . Drinking Suplena 1 can daily=425 calories, 10 grams protein  and consuming 1 pg no carb pro source daily = 15 g pro & 60 kcal.      Current Weight: Weight (kg): 11/2 - 108.4 (49.2 kg) Edema - 1+ (L) arm ; 10/27 - 107.2 (48.6 kg)   % Weight Change - 1.1 % / .6 kg gain x 6 days w/ noted 1+ edema & IV fluids continue.     Nutrition focused physical exam conducted ;   Subcutaneous fat loss: [severe ] Orbital fat pads region, [ ]Buccal fat region, [unable ]Triceps region,  [unable ]Ribs region.  Muscle wasting: [severe ]Temples region, [ severe]Clavicle region, [severe ]Shoulder region, [severe ]Scapula region, [unable ]Interosseous region,  [unable ]thigh region, [unable ]Calf region      Pertinent Medications: MEDICATIONS  (STANDING):  atorvastatin 10 milliGRAM(s) Oral at bedtime  dextrose 5%. 1000 milliLiter(s) (50 mL/Hr) IV Continuous <Continuous>  folic acid 1 milliGRAM(s) Oral daily  heparin   Injectable 5000 Unit(s) SubCutaneous every 12 hours  influenza   Vaccine 0.5 milliLiter(s) IntraMuscular once  latanoprost 0.005% Ophthalmic Solution 1 Drop(s) Both EYES at bedtime  multivitamin 1 Tablet(s) Oral daily  senna 2 Tablet(s) Oral at bedtime    MEDICATIONS  (PRN):  acetaminophen   Tablet .. 650 milliGRAM(s) Oral every 6 hours PRN Temp greater or equal to 38C (100.4F), Mild Pain (1 - 3)    Pertinent Labs: 11-01 Na142 mmol/L Glu 83 mg/dL K+ 4.1 mmol/L Cr  2.57 mg/dL<H> BUN 61 mg/dL<H> 11-01 Phos 3.4 mg/dL 10-27 Alb 3.0 g/dL<L>10-27 ALT 15 U/L AST 4 U/L<L> Alkaline Phosphatase 106 U/L     CAPILLARY BLOOD GLUCOSE        Skin: L & R inner knee stage 2         sacral stage 2         R buttock stage 2         L first toe suspected DTI         L knee Stage 2    Estimated Needs:   [x ] no change since previous assessment (10/30/20)  [ ] recalculated:     Nutrition Diagnosis:    Nutrition Diagnosis:  Nutrition diagnosis yes... Nutrition Diagnostic #1.     Nutrition Diagnostic Terminology #1 Malnutrition...     Malnutrition Severe Malnutrition in the context of Chronic illness.     Etiology Increased energy/protein intake inadequate/energy protein needs related to Dementia ; Pressure Ulcer.     Signs/Symptoms Severe Muscle loss ; & 10 % wt loss in 1 month.     Goal/Expected Outcome Pt will Meet calorie and protein needs > 50% via meals/supplements during Length Of Stay -> NOT MET      Nutrition Diagnosis is [x ] ongoing  [ ] resolved [ ] not applicable     New Nutrition Diagnosis: [x ] not applicable       Interventions:   Recommend  [ x] Continue current Diet as Rx'd - Dysphagia 1 pureed thin liquids  [ ] Change Diet To:  [ ] Nutrition Supplement - 1 pkg no carb pro source daily = 15 g pro and 60 kcal & Suplena 1 can daily=425 calories, 10 grams protein   [ ] Nutrition Support  [ ] Other:     Monitoring and Evaluation:   [ ] PO intake [ x ] Tolerance to diet prescription [ x ] weights [ x ] labs[ x ] follow up per protocol  [ ] other:

## 2020-11-02 NOTE — PHYSICAL THERAPY INITIAL EVALUATION ADULT - CRITERIA FOR SKILLED THERAPEUTIC INTERVENTIONS
rehab potential/anticipated discharge recommendation/impairments found/risk reduction/prevention/functional limitations in following categories

## 2020-11-02 NOTE — PROGRESS NOTE ADULT - NUTRITIONAL ASSESSMENT
This patient has been assessed with a concern for Malnutrition and has been determined to have a diagnosis/diagnoses of Severe protein-calorie malnutrition and Underweight/BMI < 19.    This patient is being managed with:   Diet Dysphagia 1 Pureed-Thin Liquids-  No Carb Prosource (1pkg = 15gms Protein)     Qty per Day:  daily  Supplement Feeding Modality:  Oral  Suplena Cans or Servings Per Day:  1       Frequency:  Daily  Entered: Oct 29 2020  2:22PM    

## 2020-11-02 NOTE — PHYSICAL THERAPY INITIAL EVALUATION ADULT - GENERAL OBSERVATIONS, REHAB EVAL
Pt encountered supine in bed, A&O to name only, confused however able to follow commands 50% of time, +IV line, +primafit, L trigger finger noted/observed, +offloading boots, +ABD pillow, NAD and comfortable.

## 2020-11-02 NOTE — PROGRESS NOTE ADULT - ASSESSMENT
89 yo F with history HTN, HLD, CKD IV, CAD, Dementia, DVT, Gastrointestinal bleed, Glaucoma, Iron Deficiency Anemia, Diverticulosis, Osteoarthritis and Atrial Fibrillation on Eliquis, s/p fall with recent hip fx who was admitted for Hyperkalemia and Lethargy from [facility].       Acute to subacute Blood Loss Anemia  - suspect patient cam in hemoconcentrated and Hgb of 6.7 on on 10/29 was actually her real Hgb that was revealed w/ IVF - doubt that she had acute blood loss  - FOBT  - Iron panel c/w anemia of chronic disease  - status post 2unit pPRBC       Acute Renal Failure on CKD IV  - Baseline Creatinine 1.7-1.8  - likely prerenal   - improving w/ IVF  - CT showed an indeterminate hypodense lesion in the right kidney, Renal ultrasound did not show it, but did show bilaterally increased renal cortical echogenicity compatible w/ medical renal disease  - renal following    Grossly stable 2.1 cm hypodense lesion in the pancreatic head  - will inform daughter    Hyperkalemia  - likely due to BLAYNE  - resolved     Hypernatremia  - likely due to hypovolemia from dehydration  - resolved     Acute Metabolic Encephalopathy w/ underlying dementia   - likely due to dehydration w/ BLAYNE    E. coli in urine  - likely colonization rather than infection as patient has no symptoms of UTI     Atrial Fibrillation  - c/w Amiodarone & Eliquis on hold due to anemia and BLAYNE    HTN  - hold home meds as patient is normotensive    HLD   - c/w Lipitor    Prophylaxis:  DVT: Heparin  GI: pepcid  Code status: DNR    Disposition: As per hospice note, the pt's daughter declines hospice services. Pt is medically stable, pending discharge to rehab.

## 2020-11-02 NOTE — PROGRESS NOTE ADULT - REASON FOR ADMISSION
k of 7.2
Dehydration
Hyperkalemia
k of 7.2

## 2020-11-02 NOTE — PHYSICAL THERAPY INITIAL EVALUATION ADULT - ADDITIONAL COMMENTS
As per daughter Donna via phone call, pt lives at an assisted living facility without steps to enter. Pt was independent prior to fall in september with ambulation with the use of a rolling walker and independent with feeding/toileting, however required assist with bathing. History of fall in september which resulted in L hip fracture s/p IMN 9/27/20. Daughter denies other falls prior to september

## 2020-11-02 NOTE — PROGRESS NOTE ADULT - SUBJECTIVE AND OBJECTIVE BOX
89 yo F with history HTN, HLD, CKD IV, CAD, Dementia, DVT, Gastrointestinal bleed, Glaucoma, Iron Deficiency Anemia, Diverticulosis, Osteoarthritis and Atrial Fibrillation on Eliquis, s/p fall with recent hip fx who was admitted for Hyperkalemia and Lethargy from [facility]. She is lying in bed in NAD.      MEDICATIONS  (STANDING):  atorvastatin 10 milliGRAM(s) Oral at bedtime  dextrose 5%. 1000 milliLiter(s) (50 mL/Hr) IV Continuous <Continuous>  folic acid 1 milliGRAM(s) Oral daily  heparin   Injectable 5000 Unit(s) SubCutaneous every 12 hours  influenza   Vaccine 0.5 milliLiter(s) IntraMuscular once  latanoprost 0.005% Ophthalmic Solution 1 Drop(s) Both EYES at bedtime  multivitamin 1 Tablet(s) Oral daily  senna 2 Tablet(s) Oral at bedtime    MEDICATIONS  (PRN):  acetaminophen   Tablet .. 650 milliGRAM(s) Oral every 6 hours PRN Temp greater or equal to 38C (100.4F), Mild Pain (1 - 3)      Allergies    PCN, Sulfa (Urticaria)  penicillin (Other)  penicillin (Unknown)  sulfa drugs (Other)  sulfa drugs (Unknown)    Intolerances    Bananas (Other (Mild to Mod))  Potatoes (Other (Mild to Mod))      Vital Signs Last 24 Hrs  T(C): 36.5 (02 Nov 2020 20:58), Max: 37.1 (02 Nov 2020 11:56)  T(F): 97.7 (02 Nov 2020 20:58), Max: 98.7 (02 Nov 2020 11:56)  HR: 70 (02 Nov 2020 20:58) (52 - 77)  BP: 155/83 (02 Nov 2020 20:58) (102/61 - 155/83)  BP(mean): --  RR: 17 (02 Nov 2020 20:58) (17 - 18)  SpO2: 88% (02 Nov 2020 20:58) (88% - 98%)    PHYSICAL EXAM:  GENERAL: NAD, Cachetic appearing; Lethargic AO x 1  HEAD:  Atraumatic, Normocephalic  ENMT: Dry  NECK: Supple, No JVD, Normal thyroid  NERVOUS SYSTEM:  Weak and unable to follow commands.   CHEST/LUNG: Clear to auscultation bilaterally; No rales, rhonchi, wheezing, or rubs  HEART: Regular rate and rhythm; No murmurs, rubs, or gallops  ABDOMEN: Soft, Nontender, Nondistended; Bowel sounds present  EXTREMITIES: No clubbing, cyanosis, or edema      LABS:                        12.3   8.06  )-----------( 149      ( 01 Nov 2020 07:00 )             37.0     11-01    142  |  112<H>  |  61<H>  ----------------------------<  83  4.1   |  22  |  2.57<H>    Ca    9.9      01 Nov 2020 07:00  Phos  3.4     11-01  Mg     1.8     11-01      RADIOLOGY & ADDITIONAL TESTS:    11-01-20 @ 07:01  -  11-02-20 @ 07:00  --------------------------------------------------------  IN:    Oral Fluid: 118 mL  Total IN: 118 mL    OUT:    Voided (mL): 950 mL  Total OUT: 950 mL    Total NET: -832 mL      11-02-20 @ 07:01  -  11-02-20 @ 21:07  --------------------------------------------------------  IN:    Oral Fluid: 420 mL  Total IN: 420 mL    OUT:    Voided (mL): 200 mL  Total OUT: 200 mL    Total NET: 220 mL

## 2020-11-02 NOTE — PHYSICAL THERAPY INITIAL EVALUATION ADULT - PERTINENT HX OF CURRENT PROBLEM, REHAB EVAL
Pt is a 89y/o F admitted s/p fall 1 month ago c L hip fracture and IMN on 9/27/20. Pt is from [facility] and admitted to [facility] due to hyperkalemia and lethargy.

## 2020-11-03 ENCOUNTER — TRANSCRIPTION ENCOUNTER (OUTPATIENT)
Age: 85
End: 2020-11-03

## 2020-11-03 VITALS
TEMPERATURE: 98 F | RESPIRATION RATE: 14 BRPM | OXYGEN SATURATION: 99 % | DIASTOLIC BLOOD PRESSURE: 68 MMHG | HEART RATE: 60 BPM | SYSTOLIC BLOOD PRESSURE: 149 MMHG

## 2020-11-03 LAB
ANION GAP SERPL CALC-SCNC: 7 MMOL/L — SIGNIFICANT CHANGE UP (ref 5–17)
BUN SERPL-MCNC: 44 MG/DL — HIGH (ref 7–23)
CALCIUM SERPL-MCNC: 10 MG/DL — SIGNIFICANT CHANGE UP (ref 8.5–10.1)
CHLORIDE SERPL-SCNC: 116 MMOL/L — HIGH (ref 96–108)
CO2 SERPL-SCNC: 21 MMOL/L — LOW (ref 22–31)
CREAT SERPL-MCNC: 1.97 MG/DL — HIGH (ref 0.5–1.3)
GLUCOSE SERPL-MCNC: 78 MG/DL — SIGNIFICANT CHANGE UP (ref 70–99)
MAGNESIUM SERPL-MCNC: 1.7 MG/DL — SIGNIFICANT CHANGE UP (ref 1.6–2.6)
POTASSIUM SERPL-MCNC: 4.3 MMOL/L — SIGNIFICANT CHANGE UP (ref 3.5–5.3)
POTASSIUM SERPL-SCNC: 4.3 MMOL/L — SIGNIFICANT CHANGE UP (ref 3.5–5.3)
SODIUM SERPL-SCNC: 144 MMOL/L — SIGNIFICANT CHANGE UP (ref 135–145)

## 2020-11-03 PROCEDURE — 99239 HOSP IP/OBS DSCHRG MGMT >30: CPT

## 2020-11-03 RX ORDER — CHLORHEXIDINE GLUCONATE 213 G/1000ML
1 SOLUTION TOPICAL
Refills: 0 | Status: DISCONTINUED | OUTPATIENT
Start: 2020-11-03 | End: 2020-11-03

## 2020-11-03 RX ORDER — LANOLIN ALCOHOL/MO/W.PET/CERES
1 CREAM (GRAM) TOPICAL
Qty: 0 | Refills: 0 | DISCHARGE

## 2020-11-03 RX ADMIN — HEPARIN SODIUM 5000 UNIT(S): 5000 INJECTION INTRAVENOUS; SUBCUTANEOUS at 05:13

## 2020-11-03 RX ADMIN — Medication 1 MILLIGRAM(S): at 11:43

## 2020-11-03 RX ADMIN — Medication 1 TABLET(S): at 11:44

## 2020-11-03 NOTE — DISCHARGE NOTE NURSING/CASE MANAGEMENT/SOCIAL WORK - CAREGIVER RELATION TO PATIENT
Patient will come to [facility] office to  PAPER: prescription.   Patient was advised of location and hours: Yes.   Patient was advised to bring photo ID: Yes.   Patient elects another party to  item: no.    daughter

## 2020-11-03 NOTE — DISCHARGE NOTE PROVIDER - NSDCCPCAREPLAN_GEN_ALL_CORE_FT
PRINCIPAL DISCHARGE DIAGNOSIS  Diagnosis: Hyperkalemia  Assessment and Plan of Treatment:       SECONDARY DISCHARGE DIAGNOSES  Diagnosis: Dehydration  Assessment and Plan of Treatment:     Diagnosis: BLAYNE (acute kidney injury)  Assessment and Plan of Treatment:

## 2020-11-03 NOTE — DISCHARGE NOTE PROVIDER - HOSPITAL COURSE
89 yo F with history HTN, HLD, CKD IV, CAD, Dementia, DVT, Gastrointestinal bleed, Glaucoma, Iron Deficiency Anemia, Diverticulosis, Osteoarthritis and Atrial Fibrillation on Eliquis, s/p fall with recent hip fx who was admitted for Hyperkalemia, Hypernatremia, Acute Renal Failure on CKD IV and Lethargy from [facility]. Pt was found to have Acute Metabolic Encephalopathy likely due to dehydration w/ BLAYNE. Her hypernatremia was likely due to hypovolemia from dehydration and resolved w/ IVF. He BLAYNE on CKD IV was likely prerenal and improved w/ IVF. CT showed an indeterminate hypodense lesion in the right kidney, but the Renal ultrasound did not show it and did show bilaterally increased renal cortical echogenicity compatible w/ medical renal disease. Her Cr improved w/ IVF. The pt's potassium level normalized and her mental status returned to normal.  Of note, her Hgb fell w/ IVF, likely because she came in hemoconcentrated and Hgb of 6.7 on 10/29 was actually her real Hgb that was revealed w/ IVF. She was transfused 2unit pPRBC. FOBT was negative. Her iron panel was c/w anemia of chronic disease and her Hgb remained stable since the transfusion. Of note, the patient had E. coli in urine that was likely colonization rather than infection as patient had no symptoms of UTI. I informed the pt's daughter that she had a grossly stable 2.1 cm hypodense lesion in the pancreatic head on CT. We also spoke about the risk vs benefit of continuing Eliquis.      Discharge time: 43 minutes

## 2020-11-03 NOTE — INPATIENT CERTIFICATION FOR MEDICARE PATIENTS - RISKS OF ADVERSE EVENTS
The ABCs of the Annual Wellness Visit  Subsequent Medicare Wellness Visit    Chief Complaint   Patient presents with   • Medicare Wellness-subsequent     Subjective   History of Present Illness:  Kourtney Siddiqi is a 68 y.o. female who presents for a Subsequent Medicare Wellness Visit.    Vocal Cord Paralysis: follows w/ ENT recently underwent R vocal cord surgery to help stimulate this vocal cord. Patient has had good result w/ improved speech.    HTN: 122/80 today. Maintained on Lotrel 5/10 daily. No LH/dizziness, CP or SOB.    Anxiety/depression: improved now- retired, family issues better. Maintained on escitalopram 10. She uses 2 daily, mostly at night for sleep.     GERD: maintained on omeprazole 40 daily. No heartburn/reflux or dysphagia on medicine.    HEALTH RISK ASSESSMENT    Recent Hospitalizations:  No hospitalization(s) within the last year.    Current Medical Providers:  Patient Care Team:  Kari Grullon APRN as PCP - General (Nurse Practitioner)    Smoking Status:  Social History     Tobacco Use   Smoking Status Never Smoker   Smokeless Tobacco Never Used     Alcohol Consumption:  Social History     Substance and Sexual Activity   Alcohol Use No   • Frequency: Monthly or less     Depression Screen:   PHQ-2/PHQ-9 Depression Screening 11/3/2020   Little interest or pleasure in doing things 0   Feeling down, depressed, or hopeless 0   Total Score 0     Fall Risk Screen:  STEADI Fall Risk Assessment was completed, and patient is at LOW risk for falls.Assessment completed on:11/3/2020    Health Habits and Functional and Cognitive Screening:  Functional & Cognitive Status 11/3/2020   Do you have difficulty preparing food and eating? No   Do you have difficulty bathing yourself, getting dressed or grooming yourself? No   Do you have difficulty using the toilet? No   Do you have difficulty moving around from place to place? No   Do you have trouble with steps or getting out of a bed or a chair? No    Current Diet Well Balanced Diet   Dental Exam Not up to date   Eye Exam Up to date   Exercise (times per week) 0 times per week   Current Exercise Activities Include Yard Work   Do you need help using the phone?  No   Are you deaf or do you have serious difficulty hearing?  Yes   Do you need help with transportation? No   Do you need help shopping? No   Do you need help preparing meals?  No   Do you need help with housework?  No   Do you need help with laundry? No   Do you need help taking your medications? No   Do you need help managing money? No   Do you ever drive or ride in a car without wearing a seat belt? No   Have you felt unusual stress, anger or loneliness in the last month? No   Who do you live with? Alone   If you need help, do you have trouble finding someone available to you? No   Have you been bothered in the last four weeks by sexual problems? No   Do you have difficulty concentrating, remembering or making decisions? No     Does the patient have evidence of cognitive impairment? No    Asprin use counseling:Taking ASA appropriately as indicated    Age-appropriate Screening Schedule:  Refer to the list below for future screening recommendations based on patient's age, sex and/or medical conditions. Orders for these recommended tests are listed in the plan section. The patient has been provided with a written plan.    Health Maintenance   Topic Date Due   • TDAP/TD VACCINES (1 - Tdap) 02/02/1971   • ZOSTER VACCINE (1 of 2) 02/02/2002   • LIPID PANEL  08/09/2020   • MAMMOGRAM  03/26/2021   • COLONOSCOPY  08/17/2028   • INFLUENZA VACCINE  Completed          The following portions of the patient's history were reviewed and updated as appropriate: allergies, current medications, past family history, past medical history, past social history, past surgical history and problem list.    Outpatient Medications Prior to Visit   Medication Sig Dispense Refill   • amLODIPine-benazepril (LOTREL 5-10) 5-10 MG per  capsule TAKE 1 CAPSULE BY MOUTH EVERY DAY 90 capsule 0   • aspirin 325 MG tablet Take 325 mg by mouth Daily.     • escitalopram (LEXAPRO) 10 MG tablet TAKE 1 TABLET BY MOUTH EVERY DAY 90 tablet 0   • levocetirizine (XYZAL) 5 MG tablet      • LORazepam (ATIVAN) 1 MG tablet TAKE 1 TO 2 TABLET BY MOUTH EVERY NIGHT AT BEDTIME 60 tablet 0   • meloxicam (MOBIC) 15 MG tablet TAKE 1 TABLET BY MOUTH DAILY 90 tablet 1   • minocycline (MINOCIN,DYNACIN) 50 MG capsule      • montelukast (SINGULAIR) 10 MG tablet TAKE 1 TABLET BY MOUTH EVERY NIGHT 90 tablet 0   • omeprazole (priLOSEC) 40 MG capsule Take 1 capsule by mouth Daily. 90 capsule 1   • tobramycin-dexamethasone (TOBRADEX) 0.3-0.1 % ophthalmic suspension        No facility-administered medications prior to visit.        Patient Active Problem List   Diagnosis   • Colon polyps   • Depression   • Family history of malignant neoplasm of colon   • Gastroesophageal reflux disease   • Generalized anxiety disorder   • Hyperlipidemia   • Hypertension   • Osteoarthritis   • Dysphonia   • Migraine aura without headache   • Vocal cord paralysis       Advanced Care Planning:  ACP discussion was held with the patient during this visit. Patient does not have an advance directive, declines further assistance.    Review of Systems   Constitutional: Negative for chills, fatigue and unexpected weight change.   HENT: Positive for hearing loss and voice change. Negative for congestion and rhinorrhea.    Eyes: Negative for visual disturbance.   Respiratory: Negative for cough and shortness of breath.    Cardiovascular: Negative for chest pain and palpitations.   Gastrointestinal: Negative for abdominal pain, constipation, diarrhea, nausea and vomiting.   Genitourinary: Negative for difficulty urinating.   Musculoskeletal: Positive for arthralgias. Negative for joint swelling.   Skin: Negative for rash.   Neurological: Negative for weakness and headaches.   Psychiatric/Behavioral: Positive for  "sleep disturbance. Negative for dysphoric mood. The patient is not nervous/anxious.        Compared to one year ago, the patient feels her physical health is better.  Compared to one year ago, the patient feels her mental health is the same.    Reviewed chart for potential of high risk medication in the elderly: yes  Reviewed chart for potential of harmful drug interactions in the elderly:yes    Objective         Vitals:    11/03/20 1048   BP: 122/80   BP Location: Left arm   Patient Position: Sitting   Cuff Size: Large Adult   Pulse: 78   Resp: 16   Temp: 96.9 °F (36.1 °C)   TempSrc: Temporal   SpO2: 94%   Weight: 105 kg (232 lb)   Height: 167.6 cm (66\")       Body mass index is 37.45 kg/m².  Discussed the patient's BMI with her. The BMI is above average; BMI management plan is completed.    Physical Exam  Constitutional:       General: She is not in acute distress.     Appearance: She is well-developed.   HENT:      Head: Normocephalic and atraumatic.      Right Ear: External ear normal.      Left Ear: External ear normal.      Nose: Nose normal.      Mouth/Throat:      Pharynx: No oropharyngeal exudate.   Eyes:      General: No scleral icterus.        Right eye: No discharge.         Left eye: No discharge.      Conjunctiva/sclera: Conjunctivae normal.      Pupils: Pupils are equal, round, and reactive to light.   Neck:      Musculoskeletal: Normal range of motion and neck supple.      Thyroid: No thyromegaly.      Comments: Well approximated incision w/o erythema or drainage  Cardiovascular:      Rate and Rhythm: Normal rate and regular rhythm.      Heart sounds: Normal heart sounds. No murmur.   Pulmonary:      Effort: Pulmonary effort is normal. No respiratory distress.      Breath sounds: Normal breath sounds. No wheezing or rales.   Abdominal:      General: Bowel sounds are normal. There is no distension.      Palpations: Abdomen is soft.      Tenderness: There is abdominal tenderness (RUQ).      Comments: " obese   Musculoskeletal: Normal range of motion.         General: No deformity.   Lymphadenopathy:      Cervical: No cervical adenopathy.   Skin:     General: Skin is warm and dry.      Capillary Refill: Capillary refill takes less than 2 seconds.      Findings: Rash (erythematous macule to L lower lid) present.   Neurological:      Mental Status: She is alert and oriented to person, place, and time.      Cranial Nerves: No cranial nerve deficit.      Sensory: No sensory deficit.   Psychiatric:         Behavior: Behavior normal.         Thought Content: Thought content normal.         Lab Results   Component Value Date    TRIG 161 (H) 11/04/2020    HDL 55 11/04/2020    LDL 89 11/04/2020    VLDL 28 11/04/2020    HGBA1C 6.0 (H) 11/04/2020        Assessment/Plan   Medicare Risks and Personalized Health Plan  CMS Preventative Services Quick Reference  Advance Directive Discussion  Breast Cancer/Mammogram Screening  Cardiovascular risk  Chronic Pain   Colon Cancer Screening  Dementia/Memory   Depression/Dysphoria  Diabetic Lab Screening   Fall Risk  Immunizations Discussed/Encouraged (specific immunizations; adacel Tdap )  Obesity/Overweight   Osteoprorosis Risk  Polypharmacy    The above risks/problems have been discussed with the patient.  Pertinent information has been shared with the patient in the After Visit Summary.  Follow up plans and orders are seen below in the Assessment/Plan Section.    Diagnoses and all orders for this visit:    1. Medicare annual wellness visit, subsequent (Primary)  -     CBC & Differential  -     Comprehensive Metabolic Panel  -     Hemoglobin A1c  -     Lipid Panel  -     T4, Free  -     TSH  -     Vitamin D 25 Hydroxy  -     Vitamin B12  -     DEXA Bone Density Axial; Future  -     Mammo Screening Digital Tomosynthesis Bilateral With CAD; Future  -     CBC Auto Differential  -  Counseled regarding diet, exercise, weight loss, and preventative health maintenance items/immunizations  requires hip surgery and [facility]/Other:/Concern for delay in diagnosis and treatment below    2. Vocal cord paralysis   -Continue ENT [id number]    3. Essential hypertension: 122/80 today  -     Comprehensive Metabolic Panel  - Continue home Lotrel 5/10 mg daily    4. Generalized anxiety disorder: Patient mainly uses Ativan (1-1.5 mg) at night for sleep.  We discussed the desire to use less controlled substance and would provide trazodone as below to aid with sleep and improve anxiety.  We will continue to provide Ativan second line  -     Start traZODone (DESYREL) 100 MG tablet; Take 1 tablet by mouth Every Night. Take half a pill nightly for the first week. Then increase to full tab daily  Dispense: 90 tablet; Refill: 1  - Continue home Lexapro 10 mg daily and Ativan 1 mg twice daily as needed    5. Insomnia, unspecified type  -     Start traZODone (DESYREL) 100 MG tablet; Take 1 tablet by mouth Every Night. Take half a pill nightly for the first week. Then increase to full tab daily  Dispense: 90 tablet; Refill: 1  - Continue Ativan as needed    6. Gastroesophageal reflux disease, unspecified whether esophagitis present   -Continue home omeprazole 40 mg daily    7. Vitamin D deficiency, unspecified   -     Vitamin D 25 Hydroxy    8. Abnormal findings on diagnostic imaging of other parts of musculoskeletal system   -     DEXA Bone Density Axial; Future    9. Encounter for screening mammogram for malignant neoplasm of breast   -     Mammo Screening Digital Tomosynthesis Bilateral With CAD; Future    Follow Up:  Return in about 6 months (around 5/3/2021) for Anxiety/insomnia, vocal cord.     An After Visit Summary and PPPS were given to the patient.     Preventative  Colonoscopy: 2018, due 2023  Mammogram: Ordered today- follows w/ GYN (Po)  Pap smear: aged out, s/p hysterectomy  DEXA: Ordered today  Shingles: Completed- 2018  Pneumonia: Prevnar 2017; Pneumovax 2019  Tdap: Recommended (out)  Influenza: 9/2020

## 2020-11-03 NOTE — DISCHARGE NOTE PROVIDER - NSDCMRMEDTOKEN_GEN_ALL_CORE_FT
acetaminophen 325 mg oral tablet: 2 tab(s) orally every 6 hours, As needed, Temp greater or equal to 38C (100.4F)  amiodarone 100 mg oral tablet: 1 tab(s) orally 2 times a week on Monday and Thursday  ammonium lactate 12% topical lotion: Apply topically to affected area once a day  ascorbic acid 500 mg oral tablet: 1 tab(s) orally 2 times a day  atorvastatin 10 mg oral tablet: 1 tab(s) orally once a day (at bedtime)  B-Complex with Vitamin C: 1 tab(s) orally once a day  calcitriol 0.25 mcg oral capsule: 1 cap(s) orally once a day  Colace 100 mg oral capsule: 2 cap(s) orally once a day  Eliquis 2.5 mg oral tablet: 0.5 tab(s) *1.25mg* orally 2 times a day    Dosing confirmed by JESSICA Izquierdo at [facility]  ferrous sulfate 325 mg (65 mg elemental iron) oral delayed release tablet: 1 tab(s) orally once a day  folic acid 1 mg oral tablet: 1 tab(s) orally once a day  latanoprost 0.005% ophthalmic solution: 1 drop(s) to each affected eye once a day (at bedtime)  Melatonin 3 mg oral tablet: 1 tab(s) orally once a day (in the evening), As Needed for insomnia  Milk of Magnesia 8% oral suspension: 30 milliliter(s) orally once a day (at bedtime), As Needed  multivitamin: 1 tab(s) orally once a day  senna oral tablet: 2 tab(s) orally once a day (at bedtime)  Vitamin D3 2000 intl units (50 mcg) oral tablet: 1 tab(s) orally once a day

## 2020-11-03 NOTE — DISCHARGE NOTE NURSING/CASE MANAGEMENT/SOCIAL WORK - PATIENT PORTAL LINK FT
You can access the FollowMyHealth Patient Portal offered by [facility] by registering at the following website: [email or web address]. By joining [facility]’s FollowMyHealth portal, you will also be able to view your health information using other applications (apps) compatible with our system.

## 2020-11-08 DIAGNOSIS — I48.91 UNSPECIFIED ATRIAL FIBRILLATION: ICD-10-CM

## 2020-11-08 DIAGNOSIS — E86.0 DEHYDRATION: ICD-10-CM

## 2020-11-08 DIAGNOSIS — Z91.018 ALLERGY TO OTHER FOODS: ICD-10-CM

## 2020-11-08 DIAGNOSIS — I12.9 HYPERTENSIVE CHRONIC KIDNEY DISEASE WITH STAGE 1 THROUGH STAGE 4 CHRONIC KIDNEY DISEASE, OR UNSPECIFIED CHRONIC KIDNEY DISEASE: ICD-10-CM

## 2020-11-08 DIAGNOSIS — E86.1 HYPOVOLEMIA: ICD-10-CM

## 2020-11-08 DIAGNOSIS — F03.90 UNSPECIFIED DEMENTIA, UNSPECIFIED SEVERITY, WITHOUT BEHAVIORAL DISTURBANCE, PSYCHOTIC DISTURBANCE, MOOD DISTURBANCE, AND ANXIETY: ICD-10-CM

## 2020-11-08 DIAGNOSIS — E87.0 HYPEROSMOLALITY AND HYPERNATREMIA: ICD-10-CM

## 2020-11-08 DIAGNOSIS — R64 CACHEXIA: ICD-10-CM

## 2020-11-08 DIAGNOSIS — D50.9 IRON DEFICIENCY ANEMIA, UNSPECIFIED: ICD-10-CM

## 2020-11-08 DIAGNOSIS — Z01.84 ENCOUNTER FOR ANTIBODY RESPONSE EXAMINATION: ICD-10-CM

## 2020-11-08 DIAGNOSIS — Z88.2 ALLERGY STATUS TO SULFONAMIDES: ICD-10-CM

## 2020-11-08 DIAGNOSIS — E87.5 HYPERKALEMIA: ICD-10-CM

## 2020-11-08 DIAGNOSIS — Z86.19 PERSONAL HISTORY OF OTHER INFECTIOUS AND PARASITIC DISEASES: ICD-10-CM

## 2020-11-08 DIAGNOSIS — Z88.0 ALLERGY STATUS TO PENICILLIN: ICD-10-CM

## 2020-11-08 DIAGNOSIS — G93.41 METABOLIC ENCEPHALOPATHY: ICD-10-CM

## 2020-11-08 DIAGNOSIS — R76.0 RAISED ANTIBODY TITER: ICD-10-CM

## 2020-11-08 DIAGNOSIS — N17.9 ACUTE KIDNEY FAILURE, UNSPECIFIED: ICD-10-CM

## 2020-11-08 DIAGNOSIS — N18.4 CHRONIC KIDNEY DISEASE, STAGE 4 (SEVERE): ICD-10-CM

## 2020-11-08 DIAGNOSIS — E78.2 MIXED HYPERLIPIDEMIA: ICD-10-CM

## 2020-11-08 DIAGNOSIS — E43 UNSPECIFIED SEVERE PROTEIN-CALORIE MALNUTRITION: ICD-10-CM

## 2020-11-08 DIAGNOSIS — I25.10 ATHEROSCLEROTIC HEART DISEASE OF NATIVE CORONARY ARTERY WITHOUT ANGINA PECTORIS: ICD-10-CM

## 2020-11-08 DIAGNOSIS — E83.52 HYPERCALCEMIA: ICD-10-CM

## 2020-11-23 NOTE — ED ADULT NURSE NOTE - NSFALLRSKHRMRISKTYPE_ED_ALL_ED
C/o abdominal pain and nausea. Requesting medication. Morphine 2 mg IV and zofran 4 mg IV given. See MAR. Call light in reach. age(85 years old or older)

## 2020-12-15 ENCOUNTER — INPATIENT (INPATIENT)
Facility: HOSPITAL | Age: 85
LOS: 1 days | Discharge: HOSPICE | DRG: 871 | End: 2020-12-17
Attending: INTERNAL MEDICINE | Admitting: INTERNAL MEDICINE
Payer: MEDICARE

## 2020-12-15 VITALS
DIASTOLIC BLOOD PRESSURE: 48 MMHG | HEIGHT: 63 IN | TEMPERATURE: 92 F | SYSTOLIC BLOOD PRESSURE: 92 MMHG | HEART RATE: 74 BPM

## 2020-12-15 DIAGNOSIS — Z71.89 OTHER SPECIFIED COUNSELING: ICD-10-CM

## 2020-12-15 DIAGNOSIS — T68.XXXA HYPOTHERMIA, INITIAL ENCOUNTER: ICD-10-CM

## 2020-12-15 DIAGNOSIS — I10 ESSENTIAL (PRIMARY) HYPERTENSION: ICD-10-CM

## 2020-12-15 DIAGNOSIS — I25.10 ATHEROSCLEROTIC HEART DISEASE OF NATIVE CORONARY ARTERY WITHOUT ANGINA PECTORIS: ICD-10-CM

## 2020-12-15 DIAGNOSIS — F03.90 UNSPECIFIED DEMENTIA WITHOUT BEHAVIORAL DISTURBANCE: ICD-10-CM

## 2020-12-15 DIAGNOSIS — N17.9 ACUTE KIDNEY FAILURE, UNSPECIFIED: ICD-10-CM

## 2020-12-15 DIAGNOSIS — A41.9 SEPSIS, UNSPECIFIED ORGANISM: ICD-10-CM

## 2020-12-15 DIAGNOSIS — Z95.828 PRESENCE OF OTHER VASCULAR IMPLANTS AND GRAFTS: Chronic | ICD-10-CM

## 2020-12-15 DIAGNOSIS — R41.82 ALTERED MENTAL STATUS, UNSPECIFIED: ICD-10-CM

## 2020-12-15 DIAGNOSIS — E87.5 HYPERKALEMIA: ICD-10-CM

## 2020-12-15 DIAGNOSIS — N39.0 URINARY TRACT INFECTION, SITE NOT SPECIFIED: ICD-10-CM

## 2020-12-15 DIAGNOSIS — Z29.9 ENCOUNTER FOR PROPHYLACTIC MEASURES, UNSPECIFIED: ICD-10-CM

## 2020-12-15 LAB
ALBUMIN SERPL ELPH-MCNC: 2.8 G/DL — LOW (ref 3.3–5)
ALP SERPL-CCNC: 89 U/L — SIGNIFICANT CHANGE UP (ref 40–120)
ALT FLD-CCNC: 28 U/L — SIGNIFICANT CHANGE UP (ref 12–78)
ANION GAP SERPL CALC-SCNC: 20 MMOL/L — HIGH (ref 5–17)
APPEARANCE UR: ABNORMAL
APTT BLD: 32.2 SEC — SIGNIFICANT CHANGE UP (ref 27.5–35.5)
AST SERPL-CCNC: 25 U/L — SIGNIFICANT CHANGE UP (ref 15–37)
BASOPHILS # BLD AUTO: 0.02 K/UL — SIGNIFICANT CHANGE UP (ref 0–0.2)
BASOPHILS NFR BLD AUTO: 0.2 % — SIGNIFICANT CHANGE UP (ref 0–2)
BILIRUB SERPL-MCNC: 0.3 MG/DL — SIGNIFICANT CHANGE UP (ref 0.2–1.2)
BILIRUB UR-MCNC: NEGATIVE — SIGNIFICANT CHANGE UP
BUN SERPL-MCNC: 205 MG/DL — HIGH (ref 7–23)
CALCIUM SERPL-MCNC: 12.5 MG/DL — HIGH (ref 8.5–10.1)
CHLORIDE SERPL-SCNC: 138 MMOL/L — HIGH (ref 96–108)
CO2 SERPL-SCNC: 8 MMOL/L — CRITICAL LOW (ref 22–31)
COLOR SPEC: YELLOW — SIGNIFICANT CHANGE UP
CREAT SERPL-MCNC: 14 MG/DL — HIGH (ref 0.5–1.3)
DIFF PNL FLD: ABNORMAL
EOSINOPHIL # BLD AUTO: 0.01 K/UL — SIGNIFICANT CHANGE UP (ref 0–0.5)
EOSINOPHIL NFR BLD AUTO: 0.1 % — SIGNIFICANT CHANGE UP (ref 0–6)
GLUCOSE SERPL-MCNC: 156 MG/DL — HIGH (ref 70–99)
GLUCOSE UR QL: NEGATIVE — SIGNIFICANT CHANGE UP
HCT VFR BLD CALC: 34.4 % — LOW (ref 34.5–45)
HGB BLD-MCNC: 10.4 G/DL — LOW (ref 11.5–15.5)
IMM GRANULOCYTES NFR BLD AUTO: 0.8 % — SIGNIFICANT CHANGE UP (ref 0–1.5)
INR BLD: 1.23 RATIO — HIGH (ref 0.88–1.16)
KETONES UR-MCNC: NEGATIVE — SIGNIFICANT CHANGE UP
LACTATE SERPL-SCNC: 1.9 MMOL/L — SIGNIFICANT CHANGE UP (ref 0.7–2)
LACTATE SERPL-SCNC: 4.1 MMOL/L — CRITICAL HIGH (ref 0.7–2)
LEUKOCYTE ESTERASE UR-ACNC: ABNORMAL
LYMPHOCYTES # BLD AUTO: 0.39 K/UL — LOW (ref 1–3.3)
LYMPHOCYTES # BLD AUTO: 3 % — LOW (ref 13–44)
MCHC RBC-ENTMCNC: 29.1 PG — SIGNIFICANT CHANGE UP (ref 27–34)
MCHC RBC-ENTMCNC: 30.2 GM/DL — LOW (ref 32–36)
MCV RBC AUTO: 96.4 FL — SIGNIFICANT CHANGE UP (ref 80–100)
MONOCYTES # BLD AUTO: 0.63 K/UL — SIGNIFICANT CHANGE UP (ref 0–0.9)
MONOCYTES NFR BLD AUTO: 4.9 % — SIGNIFICANT CHANGE UP (ref 2–14)
NEUTROPHILS # BLD AUTO: 11.76 K/UL — HIGH (ref 1.8–7.4)
NEUTROPHILS NFR BLD AUTO: 91 % — HIGH (ref 43–77)
NITRITE UR-MCNC: NEGATIVE — SIGNIFICANT CHANGE UP
NRBC # BLD: 3 /100 WBCS — HIGH (ref 0–0)
PH UR: 6.5 — SIGNIFICANT CHANGE UP (ref 5–8)
PLATELET # BLD AUTO: 348 K/UL — SIGNIFICANT CHANGE UP (ref 150–400)
POTASSIUM SERPL-MCNC: 8.2 MMOL/L — CRITICAL HIGH (ref 3.5–5.3)
POTASSIUM SERPL-SCNC: 8.2 MMOL/L — CRITICAL HIGH (ref 3.5–5.3)
PROT SERPL-MCNC: 7.3 G/DL — SIGNIFICANT CHANGE UP (ref 6–8.3)
PROT UR-MCNC: 500 MG/DL
PROTHROM AB SERPL-ACNC: 14.3 SEC — HIGH (ref 10.6–13.6)
RAPID RVP RESULT: SIGNIFICANT CHANGE UP
RBC # BLD: 3.57 M/UL — LOW (ref 3.8–5.2)
RBC # FLD: 19.7 % — HIGH (ref 10.3–14.5)
SARS-COV-2 RNA SPEC QL NAA+PROBE: SIGNIFICANT CHANGE UP
SODIUM SERPL-SCNC: 166 MMOL/L — CRITICAL HIGH (ref 135–145)
SP GR SPEC: 1.01 — SIGNIFICANT CHANGE UP (ref 1.01–1.02)
UROBILINOGEN FLD QL: NEGATIVE — SIGNIFICANT CHANGE UP
WBC # BLD: 12.91 K/UL — HIGH (ref 3.8–10.5)
WBC # FLD AUTO: 12.91 K/UL — HIGH (ref 3.8–10.5)

## 2020-12-15 PROCEDURE — 99285 EMERGENCY DEPT VISIT HI MDM: CPT

## 2020-12-15 PROCEDURE — 71045 X-RAY EXAM CHEST 1 VIEW: CPT | Mod: 26

## 2020-12-15 PROCEDURE — 93010 ELECTROCARDIOGRAM REPORT: CPT

## 2020-12-15 RX ORDER — DEXTROSE 50 % IN WATER 50 %
25 SYRINGE (ML) INTRAVENOUS ONCE
Refills: 0 | Status: DISCONTINUED | OUTPATIENT
Start: 2020-12-15 | End: 2020-12-17

## 2020-12-15 RX ORDER — GLUCAGON INJECTION, SOLUTION 0.5 MG/.1ML
1 INJECTION, SOLUTION SUBCUTANEOUS ONCE
Refills: 0 | Status: DISCONTINUED | OUTPATIENT
Start: 2020-12-15 | End: 2020-12-17

## 2020-12-15 RX ORDER — DOCUSATE SODIUM 100 MG
2 CAPSULE ORAL
Qty: 0 | Refills: 0 | DISCHARGE

## 2020-12-15 RX ORDER — FOLIC ACID 0.8 MG
1 TABLET ORAL DAILY
Refills: 0 | Status: DISCONTINUED | OUTPATIENT
Start: 2020-12-15 | End: 2020-12-16

## 2020-12-15 RX ORDER — LATANOPROST 0.05 MG/ML
1 SOLUTION/ DROPS OPHTHALMIC; TOPICAL AT BEDTIME
Refills: 0 | Status: DISCONTINUED | OUTPATIENT
Start: 2020-12-15 | End: 2020-12-17

## 2020-12-15 RX ORDER — ACETAMINOPHEN 500 MG
650 TABLET ORAL EVERY 6 HOURS
Refills: 0 | Status: DISCONTINUED | OUTPATIENT
Start: 2020-12-15 | End: 2020-12-17

## 2020-12-15 RX ORDER — ASCORBIC ACID 60 MG
500 TABLET,CHEWABLE ORAL DAILY
Refills: 0 | Status: DISCONTINUED | OUTPATIENT
Start: 2020-12-15 | End: 2020-12-16

## 2020-12-15 RX ORDER — ATORVASTATIN CALCIUM 80 MG/1
10 TABLET, FILM COATED ORAL AT BEDTIME
Refills: 0 | Status: DISCONTINUED | OUTPATIENT
Start: 2020-12-15 | End: 2020-12-16

## 2020-12-15 RX ORDER — INSULIN HUMAN 100 [IU]/ML
10 INJECTION, SOLUTION SUBCUTANEOUS ONCE
Refills: 0 | Status: COMPLETED | OUTPATIENT
Start: 2020-12-15 | End: 2020-12-15

## 2020-12-15 RX ORDER — SODIUM BICARBONATE 1 MEQ/ML
0.31 SYRINGE (ML) INTRAVENOUS
Qty: 150 | Refills: 0 | Status: DISCONTINUED | OUTPATIENT
Start: 2020-12-15 | End: 2020-12-17

## 2020-12-15 RX ORDER — SODIUM BICARBONATE 1 MEQ/ML
50 SYRINGE (ML) INTRAVENOUS ONCE
Refills: 0 | Status: COMPLETED | OUTPATIENT
Start: 2020-12-15 | End: 2020-12-15

## 2020-12-15 RX ORDER — SODIUM CHLORIDE 9 MG/ML
1000 INJECTION, SOLUTION INTRAVENOUS
Refills: 0 | Status: DISCONTINUED | OUTPATIENT
Start: 2020-12-15 | End: 2020-12-17

## 2020-12-15 RX ORDER — INSULIN LISPRO 100/ML
VIAL (ML) SUBCUTANEOUS
Refills: 0 | Status: DISCONTINUED | OUTPATIENT
Start: 2020-12-15 | End: 2020-12-17

## 2020-12-15 RX ORDER — MEROPENEM 1 G/30ML
500 INJECTION INTRAVENOUS EVERY 24 HOURS
Refills: 0 | Status: DISCONTINUED | OUTPATIENT
Start: 2020-12-15 | End: 2020-12-17

## 2020-12-15 RX ORDER — HEPARIN SODIUM 5000 [USP'U]/ML
5000 INJECTION INTRAVENOUS; SUBCUTANEOUS EVERY 12 HOURS
Refills: 0 | Status: DISCONTINUED | OUTPATIENT
Start: 2020-12-16 | End: 2020-12-17

## 2020-12-15 RX ORDER — CHOLECALCIFEROL (VITAMIN D3) 125 MCG
2000 CAPSULE ORAL DAILY
Refills: 0 | Status: DISCONTINUED | OUTPATIENT
Start: 2020-12-15 | End: 2020-12-16

## 2020-12-15 RX ORDER — DEXTROSE 50 % IN WATER 50 %
50 SYRINGE (ML) INTRAVENOUS ONCE
Refills: 0 | Status: COMPLETED | OUTPATIENT
Start: 2020-12-15 | End: 2020-12-15

## 2020-12-15 RX ORDER — SODIUM CHLORIDE 9 MG/ML
1000 INJECTION, SOLUTION INTRAVENOUS
Refills: 0 | Status: DISCONTINUED | OUTPATIENT
Start: 2020-12-15 | End: 2020-12-15

## 2020-12-15 RX ORDER — AMIODARONE HYDROCHLORIDE 400 MG/1
100 TABLET ORAL
Refills: 0 | Status: DISCONTINUED | OUTPATIENT
Start: 2020-12-17 | End: 2020-12-17

## 2020-12-15 RX ORDER — DEXTROSE 50 % IN WATER 50 %
12.5 SYRINGE (ML) INTRAVENOUS ONCE
Refills: 0 | Status: DISCONTINUED | OUTPATIENT
Start: 2020-12-15 | End: 2020-12-17

## 2020-12-15 RX ORDER — ALBUTEROL 90 UG/1
10 AEROSOL, METERED ORAL ONCE
Refills: 0 | Status: COMPLETED | OUTPATIENT
Start: 2020-12-15 | End: 2020-12-15

## 2020-12-15 RX ORDER — SODIUM POLYSTYRENE SULFONATE 4.1 MEQ/G
60 POWDER, FOR SUSPENSION ORAL ONCE
Refills: 0 | Status: COMPLETED | OUTPATIENT
Start: 2020-12-15 | End: 2020-12-16

## 2020-12-15 RX ORDER — AMIODARONE HYDROCHLORIDE 400 MG/1
100 TABLET ORAL DAILY
Refills: 0 | Status: DISCONTINUED | OUTPATIENT
Start: 2020-12-15 | End: 2020-12-15

## 2020-12-15 RX ORDER — CALCIUM GLUCONATE 100 MG/ML
1 VIAL (ML) INTRAVENOUS ONCE
Refills: 0 | Status: COMPLETED | OUTPATIENT
Start: 2020-12-15 | End: 2020-12-15

## 2020-12-15 RX ORDER — CEFTRIAXONE 500 MG/1
1000 INJECTION, POWDER, FOR SOLUTION INTRAMUSCULAR; INTRAVENOUS ONCE
Refills: 0 | Status: COMPLETED | OUTPATIENT
Start: 2020-12-15 | End: 2020-12-15

## 2020-12-15 RX ORDER — DEXTROSE 50 % IN WATER 50 %
15 SYRINGE (ML) INTRAVENOUS ONCE
Refills: 0 | Status: DISCONTINUED | OUTPATIENT
Start: 2020-12-15 | End: 2020-12-17

## 2020-12-15 RX ORDER — CALCITRIOL 0.5 UG/1
0.25 CAPSULE ORAL DAILY
Refills: 0 | Status: DISCONTINUED | OUTPATIENT
Start: 2020-12-15 | End: 2020-12-17

## 2020-12-15 RX ORDER — SODIUM CHLORIDE 9 MG/ML
500 INJECTION INTRAMUSCULAR; INTRAVENOUS; SUBCUTANEOUS ONCE
Refills: 0 | Status: COMPLETED | OUTPATIENT
Start: 2020-12-15 | End: 2020-12-15

## 2020-12-15 RX ORDER — SODIUM POLYSTYRENE SULFONATE 4.1 MEQ/G
60 POWDER, FOR SUSPENSION ORAL ONCE
Refills: 0 | Status: COMPLETED | OUTPATIENT
Start: 2020-12-15 | End: 2020-12-15

## 2020-12-15 RX ADMIN — Medication 50 MILLILITER(S): at 19:38

## 2020-12-15 RX ADMIN — MEROPENEM 100 MILLIGRAM(S): 1 INJECTION INTRAVENOUS at 21:58

## 2020-12-15 RX ADMIN — CEFTRIAXONE 100 MILLIGRAM(S): 500 INJECTION, POWDER, FOR SOLUTION INTRAMUSCULAR; INTRAVENOUS at 14:53

## 2020-12-15 RX ADMIN — SODIUM CHLORIDE 500 MILLILITER(S): 9 INJECTION INTRAMUSCULAR; INTRAVENOUS; SUBCUTANEOUS at 15:10

## 2020-12-15 RX ADMIN — Medication 100 GRAM(S): at 20:25

## 2020-12-15 RX ADMIN — Medication 50 MILLIEQUIVALENT(S): at 19:37

## 2020-12-15 RX ADMIN — CEFTRIAXONE 1000 MILLIGRAM(S): 500 INJECTION, POWDER, FOR SOLUTION INTRAMUSCULAR; INTRAVENOUS at 15:23

## 2020-12-15 RX ADMIN — INSULIN HUMAN 10 UNIT(S): 100 INJECTION, SOLUTION SUBCUTANEOUS at 19:40

## 2020-12-15 RX ADMIN — SODIUM POLYSTYRENE SULFONATE 60 GRAM(S): 4.1 POWDER, FOR SUSPENSION ORAL at 19:55

## 2020-12-15 RX ADMIN — SODIUM CHLORIDE 500 MILLILITER(S): 9 INJECTION INTRAMUSCULAR; INTRAVENOUS; SUBCUTANEOUS at 14:10

## 2020-12-15 NOTE — CONSULT NOTE ADULT - SUBJECTIVE AND OBJECTIVE BOX
FEROZ HARKINS    PLV APER 26    Patient is a 89y old  Female who presents with a chief complaint of hypthermia  ams  acute renal failure (15 Dec 2020 17:40)       Allergies    PCN, Sulfa (Urticaria)  penicillin (Other)  sulfa drugs (Other)  Tolerated Cefazolin  (Unknown)    Intolerances    Bananas (Other (Mild to Mod))  Potatoes (Other (Mild to Mod))      HPI:      PAST MEDICAL & SURGICAL HISTORY:  Afib    Glaucoma    Dementia    CKD (chronic kidney disease)    CAD (coronary artery disease)    OA (osteoarthritis)    HLD (hyperlipidemia)    HTN (hypertension)    Dementia    Arteriosclerotic heart disease (ASHD)    Arthritis    Dementia    Trigger finger    CKD (chronic kidney disease)    DVT (deep venous thrombosis)    GI (gastrointestinal bleed)    Anemia Iron Deficiency    H/o Diverticulosis    Carpal Tunnel Syndrome    Dyslipidemia    Glaucoma    OA (Osteoarthritis)    HTN (Hypertension)    CAD (Coronary Artery Disease)    No significant past surgical history    Ariel filter in place  placed     H/o Ovarian Cyst    Cataract  bilateral     Knee Arthroplasty right 2009 &amp; left 2010        FAMILY HISTORY:  No pertinent family history in first degree relatives          MEDICATIONS   acetaminophen   Tablet .. 650 milliGRAM(s) Oral every 6 hours PRN  ALBUTerol    0.083% 10 milliGRAM(s) Nebulizer once  aMIOdarone    Tablet 100 milliGRAM(s) Oral daily  ascorbic acid 500 milliGRAM(s) Oral daily  atorvastatin 10 milliGRAM(s) Oral at bedtime  calcitriol   Capsule 0.25 MICROGram(s) Oral daily  calcium gluconate IVPB 1 Gram(s) IV Intermittent once  cholecalciferol 2000 Unit(s) Oral daily  dextrose 40% Gel 15 Gram(s) Oral once  dextrose 5%. 1000 milliLiter(s) IV Continuous <Continuous>  dextrose 5%. 1000 milliLiter(s) IV Continuous <Continuous>  dextrose 50% Injectable 50 milliLiter(s) IV Push once  dextrose 50% Injectable 25 Gram(s) IV Push once  dextrose 50% Injectable 12.5 Gram(s) IV Push once  dextrose 50% Injectable 25 Gram(s) IV Push once  folic acid 1 milliGRAM(s) Oral daily  glucagon  Injectable 1 milliGRAM(s) IntraMuscular once  insulin lispro (ADMELOG) corrective regimen sliding scale   SubCutaneous three times a day before meals  insulin regular  human recombinant 10 Unit(s) IV Push once  latanoprost 0.005% Ophthalmic Solution 1 Drop(s) Both EYES at bedtime  sodium bicarbonate  Infusion 0.313 mEq/kG/Hr IV Continuous <Continuous>  sodium bicarbonate  Injectable 50 milliEquivalent(s) IV Push once  sodium polystyrene sulfonate Enema 60 Gram(s) Rectal once  sodium polystyrene sulfonate Enema 60 Gram(s) Rectal once      Vital Signs Last 24 Hrs  T(C): 34.6 (15 Dec 2020 15:49), Max: 34.6 (15 Dec 2020 15:49)  T(F): 94.3 (15 Dec 2020 15:49), Max: 94.3 (15 Dec 2020 15:49)  HR: 65 (15 Dec 2020 15:49) (65 - 74)  BP: 118/57 (15 Dec 2020 15:49) (92/48 - 118/57)  BP(mean): --  RR: 20 (15 Dec 2020 15:49) (20 - 20)  SpO2: 100% (15 Dec 2020 15:49) (100% - 100%)            LABS:                        10.4   12.91 )-----------( 348      ( 15 Dec 2020 14:44 )             34.4     12-15    166<HH>  |  138<H>  |  205<H>  ----------------------------<  156<H>  8.2<HH>   |  8<LL>  |  14.00<H>    Ca    12.5<H>      15 Dec 2020 14:44    TPro  7.3  /  Alb  2.8<L>  /  TBili  0.3  /  DBili  x   /  AST  25  /  ALT  28  /  AlkPhos  89  12-15    PT/INR - ( 15 Dec 2020 14:44 )   PT: 14.3 sec;   INR: 1.23 ratio         PTT - ( 15 Dec 2020 14:44 )  PTT:32.2 sec  Urinalysis Basic - ( 15 Dec 2020 14:15 )    Color: Yellow / Appearance: Turbid / S.010 / pH: x  Gluc: x / Ketone: Negative  / Bili: Negative / Urobili: Negative   Blood: x / Protein: 500 mg/dL / Nitrite: Negative   Leuk Esterase: Moderate / RBC: 3-5 /HPF / WBC >50   Sq Epi: x / Non Sq Epi: Few / Bacteria: TNTC            WBC:  WBC Count: 12.91 K/uL (12-15 @ 14:44)      MICROBIOLOGY:  RECENT CULTURES:              PT/INR - ( 15 Dec 2020 14:44 )   PT: 14.3 sec;   INR: 1.23 ratio         PTT - ( 15 Dec 2020 14:44 )  PTT:32.2 sec    Sodium:  Sodium, Serum: 166 mmol/L (12-15 @ 14:44)      14.00 mg/dL 12-15 @ 14:44      Hemoglobin:  Hemoglobin: 10.4 g/dL (12-15 @ 14:44)      Platelets: Platelet Count - Automated: 348 K/uL (12-15 @ 14:44)      LIVER FUNCTIONS - ( 15 Dec 2020 14:44 )  Alb: 2.8 g/dL / Pro: 7.3 g/dL / ALK PHOS: 89 U/L / ALT: 28 U/L / AST: 25 U/L / GGT: x             Urinalysis Basic - ( 15 Dec 2020 14:15 )    Color: Yellow / Appearance: Turbid / S.010 / pH: x  Gluc: x / Ketone: Negative  / Bili: Negative / Urobili: Negative   Blood: x / Protein: 500 mg/dL / Nitrite: Negative   Leuk Esterase: Moderate / RBC: 3-5 /HPF / WBC >50   Sq Epi: x / Non Sq Epi: Few / Bacteria: TNTC        RADIOLOGY & ADDITIONAL STUDIES:

## 2020-12-15 NOTE — ED PROVIDER NOTE - CARE PLAN
Principal Discharge DX:	Hypothermia  Secondary Diagnosis:	AMS (altered mental status)   Principal Discharge DX:	BLAYNE (acute kidney injury)  Secondary Diagnosis:	AMS (altered mental status)  Secondary Diagnosis:	Hypothermia  Secondary Diagnosis:	UTI (urinary tract infection)

## 2020-12-15 NOTE — ED PROVIDER NOTE - PMH
Afib    Anemia Iron Deficiency    Arteriosclerotic heart disease (ASHD)    Arthritis    CAD (coronary artery disease)    CAD (Coronary Artery Disease)    Carpal Tunnel Syndrome    CKD (chronic kidney disease)    CKD (chronic kidney disease)    Dementia    Dementia    Dementia    DVT (deep venous thrombosis)    Dyslipidemia    GI (gastrointestinal bleed)    Glaucoma    Glaucoma    H/o Diverticulosis    HLD (hyperlipidemia)    HTN (hypertension)    HTN (Hypertension)    OA (Osteoarthritis)    OA (osteoarthritis)    Trigger finger

## 2020-12-15 NOTE — H&P ADULT - PROBLEM SELECTOR PLAN 1
LIKELY 2/2 TO ACUTE METABOLIC ENCEPHALOPATHY SECONDARY TO SEVERE SEPSIS ,ASSOCIATED WITH  BLAYNE AND HYPERKALEMIA ,POA

## 2020-12-15 NOTE — ED PROVIDER NOTE - ATTENDING CONTRIBUTION TO CARE
89 y/o F from [facility] presents for AMS, hypothermia, sepsis    PE: ill appearing no distress, altered, grunting  dry mucous membranes  heart: irregular  lungs: clear  abd soft nt/nd    septic workup, CT head    ARF IVFs consult nephro for possible emergent dialysis  hypercalcemia: IVFs  UTI: antibiotics

## 2020-12-15 NOTE — ED ADULT NURSE NOTE - JUGULAR VENOUS DISTENTION
DATE:  04/15/2019

 

Jennie is seen in progressive care unit (PCU).  She was admitted with sepsis.  She

is on meropenem and vancomycin.  She has developed cute on chronic kidney failure

and is being followed by nephrology.  She has decline in renal function today.

She is more hyperkalemic despite not being on any medications such as ACE

inhibitor, ARB or potassium supplements.  She has an atrophic left kidney and has

solitary functioning right kidney.  She has a percutaneous nephrostomy.  She also

has a history of chronic obstructive pulmonary disease (COPD), type 2 diabetes,

hypertension.  I never met her before but there seems to be some dementia or at

least some intellectual limitations as well.

 

PHYSICAL EXAMINATION:

She is sitting up in the chair.  She is only complaining about feeling cold and

wants the temperature in the room increased.  She denies shortness of breath.

Blood pressure 150/78, pulse 100, respiratory rate 18, 90% oxygen saturation.

GENERAL APPEARANCE:  Frail, elderly, looks slightly confused.  No jugular venous

distention (JVD).

LUNGS:  Decreased breath sounds bilaterally.

HEART:  Regular rate and rhythm.

ABDOMEN:  Soft, nontender.

EXTREMITIES:  No peripheral edema.

 

LABORATORIES:  White count 8.7, hemoglobin 8.5, platelets 245.

 

Sodium 135, potassium 5.5, BUN 43, creatinine 1.5, glucose 115.

 

IMPRESSION:

1.  Sepsis, unknown source.  She is on IV antibiotics.  The plan is to continue

this for a total of week, which would be on 04/17/2019.

 

2.  Acute renal failure superimposed on chronic kidney disease.  Creatinine is

improving, but she is hyperkalemic.  Renal is following her.

 

3.  Hyperkalemia.  Lasix has been ordered, her potassium is continuing to climb.

I do not see any medications that she is taking that would be contributing to

this.

 

4.  Hyponatremia.  This is slowly improving as well.

 

5.  Anemia.  Her hemoglobin is stable.  Hold off on transfusing.

 

6.  Diabetes.  She is on sliding scale of insulin based upon fingerstick blood

sugars.

 

7.  Hypothyroidism.  Continue levothyroxine 100 mcg daily.

 

8.  Hyperlipidemia.  Continue current dose of atorvastatin 20 mg daily.

 

9.  Gout.  Continue Uloric 40 mg daily.

 

10.  Asthma/chronic obstructive pulmonary disease (COPD).  She is on Advair

230/21 two puffs twice a day, as well as DuoNeb.  She is also on systemic

steroids with prednisone 20 mg daily, presumably for the chronic obstructive

pulmonary disease.

 

At this point, the renal function is improving but hyperkalemia is getting

progressively worse.  Tentative discharge date would be 04/17/2019 at the

earliest. absent

## 2020-12-15 NOTE — H&P ADULT - HISTORY OF PRESENT ILLNESS
89 y/o F with hx of Afib on eliquis, CAD, CKD, HTN, HLD, OA, DVT biba from [facility] for evaluation of altered mental status and hypothermia.   	Pt is DNR/DNI. Unable to obtain info from pt due to AMS and dementia.Found to have sepsis likely secondary to UTI Admitted for septic workup and evaluation,send blood and urine cx,serial lactate levels,monitor vitals closley,ivfs hydration,monitor urine output and renal profile,iv abx initiated ,seen by ID CONSULT Found to be in BLAYNE and to be hyperkelmic .Treatment of hyperkalemia give by nephrologist ,iv hydration started Admit for iv hydration,monitor renal profile and urine output,nutritionist consult,prealbumin level,serial bmp,nutritional supplements,multivitamins,palliative care evaluuation regarding MOLST completion and artificial nutrition discussion ,HD disussion .Family declined emergency HD SESSION and requested symptomatic manamagement .COVID is negataive  Patient was srecntly hospitalized in [facility] 9/25/2020 f fall and l hip fx CXR 9/25/2020 showed sm r pl effsn ivcf old fx r humerus Pt has since been admitted LI VS and has been to two rehabs She was sent in from [facility] to ER today for AMS ,associated with hypothermia .

## 2020-12-15 NOTE — INPATIENT CERTIFICATION FOR MEDICARE PATIENTS - RISKS OF ADVERSE EVENTS
Concern for worsening infectious process/Concern for delay in diagnosis and treatment/Concern for cardiopulmonary deterioration/Concern for renal deterioration

## 2020-12-15 NOTE — CONSULT NOTE ADULT - PROBLEM SELECTOR RECOMMENDATION 9
ACUTE RENAL FAILURE:   Serum creatinine is       , approximating GFR at   ml/min.   There is no progression . No uremic symptoms  No evidence of anemia .  Fluid status stable.  Will continue to avoid nephrotoxic drugs.  Patient remains asymptomatic.   Continue current therapy.  hold  diuretic.  hold   ACE inhibitor.  hold   ARB.  Additional evaluation:   ECG,    echocardiogram,     CXR,  will obtained recent   renal ultrasound to evalaute kidney size and possible stones , ACUTE RENAL FAILURE: no hd as per family   Serum creatinine is    14 ,    There is  progression . pos  uremic symptoms  pos  evidence of anemia .    Will continue to avoid nephrotoxic drugs.  hold  diuretic.  hold   ACE inhibitor.  hold   ARB.  Additional evaluation:   ECG,    echocardiogram,     CXR,  will obtained recent   renal ultrasound to evalaute kidney size and possible stones ,

## 2020-12-15 NOTE — CONSULT NOTE ADULT - SUBJECTIVE AND OBJECTIVE BOX
Date/Time Patient Seen:  		  Referring MD:   Data Reviewed	       Patient is a 89y old  Female who presents with a chief complaint of hypthermia  ams  acute renal failure (15 Dec 2020 17:40)      Subjective/HPI     PAST MEDICAL & SURGICAL HISTORY:  Afib    Glaucoma    Dementia    CKD (chronic kidney disease)    CAD (coronary artery disease)    OA (osteoarthritis)    HLD (hyperlipidemia)    HTN (hypertension)    Dementia    Arteriosclerotic heart disease (ASHD)    Arthritis    Dementia    Trigger finger    CKD (chronic kidney disease)    DVT (deep venous thrombosis)    GI (gastrointestinal bleed)    Anemia Iron Deficiency    H/o Diverticulosis    Carpal Tunnel Syndrome    Trigger Finger    Dyslipidemia    Glaucoma    OA (Osteoarthritis)    HTN (Hypertension)    CAD (Coronary Artery Disease)    No significant past surgical history    Ariel filter in place  placed 2012    H/o Ovarian Cyst    Cataract  bilateral 2008    Knee Arthroplasty right 8/2009 &amp; left 9/2010          Medication list         MEDICATIONS  (STANDING):  ALBUTerol    0.083% 10 milliGRAM(s) Nebulizer once  aMIOdarone    Tablet 100 milliGRAM(s) Oral daily  ascorbic acid 500 milliGRAM(s) Oral daily  atorvastatin 10 milliGRAM(s) Oral at bedtime  calcitriol   Capsule 0.25 MICROGram(s) Oral daily  calcium gluconate IVPB 1 Gram(s) IV Intermittent once  cholecalciferol 2000 Unit(s) Oral daily  dextrose 40% Gel 15 Gram(s) Oral once  dextrose 5%. 1000 milliLiter(s) (50 mL/Hr) IV Continuous <Continuous>  dextrose 5%. 1000 milliLiter(s) (150 mL/Hr) IV Continuous <Continuous>  dextrose 5%. 1000 milliLiter(s) (100 mL/Hr) IV Continuous <Continuous>  dextrose 50% Injectable 50 milliLiter(s) IV Push once  dextrose 50% Injectable 25 Gram(s) IV Push once  dextrose 50% Injectable 12.5 Gram(s) IV Push once  dextrose 50% Injectable 25 Gram(s) IV Push once  folic acid 1 milliGRAM(s) Oral daily  glucagon  Injectable 1 milliGRAM(s) IntraMuscular once  insulin lispro (ADMELOG) corrective regimen sliding scale   SubCutaneous three times a day before meals  insulin regular  human recombinant 10 Unit(s) IV Push once  latanoprost 0.005% Ophthalmic Solution 1 Drop(s) Both EYES at bedtime  sodium bicarbonate  Injectable 50 milliEquivalent(s) IV Push once  sodium polystyrene sulfonate Enema 60 Gram(s) Rectal once  sodium polystyrene sulfonate Enema 60 Gram(s) Rectal once    MEDICATIONS  (PRN):  acetaminophen   Tablet .. 650 milliGRAM(s) Oral every 6 hours PRN Temp greater or equal to 38C (100.4F), Mild Pain (1 - 3)         Vitals log        ICU Vital Signs Last 24 Hrs  T(C): 34.6 (15 Dec 2020 15:49), Max: 34.6 (15 Dec 2020 15:49)  T(F): 94.3 (15 Dec 2020 15:49), Max: 94.3 (15 Dec 2020 15:49)  HR: 65 (15 Dec 2020 15:49) (65 - 74)  BP: 118/57 (15 Dec 2020 15:49) (92/48 - 118/57)  BP(mean): --  ABP: --  ABP(mean): --  RR: 20 (15 Dec 2020 15:49) (20 - 20)  SpO2: 100% (15 Dec 2020 15:49) (100% - 100%)           Input and Output:  I&O's Detail      Lab Data                        10.4   12.91 )-----------( 348      ( 15 Dec 2020 14:44 )             34.4     12-15    166<HH>  |  138<H>  |  205<H>  ----------------------------<  156<H>  8.2<HH>   |  8<LL>  |  14.00<H>    Ca    12.5<H>      15 Dec 2020 14:44    TPro  7.3  /  Alb  2.8<L>  /  TBili  0.3  /  DBili  x   /  AST  25  /  ALT  28  /  AlkPhos  89  12-15            Review of Systems	      Objective     Physical Examination        Pertinent Lab findings & Imaging      Lamar:  NO   Adequate UO     I&O's Detail           Discussed with:     Cultures:	        Radiology                             Date/Time Patient Seen:  		  Referring MD:   Data Reviewed	       Patient is a 89y old  Female who presents with a chief complaint of hypthermia  ams  acute renal failure (15 Dec 2020 17:40)    · Chief Complaint: The patient is a 89y Female complaining of altered mental status.  · Unable to Obtain: Dementia  · HPI Objective Statement: 89 y/o F with hx of Afib on eliquis, CAD, CKD, HTN, HLD, OA, DVT biba from [facility] for evaluation of altered mental status and hypothermia.   	Pt is DNR/DNI. Unable to obtain info from pt due to AMS and dementia.   pcp: Dean Solis    Subjective/HPI     PAST MEDICAL & SURGICAL HISTORY:  Afib    Glaucoma    Dementia    CKD (chronic kidney disease)    CAD (coronary artery disease)    OA (osteoarthritis)    HLD (hyperlipidemia)    HTN (hypertension)    Dementia    Arteriosclerotic heart disease (ASHD)    Arthritis    Dementia    Trigger finger    CKD (chronic kidney disease)    DVT (deep venous thrombosis)    GI (gastrointestinal bleed)    Anemia Iron Deficiency    H/o Diverticulosis    Carpal Tunnel Syndrome    Trigger Finger    Dyslipidemia    Glaucoma    OA (Osteoarthritis)    HTN (Hypertension)    CAD (Coronary Artery Disease)    No significant past surgical history    [place] filter in place  placed 2012    H/o Ovarian Cyst    Cataract  bilateral 2008    Knee Arthroplasty right 8/2009 &amp; left 9/2010    non smoker  non drinker  weakness  family hx - ashd -   ros - lethargic      Medication list         MEDICATIONS  (STANDING):  ALBUTerol    0.083% 10 milliGRAM(s) Nebulizer once  aMIOdarone    Tablet 100 milliGRAM(s) Oral daily  ascorbic acid 500 milliGRAM(s) Oral daily  atorvastatin 10 milliGRAM(s) Oral at bedtime  calcitriol   Capsule 0.25 MICROGram(s) Oral daily  calcium gluconate IVPB 1 Gram(s) IV Intermittent once  cholecalciferol 2000 Unit(s) Oral daily  dextrose 40% Gel 15 Gram(s) Oral once  dextrose 5%. 1000 milliLiter(s) (50 mL/Hr) IV Continuous <Continuous>  dextrose 5%. 1000 milliLiter(s) (150 mL/Hr) IV Continuous <Continuous>  dextrose 5%. 1000 milliLiter(s) (100 mL/Hr) IV Continuous <Continuous>  dextrose 50% Injectable 50 milliLiter(s) IV Push once  dextrose 50% Injectable 25 Gram(s) IV Push once  dextrose 50% Injectable 12.5 Gram(s) IV Push once  dextrose 50% Injectable 25 Gram(s) IV Push once  folic acid 1 milliGRAM(s) Oral daily  glucagon  Injectable 1 milliGRAM(s) IntraMuscular once  insulin lispro (ADMELOG) corrective regimen sliding scale   SubCutaneous three times a day before meals  insulin regular  human recombinant 10 Unit(s) IV Push once  latanoprost 0.005% Ophthalmic Solution 1 Drop(s) Both EYES at bedtime  sodium bicarbonate  Injectable 50 milliEquivalent(s) IV Push once  sodium polystyrene sulfonate Enema 60 Gram(s) Rectal once  sodium polystyrene sulfonate Enema 60 Gram(s) Rectal once    MEDICATIONS  (PRN):  acetaminophen   Tablet .. 650 milliGRAM(s) Oral every 6 hours PRN Temp greater or equal to 38C (100.4F), Mild Pain (1 - 3)         Vitals log        ICU Vital Signs Last 24 Hrs  T(C): 34.6 (15 Dec 2020 15:49), Max: 34.6 (15 Dec 2020 15:49)  T(F): 94.3 (15 Dec 2020 15:49), Max: 94.3 (15 Dec 2020 15:49)  HR: 65 (15 Dec 2020 15:49) (65 - 74)  BP: 118/57 (15 Dec 2020 15:49) (92/48 - 118/57)  BP(mean): --  ABP: --  ABP(mean): --  RR: 20 (15 Dec 2020 15:49) (20 - 20)  SpO2: 100% (15 Dec 2020 15:49) (100% - 100%)           Input and Output:  I&O's Detail      Lab Data                        10.4   12.91 )-----------( 348      ( 15 Dec 2020 14:44 )             34.4     12-15    166<HH>  |  138<H>  |  205<H>  ----------------------------<  156<H>  8.2<HH>   |  8<LL>  |  14.00<H>    Ca    12.5<H>      15 Dec 2020 14:44    TPro  7.3  /  Alb  2.8<L>  /  TBili  0.3  /  DBili  x   /  AST  25  /  ALT  28  /  AlkPhos  89  12-15            Review of Systems	  weakness      Objective     Physical Examination  heart s1s2  lung dec BS  abd soft        Pertinent Lab findings & Imaging      Lamar:  NO   Adequate UO     I&O's Detail           Discussed with:     Cultures:	        Radiology        EXAM:  XR CHEST PORTABLE IMMED 1V                            PROCEDURE DATE:  12/15/2020          INTERPRETATION:  Sepsis.    AP chest. Prior 10/27/2020.    Rotated. Chin obscures portion right apex. Low lung volumes. No change heart mediastinum. No consolidation or effusion. Bilateral high riding humeral heads suggests chronic rotator cuff pathology. Old healed fracture the right proximal humerus. IVC filter projects over the right upper quadrant.    Impression: No active infiltrates.            CHU BONE MD; Attending Radiologist  This document has been electronically signed. Dec 15 2020  3:39PM

## 2020-12-15 NOTE — ED ADULT NURSE NOTE - OBJECTIVE STATEMENT
Pt presents to the ED via ambulance from [facility], s/p AMS, hypothermia. Pt has a history of dementia. Pt screams out at times.

## 2020-12-15 NOTE — CONSULT NOTE ADULT - SUBJECTIVE AND OBJECTIVE BOX
Patient is a 89y old  Female who presents with a chief complaint of hypthermia  ams  acute renal failure (15 Dec 2020 17:40)      HPI:      Asked to see patient for ID Consult    PAST MEDICAL & SURGICAL HISTORY:  Afib    Glaucoma    Dementia    CKD (chronic kidney disease)    CAD (coronary artery disease)    OA (osteoarthritis)    HLD (hyperlipidemia)    HTN (hypertension)    Dementia    Arteriosclerotic heart disease (ASHD)    Arthritis    Dementia    Trigger finger    CKD (chronic kidney disease)    DVT (deep venous thrombosis)    GI (gastrointestinal bleed)    Anemia Iron Deficiency    H/o Diverticulosis    Carpal Tunnel Syndrome    Dyslipidemia    Glaucoma    OA (Osteoarthritis)    HTN (Hypertension)    CAD (Coronary Artery Disease)    No significant past surgical history    [place] filter in place  placed 2012    H/o Ovarian Cyst    Cataract  bilateral 2008    Knee Arthroplasty right 8/2009 &amp; left 9/2010        Allergies    PCN, Sulfa (Urticaria)  penicillin (Other)  sulfa drugs (Other)  Tolerated Cefazolin 2019 (Unknown)    Intolerances    Bananas (Other (Mild to Mod))  Potatoes (Other (Mild to Mod))      REVIEW OF SYSTEMS:  All systems below were reviewed and are negative [  ]  HEENT:  ID:  Pulmonary:  Cardiac:  GI:  Renal:  Musculoskeletal:  All other systems above were reviewed and are negative   [  ]    HOME MEDICATIONS:    MEDICATIONS  (STANDING):  ALBUTerol    0.083% 10 milliGRAM(s) Nebulizer once  aMIOdarone    Tablet 100 milliGRAM(s) Oral <User Schedule>  ascorbic acid 500 milliGRAM(s) Oral daily  atorvastatin 10 milliGRAM(s) Oral at bedtime  calcitriol   Capsule 0.25 MICROGram(s) Oral daily  calcium gluconate IVPB 1 Gram(s) IV Intermittent once  cholecalciferol 2000 Unit(s) Oral daily  dextrose 40% Gel 15 Gram(s) Oral once  dextrose 5%. 1000 milliLiter(s) (50 mL/Hr) IV Continuous <Continuous>  dextrose 5%. 1000 milliLiter(s) (100 mL/Hr) IV Continuous <Continuous>  dextrose 50% Injectable 25 Gram(s) IV Push once  dextrose 50% Injectable 12.5 Gram(s) IV Push once  dextrose 50% Injectable 25 Gram(s) IV Push once  folic acid 1 milliGRAM(s) Oral daily  glucagon  Injectable 1 milliGRAM(s) IntraMuscular once  insulin lispro (ADMELOG) corrective regimen sliding scale   SubCutaneous three times a day before meals  insulin regular  human recombinant 10 Unit(s) IV Push once  latanoprost 0.005% Ophthalmic Solution 1 Drop(s) Both EYES at bedtime  sodium bicarbonate  Infusion 0.313 mEq/kG/Hr (125 mL/Hr) IV Continuous <Continuous>  sodium polystyrene sulfonate Enema 60 Gram(s) Rectal once  sodium polystyrene sulfonate Enema 60 Gram(s) Rectal once    MEDICATIONS  (PRN):  acetaminophen   Tablet .. 650 milliGRAM(s) Oral every 6 hours PRN Temp greater or equal to 38C (100.4F), Mild Pain (1 - 3)      Vital Signs Last 24 Hrs  T(C): 34.6 (15 Dec 2020 15:49), Max: 34.6 (15 Dec 2020 15:49)  T(F): 94.3 (15 Dec 2020 15:49), Max: 94.3 (15 Dec 2020 15:49)  HR: 65 (15 Dec 2020 15:49) (65 - 74)  BP: 118/57 (15 Dec 2020 15:49) (92/48 - 118/57)  BP(mean): --  RR: 20 (15 Dec 2020 15:49) (20 - 20)  SpO2: 100% (15 Dec 2020 15:49) (100% - 100%)    PHYSICAL EXAM:  HEENT:  Neck:  Lungs:  Heart:  Abdomen:  Genital/ Rectal:  Extremities:  Neurologic:  Vascular:    I&O's Summary      LABORATORY:                          10.4   12.91 )-----------( 348      ( 15 Dec 2020 14:44 )             34.4           12-15    166<HH>  |  138<H>  |  205<H>  ----------------------------<  156<H>  8.2<HH>   |  8<LL>  |  14.00<H>    Ca    12.5<H>      15 Dec 2020 14:44    TPro  7.3  /  Alb  2.8<L>  /  TBili  0.3  /  DBili  x   /  AST  25  /  ALT  28  /  AlkPhos  89  12-15      Rapid Respiratory Viral Panel Result        12-15 @ 14:11  Rapid RVP NotDetec  Coronovirus --  Adenovirus --  Bordetella Pertussis --  Chlamydia Pneumonia --  Entero/Rhinovirus--  HKU1 Coronovirus --  HMPV Coronovirus --  Influenza A --  Influenza AH1 --  Influenza AH1 2009 --  Influenza AH3 --  Influenza B --  Mycoplasma Pneumoniae --  NL63 Coronovirus --  OC43 Coronovirus --  Parainfluenza 1 --  Parainfluenza 2 --  Parainfluenza 3 --  Parainfluenza 4 --  Resp Syncytial Virus --      LABORATORY:    CBC Full  -  ( 15 Dec 2020 14:44 )  WBC Count : 12.91 K/uL  RBC Count : 3.57 M/uL  Hemoglobin : 10.4 g/dL  Hematocrit : 34.4 %  Platelet Count - Automated : 348 K/uL  Mean Cell Volume : 96.4 fl  Mean Cell Hemoglobin : 29.1 pg  Mean Cell Hemoglobin Concentration : 30.2 gm/dL  Auto Neutrophil # : 11.76 K/uL  Auto Lymphocyte # : 0.39 K/uL  Auto Monocyte # : 0.63 K/uL  Auto Eosinophil # : 0.01 K/uL  Auto Basophil # : 0.02 K/uL  Auto Neutrophil % : 91.0 %  Auto Lymphocyte % : 3.0 %  Auto Monocyte % : 4.9 %  Auto Eosinophil % : 0.1 %  Auto Basophil % : 0.2 %          12-15    166<HH>  |  138<H>  |  205<H>  ----------------------------<  156<H>  8.2<HH>   |  8<LL>  |  14.00<H>    Ca    12.5<H>      15 Dec 2020 14:44    TPro  7.3  /  Alb  2.8<L>  /  TBili  0.3  /  DBili  x   /  AST  25  /  ALT  28  /  AlkPhos  89  12-15                                            Rapid Respiratory Viral Panel Result        12-15 @ 14:11  Rapid RVP [facility]  Coronovirus --  Adenovirus --  Bordetella Pertussis --  Chlamydia Pneumonia --  Entero/Rhinovirus--  HKU1 Coronovirus --  HMPV Coronovirus --  Influenza A --  Influenza AH1 --  Influenza AH1 2009 --  Influenza AH3 --  Influenza B --  Mycoplasma Pneumoniae --  NL63 Coronovirus --  OC43 Coronovirus --  Parainfluenza 1 --  Parainfluenza 2 --  Parainfluenza 3 --  Parainfluenza 4 --  Resp Syncytial Virus --           Patient is a 89y old  Female who presents with a chief complaint of hypthermia  ams  acute renal failure (15 Dec 2020 17:40)    The patient is a 89 year old female who was brought to the ED for worsened lethargy.       Asked to see patient for ID Consult    PAST MEDICAL & SURGICAL HISTORY:  Afib    Glaucoma    Dementia    CKD (chronic kidney disease)    CAD (coronary artery disease)    OA (osteoarthritis)    HLD (hyperlipidemia)    HTN (hypertension)    Dementia    Arteriosclerotic heart disease (ASHD)    Arthritis    Dementia    Trigger finger    CKD (chronic kidney disease)    DVT (deep venous thrombosis)    GI (gastrointestinal bleed)    Anemia Iron Deficiency    H/o Diverticulosis    Carpal Tunnel Syndrome    Dyslipidemia    Glaucoma    OA (Osteoarthritis)    HTN (Hypertension)    CAD (Coronary Artery Disease)    No significant past surgical history    [place] filter in place  placed 2012    H/o Ovarian Cyst    Cataract  bilateral 2008    Knee Arthroplasty right 8/2009 &amp; left 9/2010        Allergies    PCN, Sulfa (Urticaria)  penicillin (Other)  sulfa drugs (Other)  Tolerated Cefazolin 2019 (Unknown)    Intolerances    Bananas (Other (Mild to Mod))  Potatoes (Other (Mild to Mod))      REVIEW OF SYSTEMS:  No vomiting or diarrhea  No hematuria.    HOME MEDICATIONS:    MEDICATIONS  (STANDING):  ALBUTerol    0.083% 10 milliGRAM(s) Nebulizer once  aMIOdarone    Tablet 100 milliGRAM(s) Oral <User Schedule>  ascorbic acid 500 milliGRAM(s) Oral daily  atorvastatin 10 milliGRAM(s) Oral at bedtime  calcitriol   Capsule 0.25 MICROGram(s) Oral daily  calcium gluconate IVPB 1 Gram(s) IV Intermittent once  cholecalciferol 2000 Unit(s) Oral daily  dextrose 40% Gel 15 Gram(s) Oral once  dextrose 5%. 1000 milliLiter(s) (50 mL/Hr) IV Continuous <Continuous>  dextrose 5%. 1000 milliLiter(s) (100 mL/Hr) IV Continuous <Continuous>  dextrose 50% Injectable 25 Gram(s) IV Push once  dextrose 50% Injectable 12.5 Gram(s) IV Push once  dextrose 50% Injectable 25 Gram(s) IV Push once  folic acid 1 milliGRAM(s) Oral daily  glucagon  Injectable 1 milliGRAM(s) IntraMuscular once  insulin lispro (ADMELOG) corrective regimen sliding scale   SubCutaneous three times a day before meals  insulin regular  human recombinant 10 Unit(s) IV Push once  latanoprost 0.005% Ophthalmic Solution 1 Drop(s) Both EYES at bedtime  sodium bicarbonate  Infusion 0.313 mEq/kG/Hr (125 mL/Hr) IV Continuous <Continuous>  sodium polystyrene sulfonate Enema 60 Gram(s) Rectal once  sodium polystyrene sulfonate Enema 60 Gram(s) Rectal once    MEDICATIONS  (PRN):  acetaminophen   Tablet .. 650 milliGRAM(s) Oral every 6 hours PRN Temp greater or equal to 38C (100.4F), Mild Pain (1 - 3)      Vital Signs Last 24 Hrs  T(C): 34.6 (15 Dec 2020 15:49), Max: 34.6 (15 Dec 2020 15:49)  T(F): 94.3 (15 Dec 2020 15:49), Max: 94.3 (15 Dec 2020 15:49)  HR: 65 (15 Dec 2020 15:49) (65 - 74)  BP: 118/57 (15 Dec 2020 15:49) (92/48 - 118/57)  BP(mean): --  RR: 20 (15 Dec 2020 15:49) (20 - 20)  SpO2: 100% (15 Dec 2020 15:49) (100% - 100%)    PHYSICAL EXAM:  HEENT: NC/AT  Neck: Soft  Lungs: Coarse BS bilaterally   Heart: RRR; no murmurs.   Abdomen: Soft; no tenderness.   Genital/ Rectal: No arana   Extremities: No ulcers   Neurologic: Confused     I&O's Summary      LABORATORY:                          10.4   12.91 )-----------( 348      ( 15 Dec 2020 14:44 )             34.4           12-15    166<HH>  |  138<H>  |  205<H>  ----------------------------<  156<H>  8.2<HH>   |  8<LL>  |  14.00<H>    Ca    12.5<H>      15 Dec 2020 14:44    TPro  7.3  /  Alb  2.8<L>  /  TBili  0.3  /  DBili  x   /  AST  25  /  ALT  28  /  AlkPhos  89  12-15      Rapid Respiratory Viral Panel Result        12-15 @ 14:11  Rapid RVP NotDetec  Coronovirus --  Adenovirus --  Bordetella Pertussis --  Chlamydia Pneumonia --  Entero/Rhinovirus--  HKU1 Coronovirus --  HMPV Coronovirus --  Influenza A --  Influenza AH1 --  Influenza AH1 2009 --  Influenza AH3 --  Influenza B --  Mycoplasma Pneumoniae --  NL63 Coronovirus --  OC43 Coronovirus --  Parainfluenza 1 --  Parainfluenza 2 --  Parainfluenza 3 --  Parainfluenza 4 --  Resp Syncytial Virus --      LABORATORY:    CBC Full  -  ( 15 Dec 2020 14:44 )  WBC Count : 12.91 K/uL  RBC Count : 3.57 M/uL  Hemoglobin : 10.4 g/dL  Hematocrit : 34.4 %  Platelet Count - Automated : 348 K/uL  Mean Cell Volume : 96.4 fl  Mean Cell Hemoglobin : 29.1 pg  Mean Cell Hemoglobin Concentration : 30.2 gm/dL  Auto Neutrophil # : 11.76 K/uL  Auto Lymphocyte # : 0.39 K/uL  Auto Monocyte # : 0.63 K/uL  Auto Eosinophil # : 0.01 K/uL  Auto Basophil # : 0.02 K/uL  Auto Neutrophil % : 91.0 %  Auto Lymphocyte % : 3.0 %  Auto Monocyte % : 4.9 %  Auto Eosinophil % : 0.1 %  Auto Basophil % : 0.2 %          12-15    166<HH>  |  138<H>  |  205<H>  ----------------------------<  156<H>  8.2<HH>   |  8<LL>  |  14.00<H>    Ca    12.5<H>      15 Dec 2020 14:44    TPro  7.3  /  Alb  2.8<L>  /  TBili  0.3  /  DBili  x   /  AST  25  /  ALT  28  /  AlkPhos  89  12-15      Rapid Respiratory Viral Panel Result        12-15 @ 14:11  Rapid RVP NotDete  Coronovirus --  Adenovirus --  Bordetella Pertussis --  Chlamydia Pneumonia --  Entero/Rhinovirus--  HKU1 Coronovirus --  HMPV Coronovirus --  Influenza A --  Influenza AH1 --  Influenza AH1 2009 --  Influenza AH3 --  Influenza B --  Mycoplasma Pneumoniae --  NL63 Coronovirus --  OC43 Coronovirus --  Parainfluenza 1 --  Parainfluenza 2 --  Parainfluenza 3 --  Parainfluenza 4 --  Resp Syncytial Virus --    Assessment and Plan:\    1. Severe sepsis/  2. UTI.  3. BLAYNE on CKD.  5. Hyperkalemia  6. Dehydration.    . Change IV Rocephin to IV Merrem.  . Place arana  . IVF hydration.  . Follow all cultures.  . Aspiration precautions.    Thank you.             Patient is a 89y old  Female who presents with a chief complaint of altered MS.     The patient is a 89 year old female with a history of Afib, dementia, glaucoma, CKD, anemia and CAD who was brought to the ED today from [facility] for worsened lethargy. In the ED the patient was very hypothermic with temp of 92-93F. UA was suggestive of UTI and she was given IV Rocephin. Her evaluation also showed BLAYNE ( and Creatinine 14), high WBC of 13K, hyperkalemia (K of 8), hypernatremia (Na 166) and metabolic acidosis with HCO3 of 8. She was given IV Rocephin. Currently she is still very lethargic and is on bear hugger,.       PAST MEDICAL & SURGICAL HISTORY:  Afib    Glaucoma    Dementia    CKD (chronic kidney disease)    CAD (coronary artery disease)    OA (osteoarthritis)    HLD (hyperlipidemia)    HTN (hypertension)    Dementia    Arteriosclerotic heart disease (ASHD)    Arthritis    Dementia    Trigger finger    CKD (chronic kidney disease)    DVT (deep venous thrombosis)    GI (gastrointestinal bleed)    Anemia Iron Deficiency    H/o Diverticulosis    Carpal Tunnel Syndrome    Dyslipidemia    Glaucoma    OA (Osteoarthritis)    HTN (Hypertension)    CAD (Coronary Artery Disease)    No significant past surgical history    [place] filter in place  placed 2012    H/o Ovarian Cyst    Cataract  bilateral 2008    Knee Arthroplasty right 8/2009 &amp; left 9/2010        Allergies    PCN, Sulfa (Urticaria)  penicillin (Other)  sulfa drugs (Other)  Tolerated Cefazolin 2019 (Unknown)    Intolerances    Bananas (Other (Mild to Mod))  Potatoes (Other (Mild to Mod))      REVIEW OF SYSTEMS:  No vomiting or diarrhea  No hematuria.    HOME MEDICATIONS:    MEDICATIONS  (STANDING):  ALBUTerol    0.083% 10 milliGRAM(s) Nebulizer once  aMIOdarone    Tablet 100 milliGRAM(s) Oral <User Schedule>  ascorbic acid 500 milliGRAM(s) Oral daily  atorvastatin 10 milliGRAM(s) Oral at bedtime  calcitriol   Capsule 0.25 MICROGram(s) Oral daily  calcium gluconate IVPB 1 Gram(s) IV Intermittent once  cholecalciferol 2000 Unit(s) Oral daily  dextrose 40% Gel 15 Gram(s) Oral once  dextrose 5%. 1000 milliLiter(s) (50 mL/Hr) IV Continuous <Continuous>  dextrose 5%. 1000 milliLiter(s) (100 mL/Hr) IV Continuous <Continuous>  dextrose 50% Injectable 25 Gram(s) IV Push once  dextrose 50% Injectable 12.5 Gram(s) IV Push once  dextrose 50% Injectable 25 Gram(s) IV Push once  folic acid 1 milliGRAM(s) Oral daily  glucagon  Injectable 1 milliGRAM(s) IntraMuscular once  insulin lispro (ADMELOG) corrective regimen sliding scale   SubCutaneous three times a day before meals  insulin regular  human recombinant 10 Unit(s) IV Push once  latanoprost 0.005% Ophthalmic Solution 1 Drop(s) Both EYES at bedtime  sodium bicarbonate  Infusion 0.313 mEq/kG/Hr (125 mL/Hr) IV Continuous <Continuous>  sodium polystyrene sulfonate Enema 60 Gram(s) Rectal once  sodium polystyrene sulfonate Enema 60 Gram(s) Rectal once    MEDICATIONS  (PRN):  acetaminophen   Tablet .. 650 milliGRAM(s) Oral every 6 hours PRN Temp greater or equal to 38C (100.4F), Mild Pain (1 - 3)      Vital Signs Last 24 Hrs  T(C): 34.6 (15 Dec 2020 15:49), Max: 34.6 (15 Dec 2020 15:49)  T(F): 94.3 (15 Dec 2020 15:49), Max: 94.3 (15 Dec 2020 15:49)  HR: 65 (15 Dec 2020 15:49) (65 - 74)  BP: 118/57 (15 Dec 2020 15:49) (92/48 - 118/57)  BP(mean): --  RR: 20 (15 Dec 2020 15:49) (20 - 20)  SpO2: 100% (15 Dec 2020 15:49) (100% - 100%)    PHYSICAL EXAM:  HEENT: NC/AT  Neck: Soft  Lungs: Coarse BS bilaterally   Heart: RRR; no murmurs.   Abdomen: Soft; tenderness in lower quadrants,   Genital/ Rectal: No arana catheter.   Extremities: No ulcers   Neurologic: Confused. Lerahrgic,     I&O's Summary      LABORATORY:                          10.4   12.91 )-----------( 348      ( 15 Dec 2020 14:44 )             34.4           12-15    166<HH>  |  138<H>  |  205<H>  ----------------------------<  156<H>  8.2<HH>   |  8<LL>  |  14.00<H>    Ca    12.5<H>      15 Dec 2020 14:44    TPro  7.3  /  Alb  2.8<L>  /  TBili  0.3  /  DBili  x   /  AST  25  /  ALT  28  /  AlkPhos  89  12-15      Rapid Respiratory Viral Panel Result        12-15 @ 14:11  Rapid RVP [facility]  Coronovirus --  Adenovirus --  Bordetella Pertussis --  Chlamydia Pneumonia --  Entero/Rhinovirus--  HKU1 Coronovirus --  HMPV Coronovirus --  Influenza A --  Influenza AH1 --  Influenza AH1 2009 --  Influenza AH3 --  Influenza B --  Mycoplasma Pneumoniae --  NL63 Coronovirus --  OC43 Coronovirus --  Parainfluenza 1 --  Parainfluenza 2 --  Parainfluenza 3 --  Parainfluenza 4 --  Resp Syncytial Virus --      LABORATORY:    CBC Full  -  ( 15 Dec 2020 14:44 )  WBC Count : 12.91 K/uL  RBC Count : 3.57 M/uL  Hemoglobin : 10.4 g/dL  Hematocrit : 34.4 %  Platelet Count - Automated : 348 K/uL  Mean Cell Volume : 96.4 fl  Mean Cell Hemoglobin : 29.1 pg  Mean Cell Hemoglobin Concentration : 30.2 gm/dL  Auto Neutrophil # : 11.76 K/uL  Auto Lymphocyte # : 0.39 K/uL  Auto Monocyte # : 0.63 K/uL  Auto Eosinophil # : 0.01 K/uL  Auto Basophil # : 0.02 K/uL  Auto Neutrophil % : 91.0 %  Auto Lymphocyte % : 3.0 %  Auto Monocyte % : 4.9 %  Auto Eosinophil % : 0.1 %  Auto Basophil % : 0.2 %          12-15    166<HH>  |  138<H>  |  205<H>  ----------------------------<  156<H>  8.2<HH>   |  8<LL>  |  14.00<H>    Ca    12.5<H>      15 Dec 2020 14:44    TPro  7.3  /  Alb  2.8<L>  /  TBili  0.3  /  DBili  x   /  AST  25  /  ALT  28  /  AlkPhos  89  12-15      Rapid Respiratory Viral Panel Result        12-15 @ 14:11  Rapid RVP NotDetec  Coronovirus --  Adenovirus --  Bordetella Pertussis --  Chlamydia Pneumonia --  Entero/Rhinovirus--  HKU1 Coronovirus --  HMPV Coronovirus --  Influenza A --  Influenza AH1 --  Influenza AH1 2009 --  Influenza AH3 --  Influenza B --  Mycoplasma Pneumoniae --  NL63 Coronovirus --  OC43 Coronovirus --  Parainfluenza 1 --  Parainfluenza 2 --  Parainfluenza 3 --  Parainfluenza 4 --  Resp Syncytial Virus --    Assessment and Plan:\    1. Severe sepsis.   2. UTI.  3. BLAYNE on CKD with hyperkalemia.   5. Hyperkalemia  6. Hypernatremia.  7. Dehydration.    . Change IV Rocephin to IV Merrem 500 mg daily.   . Place arana catheter.  . IVF hydration.  . Follow all cultures.  . Aspiration precautions.    Thank you for the courtesy of this consultation.

## 2020-12-15 NOTE — H&P ADULT - RS GEN PE MLT RESP DETAILS PC
normal/airway patent/breath sounds equal/good air movement/diminished breath sounds, L/diminished breath sounds, R

## 2020-12-15 NOTE — CONSULT NOTE ADULT - PROBLEM SELECTOR RECOMMENDATION 9
89 y/o F with hx of Afib on eliquis, CAD, CKD, HTN, HLD, OA, DVT biba from [facility] for evaluation of altered mental status and hypothermia.   emp abx for acute infection  ckd aiden - no HD as per daughter  oral and skin care  assist with ADL  renal eval noted  cxr and labs reviewed  MOLST update  pt is DNR DNI

## 2020-12-15 NOTE — CONSULT NOTE ADULT - ASSESSMENT
pt with ams, hypothermia, r/o sepsis  acute renal failue- hyperkaemia,hypernatremia  atrial - fib- on eliquis  treatment as per nephrologist   discussed with daughter chandrakant  -does not want hemodialysis  prognosis is grave

## 2020-12-15 NOTE — H&P ADULT - PROBLEM SELECTOR PLAN 5
Admitted for septic workup and evaluation,send blood and urine cx,serial lactate levels,monitor vitals closley,ivfs hydration,monitor urine output and renal profile,iv abx initiated -ON MERROPENEM ( S/P CEFTRIAXONE IN ER )

## 2020-12-15 NOTE — ED PROVIDER NOTE - OBJECTIVE STATEMENT
89 y/o F with hx of Afib on eliquis, CAD, CKD, HTN, HLD, OA, DVT biba from [facility] for evaluation of altered mental status and hypothermia.   Pt is DNR/DNI. Unable to obtain info from pt due to AMS and dementia.   pcp: Dr. Jeffrey 89 y/o F with hx of Afib on eliquis, CAD, CKD, HTN, HLD, OA, DVT biba from [facility] for evaluation of altered mental status and hypothermia.   Pt is DNR/DNI. Unable to obtain info from pt due to AMS and dementia.   pcp: Dean Solis

## 2020-12-15 NOTE — H&P ADULT - PROBLEM SELECTOR PLAN 6
Admitted for septic workup and evaluation,send blood and urine cx,serial lactate levels,monitor vitals closley,ivfs hydration,monitor urine output and renal profile,iv abx as per ID CONSULT

## 2020-12-15 NOTE — H&P ADULT - ASSESSMENT
89 y/o F with hx of Afib on eliquis, CAD, CKD, HTN, HLD, OA, DVT biba from [facility] for evaluation of altered mental status and hypothermia.                                                      Pt is DNR/DNI. Unable to obtain info from pt due to AMS and dementia.Found to have sepsis likely secondary to UTI Admitted for septic workup and evaluation,send blood and urine cx,serial lactate levels,monitor vitals closley,ivfs hydration,monitor urine output and renal profile,iv abx initiated ,seen by ID CONSULT Found to be in BLAYNE and to be hyperkelmic .Treatment of hyperkalemia give by nephrologist ,iv hydration started Admit for iv hydration,monitor renal profile and urine output,nutritionist consult,prealbumin level,serial bmp,nutritional supplements,multivitamins,palliative care evaluuation regarding MOLST completion and artificial nutrition discussion ,HD disussion .Family declined emergency HD SESSION and requested symptomatic manamagement .COVID is negataive  Patient was srecntly hospitalized in [facility] 9/25/2020 f fall and l hip fx CXR 9/25/2020 showed sm r pl effsn ivcf old fx r humerus Pt has since been admitted [facility] VS and has been to two rehabs She was sent in from [facility] to ER 12/15 for AMS ,associated with hypothermia .

## 2020-12-15 NOTE — CONSULT NOTE ADULT - ASSESSMENT
89 y/o F with hx of Afib on eliquis, CAD, CKD, HTN, HLD, OA, DVT biba from [facility] for evaluation of altered mental status and hypothermia. 89 y/o F with hx of Afib on eliquis, CAD, CKD, HTN, HLD, OA, DVT biba from [facility] for evaluation of altered mental status and hypothermia.

## 2020-12-15 NOTE — CONSULT NOTE ADULT - ASSESSMENT
SHAHANA REDMAN [id number]  1930 DOA 12/15/2020 DR DHEERAJ COBB     Initial evaluation/Pulmonary Critical Care consultation requested on  12/15/2020  by Dr Cobb  from Dr Estrella   Patient examined chart reviewed    HOSPITAL ADMISSION   PATIENT CAME  FROM (if information available)      SHAHANA REDMAN [id number]  1930 DOA 12/15/2020 DR DHEERAJ COBB     REVIEW OF SYMPTOMS      Able to give ROS  NO     PHYSICAL EXAM    HEENT Unremarkable PERRLA atraumatic   RESP Fair air entry EXP prolonged    Harsh breath sound Resp distres mild   CARDIAC S1 S2 No S3     NO JVD    ABDOMEN SOFT BS PRESENT NOT DISTENDED No hepatosplenomegaly PEDAL EDEMA present No calf tenderness  NO rash     PT DATA/BEST PRACTICE  ALLERGY      pncl sulfa cefazolin         WT                       BMI                                      ADVANCED DIRECTIVE     HEAD OF BED ELEVATION Yes      DVT PROPHYLAXIS.        DIET..                            JENKINS PROPHYLAXIS.   INFECTION PPLX                                                    OXYGENATION.   12/15/2020 ra 100%     VITALS.   12/15/2020 94f 65 118/50            MICROBIO.   Covid pcr 12/15/2020 pcr negv   Ua 12/15/2020 ua w 50 blod large nitr negv l estr mod       LABS.   W 12/15/2020 w 12.9  la 12/15/2020 la 4   Hb 12/15/2020 Hb 10.4   Plt 12/15/2020 Plt 348  inr 12/15/2020 inr 123  Na 12/15/2020 Na 166  K 12/15/2020 K 8.2   CO2 12/15/2020 co2 8   bun 12/15/2020 bun 205  cr 12/15/2020 Cr 14                 PATIENT SUMMARY.   89 f ho dementia a fib on eliquis ckd cad oa hytn hld paf  2020 f fall and l hip fx CXR 2020 showed sm r pl effsn ivcf old fx r humerus Pt has since been admitted [facility] and has been to two rehabs She was sent in from [facility] 12/15/2020 and was found to have severe hyprekalemai and blayne   Renal consulted Pulm consulted 12/15/2020   labs 6:08 PM W 12.9 Hb 10.4 la 4.1 Na 166 K 8.2 Cr 14     meds given so far 12/15/2020 6:07 PM rocephin ns 500   home meds vit c atorvastat 10 calcitriol eliquis 2.5x 2     ASSESSMENT PLAN   HYPOTHERMIA Fredi hugger  SEPSIS BS ab  HYPERKALEMIA Dr Boykin on case Ca Kayexalate bicarb ordered stat 12/15/2020 6:17 PM   Family undecided re HD  RESP Target po 90-95%  HYPERNATREMIA   Hypotonic fluids  BLAYNE   GOC determination  HD if allowed       TIME SPENT   Over 55 minutes aggregate care time spent on encounter; activities included   direct patient care, counseling and/or coordinating care reviewing notes, lab data/ imaging , discussion with multidisciplinary team/ patient  /family and explaining in detail risks, benefits, alternatives  of the recommendations     SHAHANA REDMAN [id number]  1930 DOA 12/15/2020 DR DHEERAJ COBB

## 2020-12-15 NOTE — ED PROVIDER NOTE - PROGRESS NOTE DETAILS
Spoke to Dr. Jeffrey, advised to admit to Dr. Cobb due to previous admission in september with Dr. Cobb. Spoke to Dr. Cobb, discussed case and results, will contact Dr. Boykin for stat nephro consult. Spoke to daughter, Donna ([phone]), discussed results, as per daughter, she does not want pt to have dialysis. Spoke with Dr. Boykin, will see pt in ED shortly, advised arana, hydration and will monitor.

## 2020-12-15 NOTE — CONSULT NOTE ADULT - SUBJECTIVE AND OBJECTIVE BOX
CARDIOLOGY CONSULT NOTE    Patient is a 89y Female with a known history of : admitted with ams ,hypothermia and acute renal failure ,hyperkalemia, hypernatremia    HPI:      REVIEW OF SYSTEMS:    CONSTITUTIONAL: No fever, weight loss, or fatigue  EYES: No eye pain, visual disturbances, or discharge  ENMT:  No difficulty hearing, tinnitus, vertigo; No sinus or throat pain  NECK: No pain or stiffness  BREASTS: No pain, masses, or nipple discharge  RESPIRATORY: No cough, wheezing, chills or hemoptysis; No shortness of breath  CARDIOVASCULAR: No chest pain, palpitations, dizziness, or leg swelling  GASTROINTESTINAL: No abdominal or epigastric pain. No nausea, vomiting, or hematemesis; No diarrhea or constipation. No melena or hematochezia.  GENITOURINARY: No dysuria, frequency, hematuria, or incontinence  NEUROLOGICAL: No headaches, memory loss, loss of strength, numbness, or tremors  SKIN: No itching, burning, rashes, or lesions   LYMPH NODES: No enlarged glands  ENDOCRINE: No heat or cold intolerance; No hair loss  MUSCULOSKELETAL: No joint pain or swelling; No muscle, back, or extremity pain  PSYCHIATRIC: No depression, anxiety, mood swings, or difficulty sleeping  HEME/LYMPH: No easy bruising, or bleeding gums  ALLERGY AND IMMUNOLOGIC: No hives or eczema    MEDICATIONS  (STANDING):  aMIOdarone    Tablet 100 milliGRAM(s) Oral daily  ascorbic acid 500 milliGRAM(s) Oral daily  atorvastatin 10 milliGRAM(s) Oral at bedtime  calcitriol   Capsule 0.25 MICROGram(s) Oral daily  cholecalciferol 2000 Unit(s) Oral daily  dextrose 40% Gel 15 Gram(s) Oral once  dextrose 5%. 1000 milliLiter(s) (50 mL/Hr) IV Continuous <Continuous>  dextrose 5%. 1000 milliLiter(s) (100 mL/Hr) IV Continuous <Continuous>  dextrose 5%. 1000 milliLiter(s) (100 mL/Hr) IV Continuous <Continuous>  dextrose 50% Injectable 25 Gram(s) IV Push once  dextrose 50% Injectable 12.5 Gram(s) IV Push once  dextrose 50% Injectable 25 Gram(s) IV Push once  folic acid 1 milliGRAM(s) Oral daily  glucagon  Injectable 1 milliGRAM(s) IntraMuscular once  insulin lispro (ADMELOG) corrective regimen sliding scale   SubCutaneous three times a day before meals  latanoprost 0.005% Ophthalmic Solution 1 Drop(s) Both EYES at bedtime  multivitamin 1 Tablet(s) Oral daily    MEDICATIONS  (PRN):      ALLERGIES: PCN, Sulfa (Urticaria)  penicillin (Other)  sulfa drugs (Other)  Tolerated Cefazolin 2019 (Unknown)      Social History:     FAMILY HISTORY:  No pertinent family history in first degree relatives        PAST MEDICAL & SURGICAL HISTORY:  Afib    Glaucoma    Dementia    CKD (chronic kidney disease)    CAD (coronary artery disease)    OA (osteoarthritis)    HLD (hyperlipidemia)    HTN (hypertension)    Dementia    Arteriosclerotic heart disease (ASHD)    Arthritis    Dementia    Trigger finger    CKD (chronic kidney disease)    DVT (deep venous thrombosis)    GI (gastrointestinal bleed)    Anemia Iron Deficiency    H/o Diverticulosis    Carpal Tunnel Syndrome    Dyslipidemia    Glaucoma    OA (Osteoarthritis)    HTN (Hypertension)    CAD (Coronary Artery Disease)    No significant past surgical history    [place] filter in place  placed 2012    H/o Ovarian Cyst    Cataract  bilateral 2008    Knee Arthroplasty right 8/2009 &amp; left 9/2010          PHYSICAL EXAMINATION:  -----------------------------  T(C): 34.6 (12-15-20 @ 15:49), Max: 34.6 (12-15-20 @ 15:49)  HR: 65 (12-15-20 @ 15:49) (65 - 74)  BP: 118/57 (12-15-20 @ 15:49) (92/48 - 118/57)  RR: 20 (12-15-20 @ 15:49) (20 - 20)  SpO2: 100% (12-15-20 @ 15:49) (100% - 100%)  Wt(kg): --    Height (cm): 160 (12-15 @ 12:16)    Constitutional: well developed, normal appearance, well groomed, well nourished, no deformities and no acute distress.   Eyes: the conjunctiva exhibited no abnormalities and the eyelids demonstrated no xanthelasmas.   HEENT: normal oral mucosa, no oral pallor and no oral cyanosis.   Neck: normal jugular venous A waves present, normal jugular venous V waves present and no jugular venous kelley A waves.   Pulmonary: no respiratory distress, normal respiratory rhythm and effort, no accessory muscle use and lungs were clear to auscultation bilaterally.   Cardiovascular: heart rate and rhythm were normal, normal S1 and S2 and no murmur, gallop, rub, heave or thrill are present.   Abdomen: soft, non-tender, no hepato-splenomegaly and no abdominal mass palpated.   Musculoskeletal: the gait could not be assessed..   Extremities: no clubbing of the fingernails, no localized cyanosis, no petechial hemorrhages and no ischemic changes.   Skin: normal skin color and pigmentation, no rash, no venous stasis, no skin lesions, no skin ulcer and no xanthoma was observed.   Psychiatric: oriented to person, place, and time, the affect was normal, the mood was normal and not feeling anxious.     LABS:   --------  12-15    166<HH>  |  138<H>  |  205<H>  ----------------------------<  156<H>  8.2<HH>   |  8<LL>  |  14.00<H>    Ca    12.5<H>      15 Dec 2020 14:44    TPro  7.3  /  Alb  2.8<L>  /  TBili  0.3  /  DBili  x   /  AST  25  /  ALT  28  /  AlkPhos  89  12-15                         10.4   12.91 )-----------( 348      ( 15 Dec 2020 14:44 )             34.4     PT/INR - ( 15 Dec 2020 14:44 )   PT: 14.3 sec;   INR: 1.23 ratio         PTT - ( 15 Dec 2020 14:44 )  PTT:32.2 sec            RADIOLOGY:  -----------------        ECG:     ECHO

## 2020-12-15 NOTE — H&P ADULT - NSHPLABSRESULTS_GEN_ALL_CORE
INTERPRETATION:  Sepsis.    AP chest. Prior 10/27/2020.    Rotated. Chin obscures portion right apex. Low lung volumes. No change heart mediastinum. No consolidation or effusion. Bilateral high riding humeral heads suggests chronic rotator cuff pathology. Old healed fracture the right proximal humerus. IVC filter projects over the right upper quadrant.    Impression: No active infiltrates.    < end of copied text >     < from: TTE Echo Doppler w/o Cont (11.17.19 @ 11:10) >    FINDINGS  Left Ventricle: Left ventricle was of normal size, normal LV systolic   function EF 55%  Aortic Valve: Aortic sclerosis with normal systolic opening  Mitral Valve: Mild mitral regurgitation  Tricuspid Valve: Mild tricuspid regurgitation with marked pulmonary   hypertension 39 mmHg  Pulmonic Valve: Trace pulmonary regurgitation  Left Atrium: Normal  Right Ventricle: Normal  Right Atrium: Normal  Diastolic Function: Normal  Pericardium/Pleura: Normal      CONCLUSIONS:  Difficult study  Left ventricle was of normal size, normal LV systolic function EF 55%  Mild mitral regurgitation  Mild tricuspid regurgitation with mild pulmonary hypertension 39 mmHg  Trace pulmonary regurgitation    < end of copied text >

## 2020-12-15 NOTE — H&P ADULT - PROBLEM SELECTOR PLAN 3
TX GIVEN IN ER BY DR.PAHLAVAN Danni ROBERTS , SODIUM BICARB ,CA GLUCONATE ,MONITOR SERIAL SERUM LEVELS ,EKG ,SEEN BY CARDIOLOGIST

## 2020-12-15 NOTE — ED PROVIDER NOTE - PSH
Cataract  bilateral 2008    [place] filter in place  placed 2012  H/o Ovarian Cyst    Knee Arthroplasty right 8/2009 & left 9/2010    No significant past surgical history

## 2020-12-15 NOTE — ED ADULT NURSE NOTE - NSIMPLEMENTINTERV_GEN_ALL_ED
Implemented All Fall with Harm Risk Interventions:  [place] to call system. Call bell, personal items and telephone within reach. Instruct patient to call for assistance. Room bathroom lighting operational. Non-slip footwear when patient is off stretcher. Physically safe environment: no spills, clutter or unnecessary equipment. Stretcher in lowest position, wheels locked, appropriate side rails in place. Provide visual cue, wrist band, yellow gown, etc. Monitor gait and stability. Monitor for mental status changes and reorient to person, place, and time. Review medications for side effects contributing to fall risk. Reinforce activity limits and safety measures with patient and family. Provide visual clues: red socks.

## 2020-12-15 NOTE — ED PROVIDER NOTE - CLINICAL SUMMARY MEDICAL DECISION MAKING FREE TEXT BOX
89 yo F with hx of afib, ckd, cad, htn, hld biba from [facility] for AMS and hypothermia, temp 93F rectal; BP 92/48; will do sepsis workup, IVF, warming blankets, cxr, ekg, covid swab, admit

## 2020-12-15 NOTE — H&P ADULT - ATTENDING COMMENTS
89 y/o F with hx of Afib on eliquis, CAD, CKD, HTN, HLD, OA, DVT biba from [facility] for evaluation of altered mental status and hypothermia.  Pt is DNR/DNI. Unable to obtain info from pt due to AMS and dementia.Found to have sepsis likely secondary to UTI Admitted for septic workup and evaluation,send blood and urine cx,serial lactate levels,monitor vitals closley,ivfs hydration,monitor urine output and renal profile,iv abx initiated ,seen by ID CONSULT Found to be in BLAYNE and to be hyperkelmic .Treatment of hyperkalemia give by nephrologist ,iv hydration started Admit for iv hydration,monitor renal profile and urine output,nutritionist consult,prealbumin level,serial bmp,nutritional supplements,multivitamins,palliative care evaluuation regarding MOLST completion and artificial nutrition discussion ,HD disussion .Family declined emergency HD SESSION and requested symptomatic manamagement .COVID is negataive  Patient was srecntly hospitalized in [facility] 9/25/2020 f fall and l hip fx CXR 9/25/2020 showed sm r pl effsn ivcf old fx r humerus Pt has since been admitted [facility] and has been to two rehabs [facility] for AMS ,associated with hypothermia .

## 2020-12-15 NOTE — CONSULT NOTE ADULT - SUBJECTIVE AND OBJECTIVE BOX
Patient is a 89y Female whom presented to the hospital with aiden , hyperkalemia    PAST MEDICAL & SURGICAL HISTORY:  Afib    Glaucoma    Dementia    CKD (chronic kidney disease)    CAD (coronary artery disease)    OA (osteoarthritis)    HLD (hyperlipidemia)    HTN (hypertension)    Dementia    Arteriosclerotic heart disease (ASHD)    Arthritis    Dementia    Trigger finger    CKD (chronic kidney disease)    DVT (deep venous thrombosis)    GI (gastrointestinal bleed)    Anemia Iron Deficiency    H/o Diverticulosis    Carpal Tunnel Syndrome    Dyslipidemia    Glaucoma    OA (Osteoarthritis)    HTN (Hypertension)    CAD (Coronary Artery Disease)    No significant past surgical history    [place] filter in place  placed     H/o Ovarian Cyst    Cataract  bilateral 2008    Knee Arthroplasty right 2009 &amp; left 2010        MEDICATIONS  (STANDING):  aMIOdarone    Tablet 100 milliGRAM(s) Oral daily  ascorbic acid 500 milliGRAM(s) Oral daily  atorvastatin 10 milliGRAM(s) Oral at bedtime  calcitriol   Capsule 0.25 MICROGram(s) Oral daily  calcium gluconate IVPB 1 Gram(s) IV Intermittent once  cholecalciferol 2000 Unit(s) Oral daily  dextrose 40% Gel 15 Gram(s) Oral once  dextrose 5%. 1000 milliLiter(s) (50 mL/Hr) IV Continuous <Continuous>  dextrose 5%. 1000 milliLiter(s) (150 mL/Hr) IV Continuous <Continuous>  dextrose 5%. 1000 milliLiter(s) (100 mL/Hr) IV Continuous <Continuous>  dextrose 50% Injectable 25 Gram(s) IV Push once  dextrose 50% Injectable 12.5 Gram(s) IV Push once  dextrose 50% Injectable 25 Gram(s) IV Push once  folic acid 1 milliGRAM(s) Oral daily  glucagon  Injectable 1 milliGRAM(s) IntraMuscular once  insulin lispro (ADMELOG) corrective regimen sliding scale   SubCutaneous three times a day before meals  latanoprost 0.005% Ophthalmic Solution 1 Drop(s) Both EYES at bedtime  sodium bicarbonate  Injectable 50 milliEquivalent(s) IV Push once  sodium polystyrene sulfonate Enema 60 Gram(s) Rectal once      Allergies    PCN, Sulfa (Urticaria)  penicillin (Other)  sulfa drugs (Other)  Tolerated Cefazolin 2019 (Unknown)    Intolerances    Bananas (Other (Mild to Mod))  Potatoes (Other (Mild to Mod))      SOCIAL HISTORY:  Denies ETOh,Smoking,     FAMILY HISTORY:  No pertinent family history in first degree relatives        REVIEW OF SYSTEMS:    unable to obtained a good review system      VITAL:  T(C): , Max: 34.6 (12-15-20 @ 15:49)  T(F): , Max: 94.3 (12-15-20 @ 15:49)  HR: 65 (12-15-20 @ 15:49)  BP: 118/57 (12-15-20 @ 15:49)  BP(mean): --  RR: 20 (12-15-20 @ 15:49)  SpO2: 100% (12-15-20 @ 15:49)  Wt(kg): --    I and O's:    Height (cm): 160 (12-15 @ 12:16)    PHYSICAL EXAM:    Constitutional: lethargic   Neck:  No JVD  Respiratory: decrease bs b/l   Cardiovascular: S1 and S2  Gastrointestinal: BS+, soft,   Extremities: No peripheral edema    LABS:                        10.4   12.91 )-----------( 348      ( 15 Dec 2020 14:44 )             34.4     12-15    166<HH>  |  138<H>  |  205<H>  ----------------------------<  156<H>  8.2<HH>   |  8<LL>  |  14.00<H>    Ca    12.5<H>      15 Dec 2020 14:44    TPro  7.3  /  Alb  2.8<L>  /  TBili  0.3  /  DBili  x   /  AST  25  /  ALT  28  /  AlkPhos  89  12-15      Urine Studies:  Urinalysis Basic - ( 15 Dec 2020 14:15 )    Color: Yellow / Appearance: Turbid / S.010 / pH: x  Gluc: x / Ketone: Negative  / Bili: Negative / Urobili: Negative   Blood: x / Protein: 500 mg/dL / Nitrite: Negative   Leuk Esterase: Moderate / RBC: 3-5 /HPF / WBC >50   Sq Epi: x / Non Sq Epi: Few / Bacteria: TNTC            RADIOLOGY & ADDITIONAL STUDIES:

## 2020-12-16 ENCOUNTER — TRANSCRIPTION ENCOUNTER (OUTPATIENT)
Age: 85
End: 2020-12-16

## 2020-12-16 LAB
A1C WITH ESTIMATED AVERAGE GLUCOSE RESULT: 5.8 % — HIGH (ref 4–5.6)
ANION GAP SERPL CALC-SCNC: 16 MMOL/L — SIGNIFICANT CHANGE UP (ref 5–17)
BUN SERPL-MCNC: 197 MG/DL — HIGH (ref 7–23)
CALCIUM SERPL-MCNC: 11.3 MG/DL — HIGH (ref 8.5–10.1)
CHLORIDE SERPL-SCNC: 137 MMOL/L — HIGH (ref 96–108)
CO2 SERPL-SCNC: 17 MMOL/L — LOW (ref 22–31)
CREAT SERPL-MCNC: 14 MG/DL — HIGH (ref 0.5–1.3)
ESTIMATED AVERAGE GLUCOSE: 120 MG/DL — HIGH (ref 68–114)
GLUCOSE SERPL-MCNC: 247 MG/DL — HIGH (ref 70–99)
HCT VFR BLD CALC: 28.2 % — LOW (ref 34.5–45)
HGB BLD-MCNC: 9 G/DL — LOW (ref 11.5–15.5)
MCHC RBC-ENTMCNC: 29.3 PG — SIGNIFICANT CHANGE UP (ref 27–34)
MCHC RBC-ENTMCNC: 31.9 GM/DL — LOW (ref 32–36)
MCV RBC AUTO: 91.9 FL — SIGNIFICANT CHANGE UP (ref 80–100)
NRBC # BLD: 4 /100 WBCS — HIGH (ref 0–0)
PLATELET # BLD AUTO: 258 K/UL — SIGNIFICANT CHANGE UP (ref 150–400)
POTASSIUM SERPL-MCNC: 6 MMOL/L — HIGH (ref 3.5–5.3)
POTASSIUM SERPL-SCNC: 6 MMOL/L — HIGH (ref 3.5–5.3)
PROCALCITONIN SERPL-MCNC: 1.93 NG/ML — HIGH (ref 0–0.04)
RBC # BLD: 3.07 M/UL — LOW (ref 3.8–5.2)
RBC # FLD: 19.5 % — HIGH (ref 10.3–14.5)
SODIUM SERPL-SCNC: 170 MMOL/L — CRITICAL HIGH (ref 135–145)
WBC # BLD: 11.4 K/UL — HIGH (ref 3.8–10.5)
WBC # FLD AUTO: 11.4 K/UL — HIGH (ref 3.8–10.5)

## 2020-12-16 RX ORDER — HYDROMORPHONE HYDROCHLORIDE 2 MG/ML
1 INJECTION INTRAMUSCULAR; INTRAVENOUS; SUBCUTANEOUS
Refills: 0 | Status: DISCONTINUED | OUTPATIENT
Start: 2020-12-16 | End: 2020-12-17

## 2020-12-16 RX ORDER — HYDROMORPHONE HYDROCHLORIDE 2 MG/ML
0.5 INJECTION INTRAMUSCULAR; INTRAVENOUS; SUBCUTANEOUS
Refills: 0 | Status: DISCONTINUED | OUTPATIENT
Start: 2020-12-16 | End: 2020-12-17

## 2020-12-16 RX ORDER — FENTANYL CITRATE 50 UG/ML
1 INJECTION INTRAVENOUS
Refills: 0 | Status: DISCONTINUED | OUTPATIENT
Start: 2020-12-16 | End: 2020-12-17

## 2020-12-16 RX ADMIN — Medication 3: at 18:58

## 2020-12-16 RX ADMIN — CALCITRIOL 0.25 MICROGRAM(S): 0.5 CAPSULE ORAL at 16:20

## 2020-12-16 RX ADMIN — HYDROMORPHONE HYDROCHLORIDE 0.5 MILLIGRAM(S): 2 INJECTION INTRAMUSCULAR; INTRAVENOUS; SUBCUTANEOUS at 18:58

## 2020-12-16 RX ADMIN — HEPARIN SODIUM 5000 UNIT(S): 5000 INJECTION INTRAVENOUS; SUBCUTANEOUS at 05:52

## 2020-12-16 RX ADMIN — HEPARIN SODIUM 5000 UNIT(S): 5000 INJECTION INTRAVENOUS; SUBCUTANEOUS at 19:01

## 2020-12-16 RX ADMIN — HYDROMORPHONE HYDROCHLORIDE 0.5 MILLIGRAM(S): 2 INJECTION INTRAMUSCULAR; INTRAVENOUS; SUBCUTANEOUS at 10:37

## 2020-12-16 RX ADMIN — Medication 3: at 10:33

## 2020-12-16 RX ADMIN — ALBUTEROL 10 MILLIGRAM(S): 90 AEROSOL, METERED ORAL at 02:08

## 2020-12-16 RX ADMIN — SODIUM POLYSTYRENE SULFONATE 60 GRAM(S): 4.1 POWDER, FOR SUSPENSION ORAL at 02:09

## 2020-12-16 RX ADMIN — FENTANYL CITRATE 1 PATCH: 50 INJECTION INTRAVENOUS at 19:01

## 2020-12-16 RX ADMIN — Medication 125 MEQ/KG/HR: at 01:00

## 2020-12-16 NOTE — SWALLOW BEDSIDE ASSESSMENT ADULT - COMMENTS
Consult received and chart reviewed. Per discussion with RN, pt significantly lethargic at this time, moans when attempting to be aroused. Discussed with Dr. Cobb, deemed not appropriate for clinical assessment at this time. Defer PO diet to MD. Consider GOC discussion re: nutritional intake with pt/family members given overall prognosis. Will f/u pending further GOC, as pt is clinically appropriate, and as schedule permits. Dr. Cobb in agreement with POC.

## 2020-12-16 NOTE — DISCHARGE NOTE PROVIDER - NSDCCPCAREPLAN_GEN_ALL_CORE_FT
PRINCIPAL DISCHARGE DIAGNOSIS  Diagnosis: BLAYNE (acute kidney injury)  Assessment and Plan of Treatment:       SECONDARY DISCHARGE DIAGNOSES  Diagnosis: Sepsis  Assessment and Plan of Treatment: Sepsis    Diagnosis: UTI (urinary tract infection)  Assessment and Plan of Treatment:     Diagnosis: AMS (altered mental status)  Assessment and Plan of Treatment:     Diagnosis: Hyperkalemia  Assessment and Plan of Treatment:     Diagnosis: Hypernatremia  Assessment and Plan of Treatment:     Diagnosis: HTN (Hypertension)  Assessment and Plan of Treatment: HTN (Hypertension)    Diagnosis: Dementia  Assessment and Plan of Treatment: Dementia    Diagnosis: Hypothermia  Assessment and Plan of Treatment:

## 2020-12-16 NOTE — ED ADULT NURSE REASSESSMENT NOTE - NS ED NURSE REASSESS COMMENT FT1
2000: #14 fr coude arana cath inserted with sterile technique.
Pt cleansed for incontinence of stool, linens changed, pt repositioned.
Pt has had scant urine output since 11pm when taking over care for pt. MD Clark at bedside to evaluate, made aware.
Pt received in bed awake and moaning and groaning meds given and tolerated well. Pt admitted and awaiting tele bed. Nursing care ongoing and safety maintained.

## 2020-12-16 NOTE — DISCHARGE NOTE PROVIDER - NSDCMRMEDTOKEN_GEN_ALL_CORE_FT
acetaminophen 325 mg oral tablet: 2 tab(s) orally every 6 hours, As needed, Temp greater or equal to 38C (100.4F)  amiodarone 100 mg oral tablet: 1 tab(s) orally 2 times a week on Monday and Thursday  ammonium lactate 12% topical lotion: Apply topically to affected area once a day  ascorbic acid 500 mg oral tablet: 1 tab(s) orally 2 times a day  atorvastatin 10 mg oral tablet: 1 tab(s) orally once a day (at bedtime)  B-Complex with Vitamin C: 1 tab(s) orally once a day  bisacodyl 10 mg rectal suppository: 1 suppository(ies) rectal once a day, As Needed  calcitriol 0.25 mcg oral capsule: 1 cap(s) orally once a day  Colace 100 mg oral capsule: 2 cap(s) orally once a day (at bedtime)  Eliquis 2.5 mg oral tablet: 0.5 tab(s) *1.25mg* orally 2 times a day    Dosing confirmed by MAR sent over by [facility]  ferrous sulfate 325 mg (65 mg elemental iron) oral delayed release tablet: 1 tab(s) orally once a day  Fleet Enema 7 g-19 g rectal enema: 1 application rectal once, As Needed  folic acid 1 mg oral tablet: 1 tab(s) orally once a day  latanoprost 0.005% ophthalmic solution: 1 drop(s) to each affected eye once a day (at bedtime)  Melatonin 3 mg oral tablet: 1 tab(s) orally once a day (in the evening), As Needed for insomnia  Milk of Magnesia 8% oral suspension: 30 milliliter(s) orally once a day (at bedtime), As Needed  multivitamin: 1 tab(s) orally once a day  senna oral tablet: 2 tab(s) orally once a day (in the morning)  Vitamin D3 2000 intl units (50 mcg) oral tablet: 1 tab(s) orally once a day   acetaminophen 650 mg rectal suppository: rectal every 4 hours, As Needed temp above 100.4  bisacodyl 10 mg rectal suppository: 1 suppository(ies) rectal once a day, As Needed  fentaNYL 12 mcg/hr transdermal film, extended release: 1 patch transdermal every 72 hours  HYDROmorphone: 0.5 milligram(s) intravenous every 90 minutes, As Needed pain ,dyspnea ,suffering   LORazepam: 0.5 milligram(s) intravenous every 4 hours, As Needed anxiety ,agitation

## 2020-12-16 NOTE — SWALLOW BEDSIDE ASSESSMENT ADULT - SLP PERTINENT HISTORY OF CURRENT PROBLEM
Per charting, 89 y/o F with hx of Afib on eliquis, CAD, CKD, HTN, HLD, OA, DVT biba from [facility] for evaluation of altered mental status and hypothermia.

## 2020-12-16 NOTE — DISCHARGE NOTE PROVIDER - CARE PROVIDER_API CALL
Flaco Li)  Critical Care Medicine; HospicePalliative Medicine; Internal Medicine; Pulmonary Disease  [address] [place] [address]  Phone: [phone]  Fax: [phone]  Follow Up Time: 1-3 days

## 2020-12-16 NOTE — DISCHARGE NOTE PROVIDER - HOSPITAL COURSE
89 y/o F with hx of Afib on eliquis, CAD, CKD, HTN, HLD, OA, DVT biba from [facility] for evaluation of altered mental status and hypothermia.                                                      Pt is DNR/DNI. Unable to obtain info from pt due to AMS and dementia.Found to have sepsis likely secondary to UTI Admitted for septic workup and evaluation,send blood and urine cx,serial lactate levels,monitor vitals closley,ivfs hydration,monitor urine output and renal profile,iv abx initiated ,seen by ID CONSULT Found to be in BLAYNE and to be hyperkelmic .Treatment of hyperkalemia give by nephrologist ,iv hydration started Admit for iv hydration,monitor renal profile and urine output,nutritionist consult,prealbumin level,serial bmp,nutritional supplements,multivitamins,palliative care evaluuation regarding MOLST completion and artificial nutrition discussion ,HD disussion .Family declined emergency HD SESSION and requested symptomatic manamagement .COVID is negataive  Patient was srecntly hospitalized in [facility] 9/25/2020 f fall and l hip fx CXR 9/25/2020 showed sm r pl effsn ivcf old fx r humerus Pt has since been admitted [facility] and has been to two rehabs She was sent in from [facility] to ER 12/15 for AMS ,associated with hypothermia .    Problem/Plan - 1:  ·  Problem: AMS (altered mental status).  Plan: LIKELY 2/2 TO ACUTE METABOLIC ENCEPHALOPATHY SECONDARY TO SEVERE SEPSIS ,ASSOCIATED WITH  BLAYNE AND HYPERKALEMIA ,POA.     Problem/Plan - 2:  ·  Problem: BLAYNE (acute kidney injury).  Plan: IV HYDRATION ,SERIAL BMP ,INS/OUTS ,OG PLACED ,IV FLUIDS AS PER .     Problem/Plan - 3:  ·  Problem: Hyperkalemia.  Plan: TX GIVEN IN ER BY  - KAYAXALATE , SODIUM BICARB ,CA GLUCONATE ,MONITOR SERIAL SERUM LEVELS ,EKG ,SEEN BY CARDIOLOGIST.     Problem/Plan - 4:  ·  Problem: Hypothermia.  Plan: SEPTIC WORKUP IN PROGRESS.     Problem/Plan - 5:  ·  Problem: Sepsis.  Plan: Admitted for septic workup and evaluation,send blood and urine cx,serial lactate levels,monitor vitals closley,ivfs hydration,monitor urine output and renal profile,iv abx initiated -ON MERROPENEM ( S/P CEFTRIAXONE IN ER ).     Problem/Plan - 6:  Problem: UTI (urinary tract infection). Plan: Admitted for septic workup and evaluation,send blood and urine cx,serial lactate levels,monitor vitals closley,ivfs hydration,monitor urine output and renal profile,iv abx as per ID CONSULT.    Problem/Plan - 7:  ·  Problem: CAD (coronary artery disease).  Plan: Admitted  to telemetry unit for monitoring , send 3 sets of cardiac enzymes to rule out acute coronary event, obtain ECHO to evaluate LVEF, cardiology consult  ,continue current management, O2 supply, anticoagulation plan as per cardiology consult.     Problem/Plan - 8:  ·  Problem: Dementia.  Plan: continue home medications ,supportive care.     Problem/Plan - 9:  ·  Problem: HTN (Hypertension).  Plan: Admitted  to telemetry unit for monitoring , send 3 sets of cardiac enzymes to rule out acute coronary event, obtain ECHO to evaluate LVEF, cardiology consult  ,continue current management, O2 supply, anticoagulation plan as per cardiology consult.     Problem/Plan - 10:  Problem: Prophylactic measure. Plan; Gastrointestinal stress ulcer prophylaxis and DVT prophylaxis

## 2020-12-16 NOTE — HOSPICE CARE NOTE - HOSPICE APPROVAL ADDITIONAL DETAILS
Hospice dx Acute Renal Failure.     HCN MD approval : Dr Jerry Littlejohn (*if pt continues to require IV meds, vs alternative route, for uncontrolled pain and sx mgmt needs)  Verbal Cert: Dr Kylah Cobb    Inpatient reason : pain  IV Medication:   Hydromorphone 1mg IVP Q2hrs PRN severe pain  Hydromorphone 0.5mg IVPQ90 minutes PRN moderate pain (PRN x 1 today)  Lorazepam 1mg IVP Q30 minutes PRN  agitation  (also on Fentanyl  patch)     DNR/DNI   NPO (failed speech and swallow) .pleasure feeds only  PPSV2 10% Hospice dx Acute Renal Failure.     HCN MD approval : Dr Jerry Littlejohn (*if pt continues to require IV meds, vs alternative route, for uncontrolled pain and sx mgmt needs)  Verbal Cert: Dr Kylah Cobb  **CONSENTS PENDING**    Inpatient reason : pain  IV Medication:   Hydromorphone 1mg IVP Q2hrs PRN severe pain  Hydromorphone 0.5mg IVPQ90 minutes PRN moderate pain (PRN x 1 today)  Lorazepam 1mg IVP Q30 minutes PRN  agitation  (also on Fentanyl  patch)     DNR/DNI   NPO (failed speech and swallow) pleasure feeds only  PPSV2 10%

## 2020-12-16 NOTE — ED ADULT NURSE REASSESSMENT NOTE - NSIMPLEMENTINTERV_GEN_ALL_ED
Implemented All Fall with Harm Risk Interventions:  [place] to call system. Call bell, personal items and telephone within reach. Instruct patient to call for assistance. Room bathroom lighting operational. Non-slip footwear when patient is off stretcher. Physically safe environment: no spills, clutter or unnecessary equipment. Stretcher in lowest position, wheels locked, appropriate side rails in place. Provide visual cue, wrist band, yellow gown, etc. Monitor gait and stability. Monitor for mental status changes and reorient to person, place, and time. Review medications for side effects contributing to fall risk. Reinforce activity limits and safety measures with patient and family. Provide visual clues: red socks.

## 2020-12-16 NOTE — HOSPICE CARE NOTE - CONVESATION DETAILS
[facility]     Allscripts referral for inpatient hospice at [facility] received from IRA Schwab in collaboration with Dr. Kylah Cobb. Reviewed with [facility] MD Dr. Jerry Littlejohn for inpatient appropriateness. Pt approved for inpatient [facility] for tomorrow pending consents and should patient continue to require IV meds for  uncontrolled pain and symptom mgmt needs.  Numerous attempts to contact daughter, Donna, at [phone] to review hospice services, GOC and consents. No answer. LVM x 2 with direct call back number.    Referral in progress with follow up by this writer in AM.    Thank you for this referral.    Marzena Yao, RN, BSN, CHPN, OCN  Hospice Inpatient Specialist  [phone]   [facility]     Allscripts referral for inpatient hospice at [facility] received from IRA Schwab in collaboration with Dr. Kylah Cobb. Reviewed with [facility] MD Dr. Jerry Littlejohn for inpatient appropriateness. Pt approved for inpatient [facility] for tomorrow pending consents if pt  continues to require IV meds for  uncontrolled pain and symptom mgmt needs. Hospice dx Acute Renal Failure.     Numerous attempts to contact daughter, Donna, at [phone] to review hospice services, GOC and consents. No answer. LVM x 2 with direct call back number.    Referral in progress with follow up by this writer in AM.    Thank you for this referral.    Marzena Yao, RN, BSN, CHPN, OCN  Hospice Inpatient Specialist  [phone]   [facility]     Allscripts referral for inpatient hospice at [facility] received from IRA Schwab in collaboration with Dr. Kylah Cobb. Reviewed with [facility] MD Dr. Jerry Littlejohn for inpatient appropriateness. Pt approved for inpatient [facility] for "tomorrow" PENDING consents if pt  continues to require IV meds for  uncontrolled pain and symptom mgmt needs. Hospice dx Acute Renal Failure.     Numerous attempts to contact daughter, Donna, at [phone] to review hospice services, GOC and consents. No answer. LVM x 2 with direct call back number.    Referral in progress with follow up by this writer in AM.    Thank you for this referral.    Marzena Yao, RN, BSN, CHPN, OCN  Hospice Inpatient Specialist  [phone]   [facility]     Allscripts referral for inpatient hospice at [facility] received from IRA Schwab in collaboration with Dr. Kylah Cobb. Reviewed with [facility] MD Dr. Jerry Littlejohn for inpatient appropriateness. Pt approved for inpatient [facility] for "tomorrow" PENDING consents if pt  continues to require IV meds for  uncontrolled pain and symptom mgmt needs. Hospice dx Acute Renal Failure.     TCT Daughter, Donna, at [phone] to review hospice services, GOC and consents. Consents emailed to [email or web address] Daughter unsure if will be able to review this evening or in AM. This writer will be available to review .      Referral in progress with follow up by this writer in AM.    Thank you for this referral.    Marzena Yao, RN, BSN, CHPN, OCN  Hospice Inpatient Specialist  [phone]

## 2020-12-16 NOTE — DISCHARGE NOTE PROVIDER - NSDCCONDITION_GEN_ALL_CORE
ANTICOAGULATION FOLLOW-UP CLINIC VISIT    Patient Name:  Tucker Slater  Date:  5/21/2018  Contact Type:  Face to Face    SUBJECTIVE:     Patient Findings     Positives No Problem Findings           OBJECTIVE    INR Protime   Date Value Ref Range Status   05/21/2018 2.8 (A) 0.86 - 1.14 Final       ASSESSMENT / PLAN  INR assessment THER    Recheck INR In: 4 WEEKS    INR Location Clinic      Anticoagulation Summary as of 5/21/2018     INR goal 2.0-3.0   Today's INR 2.8   Maintenance plan 1.25 mg (2.5 mg x 0.5) on Fri; 2.5 mg (2.5 mg x 1) all other days   Full instructions 1.25 mg on Fri; 2.5 mg all other days   Weekly total 16.25 mg   No change documented Ivory Machado RN   Plan last modified Ivory Machado RN (7/7/2017)   Next INR check 6/20/2018   Priority INR   Target end date     Indications   Long-term (current) use of anticoagulants [Z79.01] [Z79.01]  Atrial fibrillation (H) [I48.91]         Anticoagulation Episode Summary     INR check location     Preferred lab     Send INR reminders to [facility]    Comments       Anticoagulation Care Providers     Provider Role Specialty Phone number    Kwadwo Winslow MD Responsible Internal Medicine [phone]            See the Encounter Report to view Anticoagulation Flowsheet and Dosing Calendar (Go to Encounters tab in chart review, and find the Anticoagulation Therapy Visit)    Dosage adjustment made based on physician directed care plan.    Ivory Machado RN               
Stable

## 2020-12-17 ENCOUNTER — TRANSCRIPTION ENCOUNTER (OUTPATIENT)
Age: 85
End: 2020-12-17

## 2020-12-17 VITALS — WEIGHT: 107.14 LBS

## 2020-12-17 LAB
-  AMIKACIN: SIGNIFICANT CHANGE UP
-  AMOXICILLIN/CLAVULANIC ACID: SIGNIFICANT CHANGE UP
-  AMPICILLIN/SULBACTAM: SIGNIFICANT CHANGE UP
-  AMPICILLIN: SIGNIFICANT CHANGE UP
-  AZTREONAM: SIGNIFICANT CHANGE UP
-  CEFAZOLIN: SIGNIFICANT CHANGE UP
-  CEFEPIME: SIGNIFICANT CHANGE UP
-  CEFOXITIN: SIGNIFICANT CHANGE UP
-  CEFTRIAXONE: SIGNIFICANT CHANGE UP
-  CIPROFLOXACIN: SIGNIFICANT CHANGE UP
-  ERTAPENEM: SIGNIFICANT CHANGE UP
-  GENTAMICIN: SIGNIFICANT CHANGE UP
-  IMIPENEM: SIGNIFICANT CHANGE UP
-  LEVOFLOXACIN: SIGNIFICANT CHANGE UP
-  MEROPENEM: SIGNIFICANT CHANGE UP
-  NITROFURANTOIN: SIGNIFICANT CHANGE UP
-  PIPERACILLIN/TAZOBACTAM: SIGNIFICANT CHANGE UP
-  TIGECYCLINE: SIGNIFICANT CHANGE UP
-  TOBRAMYCIN: SIGNIFICANT CHANGE UP
-  TRIMETHOPRIM/SULFAMETHOXAZOLE: SIGNIFICANT CHANGE UP
METHOD TYPE: SIGNIFICANT CHANGE UP
SARS-COV-2 IGG SERPL QL IA: POSITIVE
SARS-COV-2 IGM SERPL IA-ACNC: 1.53 INDEX — HIGH

## 2020-12-17 RX ORDER — FENTANYL CITRATE 50 UG/ML
1 INJECTION INTRAVENOUS
Qty: 0 | Refills: 0 | DISCHARGE
Start: 2020-12-17

## 2020-12-17 RX ORDER — INFLUENZA VIRUS VACCINE 15; 15; 15; 15 UG/.5ML; UG/.5ML; UG/.5ML; UG/.5ML
0.5 SUSPENSION INTRAMUSCULAR ONCE
Refills: 0 | Status: DISCONTINUED | OUTPATIENT
Start: 2020-12-17 | End: 2020-12-17

## 2020-12-17 RX ORDER — AMIODARONE HYDROCHLORIDE 400 MG/1
1 TABLET ORAL
Qty: 0 | Refills: 0 | DISCHARGE

## 2020-12-17 RX ORDER — CHOLECALCIFEROL (VITAMIN D3) 125 MCG
1 CAPSULE ORAL
Qty: 0 | Refills: 0 | DISCHARGE

## 2020-12-17 RX ORDER — SENNA PLUS 8.6 MG/1
2 TABLET ORAL
Qty: 0 | Refills: 0 | DISCHARGE

## 2020-12-17 RX ORDER — SOD,AMMONIUM,POTASSIUM LACTATE
1 CREAM (GRAM) TOPICAL
Qty: 0 | Refills: 0 | DISCHARGE

## 2020-12-17 RX ORDER — MAGNESIUM HYDROXIDE 400 MG/1
30 TABLET, CHEWABLE ORAL
Qty: 0 | Refills: 0 | DISCHARGE

## 2020-12-17 RX ORDER — APIXABAN 2.5 MG/1
0.5 TABLET, FILM COATED ORAL
Qty: 0 | Refills: 0 | DISCHARGE

## 2020-12-17 RX ORDER — ACETAMINOPHEN 500 MG
0 TABLET ORAL
Qty: 0 | Refills: 0 | DISCHARGE
Start: 2020-12-17

## 2020-12-17 RX ORDER — FOLIC ACID 0.8 MG
1 TABLET ORAL
Qty: 0 | Refills: 0 | DISCHARGE

## 2020-12-17 RX ORDER — HYDROMORPHONE HYDROCHLORIDE 2 MG/ML
0.5 INJECTION INTRAMUSCULAR; INTRAVENOUS; SUBCUTANEOUS
Qty: 0 | Refills: 0 | DISCHARGE
Start: 2020-12-17

## 2020-12-17 RX ORDER — LANOLIN ALCOHOL/MO/W.PET/CERES
1 CREAM (GRAM) TOPICAL
Qty: 0 | Refills: 0 | DISCHARGE

## 2020-12-17 RX ORDER — DOCUSATE SODIUM 100 MG
2 CAPSULE ORAL
Qty: 0 | Refills: 0 | DISCHARGE

## 2020-12-17 RX ORDER — FERROUS SULFATE 325(65) MG
1 TABLET ORAL
Qty: 0 | Refills: 0 | DISCHARGE

## 2020-12-17 RX ORDER — CALCITRIOL 0.5 UG/1
1 CAPSULE ORAL
Qty: 0 | Refills: 0 | DISCHARGE

## 2020-12-17 RX ORDER — LATANOPROST 0.05 MG/ML
1 SOLUTION/ DROPS OPHTHALMIC; TOPICAL
Qty: 0 | Refills: 0 | DISCHARGE

## 2020-12-17 RX ADMIN — HEPARIN SODIUM 5000 UNIT(S): 5000 INJECTION INTRAVENOUS; SUBCUTANEOUS at 05:50

## 2020-12-17 RX ADMIN — Medication 1: at 09:00

## 2020-12-17 RX ADMIN — HYDROMORPHONE HYDROCHLORIDE 0.5 MILLIGRAM(S): 2 INJECTION INTRAMUSCULAR; INTRAVENOUS; SUBCUTANEOUS at 09:59

## 2020-12-17 RX ADMIN — MEROPENEM 100 MILLIGRAM(S): 1 INJECTION INTRAVENOUS at 01:39

## 2020-12-17 RX ADMIN — AMIODARONE HYDROCHLORIDE 100 MILLIGRAM(S): 400 TABLET ORAL at 05:49

## 2020-12-17 RX ADMIN — Medication 125 MEQ/KG/HR: at 02:46

## 2020-12-17 NOTE — PROGRESS NOTE ADULT - PROBLEM SELECTOR PLAN 1
ACUTE RENAL FAILURE: and ckd , no hd   Serum creatinine is  pending     continue iv fluid
multi organ failure  accepted for Inpatient hospice at [facility] notes reviewed  pt is DNR DNI with palliative measures  Opioids and Benzo on order for sx management  prognosis very poor  spoke with Daughter - Donna -
89 y/o F with hx of Afib on eliquis, CAD, CKD, HTN, HLD, OA, DVT biba from [facility] for evaluation of altered mental status and hypothermia.   emp abx for acute infection  ckd aiden - no HD as per daughter  oral and skin care  assist with ADL  renal eval noted  cxr and labs reviewed  MOLST update  pt is DNR DNI.  no PEG - no Dialysis
LIKELY 2/2 TO ACUTE METABOLIC ENCEPHALOPATHY SECONDARY TO SEVERE SEPSIS ,ASSOCIATED WITH  BLAYNE AND HYPERKALEMIA ,POA
ACUTE RENAL FAILURE: and ckd , no hd   Serum creatinine is  at     , approximating GFR at   ml/min.   There is no progression . No uremic symptoms  pos  evidence of anemia .  Fluid status stable.  Will continue to avoid nephrotoxic drugs.  Patient remains asymptomatic.   Continue current therapy.  hold  diuretic.  hold   ACE inhibitor.  hold   ARB.
LIKELY 2/2 TO ACUTE METABOLIC ENCEPHALOPATHY SECONDARY TO SEVERE SEPSIS ,ASSOCIATED WITH  BLAYNE AND HYPERKALEMIA ,POA

## 2020-12-17 NOTE — PROGRESS NOTE ADULT - REASON FOR ADMISSION
AMS AND HYPOTHERMIA

## 2020-12-17 NOTE — HOSPICE CARE NOTE - CONVESATION DETAILS
Hospice Care Note    Patient approved for [facility] inpatient hospice. Tentative discharge plan to inpatient [facility] at 2pm.  [facility] SW Rima Copeland and Daughter Donna are aware. [facility] SW to set up transport to :  [facility] Re[facility] - Hospice Unit  [address]  [address] [facility]    Kindly request Staff JESSICA Calzada to give report to [facility] RN at [phone] and for SW to fax MOLST, Discharge orders and meds to [facility] floor [phone].    Marzena Yao, RN, BSN, CHPN, OCN  Hospice Inpatient Specialist  [phone]   Hospice Care Note    Patient approved for [facility] inpatient hospice. Tentative discharge plan to inpatient [facility] at 2pm.  [facility] SW Rima Copeland and Daughter Donna are aware. [facility] SW to set up transport to :  [facility] - Hospice Unit  [address]  [address] [place], [place]    Kindly request Staff JESSICA Calzada to give report to [facility] RN at [phone] and for SW to fax MOLST, Discharge orders and meds to [facility] floor [phone].    Marzena Yao, RN, BSN, CHPN, OCN  Hospice Inpatient Specialist  [phone]

## 2020-12-17 NOTE — PROGRESS NOTE ADULT - PROBLEM SELECTOR PLAN 2
IV HYDRATION ,SERIAL BMP ,INS/OUTS ,OG PLACED ,IV FLUIDS AS PER 
f/u  blood and urine cx,serial lactate levels,monitor vitals closley,ivfs hydration,monitor urine output and renal profile,iv abx as per id cons
IV HYDRATION ,SERIAL BMP ,INS/OUTS ,OG PLACED ,IV FLUIDS AS PER 
Admit for septic workup and ID evaluation,send blood and urine cx,serial lactate levels,monitor vitals closley,ivfs hydration,monitor urine output and renal profile,iv abx as per id cons

## 2020-12-17 NOTE — PROGRESS NOTE ADULT - PROBLEM SELECTOR PLAN 5
Admitted for septic workup and evaluation,send blood and urine cx,serial lactate levels,monitor vitals closley,ivfs hydration,monitor urine output and renal profile,iv abx initiated -ON MERROPENEM ( S/P CEFTRIAXONE IN ER )
Admitted for septic workup and evaluation,send blood and urine cx,serial lactate levels,monitor vitals closley,ivfs hydration,monitor urine output and renal profile,iv abx initiated -ON MERROPENEM ( S/P CEFTRIAXONE IN ER )

## 2020-12-17 NOTE — PROGRESS NOTE ADULT - ATTENDING COMMENTS
89 y/o F with hx of Afib on eliquis, CAD, CKD, HTN, HLD, OA, DVT biba from [facility] for evaluation of altered mental status and hypothermia.  Pt is DNR/DNI. Unable to obtain info from pt due to AMS and dementia.Found to have sepsis likely secondary to UTI Admitted for septic workup and evaluation,send blood and urine cx,serial lactate levels,monitor vitals closley,ivfs hydration,monitor urine output and renal profile,iv abx initiated ,seen by ID CONSULT Found to be in BLAYNE and to be hyperkelmic .Treatment of hyperkalemia give by nephrologist ,iv hydration started Admit for iv hydration,monitor renal profile and urine output,nutritionist consult,prealbumin level,serial bmp,nutritional supplements,multivitamins,palliative care evaluuation regarding MOLST completion and artificial nutrition discussion ,HD disussion .Family declined emergency HD SESSION and requested symptomatic manamagement .COVID is negataive  Patient was srecntly hospitalized in [facility] 9/25/2020 f fall and l hip fx CXR 9/25/2020 showed sm r pl effsn ivcf old fx r humerus Pt has since been admitted [facility] VS and has been to two rehabs She was sent in from [facility] to ER 12/15 for AMS ,associated with hypothermia .                                                                                                                                                    12/16/20 - I spoke to the daughter on a phone this morning ,she visited early am and is receptive to palliative care discussion .Patient was placed on CMO as per daughter request she would like to continue iv abx and fluids/bicarb for now .Dr Boykin id aware of NA and K levels . Palllaitive care orders were written by pall care MD Dr Li . I spoke to minerva Jaffe and HCN ANU Mag Garcia  ,case referred to hospice ,daughter is aware of poor prognosis . I attempted doctor to doctor verb cert , but HCN rerp was not able to reach family for consent .PCP from [facility] is aware of plan and is in agreement with hospice referral .
89 y/o F with hx of Afib on eliquis, CAD, CKD, HTN, HLD, OA, DVT biba from [facility] for evaluation of altered mental status and hypothermia.  Pt is DNR/DNI. Unable to obtain info from pt due to AMS and dementia.Found to have sepsis likely secondary to UTI Admitted for septic workup and evaluation,send blood and urine cx,serial lactate levels,monitor vitals closley,ivfs hydration,monitor urine output and renal profile,iv abx initiated ,seen by ID CONSULT Found to be in BLAYNE and to be hyperkelmic .Treatment of hyperkalemia give by nephrologist ,iv hydration started Admit for iv hydration,monitor renal profile and urine output,nutritionist consult,prealbumin level,serial bmp,nutritional supplements,multivitamins,palliative care evaluuation regarding MOLST completion and artificial nutrition discussion ,HD disussion .Family declined emergency HD SESSION and requested symptomatic manamagement .COVID is negataive  Patient was srecntly hospitalized in [facility] 9/25/2020 f fall and l hip fx CXR 9/25/2020 showed sm r pl effsn ivcf old fx r humerus Pt has since been admitted [facility] VS and has been to two rehabs She was sent in from [facility] to ER 12/15 for AMS ,associated with hypothermia .                                                                                                                                                    12/16/20 - I spoke to the daughter on a phone this morning ,she visited early am and is receptive to palliative care discussion .Patient was placed on CMO as per daughter request she would like to continue iv abx and fluids/bicarb for now .Dr Boykin id aware of NA and K levels . Palllaitive care orders were written by pall care MD Dr Li . I spoke to minerva Jafef and [facility] ANU Mag Garcia  ,case referred to hospice ,daughter is aware of poor prognosis . I attempted doctor to doctor verb cert , but HCN rerp was not able to reach family for consent .PCP from [facility] is aware of plan and is in agreement with hospice referral .

## 2020-12-17 NOTE — HOSPICE CARE NOTE - CONVESATION DETAILS
[facility]    Consents reviewed with daughter, Donna, at 8pm on 12/16 and awaiting confirmed receipt in office this AM.  TCT [facility] Staff RN Elsi who states patient has not required any IV meds for symptom mgmt needs. Will discuss with Dr. Cobb to confirm if d/c if plan still for inpatient for uncontrolled sx requiring IV meds. Daughter spoke to family late night /this AM and they had questions about returning to [facility]/ hospice as alternative option.      LVM with Rima Painter and Alannah this AM to discuss discharge plan .    Marzena Yao, RN, BSN, CHPN, OCN  Hospice Inpatient Specialist  [phone]

## 2020-12-17 NOTE — DIETITIAN INITIAL EVALUATION ADULT. - PERTINENT LABORATORY DATA
12/16-Hgb 9.0, Hct 28.2, Sodium 170, Potassium 6.0, Chloride 137, , Creatinine 14.00, Glucose 247, Calcium 11.3; HgbA1c 5.8%

## 2020-12-17 NOTE — PROGRESS NOTE ADULT - ASSESSMENT
89 y/o F with hx of Afib on eliquis, CAD, CKD, HTN, HLD, OA, DVT biba from [facility] for evaluation of altered mental status and hypothermia.                                                      Pt is DNR/DNI. Unable to obtain info from pt due to AMS and dementia.Found to have sepsis likely secondary to UTI Admitted for septic workup and evaluation,send blood and urine cx,serial lactate levels,monitor vitals closley,ivfs hydration,monitor urine output and renal profile,iv abx initiated ,seen by ID CONSULT Found to be in BLAYNE and to be hyperkelmic .Treatment of hyperkalemia give by nephrologist ,iv hydration started Admit for iv hydration,monitor renal profile and urine output,nutritionist consult,prealbumin level,serial bmp,nutritional supplements,multivitamins,palliative care evaluuation regarding MOLST completion and artificial nutrition discussion ,HD disussion .Family declined emergency HD SESSION and requested symptomatic manamagement .COVID is negataive  Patient was srecntly hospitalized in [facility] 9/25/2020 f fall and l hip fx CXR 9/25/2020 showed sm r pl effsn ivcf old fx r humerus Pt has since been admitted [facility] VS and has been to two rehabs She was sent in from [facility] to ER 12/15 for AMS ,associated with hypothermia .
pt with ams, hypothermia, r/o sepsis  acute renal failue- hyperkaemia,hypernatremia  atrial - fib- eliquis on hold  treatment as per nephrologist   discussed with daughter chandrakant  -does not want hemodialysis  pt is now comfort care  prognosis is grave 12/16  
  SHAHANA FEROZ [id number]  1930 DOA 12/15/2020 DR DHEERAJ SILVERIO     REVIEW OF SYMPTOMS      Able to give ROS  NO     PHYSICAL EXAM    HEENT Unremarkable PERRLA atraumatic   RESP Fair air entry EXP prolonged    Harsh breath sound Resp distres mild   CARDIAC S1 S2 No S3     NO JVD    ABDOMEN SOFT BS PRESENT NOT DISTENDED No hepatosplenomegaly PEDAL EDEMA present No calf tenderness  NO rash     PT DATA/BEST PRACTICE  ALLERGY      pncl sulfa cefazolin         WT                       BMI                                      ADVANCED DIRECTIVE   dnr   HEAD OF BED ELEVATION Yes      DVT PROPHYLAXIS.  hpsc (20200       DIET. npo (12/15)                      JENKINS PROPHYLAXIS.   INFECTION PPLX                                                    OXYGENATION.   12/ ra 100% - ra 98%     VITALS.   2020 afeb 99 110/70       PATIENT SUMMARY.   89 f ho dementia a fib on eliquis ckd cad oa hytn hld paf  2020 f fall and l hip fx CXR 2020 showed sm r pl effsn ivcf old fx r humerus Pt has since been admitted [facility] and has been to two rehabs She was sent in from [facility] 12/15/2020 and was found to have severe hyprekalemai and blayne   Renal consulted Pulm consulted 12/15/2020   labs 6:08 PM W 12.9 Hb 10.4 la 4.1 Na 166 K 8.2 Cr 14     meds given so far 12/15/2020 6:07 PM rocephin ns 500   home meds vit c atorvastat 10 calcitriol eliquis 2.5x 2     ASSESSMENT PLAN   HYPOTHERMIA Fredi huggjitendra  SEPSIS BS ab  HYPERKALEMIA Dr Boykin on case Ca Kayexalate bicarb ordered stat 12/15/2020 6:17 PM   Family declined HD  RESP Target po 90-95%  SEPSIS   On meropenem () for pyelonephr (Dr Yuan)   HYPERNATREMIA   Hypotonic fluids Follow lytes  BLAYNE   GOC determination  HD not allowed   On bicarb drip started 12/15  GO   Palliative note 2020 states dnr dni No peg No dialysis   Prognosis is poor  2020 Pt going for inpatient hospice at [facility]    TIME SPENT   Over 25 minutes aggregate care time spent on encounter; activities included   direct patient care, counseling and/or coordinating care reviewing notes, lab data/ imaging , discussion with multidisciplinary team/ patient  /family and explaining in detail risks, benefits, alternatives  of the recommendations     SHAHANA REDMAN [id number]  1930 DOA 12/15/2020 DR DHEERAJ SILVERIO  
  SHAHANA REDMAN [id number]  1930 DOA 12/15/2020 DR DHEERAJ SILVERIO     REVIEW OF SYMPTOMS      Able to give ROS  NO     PHYSICAL EXAM    HEENT Unremarkable PERRLA atraumatic   RESP Fair air entry EXP prolonged    Harsh breath sound Resp distres mild   CARDIAC S1 S2 No S3     NO JVD    ABDOMEN SOFT BS PRESENT NOT DISTENDED No hepatosplenomegaly PEDAL EDEMA present No calf tenderness  NO rash     PT DATA/BEST PRACTICE  ALLERGY      pncl sulfa cefazolin         WT                       BMI                                      ADVANCED DIRECTIVE   dnr   HEAD OF BED ELEVATION Yes      DVT PROPHYLAXIS.  hpsc (20200       DIET. npo (12/15)                      JENKINS PROPHYLAXIS.   INFECTION PPLX                                                    OXYGENATION.   12/ ra 100% - ra 98%     VITALS.   2020 99 100 100/56       MICROBIO.   Covid pcr 12/15/2020 pcr negv   Ua 12/15/2020 ua w 50 blod large nitr negv l estr mod     IV.NaHCO3 150 in 1l d5 125   AC APA. HPSC (2020)   ABIO.MEROPENEM (2020) DR YUAN (PYELO)   DM iss (2020)   PULM.  CARDIAC.  Amiodarone 100 mt (2020)   atorvastat 10 (2020)     ASSESSMENT PLAN   HYPOTHERMIA Fredi hukathie  SEPSIS BS ab  HYPERKALEMIA Dr Boykin on case Ca Kayexalate bicarb ordered stat 12/15/2020 6:17 PM   Family declined HD  RESP Target po 90-95%  SEPSIS   On meropenem () for pyelonephr (Dr Yuan)   HYPERNATREMIA   Hypotonic fluids Follow lytes  BLAYNE   GOC determination  HD not allowed   On bicarb drip started 12/15  GO   Palliative note 2020 states dnr dni No peg No dialysis   Prognosis is poor      TIME SPENT   Over 25 minutes aggregate care time spent on encounter; activities included   direct patient care, counseling and/or coordinating care reviewing notes, lab data/ imaging , discussion with multidisciplinary team/ patient  /family and explaining in detail risks, benefits, alternatives  of the recommendations     SHAHANA REDMAN [id number]  1930 DOA 12/15/2020 DR DHEERAJ SILVERIO    
89 y/o F with hx of Afib on eliquis, CAD, CKD, HTN, HLD, OA, DVT biba from [facility] for evaluation of altered mental status and hypothermia. 89 y/o F with hx of Afib on eliquis, CAD, CKD, HTN, HLD, OA, DVT biba from [facility] for evaluation of altered mental status and hypothermia. 
89 y/o F with hx of Afib on eliquis, CAD, CKD, HTN, HLD, OA, DVT biba from [facility] for evaluation of altered mental status and hypothermia. 89 y/o F with hx of Afib on eliquis, CAD, CKD, HTN, HLD, OA, DVT biba from [facility] for evaluation of altered mental status and hypothermia. 
89 y/o F with hx of Afib on eliquis, CAD, CKD, HTN, HLD, OA, DVT biba from [facility] for evaluation of altered mental status and hypothermia.                                                      Pt is DNR/DNI. Unable to obtain info from pt due to AMS and dementia.Found to have sepsis likely secondary to UTI Admitted for septic workup and evaluation,send blood and urine cx,serial lactate levels,monitor vitals closley,ivfs hydration,monitor urine output and renal profile,iv abx initiated ,seen by ID CONSULT Found to be in BLAYNE and to be hyperkelmic .Treatment of hyperkalemia give by nephrologist ,iv hydration started Admit for iv hydration,monitor renal profile and urine output,nutritionist consult,prealbumin level,serial bmp,nutritional supplements,multivitamins,palliative care evaluuation regarding MOLST completion and artificial nutrition discussion ,HD disussion .Family declined emergency HD SESSION and requested symptomatic manamagement .COVID is negataive  Patient was srecntly hospitalized in [facility] 9/25/2020 f fall and l hip fx CXR 9/25/2020 showed sm r pl effsn ivcf old fx r humerus Pt has since been admitted [facility] VS and has been to two rehabs She was sent in from [facility] to ER 12/15 for AMS ,associated with hypothermia .

## 2020-12-17 NOTE — PROGRESS NOTE ADULT - PROBLEM SELECTOR PROBLEM 2
R/O UTI (urinary tract infection)
BLAYNE (acute kidney injury)
R/O UTI (urinary tract infection)
BLAYNE (acute kidney injury)

## 2020-12-17 NOTE — PROGRESS NOTE ADULT - NSHPATTENDINGPLANDISCUSS_GEN_ALL_CORE
RN , , , , ,DAUGHTER ,IRA PACHECO , HCN REPR YONG W 12/16  .DISCHARGE TO HOSPICE INN WHEN THE BED IS AVAILABLE
RN , , , , ,DAUGHTER ,IRA PACHECO , HCN REPR YONG GALO .DISCHARGE TO HOSPICE INN WHEN THE BED IS AVAILABLE

## 2020-12-17 NOTE — PROGRESS NOTE ADULT - PROBLEM SELECTOR PROBLEM 1
AMS (altered mental status)
Goals of care, counseling/discussion
Goals of care, counseling/discussion
AMS (altered mental status)
R/O BLAYNE (acute kidney injury)
R/O BLAYNE (acute kidney injury)

## 2020-12-17 NOTE — DIETITIAN INITIAL EVALUATION ADULT. - FACTORS AFF FOOD INTAKE
Pt seen by SLP on 12/16 in which assessment was deemed not appropriate at that time/difficulty swallowing

## 2020-12-17 NOTE — PROGRESS NOTE ADULT - PROBLEM SELECTOR PLAN 6
Admitted for septic workup and evaluation,send blood and urine cx,serial lactate levels,monitor vitals closley,ivfs hydration,monitor urine output and renal profile,iv abx as per ID CONSULT
Admitted for septic workup and evaluation,send blood and urine cx,serial lactate levels,monitor vitals closley,ivfs hydration,monitor urine output and renal profile,iv abx as per ID CONSULT

## 2020-12-17 NOTE — DISCHARGE NOTE NURSING/CASE MANAGEMENT/SOCIAL WORK - PATIENT PORTAL LINK FT
You can access the FollowMyHealth Patient Portal offered by [facility] by registering at the following website: [email or web address]. By joining [employer]’s FollowMyHealth portal, you will also be able to view your health information using other applications (apps) compatible with our system.

## 2020-12-17 NOTE — PROGRESS NOTE ADULT - PROVIDER SPECIALTY LIST ADULT
Cardiology
Hospitalist
Palliative Care
Pulmonology
Palliative Care
Hospitalist
Pulmonology
Nephrology
Nephrology

## 2020-12-17 NOTE — DIETITIAN INITIAL EVALUATION ADULT. - ADD RECOMMEND
Pt currently on Hospice, RD to remain available for nutrition intervention if warranted and with pt's GOC

## 2020-12-17 NOTE — PROGRESS NOTE ADULT - PROBLEM SELECTOR PLAN 3
is improving
TX GIVEN IN ER BY DR.PAHLAVAN Danni ROBERTS , SODIUM BICARB ,CA GLUCONATE ,MONITOR SERIAL SERUM LEVELS ,EKG ,SEEN BY CARDIOLOGIST
is improving
TX GIVEN IN ER BY DR.PAHLAVAN Danni ROBERTS , SODIUM BICARB ,CA GLUCONATE ,MONITOR SERIAL SERUM LEVELS ,EKG ,SEEN BY CARDIOLOGIST

## 2020-12-17 NOTE — PROGRESS NOTE ADULT - PROBLEM SELECTOR PLAN 7
Admitted  to telemetry unit for monitoring , send 3 sets of cardiac enzymes to rule out acute coronary event, obtain ECHO to evaluate LVEF, cardiology consult  ,continue current management, O2 supply, anticoagulation plan as per cardiology consult
Admitted  to telemetry unit for monitoring , send 3 sets of cardiac enzymes to rule out acute coronary event, obtain ECHO to evaluate LVEF, cardiology consult  ,continue current management, O2 supply, anticoagulation plan as per cardiology consult

## 2020-12-17 NOTE — DIETITIAN INITIAL EVALUATION ADULT. - OTHER INFO
As per chart pt is a 89 year old female with a PMH of  CAD, CKD, HTN, HLD, OA, DVT biba from [facility] for evaluation of altered mental status and hypothermia. Pt is approved for inpatient Hospice, family does not want HD or PEG.     Pt seen at bedside. Pt is currently receiving pleasure feeds. No admission weight listed in chart, bedscale weight obtain by RD of 96.4lbs. Per transfer records pt's weight 93.6lbs. Per previous RD note pt's weight (10/29/20) 107.2lbs, indicates pt with some weight loss. Pt noted with visual muscle loss. Nutrition Focused Physical Exam deferred. Pt most likely meets malnutrition criteria however not appropriate at this time as pt is on hospice and awaiting transfer to inpatient hospice. No GI distress noted at this time, +BM 12/16.    No Pressure injuries noted at this time

## 2020-12-17 NOTE — PROGRESS NOTE ADULT - SUBJECTIVE AND OBJECTIVE BOX
FEROZ HARKINS    PLV 1EAS 113 W1    Patient is a 89y old  Female who presents with a chief complaint of AMS AND HYPOTHERMIA (17 Dec 2020 08:26)       Allergies    PCN, Sulfa (Urticaria)  penicillin (Other)  sulfa drugs (Other)  Tolerated Cefazolin  (Unknown)    Intolerances    Bananas (Other (Mild to Mod))  Potatoes (Other (Mild to Mod))      HPI:  89 y/o F with hx of Afib on eliquis, CAD, CKD, HTN, HLD, OA, DVT biba from [facility] for evaluation of altered mental status and hypothermia.   	Pt is DNR/DNI. Unable to obtain info from pt due to AMS and dementia.Found to have sepsis likely secondary to UTI Admitted for septic workup and evaluation,send blood and urine cx,serial lactate levels,monitor vitals closley,ivfs hydration,monitor urine output and renal profile,iv abx initiated ,seen by ID CONSULT Found to be in BLAYNE and to be hyperkelmic .Treatment of hyperkalemia give by nephrologist ,iv hydration started Admit for iv hydration,monitor renal profile and urine output,nutritionist consult,prealbumin level,serial bmp,nutritional supplements,multivitamins,palliative care evaluuation regarding MOLST completion and artificial nutrition discussion ,HD disussion .Family declined emergency HD SESSION and requested symptomatic manamagement .COVID is negataive  Patient was srecntly hospitalized in [facility] 2020 f fall and l hip fx CXR 2020 showed sm r pl effsn ivcf old fx r humerus Pt has since been admitted [facility] and has been to two rehabs She was sent in from [facility] to ER today for AMS ,associated with hypothermia . (15 Dec 2020 20:29)      PAST MEDICAL & SURGICAL HISTORY:  Afib    Glaucoma    Dementia    CKD (chronic kidney disease)    CAD (coronary artery disease)    OA (osteoarthritis)    HLD (hyperlipidemia)    HTN (hypertension)    Dementia    Arteriosclerotic heart disease (ASHD)    Arthritis    Dementia    Trigger finger    CKD (chronic kidney disease)    DVT (deep venous thrombosis)    GI (gastrointestinal bleed)    Anemia Iron Deficiency    H/o Diverticulosis    Carpal Tunnel Syndrome    Dyslipidemia    Glaucoma    OA (Osteoarthritis)    HTN (Hypertension)    CAD (Coronary Artery Disease)    No significant past surgical history    [place] filter in place  placed     H/o Ovarian Cyst    Cataract  bilateral     Knee Arthroplasty right 2009 &amp; left 2010        FAMILY HISTORY:  No pertinent family history in first degree relatives          MEDICATIONS   acetaminophen   Tablet .. 650 milliGRAM(s) Oral every 6 hours PRN  aMIOdarone    Tablet 100 milliGRAM(s) Oral <User Schedule>  calcitriol   Capsule 0.25 MICROGram(s) Oral daily  dextrose 40% Gel 15 Gram(s) Oral once  dextrose 5%. 1000 milliLiter(s) IV Continuous <Continuous>  dextrose 5%. 1000 milliLiter(s) IV Continuous <Continuous>  dextrose 50% Injectable 25 Gram(s) IV Push once  dextrose 50% Injectable 12.5 Gram(s) IV Push once  dextrose 50% Injectable 25 Gram(s) IV Push once  fentaNYL   Patch  12 MICROgram(s)/Hr 1 Patch Transdermal every 72 hours  glucagon  Injectable 1 milliGRAM(s) IntraMuscular once  heparin   Injectable 5000 Unit(s) SubCutaneous every 12 hours  HYDROmorphone  Injectable 1 milliGRAM(s) IV Push every 2 hours PRN  HYDROmorphone  Injectable 0.5 milliGRAM(s) IV Push every 90 minutes PRN  influenza   Vaccine 0.5 milliLiter(s) IntraMuscular once  insulin lispro (ADMELOG) corrective regimen sliding scale   SubCutaneous three times a day before meals  latanoprost 0.005% Ophthalmic Solution 1 Drop(s) Both EYES at bedtime  LORazepam   Injectable 1 milliGRAM(s) IV Push every 30 minutes PRN  meropenem  IVPB 500 milliGRAM(s) IV Intermittent every 24 hours  sodium bicarbonate  Infusion 0.313 mEq/kG/Hr IV Continuous <Continuous>      Vital Signs Last 24 Hrs  T(C): 36.9 (17 Dec 2020 05:07), Max: 36.9 (17 Dec 2020 05:07)  T(F): 98.4 (17 Dec 2020 05:07), Max: 98.4 (17 Dec 2020 05:07)  HR: 99 (17 Dec 2020 05:07) (72 - 99)  BP: 110/70 (17 Dec 2020 05:07) (91/52 - 143/99)  BP(mean): --  RR: 19 (17 Dec 2020 05:07) (18 - 19)  SpO2: 96% (17 Dec 2020 05:07) (94% - 98%)            LABS:                        9.0    11.40 )-----------( 258      ( 16 Dec 2020 10:53 )             28.2     12-16    170<HH>  |  137<H>  |  197<H>  ----------------------------<  247<H>  6.0<H>   |  17<L>  |  14.00<H>    Ca    11.3<H>      16 Dec 2020 10:53    TPro  7.3  /  Alb  2.8<L>  /  TBili  0.3  /  DBili  x   /  AST  25  /  ALT  28  /  AlkPhos  89  12-15    PT/INR - ( 15 Dec 2020 14:44 )   PT: 14.3 sec;   INR: 1.23 ratio         PTT - ( 15 Dec 2020 14:44 )  PTT:32.2 sec  Urinalysis Basic - ( 15 Dec 2020 14:15 )    Color: Yellow / Appearance: Turbid / S.010 / pH: x  Gluc: x / Ketone: Negative  / Bili: Negative / Urobili: Negative   Blood: x / Protein: 500 mg/dL / Nitrite: Negative   Leuk Esterase: Moderate / RBC: 3-5 /HPF / WBC >50   Sq Epi: x / Non Sq Epi: Few / Bacteria: TNTC            WBC:  WBC Count: 11.40 K/uL ( @ 10:53)  WBC Count: 12.91 K/uL (12-15 @ 14:44)      MICROBIOLOGY:  RECENT CULTURES:  12-15 .Blood Blood-Peripheral XXXX XXXX   No growth to date.    12-15 .Urine Clean Catch (Midstream) XXXX XXXX   >100,000 CFU/ml Escherichia coli                PT/INR - ( 15 Dec 2020 14:44 )   PT: 14.3 sec;   INR: 1.23 ratio         PTT - ( 15 Dec 2020 14:44 )  PTT:32.2 sec    Sodium:  Sodium, Serum: 170 mmol/L ( @ 10:53)  Sodium, Serum: 166 mmol/L (12-15 @ 14:44)      14.00 mg/dL  @ 10:53  14.00 mg/dL 12-15 @ 14:44      Hemoglobin:  Hemoglobin: 9.0 g/dL ( @ 10:53)  Hemoglobin: 10.4 g/dL (12-15 @ 14:44)      Platelets: Platelet Count - Automated: 258 K/uL ( @ 10:53)  Platelet Count - Automated: 348 K/uL (12-15 @ 14:44)      LIVER FUNCTIONS - ( 15 Dec 2020 14:44 )  Alb: 2.8 g/dL / Pro: 7.3 g/dL / ALK PHOS: 89 U/L / ALT: 28 U/L / AST: 25 U/L / GGT: x             Urinalysis Basic - ( 15 Dec 2020 14:15 )    Color: Yellow / Appearance: Turbid / S.010 / pH: x  Gluc: x / Ketone: Negative  / Bili: Negative / Urobili: Negative   Blood: x / Protein: 500 mg/dL / Nitrite: Negative   Leuk Esterase: Moderate / RBC: 3-5 /HPF / WBC >50   Sq Epi: x / Non Sq Epi: Few / Bacteria: TNTC        RADIOLOGY & ADDITIONAL STUDIES:  
    FEROZ HARKINS    PLV APER 26    Patient is a 89y old  Female who presents with a chief complaint of AMS AND HYPOTHERMIA (16 Dec 2020 05:46)       Allergies    PCN, Sulfa (Urticaria)  penicillin (Other)  sulfa drugs (Other)  Tolerated Cefazolin  (Unknown)    Intolerances    Bananas (Other (Mild to Mod))  Potatoes (Other (Mild to Mod))      HPI:  89 y/o F with hx of Afib on eliquis, CAD, CKD, HTN, HLD, OA, DVT biba from [facility] for evaluation of altered mental status and hypothermia.   	Pt is DNR/DNI. Unable to obtain info from pt due to AMS and dementia.Found to have sepsis likely secondary to UTI Admitted for septic workup and evaluation,send blood and urine cx,serial lactate levels,monitor vitals closley,ivfs hydration,monitor urine output and renal profile,iv abx initiated ,seen by ID CONSULT Found to be in BLAYNE and to be hyperkelmic .Treatment of hyperkalemia give by nephrologist ,iv hydration started Admit for iv hydration,monitor renal profile and urine output,nutritionist consult,prealbumin level,serial bmp,nutritional supplements,multivitamins,palliative care evaluuation regarding MOLST completion and artificial nutrition discussion ,HD disussion .Family declined emergency HD SESSION and requested symptomatic manamagement .COVID is negataive  Patient was srecntly hospitalized in [facility] 2020 f fall and l hip fx CXR 2020 showed sm r pl effsn ivcf old fx r humerus Pt has since been admitted [facility] and has been to two rehabs She was sent in from [facility] to ER today for AMS ,associated with hypothermia . (15 Dec 2020 20:29)      PAST MEDICAL & SURGICAL HISTORY:  Afib    Glaucoma    Dementia    CKD (chronic kidney disease)    CAD (coronary artery disease)    OA (osteoarthritis)    HLD (hyperlipidemia)    HTN (hypertension)    Dementia    Arteriosclerotic heart disease (ASHD)    Arthritis    Dementia    Trigger finger    CKD (chronic kidney disease)    DVT (deep venous thrombosis)    GI (gastrointestinal bleed)    Anemia Iron Deficiency    H/o Diverticulosis    Carpal Tunnel Syndrome    Dyslipidemia    Glaucoma    OA (Osteoarthritis)    HTN (Hypertension)    CAD (Coronary Artery Disease)    No significant past surgical history    [place] filter in place  placed     H/o Ovarian Cyst    Cataract  bilateral     Knee Arthroplasty right 2009 &amp; left 2010        FAMILY HISTORY:  No pertinent family history in first degree relatives          MEDICATIONS   acetaminophen   Tablet .. 650 milliGRAM(s) Oral every 6 hours PRN  aMIOdarone    Tablet 100 milliGRAM(s) Oral <User Schedule>  ascorbic acid 500 milliGRAM(s) Oral daily  atorvastatin 10 milliGRAM(s) Oral at bedtime  calcitriol   Capsule 0.25 MICROGram(s) Oral daily  cholecalciferol 2000 Unit(s) Oral daily  dextrose 40% Gel 15 Gram(s) Oral once  dextrose 5%. 1000 milliLiter(s) IV Continuous <Continuous>  dextrose 5%. 1000 milliLiter(s) IV Continuous <Continuous>  dextrose 50% Injectable 25 Gram(s) IV Push once  dextrose 50% Injectable 12.5 Gram(s) IV Push once  dextrose 50% Injectable 25 Gram(s) IV Push once  folic acid 1 milliGRAM(s) Oral daily  glucagon  Injectable 1 milliGRAM(s) IntraMuscular once  heparin   Injectable 5000 Unit(s) SubCutaneous every 12 hours  insulin lispro (ADMELOG) corrective regimen sliding scale   SubCutaneous three times a day before meals  latanoprost 0.005% Ophthalmic Solution 1 Drop(s) Both EYES at bedtime  meropenem  IVPB 500 milliGRAM(s) IV Intermittent every 24 hours  sodium bicarbonate  Infusion 0.313 mEq/kG/Hr IV Continuous <Continuous>      Vital Signs Last 24 Hrs  T(C): 36.4 (16 Dec 2020 06:13), Max: 37.2 (15 Dec 2020 20:00)  T(F): 97.5 (16 Dec 2020 06:13), Max: 99 (15 Dec 2020 20:00)  HR: 86 (16 Dec 2020 06:13) (65 - 109)  BP: 114/59 (16 Dec 2020 06:13) (92/48 - 118/57)  BP(mean): 78 (16 Dec 2020 06:13) (76 - 79)  RR: 18 (16 Dec 2020 06:13) (18 - 20)  SpO2: 100% (16 Dec 2020 06:13) (98% - 100%)            LABS:                        10.4   12.91 )-----------( 348      ( 15 Dec 2020 14:44 )             34.4     12-15    166<HH>  |  138<H>  |  205<H>  ----------------------------<  156<H>  8.2<HH>   |  8<LL>  |  14.00<H>    Ca    12.5<H>      15 Dec 2020 14:44    TPro  7.3  /  Alb  2.8<L>  /  TBili  0.3  /  DBili  x   /  AST  25  /  ALT  28  /  AlkPhos  89  12-15    PT/INR - ( 15 Dec 2020 14:44 )   PT: 14.3 sec;   INR: 1.23 ratio         PTT - ( 15 Dec 2020 14:44 )  PTT:32.2 sec  Urinalysis Basic - ( 15 Dec 2020 14:15 )    Color: Yellow / Appearance: Turbid / S.010 / pH: x  Gluc: x / Ketone: Negative  / Bili: Negative / Urobili: Negative   Blood: x / Protein: 500 mg/dL / Nitrite: Negative   Leuk Esterase: Moderate / RBC: 3-5 /HPF / WBC >50   Sq Epi: x / Non Sq Epi: Few / Bacteria: TNTC            WBC:  WBC Count: 12.91 K/uL (12-15 @ 14:44)      MICROBIOLOGY:  RECENT CULTURES:              PT/INR - ( 15 Dec 2020 14:44 )   PT: 14.3 sec;   INR: 1.23 ratio         PTT - ( 15 Dec 2020 14:44 )  PTT:32.2 sec    Sodium:  Sodium, Serum: 166 mmol/L (12-15 @ 14:44)      14.00 mg/dL 12-15 @ 14:44      Hemoglobin:  Hemoglobin: 10.4 g/dL (12-15 @ 14:44)      Platelets: Platelet Count - Automated: 348 K/uL (12-15 @ 14:44)      LIVER FUNCTIONS - ( 15 Dec 2020 14:44 )  Alb: 2.8 g/dL / Pro: 7.3 g/dL / ALK PHOS: 89 U/L / ALT: 28 U/L / AST: 25 U/L / GGT: x             Urinalysis Basic - ( 15 Dec 2020 14:15 )    Color: Yellow / Appearance: Turbid / S.010 / pH: x  Gluc: x / Ketone: Negative  / Bili: Negative / Urobili: Negative   Blood: x / Protein: 500 mg/dL / Nitrite: Negative   Leuk Esterase: Moderate / RBC: 3-5 /HPF / WBC >50   Sq Epi: x / Non Sq Epi: Few / Bacteria: TNTC        RADIOLOGY & ADDITIONAL STUDIES:  
Date/Time Patient Seen:  		  Referring MD:   Data Reviewed	       Patient is a 89y old  Female who presents with a chief complaint of AMS AND HYPOTHERMIA (15 Dec 2020 20:29)      Subjective/HPI     PAST MEDICAL & SURGICAL HISTORY:  Afib    Glaucoma    Dementia    CKD (chronic kidney disease)    CAD (coronary artery disease)    OA (osteoarthritis)    HLD (hyperlipidemia)    HTN (hypertension)    Dementia    Arteriosclerotic heart disease (ASHD)    Arthritis    Dementia    Trigger finger    CKD (chronic kidney disease)    DVT (deep venous thrombosis)    GI (gastrointestinal bleed)    Anemia Iron Deficiency    H/o Diverticulosis    Carpal Tunnel Syndrome    Trigger Finger    Dyslipidemia    Glaucoma    OA (Osteoarthritis)    HTN (Hypertension)    CAD (Coronary Artery Disease)    No significant past surgical history    [place] filter in place  placed 2012    H/o Ovarian Cyst    Cataract  bilateral 2008    Knee Arthroplasty right 8/2009 &amp; left 9/2010          Medication list         MEDICATIONS  (STANDING):  aMIOdarone    Tablet 100 milliGRAM(s) Oral <User Schedule>  ascorbic acid 500 milliGRAM(s) Oral daily  atorvastatin 10 milliGRAM(s) Oral at bedtime  calcitriol   Capsule 0.25 MICROGram(s) Oral daily  cholecalciferol 2000 Unit(s) Oral daily  dextrose 40% Gel 15 Gram(s) Oral once  dextrose 5%. 1000 milliLiter(s) (50 mL/Hr) IV Continuous <Continuous>  dextrose 5%. 1000 milliLiter(s) (100 mL/Hr) IV Continuous <Continuous>  dextrose 50% Injectable 25 Gram(s) IV Push once  dextrose 50% Injectable 12.5 Gram(s) IV Push once  dextrose 50% Injectable 25 Gram(s) IV Push once  folic acid 1 milliGRAM(s) Oral daily  glucagon  Injectable 1 milliGRAM(s) IntraMuscular once  heparin   Injectable 5000 Unit(s) SubCutaneous every 12 hours  insulin lispro (ADMELOG) corrective regimen sliding scale   SubCutaneous three times a day before meals  latanoprost 0.005% Ophthalmic Solution 1 Drop(s) Both EYES at bedtime  meropenem  IVPB 500 milliGRAM(s) IV Intermittent every 24 hours  sodium bicarbonate  Infusion 0.313 mEq/kG/Hr (125 mL/Hr) IV Continuous <Continuous>    MEDICATIONS  (PRN):  acetaminophen   Tablet .. 650 milliGRAM(s) Oral every 6 hours PRN Temp greater or equal to 38C (100.4F), Mild Pain (1 - 3)         Vitals log        ICU Vital Signs Last 24 Hrs  T(C): 36.9 (16 Dec 2020 00:43), Max: 37.2 (15 Dec 2020 20:00)  T(F): 98.4 (16 Dec 2020 00:43), Max: 99 (15 Dec 2020 20:00)  HR: 99 (16 Dec 2020 02:55) (65 - 109)  BP: 104/59 (16 Dec 2020 02:55) (92/48 - 118/57)  BP(mean): 79 (16 Dec 2020 02:55) (76 - 79)  ABP: --  ABP(mean): --  RR: 18 (16 Dec 2020 02:55) (18 - 20)  SpO2: 98% (16 Dec 2020 02:55) (98% - 100%)           Input and Output:  I&O's Detail      Lab Data                        10.4   12.91 )-----------( 348      ( 15 Dec 2020 14:44 )             34.4     12-15    166<HH>  |  138<H>  |  205<H>  ----------------------------<  156<H>  8.2<HH>   |  8<LL>  |  14.00<H>    Ca    12.5<H>      15 Dec 2020 14:44    TPro  7.3  /  Alb  2.8<L>  /  TBili  0.3  /  DBili  x   /  AST  25  /  ALT  28  /  AlkPhos  89  12-15            Review of Systems	      Objective     Physical Examination    heart s1s2  lung dec BS  abd soft      Pertinent Lab findings & Imaging      Brianda:  NO   Adequate UO     I&O's Detail           Discussed with:     Cultures:	        Radiology                            
PROGRESS NOTE  Patient is a 89y old  Female who presents with a chief complaint of AMS AND HYPOTHERMIA (16 Dec 2020 10:23)  late entry   Chart and available morning labs /imaging are reviewed electronically , urgent issues addressed . More information  is being added upon completion of rounds , when more information is collected and management discussed with consultants , medical staff and social service/case management on the floor   OVERNIGHT  Remained lethargic over night ,failed SLP .I spoke to the daughter on a phone this morning ,she visited early am and is receptive to palliative care discussion .Patient was placed on CMO as per daughter request she would like to continue iv abx and fluids/bicarb for now .Dr Boykin id aware of NA and K levels . Palllaitive care orders were written by pall care MD Dr Li . I spoke to minerva Jaffe and [facility] ANU Deutschmarlys Youngyanci  ,case referred to hospice ,daughter is aware of poor prognosis . I attempted doctor to doctor verb cert , but [facility] rerp was not able to reach family for consent .PCP from [facility] is aware of plan and is in agreement with hospice referral .    HPI:  89 y/o F with hx of Afib on eliquis, CAD, CKD, HTN, HLD, OA, DVT biba from [facility] for evaluation of altered mental status and hypothermia. Pt is DNR/DNI. Unable to obtain info from pt due to AMS and dementia.Found to have sepsis likely secondary to UTI Admitted for septic workup and evaluation,send blood and urine cx,serial lactate levels,monitor vitals closley,ivfs hydration,monitor urine output and renal profile,iv abx initiated ,seen by ID CONSULT Found to be in BLAYNE and to be hyperkelmic .Treatment of hyperkalemia give by nephrologist ,iv hydration started Admit for iv hydration,monitor renal profile and urine output,nutritionist consult,prealbumin level,serial bmp,nutritional supplements,multivitamins,palliative care evaluuation regarding MOLST completion and artificial nutrition discussion ,HD disussion .Family declined emergency HD SESSION and requested symptomatic manamagement .COVID is negataive  Patient was srecntly hospitalized in [facility] 2020 f fall and l hip fx CXR 2020 showed sm r pl effsn ivcf old fx r humerus Pt has since been admitted [facility] and has been to two rehabs She was sent in from [facility] to ER today for AMS ,associated with hypothermia . (15 Dec 2020 20:29)    PAST MEDICAL & SURGICAL HISTORY:  Afib    Glaucoma    Dementia    CKD (chronic kidney disease)    CAD (coronary artery disease)    OA (osteoarthritis)    HLD (hyperlipidemia)    HTN (hypertension)    Dementia    Arteriosclerotic heart disease (ASHD)    Arthritis    Dementia    Trigger finger    CKD (chronic kidney disease)    DVT (deep venous thrombosis)    GI (gastrointestinal bleed)    Anemia Iron Deficiency    H/o Diverticulosis    Carpal Tunnel Syndrome    Dyslipidemia    Glaucoma    OA (Osteoarthritis)    HTN (Hypertension)    CAD (Coronary Artery Disease)    No significant past surgical history    [place] filter in place  placed     H/o Ovarian Cyst    Cataract  bilateral 2008    Knee Arthroplasty right 2009 &amp; left 2010        MEDICATIONS  (STANDING):  aMIOdarone    Tablet 100 milliGRAM(s) Oral <User Schedule>  calcitriol   Capsule 0.25 MICROGram(s) Oral daily  dextrose 40% Gel 15 Gram(s) Oral once  dextrose 5%. 1000 milliLiter(s) (50 mL/Hr) IV Continuous <Continuous>  dextrose 5%. 1000 milliLiter(s) (100 mL/Hr) IV Continuous <Continuous>  dextrose 50% Injectable 25 Gram(s) IV Push once  dextrose 50% Injectable 12.5 Gram(s) IV Push once  dextrose 50% Injectable 25 Gram(s) IV Push once  fentaNYL   Patch  12 MICROgram(s)/Hr 1 Patch Transdermal every 72 hours  glucagon  Injectable 1 milliGRAM(s) IntraMuscular once  heparin   Injectable 5000 Unit(s) SubCutaneous every 12 hours  insulin lispro (ADMELOG) corrective regimen sliding scale   SubCutaneous three times a day before meals  latanoprost 0.005% Ophthalmic Solution 1 Drop(s) Both EYES at bedtime  meropenem  IVPB 500 milliGRAM(s) IV Intermittent every 24 hours  sodium bicarbonate  Infusion 0.313 mEq/kG/Hr (125 mL/Hr) IV Continuous <Continuous>    MEDICATIONS  (PRN):  acetaminophen   Tablet .. 650 milliGRAM(s) Oral every 6 hours PRN Temp greater or equal to 38C (100.4F), Mild Pain (1 - 3)  HYDROmorphone  Injectable 1 milliGRAM(s) IV Push every 2 hours PRN Severe Pain (7 - 10)  HYDROmorphone  Injectable 0.5 milliGRAM(s) IV Push every 90 minutes PRN Moderate Pain (4 - 6)  LORazepam   Injectable 1 milliGRAM(s) IV Push every 30 minutes PRN Agitation      OBJECTIVE    T(C): 36.8 (20 @ 10:30), Max: 37.2 (12-15-20 @ 20:00)  HR: 87 (20 @ 10:30) (86 - 109)  BP: 103/58 (20 @ 10:30) (100/53 - 114/59)  RR: 20 (20 @ 10:30) (18 - 20)  SpO2: 96% (20 @ 10:30) (96% - 100%)  Wt(kg): --  I&O's Summary        REVIEW OF SYSTEMS:  Patient is  unable to provide any information/ROS  due to baseline mental status.     PHYSICAL EXAM: ill apprearing   Appearance: NAD. VS past 24 hrs -as above   HEENT:   Moist oral mucosa. Conjunctiva clear b/l.   Neck : supple  Respiratory: Lungs CTAB.  Gastrointestinal:  Soft, nontender. No rebound. No rigidity. BS present	  Cardiovascular: RRR ,S1S2 present  Neurologic: Non-focal. Moving all extremities.  Extremities: No edema. No erythema. No calf tenderness.  Skin: No rashes, No ecchymoses, No cyanosis.	  wounds ,skin lesions-See skin assesment flow sheet   LABS:                        9.0    11.40 )-----------( 258      ( 16 Dec 2020 10:53 )             28.2     12-16    170<HH>  |  137<H>  |  197<H>  ----------------------------<  247<H>  6.0<H>   |  17<L>  |  14.00<H>    Ca    11.3<H>      16 Dec 2020 10:53    TPro  7.3  /  Alb  2.8<L>  /  TBili  0.3  /  DBili  x   /  AST  25  /  ALT  28  /  AlkPhos  89  12-15    CAPILLARY BLOOD GLUCOSE      POCT Blood Glucose.: 280 mg/dL (16 Dec 2020 10:32)  POCT Blood Glucose.: 273 mg/dL (16 Dec 2020 08:30)  POCT Blood Glucose.: 142 mg/dL (16 Dec 2020 03:29)    PT/INR - ( 15 Dec 2020 14:44 )   PT: 14.3 sec;   INR: 1.23 ratio         PTT - ( 15 Dec 2020 14:44 )  PTT:32.2 sec  Urinalysis Basic - ( 15 Dec 2020 14:15 )    Color: Yellow / Appearance: Turbid / S.010 / pH: x  Gluc: x / Ketone: Negative  / Bili: Negative / Urobili: Negative   Blood: x / Protein: 500 mg/dL / Nitrite: Negative   Leuk Esterase: Moderate / RBC: 3-5 /HPF / WBC >50   Sq Epi: x / Non Sq Epi: Few / Bacteria: TNTC        Culture - Urine (collected 15 Dec 2020 18:12)  Source: .Urine Clean Catch (Midstream)  Preliminary Report (16 Dec 2020 15:57):    >100,000 CFU/ml Escherichia coli  RADIOLOGY & ADDITIONAL TESTS:   reviewed elctronically	  45minutes spent on this visit, 50% visit time spent in care co-ordination with other attendings and counselling patient  I have discussed care plan with patient and HCP, expressed understanding of problems treatment and their effect and side effects, alternatives in detail,I have asked if they have any questions and concerns and appropriately addressed them to best of my ability   Advance care planning was discussed , pallitaive care issues ,CMO ,hospice levels of care were discussed in details , forms ,advance directives were reviewed .All questions were answered to the best of my knowledge
Patient is a 89y Female with a known history of :  Prophylactic measure [Z29.9]    HTN (Hypertension) [I10]    Dementia [F03.90]    CAD (coronary artery disease) [I25.10]    Sepsis [A41.9]    Hypothermia [T68.XXXA]    AMS (altered mental status) [R41.82]    Hyperkalemia [E87.5]    UTI (urinary tract infection) [N39.0]    BLAYNE (acute kidney injury) [N17.9]    Goals of care, counseling/discussion [Z71.89]      HPI:  89 y/o F with hx of Afib on eliquis, CAD, CKD, HTN, HLD, OA, DVT biba from [facility] for evaluation of altered mental status and hypothermia.   	Pt is DNR/DNI. Unable to obtain info from pt due to AMS and dementia.Found to have sepsis likely secondary to UTI Admitted for septic workup and evaluation,send blood and urine cx,serial lactate levels,monitor vitals closley,ivfs hydration,monitor urine output and renal profile,iv abx initiated ,seen by ID CONSULT Found to be in BLAYNE and to be hyperkelmic .Treatment of hyperkalemia give by nephrologist ,iv hydration started Admit for iv hydration,monitor renal profile and urine output,nutritionist consult,prealbumin level,serial bmp,nutritional supplements,multivitamins,palliative care evaluuation regarding MOLST completion and artificial nutrition discussion ,HD disussion .Family declined emergency HD SESSION and requested symptomatic manamagement .COVID is negataive  Patient was srecntly hospitalized in [facility] 9/25/2020 f fall and l hip fx CXR 9/25/2020 showed sm r pl effsn ivcf old fx r humerus Pt has since been admitted [facility] Hospital VS and has been to two rehabs She was sent in from [facility] to ER today for AMS ,associated with hypothermia . (15 Dec 2020 20:29)      REVIEW OF SYSTEMS:    CONSTITUTIONAL: No fever, weight loss, or fatigue  EYES: No eye pain, visual disturbances, or discharge  ENMT:  No difficulty hearing, tinnitus, vertigo; No sinus or throat pain  NECK: No pain or stiffness  BREASTS: No pain, masses, or nipple discharge  RESPIRATORY: No cough, wheezing, chills or hemoptysis; No shortness of breath  CARDIOVASCULAR: No chest pain, palpitations, dizziness, or leg swelling  GASTROINTESTINAL: No abdominal or epigastric pain. No nausea, vomiting, or hematemesis; No diarrhea or constipation. No melena or hematochezia.  GENITOURINARY: No dysuria, frequency, hematuria, or incontinence  NEUROLOGICAL: No headaches, memory loss, loss of strength, numbness, or tremors  SKIN: No itching, burning, rashes, or lesions   LYMPH NODES: No enlarged glands  ENDOCRINE: No heat or cold intolerance; No hair loss  MUSCULOSKELETAL: No joint pain or swelling; No muscle, back, or extremity pain  PSYCHIATRIC: No depression, anxiety, mood swings, or difficulty sleeping  HEME/LYMPH: No easy bruising, or bleeding gums  ALLERGY AND IMMUNOLOGIC: No hives or eczema    MEDICATIONS  (STANDING):  aMIOdarone    Tablet 100 milliGRAM(s) Oral <User Schedule>  calcitriol   Capsule 0.25 MICROGram(s) Oral daily  dextrose 40% Gel 15 Gram(s) Oral once  dextrose 5%. 1000 milliLiter(s) (50 mL/Hr) IV Continuous <Continuous>  dextrose 5%. 1000 milliLiter(s) (100 mL/Hr) IV Continuous <Continuous>  dextrose 50% Injectable 25 Gram(s) IV Push once  dextrose 50% Injectable 12.5 Gram(s) IV Push once  dextrose 50% Injectable 25 Gram(s) IV Push once  fentaNYL   Patch  12 MICROgram(s)/Hr 1 Patch Transdermal every 72 hours  glucagon  Injectable 1 milliGRAM(s) IntraMuscular once  heparin   Injectable 5000 Unit(s) SubCutaneous every 12 hours  insulin lispro (ADMELOG) corrective regimen sliding scale   SubCutaneous three times a day before meals  latanoprost 0.005% Ophthalmic Solution 1 Drop(s) Both EYES at bedtime  meropenem  IVPB 500 milliGRAM(s) IV Intermittent every 24 hours  sodium bicarbonate  Infusion 0.313 mEq/kG/Hr (125 mL/Hr) IV Continuous <Continuous>    MEDICATIONS  (PRN):  acetaminophen   Tablet .. 650 milliGRAM(s) Oral every 6 hours PRN Temp greater or equal to 38C (100.4F), Mild Pain (1 - 3)  HYDROmorphone  Injectable 1 milliGRAM(s) IV Push every 2 hours PRN Severe Pain (7 - 10)  HYDROmorphone  Injectable 0.5 milliGRAM(s) IV Push every 90 minutes PRN Moderate Pain (4 - 6)  LORazepam   Injectable 1 milliGRAM(s) IV Push every 30 minutes PRN Agitation      ALLERGIES: PCN, Sulfa (Urticaria)  penicillin (Other)  sulfa drugs (Other)  Tolerated Cefazolin 2019 (Unknown)      FAMILY HISTORY:  No pertinent family history in first degree relatives        PHYSICAL EXAMINATION:  -----------------------------  T(C): 36.4 (12-16-20 @ 06:13), Max: 37.2 (12-15-20 @ 20:00)  HR: 86 (12-16-20 @ 06:13) (65 - 109)  BP: 114/59 (12-16-20 @ 06:13) (92/48 - 118/57)  RR: 18 (12-16-20 @ 06:13) (18 - 20)  SpO2: 100% (12-16-20 @ 06:13) (98% - 100%)  Wt(kg): --    Height (cm): 160 (12-15 @ 12:16)    VITALS  T(C): 36.4 (12-16-20 @ 06:13), Max: 37.2 (12-15-20 @ 20:00)  HR: 86 (12-16-20 @ 06:13) (65 - 109)  BP: 114/59 (12-16-20 @ 06:13) (92/48 - 118/57)  RR: 18 (12-16-20 @ 06:13) (18 - 20)  SpO2: 100% (12-16-20 @ 06:13) (98% - 100%)    Constitutional: well developed, normal appearance, well groomed, well nourished, no deformities and no acute distress.   Eyes: the conjunctiva exhibited no abnormalities and the eyelids demonstrated no xanthelasmas.   HEENT: normal oral mucosa, no oral pallor and no oral cyanosis.   Neck: normal jugular venous A waves present, normal jugular venous V waves present and no jugular venous kelley A waves.   Pulmonary: no respiratory distress, normal respiratory rhythm and effort, no accessory muscle use and lungs were clear to auscultation bilaterally.   Cardiovascular: heart rate and rhythm were normal, normal S1 and S2 and no murmur, gallop, rub, heave or thrill are present.   Abdomen: soft, non-tender, no hepato-splenomegaly and no abdominal mass palpated.   Musculoskeletal: the gait could not be assessed..   Extremities: no clubbing of the fingernails, no localized cyanosis, no petechial hemorrhages and no ischemic changes.   Skin: normal skin color and pigmentation, no rash, no venous stasis, no skin lesions, no skin ulcer and no xanthoma was observed.   Psychiatric: oriented to person, place, and time, the affect was normal, the mood was normal and not feeling anxious.     LABS:   --------  12-15    166<HH>  |  138<H>  |  205<H>  ----------------------------<  156<H>  8.2<HH>   |  8<LL>  |  14.00<H>    Ca    12.5<H>      15 Dec 2020 14:44    TPro  7.3  /  Alb  2.8<L>  /  TBili  0.3  /  DBili  x   /  AST  25  /  ALT  28  /  AlkPhos  89  12-15                         10.4   12.91 )-----------( 348      ( 15 Dec 2020 14:44 )             34.4     PT/INR - ( 15 Dec 2020 14:44 )   PT: 14.3 sec;   INR: 1.23 ratio         PTT - ( 15 Dec 2020 14:44 )  PTT:32.2 sec            RADIOLOGY:  -----------------    ECG:     ECHO:
  Patient is a 89y Female whom presented to the hospital with aiden , hyperkalemia    PAST MEDICAL & SURGICAL HISTORY:  Afib    Glaucoma    Dementia    CKD (chronic kidney disease)    CAD (coronary artery disease)    OA (osteoarthritis)    HLD (hyperlipidemia)    HTN (hypertension)    Dementia    Arteriosclerotic heart disease (ASHD)    Arthritis    Dementia    Trigger finger    CKD (chronic kidney disease)    DVT (deep venous thrombosis)    GI (gastrointestinal bleed)    Anemia Iron Deficiency    H/o Diverticulosis    Carpal Tunnel Syndrome    Dyslipidemia    Glaucoma    OA (Osteoarthritis)    HTN (Hypertension)    CAD (Coronary Artery Disease)    No significant past surgical history    [place] filter in place  placed     H/o Ovarian Cyst    Cataract  bilateral 2008    Knee Arthroplasty right 2009 &amp; left 2010        MEDICATIONS  (STANDING):  aMIOdarone    Tablet 100 milliGRAM(s) Oral daily  ascorbic acid 500 milliGRAM(s) Oral daily  atorvastatin 10 milliGRAM(s) Oral at bedtime  calcitriol   Capsule 0.25 MICROGram(s) Oral daily  calcium gluconate IVPB 1 Gram(s) IV Intermittent once  cholecalciferol 2000 Unit(s) Oral daily  dextrose 40% Gel 15 Gram(s) Oral once  dextrose 5%. 1000 milliLiter(s) (50 mL/Hr) IV Continuous <Continuous>  dextrose 5%. 1000 milliLiter(s) (150 mL/Hr) IV Continuous <Continuous>  dextrose 5%. 1000 milliLiter(s) (100 mL/Hr) IV Continuous <Continuous>  dextrose 50% Injectable 25 Gram(s) IV Push once  dextrose 50% Injectable 12.5 Gram(s) IV Push once  dextrose 50% Injectable 25 Gram(s) IV Push once  folic acid 1 milliGRAM(s) Oral daily  glucagon  Injectable 1 milliGRAM(s) IntraMuscular once  insulin lispro (ADMELOG) corrective regimen sliding scale   SubCutaneous three times a day before meals  latanoprost 0.005% Ophthalmic Solution 1 Drop(s) Both EYES at bedtime  sodium bicarbonate  Injectable 50 milliEquivalent(s) IV Push once  sodium polystyrene sulfonate Enema 60 Gram(s) Rectal once      Allergies    PCN, Sulfa (Urticaria)  penicillin (Other)  sulfa drugs (Other)  Tolerated Cefazolin 2019 (Unknown)    Intolerances    Bananas (Other (Mild to Mod))  Potatoes (Other (Mild to Mod))      SOCIAL HISTORY:  Denies ETOh,Smoking,     FAMILY HISTORY:  No pertinent family history in first degree relatives        REVIEW OF SYSTEMS:    unable to obtained a good review system                            9.0    11.40 )-----------( 258      ( 16 Dec 2020 10:53 )             28.2       CBC Full  -  ( 16 Dec 2020 10:53 )  WBC Count : 11.40 K/uL  RBC Count : 3.07 M/uL  Hemoglobin : 9.0 g/dL  Hematocrit : 28.2 %  Platelet Count - Automated : 258 K/uL  Mean Cell Volume : 91.9 fl  Mean Cell Hemoglobin : 29.3 pg  Mean Cell Hemoglobin Concentration : 31.9 gm/dL  Auto Neutrophil # : x  Auto Lymphocyte # : x  Auto Monocyte # : x  Auto Eosinophil # : x  Auto Basophil # : x  Auto Neutrophil % : x  Auto Lymphocyte % : x  Auto Monocyte % : x  Auto Eosinophil % : x  Auto Basophil % : x      12-16    170<HH>  |  137<H>  |  197<H>  ----------------------------<  247<H>  6.0<H>   |  17<L>  |  14.00<H>    Ca    11.3<H>      16 Dec 2020 10:53    TPro  7.3  /  Alb  2.8<L>  /  TBili  0.3  /  DBili  x   /  AST  25  /  ALT  28  /  AlkPhos  89  12-15      CAPILLARY BLOOD GLUCOSE      POCT Blood Glucose.: 260 mg/dL (16 Dec 2020 18:57)  POCT Blood Glucose.: 265 mg/dL (16 Dec 2020 17:38)  POCT Blood Glucose.: 280 mg/dL (16 Dec 2020 10:32)  POCT Blood Glucose.: 273 mg/dL (16 Dec 2020 08:30)  POCT Blood Glucose.: 142 mg/dL (16 Dec 2020 03:29)      Vital Signs Last 24 Hrs  T(C): 33.5 (16 Dec 2020 20:15), Max: 36.9 (16 Dec 2020 00:43)  T(F): 92.3 (16 Dec 2020 20:15), Max: 98.4 (16 Dec 2020 00:43)  HR: 72 (16 Dec 2020 20:15) (72 - 104)  BP: 91/52 (16 Dec 2020 20:15) (91/52 - 114/59)  BP(mean): 78 (16 Dec 2020 06:13) (76 - 79)  RR: 18 (16 Dec 2020 20:15) (18 - 20)  SpO2: 98% (16 Dec 2020 20:15) (96% - 100%)    Urinalysis Basic - ( 15 Dec 2020 14:15 )    Color: Yellow / Appearance: Turbid / S.010 / pH: x  Gluc: x / Ketone: Negative  / Bili: Negative / Urobili: Negative   Blood: x / Protein: 500 mg/dL / Nitrite: Negative   Leuk Esterase: Moderate / RBC: 3-5 /HPF / WBC >50   Sq Epi: x / Non Sq Epi: Few / Bacteria: TNTC        PT/INR - ( 15 Dec 2020 14:44 )   PT: 14.3 sec;   INR: 1.23 ratio         PTT - ( 15 Dec 2020 14:44 )  PTT:32.2 sec      PHYSICAL EXAM:    Constitutional: lethargic   Neck:  No JVD  Respiratory: decrease bs b/l   Cardiovascular: S1 and S2  Gastrointestinal: BS+, soft,   Extremities: No peripheral edema    
Date/Time Patient Seen:  		  Referring MD:   Data Reviewed	       Patient is a 89y old  Female who presents with a chief complaint of AMS AND HYPOTHERMIA (16 Dec 2020 16:43)      Subjective/HPI     PAST MEDICAL & SURGICAL HISTORY:  Afib    Glaucoma    Dementia    CKD (chronic kidney disease)    CAD (coronary artery disease)    OA (osteoarthritis)    HLD (hyperlipidemia)    HTN (hypertension)    Dementia    Arteriosclerotic heart disease (ASHD)    Arthritis    Dementia    Trigger finger    CKD (chronic kidney disease)    DVT (deep venous thrombosis)    GI (gastrointestinal bleed)    Anemia Iron Deficiency    H/o Diverticulosis    Carpal Tunnel Syndrome    Trigger Finger    Dyslipidemia    Glaucoma    OA (Osteoarthritis)    HTN (Hypertension)    CAD (Coronary Artery Disease)    No significant past surgical history    [place] filter in place  placed 2012    H/o Ovarian Cyst    Cataract  bilateral 2008    Knee Arthroplasty right 8/2009 &amp; left 9/2010          Medication list         MEDICATIONS  (STANDING):  aMIOdarone    Tablet 100 milliGRAM(s) Oral <User Schedule>  calcitriol   Capsule 0.25 MICROGram(s) Oral daily  dextrose 40% Gel 15 Gram(s) Oral once  dextrose 5%. 1000 milliLiter(s) (50 mL/Hr) IV Continuous <Continuous>  dextrose 5%. 1000 milliLiter(s) (100 mL/Hr) IV Continuous <Continuous>  dextrose 50% Injectable 25 Gram(s) IV Push once  dextrose 50% Injectable 12.5 Gram(s) IV Push once  dextrose 50% Injectable 25 Gram(s) IV Push once  fentaNYL   Patch  12 MICROgram(s)/Hr 1 Patch Transdermal every 72 hours  glucagon  Injectable 1 milliGRAM(s) IntraMuscular once  heparin   Injectable 5000 Unit(s) SubCutaneous every 12 hours  influenza   Vaccine 0.5 milliLiter(s) IntraMuscular once  insulin lispro (ADMELOG) corrective regimen sliding scale   SubCutaneous three times a day before meals  latanoprost 0.005% Ophthalmic Solution 1 Drop(s) Both EYES at bedtime  meropenem  IVPB 500 milliGRAM(s) IV Intermittent every 24 hours  sodium bicarbonate  Infusion 0.313 mEq/kG/Hr (125 mL/Hr) IV Continuous <Continuous>    MEDICATIONS  (PRN):  acetaminophen   Tablet .. 650 milliGRAM(s) Oral every 6 hours PRN Temp greater or equal to 38C (100.4F), Mild Pain (1 - 3)  HYDROmorphone  Injectable 1 milliGRAM(s) IV Push every 2 hours PRN Severe Pain (7 - 10)  HYDROmorphone  Injectable 0.5 milliGRAM(s) IV Push every 90 minutes PRN Moderate Pain (4 - 6)  LORazepam   Injectable 1 milliGRAM(s) IV Push every 30 minutes PRN Agitation         Vitals log        ICU Vital Signs Last 24 Hrs  T(C): 36.9 (17 Dec 2020 05:07), Max: 36.9 (17 Dec 2020 05:07)  T(F): 98.4 (17 Dec 2020 05:07), Max: 98.4 (17 Dec 2020 05:07)  HR: 99 (17 Dec 2020 05:07) (72 - 99)  BP: 143/99 (16 Dec 2020 22:06) (91/52 - 143/99)  BP(mean): 78 (16 Dec 2020 06:13) (78 - 78)  ABP: --  ABP(mean): --  RR: 19 (17 Dec 2020 05:07) (18 - 20)  SpO2: 96% (17 Dec 2020 05:07) (94% - 100%)           Input and Output:  I&O's Detail      Lab Data                        9.0    11.40 )-----------( 258      ( 16 Dec 2020 10:53 )             28.2     12-16    170<HH>  |  137<H>  |  197<H>  ----------------------------<  247<H>  6.0<H>   |  17<L>  |  14.00<H>    Ca    11.3<H>      16 Dec 2020 10:53    TPro  7.3  /  Alb  2.8<L>  /  TBili  0.3  /  DBili  x   /  AST  25  /  ALT  28  /  AlkPhos  89  12-15            Review of Systems	      Objective     Physical Examination    frail  weak  on RA  lung dec BS  abd soft      Pertinent Lab findings & Imaging      Lamar:  NO   Adequate UO     I&O's Detail           Discussed with:     Cultures:	        Radiology                            
  Patient is a 89y Female whom presented to the hospital with aiden , hyperkalemia    PAST MEDICAL & SURGICAL HISTORY:  Afib    Glaucoma    Dementia    CKD (chronic kidney disease)    CAD (coronary artery disease)    OA (osteoarthritis)    HLD (hyperlipidemia)    HTN (hypertension)    Dementia    Arteriosclerotic heart disease (ASHD)    Arthritis    Dementia    Trigger finger    CKD (chronic kidney disease)    DVT (deep venous thrombosis)    GI (gastrointestinal bleed)    Anemia Iron Deficiency    H/o Diverticulosis    Carpal Tunnel Syndrome    Dyslipidemia    Glaucoma    OA (Osteoarthritis)    HTN (Hypertension)    CAD (Coronary Artery Disease)    No significant past surgical history    [place] filter in place  placed     H/o Ovarian Cyst    Cataract  bilateral 2008    Knee Arthroplasty right 2009 &amp; left 2010        MEDICATIONS  (STANDING):  aMIOdarone    Tablet 100 milliGRAM(s) Oral daily  ascorbic acid 500 milliGRAM(s) Oral daily  atorvastatin 10 milliGRAM(s) Oral at bedtime  calcitriol   Capsule 0.25 MICROGram(s) Oral daily  calcium gluconate IVPB 1 Gram(s) IV Intermittent once  cholecalciferol 2000 Unit(s) Oral daily  dextrose 40% Gel 15 Gram(s) Oral once  dextrose 5%. 1000 milliLiter(s) (50 mL/Hr) IV Continuous <Continuous>  dextrose 5%. 1000 milliLiter(s) (150 mL/Hr) IV Continuous <Continuous>  dextrose 5%. 1000 milliLiter(s) (100 mL/Hr) IV Continuous <Continuous>  dextrose 50% Injectable 25 Gram(s) IV Push once  dextrose 50% Injectable 12.5 Gram(s) IV Push once  dextrose 50% Injectable 25 Gram(s) IV Push once  folic acid 1 milliGRAM(s) Oral daily  glucagon  Injectable 1 milliGRAM(s) IntraMuscular once  insulin lispro (ADMELOG) corrective regimen sliding scale   SubCutaneous three times a day before meals  latanoprost 0.005% Ophthalmic Solution 1 Drop(s) Both EYES at bedtime  sodium bicarbonate  Injectable 50 milliEquivalent(s) IV Push once  sodium polystyrene sulfonate Enema 60 Gram(s) Rectal once      Allergies    PCN, Sulfa (Urticaria)  penicillin (Other)  sulfa drugs (Other)  Tolerated Cefazolin 2019 (Unknown)    Intolerances    Bananas (Other (Mild to Mod))  Potatoes (Other (Mild to Mod))      SOCIAL HISTORY:  Denies ETOh,Smoking,     FAMILY HISTORY:  No pertinent family history in first degree relatives        REVIEW OF SYSTEMS:    unable to obtained a good review system                                            9.0    11.40 )-----------( 258      ( 16 Dec 2020 10:53 )             28.2       CBC Full  -  ( 16 Dec 2020 10:53 )  WBC Count : 11.40 K/uL  RBC Count : 3.07 M/uL  Hemoglobin : 9.0 g/dL  Hematocrit : 28.2 %  Platelet Count - Automated : 258 K/uL  Mean Cell Volume : 91.9 fl  Mean Cell Hemoglobin : 29.3 pg  Mean Cell Hemoglobin Concentration : 31.9 gm/dL  Auto Neutrophil # : x  Auto Lymphocyte # : x  Auto Monocyte # : x  Auto Eosinophil # : x  Auto Basophil # : x  Auto Neutrophil % : x  Auto Lymphocyte % : x  Auto Monocyte % : x  Auto Eosinophil % : x  Auto Basophil % : x      12-16    170<HH>  |  137<H>  |  197<H>  ----------------------------<  247<H>  6.0<H>   |  17<L>  |  14.00<H>    Ca    11.3<H>      16 Dec 2020 10:53    TPro  7.3  /  Alb  2.8<L>  /  TBili  0.3  /  DBili  x   /  AST  25  /  ALT  28  /  AlkPhos  89  12-15      CAPILLARY BLOOD GLUCOSE      POCT Blood Glucose.: 164 mg/dL (17 Dec 2020 08:07)  POCT Blood Glucose.: 114 mg/dL (16 Dec 2020 22:27)  POCT Blood Glucose.: 260 mg/dL (16 Dec 2020 18:57)  POCT Blood Glucose.: 265 mg/dL (16 Dec 2020 17:38)      Vital Signs Last 24 Hrs  T(C): 36.9 (17 Dec 2020 05:07), Max: 36.9 (17 Dec 2020 05:07)  T(F): 98.4 (17 Dec 2020 05:07), Max: 98.4 (17 Dec 2020 05:07)  HR: 99 (17 Dec 2020 05:07) (72 - 99)  BP: 110/70 (17 Dec 2020 05:07) (91/52 - 143/99)  BP(mean): --  RR: 19 (17 Dec 2020 05:07) (18 - 19)  SpO2: 96% (17 Dec 2020 05:07) (94% - 98%)    Urinalysis Basic - ( 15 Dec 2020 14:15 )    Color: Yellow / Appearance: Turbid / S.010 / pH: x  Gluc: x / Ketone: Negative  / Bili: Negative / Urobili: Negative   Blood: x / Protein: 500 mg/dL / Nitrite: Negative   Leuk Esterase: Moderate / RBC: 3-5 /HPF / WBC >50   Sq Epi: x / Non Sq Epi: Few / Bacteria: TNTC        PT/INR - ( 15 Dec 2020 14:44 )   PT: 14.3 sec;   INR: 1.23 ratio         PTT - ( 15 Dec 2020 14:44 )  PTT:32.2 sec  PHYSICAL EXAM:    Constitutional: lethargic   Neck:  No JVD  Respiratory: decrease bs b/l   Cardiovascular: S1 and S2  Gastrointestinal: BS+, soft,   Extremities: No peripheral edema    
PROGRESS NOTE  Patient is a 89y old  Female who presents with a chief complaint of AMS AND HYPOTHERMIA (17 Dec 2020 05:52)  Chart and available morning labs /imaging are reviewed electronically , urgent issues addressed . More information  is being added upon completion of rounds , when more information is collected and management discussed with consultants , medical staff and social service/case management on the floor   OVERNIGHT SpO2: 96% (-20 @ 05:07) (94% - 98%)  Remains lethargic ,was hypothermic earlier .Accepted by Hospice network ,[facility] care MD and [facility] notes reviewed   Patient failed SLP due to lethargy .Will be transferred to Hospice facility when arranged by sw and consent obtained for the HCP Donna   HPI:  89 y/o F with hx of Afib on eliquis, CAD, CKD, HTN, HLD, OA, DVT biba from [facility] for evaluation of altered mental status and hypothermia.   	Pt is DNR/DNI. Unable to obtain info from pt due to AMS and dementia.Found to have sepsis likely secondary to UTI Admitted for septic workup and evaluation,send blood and urine cx,serial lactate levels,monitor vitals closley,ivfs hydration,monitor urine output and renal profile,iv abx initiated ,seen by ID CONSULT Found to be in BLAYNE and to be hyperkelmic .Treatment of hyperkalemia give by nephrologist ,iv hydration started Admit for iv hydration,monitor renal profile and urine output,nutritionist consult,prealbumin level,serial bmp,nutritional supplements,multivitamins,palliative care evaluuation regarding MOLST completion and artificial nutrition discussion ,HD disussion .Family declined emergency HD SESSION and requested symptomatic manamagement .COVID is negataive  Patient was srecntly hospitalized in [facility] 2020 f fall and l hip fx CXR 2020 showed sm r pl effsn ivcf old fx r humerus Pt has since been admitted [facility] and has been to two rehabs She was sent in from [facility] to ER today for AMS ,associated with hypothermia . (15 Dec 2020 20:29)    PAST MEDICAL & SURGICAL HISTORY:  Afib    Glaucoma    Dementia    CKD (chronic kidney disease)    CAD (coronary artery disease)    OA (osteoarthritis)    HLD (hyperlipidemia)    HTN (hypertension)    Dementia    Arteriosclerotic heart disease (ASHD)    Arthritis    Dementia    Trigger finger    CKD (chronic kidney disease)    DVT (deep venous thrombosis)    GI (gastrointestinal bleed)    Anemia Iron Deficiency    H/o Diverticulosis    Carpal Tunnel Syndrome    Dyslipidemia    Glaucoma    OA (Osteoarthritis)    HTN (Hypertension)    CAD (Coronary Artery Disease)    No significant past surgical history    [place] filter in place  placed     H/o Ovarian Cyst    Cataract  bilateral 2008    Knee Arthroplasty right 2009 &amp; left 2010        MEDICATIONS  (STANDING):  aMIOdarone    Tablet 100 milliGRAM(s) Oral <User Schedule>  calcitriol   Capsule 0.25 MICROGram(s) Oral daily  dextrose 40% Gel 15 Gram(s) Oral once  dextrose 5%. 1000 milliLiter(s) (50 mL/Hr) IV Continuous <Continuous>  dextrose 5%. 1000 milliLiter(s) (100 mL/Hr) IV Continuous <Continuous>  dextrose 50% Injectable 25 Gram(s) IV Push once  dextrose 50% Injectable 12.5 Gram(s) IV Push once  dextrose 50% Injectable 25 Gram(s) IV Push once  fentaNYL   Patch  12 MICROgram(s)/Hr 1 Patch Transdermal every 72 hours  glucagon  Injectable 1 milliGRAM(s) IntraMuscular once  heparin   Injectable 5000 Unit(s) SubCutaneous every 12 hours  influenza   Vaccine 0.5 milliLiter(s) IntraMuscular once  insulin lispro (ADMELOG) corrective regimen sliding scale   SubCutaneous three times a day before meals  latanoprost 0.005% Ophthalmic Solution 1 Drop(s) Both EYES at bedtime  meropenem  IVPB 500 milliGRAM(s) IV Intermittent every 24 hours  sodium bicarbonate  Infusion 0.313 mEq/kG/Hr (125 mL/Hr) IV Continuous <Continuous>    MEDICATIONS  (PRN):  acetaminophen   Tablet .. 650 milliGRAM(s) Oral every 6 hours PRN Temp greater or equal to 38C (100.4F), Mild Pain (1 - 3)  HYDROmorphone  Injectable 1 milliGRAM(s) IV Push every 2 hours PRN Severe Pain (7 - 10)  HYDROmorphone  Injectable 0.5 milliGRAM(s) IV Push every 90 minutes PRN Moderate Pain (4 - 6)  LORazepam   Injectable 1 milliGRAM(s) IV Push every 30 minutes PRN Agitation      OBJECTIVE    T(C): 36.9 (20 @ 05:07), Max: 36.9 (20 @ 05:07)  HR: 99 (20 @ 05:07) (72 - 99)  BP: 110/70 (20 @ 05:07) (91/52 - 143/99)  RR: 19 (20 @ 05:07) (18 - 20)  SpO2: 96% (20 @ 05:07) (94% - 98%)  Wt(kg): --  I&O's Summary        REVIEW OF SYSTEMS:  Patient is  unable to provide any information/ROS  due to baseline mental status.     PHYSICAL EXAM:  Appearance: NAD. VS past 24 hrs -as above   HEENT:   Moist oral mucosa. Conjunctiva clear b/l.   Neck : supple  Respiratory: Lungs CTAB.  Gastrointestinal:  Soft, nontender. No rebound. No rigidity. BS present	  Cardiovascular: RRR ,S1S2 present  Neurologic: Non-focal. Moving all extremities.  Extremities: No edema. No erythema. No calf tenderness.  Skin: No rashes, No ecchymoses, No cyanosis.	  wounds ,skin lesions-See skin assesment flow sheet   LABS:                        9.0    11.40 )-----------( 258      ( 16 Dec 2020 10:53 )             28.2     12-16    170<HH>  |  137<H>  |  197<H>  ----------------------------<  247<H>  6.0<H>   |  17<L>  |  14.00<H>    Ca    11.3<H>      16 Dec 2020 10:53    TPro  7.3  /  Alb  2.8<L>  /  TBili  0.3  /  DBili  x   /  AST  25  /  ALT  28  /  AlkPhos  89  12-15    CAPILLARY BLOOD GLUCOSE      POCT Blood Glucose.: 164 mg/dL (17 Dec 2020 08:07)  POCT Blood Glucose.: 114 mg/dL (16 Dec 2020 22:27)  POCT Blood Glucose.: 260 mg/dL (16 Dec 2020 18:57)  POCT Blood Glucose.: 265 mg/dL (16 Dec 2020 17:38)  POCT Blood Glucose.: 280 mg/dL (16 Dec 2020 10:32)  POCT Blood Glucose.: 273 mg/dL (16 Dec 2020 08:30)    PT/INR - ( 15 Dec 2020 14:44 )   PT: 14.3 sec;   INR: 1.23 ratio         PTT - ( 15 Dec 2020 14:44 )  PTT:32.2 sec  Urinalysis Basic - ( 15 Dec 2020 14:15 )    Color: Yellow / Appearance: Turbid / S.010 / pH: x  Gluc: x / Ketone: Negative  / Bili: Negative / Urobili: Negative   Blood: x / Protein: 500 mg/dL / Nitrite: Negative   Leuk Esterase: Moderate / RBC: 3-5 /HPF / WBC >50   Sq Epi: x / Non Sq Epi: Few / Bacteria: TNTC        Culture - Blood (collected 15 Dec 2020 22:08)  Source: .Blood Blood-Peripheral  Preliminary Report (16 Dec 2020 23:01):    No growth to date.    Culture - Urine (collected 15 Dec 2020 18:12)  Source: .Urine Clean Catch (Midstream)  Preliminary Report (16 Dec 2020 15:57):    >100,000 CFU/ml Escherichia coli      RADIOLOGY & ADDITIONAL TESTS: < from: Xray Chest 1 View-PORTABLE IMMEDIATE (12.15.20 @ 15:33) >    INTERPRETATION:  Sepsis.    AP chest. Prior 10/27/2020.    Rotated. Chin obscures portion right apex. Low lung volumes. No change heart mediastinum. No consolidation or effusion. Bilateral high riding humeral heads suggests chronic rotator cuff pathology. Old healed fracture the right proximal humerus. IVC filter projects over the right upper quadrant.    Impression: No active infiltrates.    < end of copied text >     reviewed elctronically  ASSESSMENT/PLAN:

## 2020-12-18 LAB
-  AMIKACIN: SIGNIFICANT CHANGE UP
-  AMOXICILLIN/CLAVULANIC ACID: SIGNIFICANT CHANGE UP
-  AMPICILLIN/SULBACTAM: SIGNIFICANT CHANGE UP
-  AMPICILLIN: SIGNIFICANT CHANGE UP
-  AZTREONAM: SIGNIFICANT CHANGE UP
-  CEFAZOLIN: SIGNIFICANT CHANGE UP
-  CEFEPIME: SIGNIFICANT CHANGE UP
-  CEFOXITIN: SIGNIFICANT CHANGE UP
-  CEFTRIAXONE: SIGNIFICANT CHANGE UP
-  CIPROFLOXACIN: SIGNIFICANT CHANGE UP
-  ERTAPENEM: SIGNIFICANT CHANGE UP
-  LEVOFLOXACIN: SIGNIFICANT CHANGE UP
-  MEROPENEM: SIGNIFICANT CHANGE UP
-  NITROFURANTOIN: SIGNIFICANT CHANGE UP
-  PIPERACILLIN/TAZOBACTAM: SIGNIFICANT CHANGE UP
-  TIGECYCLINE: SIGNIFICANT CHANGE UP
-  TRIMETHOPRIM/SULFAMETHOXAZOLE: SIGNIFICANT CHANGE UP
CULTURE RESULTS: SIGNIFICANT CHANGE UP
METHOD TYPE: SIGNIFICANT CHANGE UP
ORGANISM # SPEC MICROSCOPIC CNT: SIGNIFICANT CHANGE UP
SPECIMEN SOURCE: SIGNIFICANT CHANGE UP

## 2020-12-20 LAB
CULTURE RESULTS: SIGNIFICANT CHANGE UP
SPECIMEN SOURCE: SIGNIFICANT CHANGE UP

## 2021-01-18 PROCEDURE — 82962 GLUCOSE BLOOD TEST: CPT

## 2021-01-18 PROCEDURE — 71045 X-RAY EXAM CHEST 1 VIEW: CPT

## 2021-01-18 PROCEDURE — 87186 SC STD MICRODIL/AGAR DIL: CPT

## 2021-01-18 PROCEDURE — 85610 PROTHROMBIN TIME: CPT

## 2021-01-18 PROCEDURE — 99285 EMERGENCY DEPT VISIT HI MDM: CPT

## 2021-01-18 PROCEDURE — 0225U NFCT DS DNA&RNA 21 SARSCOV2: CPT

## 2021-01-18 PROCEDURE — 94640 AIRWAY INHALATION TREATMENT: CPT

## 2021-01-18 PROCEDURE — 36415 COLL VENOUS BLD VENIPUNCTURE: CPT

## 2021-01-18 PROCEDURE — 85730 THROMBOPLASTIN TIME PARTIAL: CPT

## 2021-01-18 PROCEDURE — 81001 URINALYSIS AUTO W/SCOPE: CPT

## 2021-01-18 PROCEDURE — 87086 URINE CULTURE/COLONY COUNT: CPT

## 2021-01-18 PROCEDURE — 85025 COMPLETE CBC W/AUTO DIFF WBC: CPT

## 2021-01-18 PROCEDURE — 87040 BLOOD CULTURE FOR BACTERIA: CPT

## 2021-01-18 PROCEDURE — 96365 THER/PROPH/DIAG IV INF INIT: CPT

## 2021-01-18 PROCEDURE — 80053 COMPREHEN METABOLIC PANEL: CPT

## 2021-01-18 PROCEDURE — 96361 HYDRATE IV INFUSION ADD-ON: CPT

## 2021-01-18 PROCEDURE — 84145 PROCALCITONIN (PCT): CPT

## 2021-01-18 PROCEDURE — 93005 ELECTROCARDIOGRAM TRACING: CPT

## 2021-01-18 PROCEDURE — 83605 ASSAY OF LACTIC ACID: CPT

## 2021-01-18 PROCEDURE — 86769 SARS-COV-2 COVID-19 ANTIBODY: CPT

## 2021-01-18 PROCEDURE — 80048 BASIC METABOLIC PNL TOTAL CA: CPT

## 2021-01-18 PROCEDURE — 83036 HEMOGLOBIN GLYCOSYLATED A1C: CPT

## 2021-01-18 PROCEDURE — 85027 COMPLETE CBC AUTOMATED: CPT

## 2021-01-19 NOTE — ED PROVIDER NOTE - AREA
[place] [address] Transitions Follow Up Call    2021    Patient: Kaye Aceves  Patient : 1956   MRN: [id number]  Reason for Admission: Pneumonia/Covid+/Covid-19 monitoring  Discharge Date: 21 RARS: Readmission Risk Score: 31         Spoke with: Kaye Aceves    Was able to contact Shefali Carbone passed away this last Saturday. Vira Libman Linda Ro MD Resp Spec
Vish Lucero RN
medial/distal
36.6

## 2021-01-22 NOTE — ED ADULT NURSE NOTE - IS THE PATIENT ABLE TO BE SCREENED?
THERAPY NOTE    Patient Active Problem List   Diagnosis     Obesity     Chronic rhinitis     Incomplete circumcision     Disorganized behavior     PTSD (post-traumatic stress disorder)     Current moderate episode of major depressive disorder, unspecified whether recurrent (H)         Duration: Met with patient on 1/22/2021 , for a total of 10 minutes.    Patient Goals: The patient identified their treatment goals as finishing his relapse prevention plan.     Interventions used: Motivational Interviewing    Patient progress: The purpose of this session was to follow up and check on pt's progress with his relapse prevention plan. Pt provided his completed safety plan and reviewed it with writer. Writer assisted pt with following up on various questions of the plan and to elaborate them. Writer reviewed the relapse warning sing checklist and instructed pt to review and identify his top 5 or more warning signs and review them with writer. Pt was agreeable to work on completing them. Pt provided and reviewed his completed safety plan. Writer informed pt that writer will provide [facility] with a list of AA/NA meetings that he will have to choose. Pt was agreeable    Patient Response: Pt presented as engaged and participated in the session.     Assessment or plan: Pt appears anxious about upcoming discharge due to pt's frequent questions if he will be leaving and needed reassurance that his discharge date is approaching.      No

## 2021-05-28 NOTE — DISCHARGE NOTE NURSING/CASE MANAGEMENT/SOCIAL WORK - NSDCPEELIQUISDIET_GEN_ALL_CORE
[place] Eat healthy foods you enjoy. Apixaban/Eliquis DOES NOT have a special diet. Limit your alcohol intake.

## 2021-06-21 NOTE — PROGRESS NOTE ADULT - PROBLEM SELECTOR PLAN 1
The saphenous vein graft was selectively engaged and injected.  Distal RCA cardiology consult pending

## 2021-06-28 NOTE — CONSULT NOTE ADULT - PROBLEM SELECTOR RECOMMENDATION 3
f/u  blood and urine cx,serial lactate levels,monitor vitals closley,ivfs hydration,monitor urine output and renal profile,iv abx
diabetes education

## 2021-10-14 NOTE — H&P ADULT - HISTORY OF PRESENT ILLNESS
89yo female from [facility] with pmh HTN, HLD, OA, CKD, CAD, Afib on Amiodoronie and eliquis, recent left hip fx with surgery s/p fall 1 month ago presents with hyperkalemia of 7.2.  Pt is AOx1 and does not provide history. Pt on NC. Pt does appear to be moaning. HR of 110s, noted however goes to 90s when pt not disturbed or yelling        
14-Oct-2021

## 2021-12-01 NOTE — ED PROVIDER NOTE - CCCP TRG CHIEF CMPLNT
Patient needs an order and a referral for semen analysis for her ; Sukh Lorene 4/3/1990. Pt has PPO but office that pt is getting analysis done at [facility] pt needs referral. Please place order and referral accordingly.    altered mental status

## 2022-01-31 NOTE — ED PROVIDER NOTE - NS ED MD EM SELECTION
Ms. Mcclendon was last seen by Dr. Delarosa on 8/31/2021 and had both a lipid panel and a BMP lab draw ordered.  Has a scheduled visit appointment 9/13/2022.  Ms. Mcclendon called and left a voice message that she had outstanding lab work that needs to be drawn.   was advised to fast 8 to 12 hours prior to going to any [facility] lab.  Will extend the orders in [facility].   [id number] [facility]

## 2022-06-12 NOTE — H&P ADULT - NSICDXNOPASTSURGICALHX_GEN_ALL_CORE
Post Op Visit  [facility]      REFERRING PHYSICIAN:  Sissy Zazueta MD    DATE: 6/14/2022    DIAGNOSIS:    This is a 45 y.o. female with radial scar of the left breast.    TREATMENT SUMMARY:  The patient is status post left excisional breast biopsy.  Final pathology showed:  Final Pathologic Diagnosis 1. Left breast, lumpectomy (22.2 grams):       -  Radial scar formation with background breast tissue showing   fibrocystic change including fibrosis,          adenosis, duct ectasia and apocrine metaplasia       -  Microcalcifications:  Present, associated with benign duct adenosis       -  Sequela of previous biopsy present       -  X shaped biopsy clip present, level 6       -  Reflector tag present, level 5       -  Negative for atypia or malignancy          INTERVAL HISTORY:   So Winkler comes in for a post-op check.  She denies fever, chills, chest pain or shortness of breath.  Her pain is well controlled.    She presented to the [facility] ED for left breast swelling, tenderness and drainage of the left breast.  Last week, she woke up with extreme swelling and pain of the left breast. Also reported bloody drainage from the area but unsure if it was coming from incision.  She went to the ED due to the new change. She was started on antibiotic and discharged to follow up in clinic. On clinic follow up, she underwent aspiration of a seroma with 20 mL of bloody fluid aspirated. She reports overall improvement in the seroma. She is no longer have pain or drainage. She is wearing a compressive bra.       PHYSICAL EXAMINATION:   General:  This is a well appearing female with appropriate speech, affect and gait.     Breast:  Incision clean, dry, and intact. Palpable seroma.     Fine Needle Aspiration Procedure Note    Pre-operative Diagnosis: hematoma     Post-operative Diagnosis: same    Location: left breast    Anesthesia: 1% plain lidocaine    Procedure Details   The Procedure, risks and complications  have been discussed in detail (including, but not limited to pain, infection, bleeding) with the patient, and the patient has signed consent to have the surgery completed.    The skin was sterilely prepped and draped over the affected area in the usual fashion.  Fine Needle Aspiration was performed with a18 using ultrasound guidance.  Contents of Aspiration: 12 ml of thick old blood   Size of mass after cyst aspiration: decreased in size  Specimens sent to pathology.  EBL: none  Sterile dressing placed.  May place ice pack over affected area during the first 24 hours.    Condition:  Stable    Complications:  none.      IMPRESSION:   The patient with a seroma which was drained last week. Doing well, although has had re-accumulation of the seroma.     PLAN:   1. return to clinic in 1 month for follow up  2. Can return to screening mammogram in May 2023  3. The patient is advised in continued exam of the breast chest wall and to report to this office sooner should she note any areas of abnormality or concern.        <-- Click to add NO significant Past Surgical History

## 2022-09-15 NOTE — PROGRESS NOTE ADULT - SUBJECTIVE AND OBJECTIVE BOX
The patient has been examined and the H&P has been reviewed:    I concur with the findings and no changes have occurred since H&P was written.    Surgery risks, benefits and alternative options discussed and understood by patient/family.          Active Hospital Problems    Diagnosis  POA    *Diabetic ulcer of right foot with abscess  [E11.621, L97.519]  Yes    History of stroke [Z86.73]  Not Applicable    Bacteremia [R78.81]  Yes    Abscess of foot without toes, right [L02.611]  Yes    Charcot's joint of foot, right [M14.671]  Yes    Diabetic gastroparesis [E11.43, K31.84]  Yes    Hypertension [I10]  Yes    Diabetes mellitus [E11.9]  Yes      Resolved Hospital Problems   No resolved problems to display.        Patient is a 89y Female whom presented to the hospital with ckd and aiden     PAST MEDICAL & SURGICAL HISTORY:  Afib    Glaucoma    Dementia    CKD (chronic kidney disease)    CAD (coronary artery disease)    OA (osteoarthritis)    HLD (hyperlipidemia)    HTN (hypertension)    Dementia    No significant past surgical history        MEDICATIONS  (STANDING):  aMIOdarone    Tablet 100 milliGRAM(s) Oral daily  amLODIPine   Tablet 2.5 milliGRAM(s) Oral daily  atorvastatin 10 milliGRAM(s) Oral at bedtime  calcitriol   Capsule 0.25 MICROGram(s) Oral daily  donepezil 10 milliGRAM(s) Oral at bedtime  lactated ringers. 1000 milliLiter(s) (75 mL/Hr) IV Continuous <Continuous>  latanoprost 0.005% Ophthalmic Solution 1 Drop(s) Both EYES at bedtime  multivitamin 1 Tablet(s) Oral daily  polyethylene glycol 3350 17 Gram(s) Oral daily  senna 2 Tablet(s) Oral at bedtime      Allergies    penicillin (Other)  sulfa drugs (Other)    Intolerances        SOCIAL HISTORY:  Denies ETOh,Smoking,     FAMILY HISTORY:      REVIEW OF SYSTEMS:    CONSTITUTIONAL: No fevers or chills  RESPIRATORY: No cough, wheezing, hemoptysis; No shortness of breath  CARDIOVASCULAR: No chest pain or palpitations  GASTROINTESTINAL: No abdominal or epigastric pain. No nausea, vomiting,     No diarrhea or constipation. No melena   GENITOURINARY: No dysuria, frequency or hematuria                               9.3    7.59  )-----------( 129      ( 27 Sep 2020 06:59 )             27.7       CBC Full  -  ( 27 Sep 2020 06:59 )  WBC Count : 7.59 K/uL  RBC Count : 3.18 M/uL  Hemoglobin : 9.3 g/dL  Hematocrit : 27.7 %  Platelet Count - Automated : 129 K/uL  Mean Cell Volume : 87.1 fl  Mean Cell Hemoglobin : 29.2 pg  Mean Cell Hemoglobin Concentration : 33.6 gm/dL  Auto Neutrophil # : x  Auto Lymphocyte # : x  Auto Monocyte # : x  Auto Eosinophil # : x  Auto Basophil # : x  Auto Neutrophil % : x  Auto Lymphocyte % : x  Auto Monocyte % : x  Auto Eosinophil % : x  Auto Basophil % : x      09-27    145  |  117<H>  |  39<H>  ----------------------------<  75  4.9   |  21<L>  |  1.50<H>    Ca    9.3      27 Sep 2020 06:59        CAPILLARY BLOOD GLUCOSE          Vital Signs Last 24 Hrs  T(C): 36.4 (27 Sep 2020 13:47), Max: 36.5 (26 Sep 2020 15:48)  T(F): 97.6 (27 Sep 2020 13:47), Max: 97.7 (26 Sep 2020 15:48)  HR: 125 (27 Sep 2020 13:47) (57 - 125)  BP: 127/78 (27 Sep 2020 13:47) (104/59 - 149/53)  BP(mean): --  RR: 18 (27 Sep 2020 13:47) (13 - 18)  SpO2: 97% (27 Sep 2020 13:47) (92% - 100%)        PT/INR - ( 26 Sep 2020 07:55 )   PT: 13.8 sec;   INR: 1.19 ratio         PTT - ( 26 Sep 2020 06:08 )  PTT:32.4 sec  PHYSICAL EXAM:    Constitutional: NAD  HEENT: conjunctive   clear   Neck:  No JVD  Respiratory: CTAB  Cardiovascular: S1 and S2  Gastrointestinal: BS+, soft,   Extremities: No peripheral edema

## 2022-12-06 NOTE — ED PROVIDER NOTE - ENMT NEGATIVE STATEMENT, MLM
Ears: no ear pain and no hearing problems.Nose: no nasal congestion and no nasal drainage.Mouth/Throat: no dysphagia, no hoarseness and no throat pain.Neck: no lumps, no pain, no stiffness and no swollen glands. no cold intolerance/no heat intolerance

## 2023-01-19 NOTE — PHYSICAL THERAPY INITIAL EVALUATION ADULT - LIVES WITH, PROFILE
Pt states she "lives with family" Bcc Infiltrative Histology Text: There were numerous aggregates of basaloid cells demonstrating an infiltrative pattern.

## 2023-01-30 NOTE — PATIENT PROFILE ADULT - ARRIVAL FROM
- Maintain CPAP +5 with FiO2 changes as needed to maintain oxygen saturations >93%  - Frequent suctioning for thick secretions  - Saline nebs PRN to alleviate congestion  - monitor resp status  - ABG/CXR prn Assisted living facility

## 2023-04-06 NOTE — ED PROVIDER NOTE - PSH
Bed: 05  Expected date:   Expected time:   Means of arrival:   Comments:  67F unwitnessed fall on blood thinners /120    Cataract  bilateral 2008    Creola filter in place  placed 2012  H/o Ovarian Cyst    Knee Arthroplasty right 8/2009 & left 9/2010

## 2023-04-09 NOTE — PROGRESS NOTE ADULT - PROBLEM SELECTOR PROBLEM 5
Dyslipidemia
HTN (hypertension)
HTN (hypertension)
alert and awake

## 2023-04-24 NOTE — ED PROVIDER NOTE - NS ED MD EM SELECTION
Patient transitioned from warfarin to Eliquis during hospitalization. [address] [facility], [address] clinic signing off.
[id number] Comprehensive

## 2023-05-09 NOTE — H&P ADULT - PROBLEM SELECTOR PLAN 4
continue home meds except for losartan Taltz Counseling: I discussed with the patient the risks of ixekizumab including but not limited to immunosuppression, serious infections, worsening of inflammatory bowel disease and drug reactions.  The patient understands that monitoring is required including a PPD at baseline and must alert us or the primary physician if symptoms of infection or other concerning signs are noted.

## 2023-06-15 NOTE — SWALLOW BEDSIDE ASSESSMENT ADULT - SWALLOW EVAL: ORAL MUSCULATURE
[facility] Call Center    Phone Message    May a detailed message be left on voicemail: yes     Reason for Call: Other: Pharmacy calling about theophylline (DENIA-24) 400 MG 24 hr capsule is not available in any strengths due  back order. No date ETA.   Ref number [id number]  call back to [phone] option 2 to change to a different drug.     Action Taken: Message routed to:  [facility] Lung science    Travel Screening: Not Applicable                                                                        
There is no substitution for Theophylline.     Contacted  Pharmacy to source Theophylline, they do not currently have any stock and confirm medication is on back order.  Reviewed with pt, she does have enough medication on hand for the next 6 weeks.  Writer will review medication availability again in 4 weeks.      
unable to assess due to poor participation/comprehension

## 2023-06-28 NOTE — DIETITIAN INITIAL EVALUATION ADULT. - MUSCLE MASS (LOSS OF MUSCLE)
Temples.../Scapula.../Clavicles.../Shoulders... Cheiloplasty (Less Than 50%) Text: A decision was made to reconstruct the defect with a  cheiloplasty.  The defect was undermined extensively.  Additional orbicularis oris muscle was excised with a 15 blade scalpel.  The defect was converted into a full thickness wedge, of less than 50% of the vertical height of the lip, to facilite a better cosmetic result.  Small vessels were then tied off with 5-0 monocyrl. The orbicularis oris, superficial fascia, adipose and dermis were then reapproximated.  After the deeper layers were approximated the epidermis was reapproximated with particular care given to realign the vermilion border.

## 2023-08-10 NOTE — PATIENT PROFILE ADULT - FOOD INSECURITY
History of Present Illness





- Chief Complaint


Chief Complaint: Alcohol withdrawal, nausea and vomiting, hypokalemia


Date: 08/09/23


History of Present Illness: 


 is a 52 year old male who presents to the hospital concerned about 

alcohol withdrawal. He has a long standing history of intermittent binge alcohol

abuse (drinks a fifth of liquor daily), and he had been drinking daily over the 

last few weeks. He states that he has been intermittently binging for about 5 

years. His last drink was at approximately 18:00 on the night prior to 

presenting. Since then, he has had nausea and nonbilious nonbloody emesis x 20 

episodes daily and generalized abdominal pain. He also has experiened nonbloody 

diarrhea. He denies fevers or chills. In the ED he was noted to have tremors and

diaphoresis. The patient states that he recently started drinking when his 

automobile broke down, but he denies suicidal or homicidal ideations. He denies 

auditory or visual hallucinations. He is interested in permanently quitting 

alcohol abuse. 








- Review of Systems


Constitutional: No Symptoms


Eyes: No Symptoms


Ears, Nose, & Throat: No Symptoms


Respiratory: No Symptoms


Cardiac: No Symptoms


Abdominal/Gastrointestinal: Nausea, Vomiting, Diarrhea


Genitourinary Symptoms: No Symptoms


Musculoskeletal: No Symptoms


Skin: No Symptoms


Neurological: No Symptoms, Tremors


Psychological: Anxiety, Depression


Endocrine: No Symptoms


Hematologic/Lymphatic: No Symptoms


Immunological/Allergic: No Symptoms


All Other Systems: Reviewed and Negative





Medications & Allergies


Allergies/Adverse Reactions: 


                                    Allergies











Allergy/AdvReac Type Severity Reaction Status Date / Time


 


No Known Drug Allergies Allergy   Verified 08/09/23 19:19














- Past Medical History


Past Medical History: No


Comment: Kidney stones with lithotripsy





- Past Surgical History


Past Surgical History: Yes





- Social History


Smoking Status: Never smoker


Alcohol: Daily


Drug Use: marijuana





- Physical Exam


Vital Signs: 


                               Vital Signs - 24 hr











  Temp Pulse Resp BP Pulse Ox


 


 08/09/23 23:56  98.0 F  60  16  124/97  97


 


 08/09/23 23:32      97


 


 08/09/23 23:00   79  18  130/82  97


 


 08/09/23 22:00   68  18  136/93  98


 


 08/09/23 21:29      97


 


 08/09/23 21:00   73  18  140/84  97


 


 08/09/23 20:00   78  18  138/88  97


 


 08/09/23 19:54      98


 


 08/09/23 19:00   104 H  22  135/97  98


 


 08/09/23 18:25  98.3 F  78  16  145/99  97











General Appearance: no apparent distress, alert


Neurologic Exam: alert, oriented x 3, cooperative, CNs II-XII nml as tested, 

normal mood/affect, nml cerebellar function


Eye Exam: PERRL/EOMI, eyes nml inspection


Ears, Nose, Throat Exam: normal ENT inspection


Neck Exam: normal inspection, non-tender, supple, full range of motion


Respiratory Exam: normal breath sounds, lungs clear


Cardiovascular Exam: regular rate/rhythm, normal heart sounds


Gastrointestinal/Abdomen Exam: soft, normal bowel sounds


Back Exam: normal range of motion


Extremity Exam: normal inspection, normal range of motion


Skin Exam: normal color





Results





- Labs


Lab/Micro Results: 


                            Lab Results-Last 24 Hours











  08/09/23 08/09/23 08/09/23 Range/Units





  19:00 19:00 19:00 


 


WBC  10.0    (4.0-10.5)  x10^3/uL


 


RBC  5.31    (4.1-5.6)  x10^6/uL


 


Hgb  17.2    (12.5-18.0)  g/dL


 


Hct  48.6    (42-50)  %


 


MCV  91.5    ()  fL


 


MCH  32.4 H    (26-32)  pg


 


MCHC  35.4    (32-36)  g/dL


 


RDW  13.0    (11.5-14.0)  %


 


Plt Count  212    (150-450)  x10^3/uL


 


MPV  11.1 H    (7.5-11.0)  fL


 


Gran %  68.9 H    (36.0-66.0)  %


 


Immature Gran % (Auto)  0.3    (0.00-0.4)  %


 


Nucleat RBC Rel Count  0.0    (0.00-0.1)  %


 


Eos # (Auto)  0    (0-0.5)  x10^3/uL


 


Immature Gran # (Auto)  0.03    (0.00-0.03)  x10^3u/L


 


Absolute Lymphs (auto)  1.73    (1.0-4.6)  x10^3/uL


 


Absolute Monos (auto)  1.34 H    (0.0-1.3)  x10^3/uL


 


Absolute Nucleated RBC  0.00    (0.00-0.01)  x10^3u/L


 


Lymphocytes %  17.2 L    (24.0-44.0)  %


 


Monocytes %  13.3 H    (0.0-12.0)  %


 


Eosinophils %  0.0    (0.00-5.0)  %


 


Basophils %  0.3    (0.0-0.4)  %


 


Absolute Granulocytes  6.91 H    (1.4-6.9)  x10^3/uL


 


Basophils #  0.03    (0-0.4)  x10^3/uL


 


Sodium   137   (137-145)  mmol/L


 


Potassium   3.2 L   (3.5-5.1)  mmol/L


 


Chloride   96 L   ()  mmol/L


 


Carbon Dioxide   29   (22-30)  mmol/L


 


Anion Gap   15.8 H   (5-15)  MEQ/L


 


BUN   11   (9-20)  mg/dL


 


Creatinine   1.01   (0.66-1.25)  mg/dL


 


Estimated GFR   > 60.0   ML/MIN


 


Glucose   159 H   ()  mg/dL


 


Calcium   8.5   (8.4-10.2)  mg/dL


 


Total Bilirubin   1.60 H   (0.2-1.3)  mg/dL


 


AST   270 H   (17-59)  U/L


 


ALT   258 H   (0-50)  U/L


 


Alkaline Phosphatase   105   ()  U/L


 


Troponin I    < 0.012  (0.000-0.034)  ng/mL


 


Serum Total Protein   7.6   (6.3-8.2)  g/dL


 


Albumin   4.2   (3.5-5.0)  g/dL


 


Lipase   96   ()  U/L


 


Urine Color     (Yellow)  


 


Urine Appearance     (Clear)  


 


Urine pH     (4.6-8.0)  


 


Ur Specific Gravity     (1.005-1.030)  


 


Urine Protein     (Negative)  


 


Urine Glucose (UA)     (Negative)  mg/dL


 


Urine Ketones     (Negative)  


 


Urine Blood     (Negative)  


 


Urine Nitrite     (Negative)  


 


Urine Bilirubin     (Negative)  


 


Urine Urobilinogen     (0.2)  mg/dL


 


Ur Leukocyte Esterase     (Negative)  


 


U Hyaline Cast (Auto)     (0-2)  /LPF


 


Urine Microscopic RBC     (0-5)  /HPF


 


Urine Microscopic WBC     (0-5)  /HPF


 


Ur Epithelial Cells     (None Seen)  /HPF


 


Urine Bacteria     (None Seen)  /HPF


 


Urine Culture Reflexed     (NO)  


 


Urine Opiates Level     (NEGATIVE)  


 


Ur Methadone     (NEGATIVE)  


 


Urine Barbiturates     (NEGATIVE)  


 


Ur Phencyclidine (PCP)     (NEGATIVE)  


 


Urine Amphetamine     (NEGATIVE)  


 


U Benzodiazepine Level     (NEGATIVE)  


 


Urine Cocaine     (NEGATIVE)  


 


Urine Marijuana (THC)     (NEGATIVE)  


 


Ethyl Alcohol   < 10   (0-10)  mg/dL














  08/09/23 08/09/23 08/09/23 Range/Units





  19:21 19:21 23:53 


 


WBC     (4.0-10.5)  x10^3/uL


 


RBC     (4.1-5.6)  x10^6/uL


 


Hgb     (12.5-18.0)  g/dL


 


Hct     (42-50)  %


 


MCV     ()  fL


 


MCH     (26-32)  pg


 


MCHC     (32-36)  g/dL


 


RDW     (11.5-14.0)  %


 


Plt Count     (150-450)  x10^3/uL


 


MPV     (7.5-11.0)  fL


 


Gran %     (36.0-66.0)  %


 


Immature Gran % (Auto)     (0.00-0.4)  %


 


Nucleat RBC Rel Count     (0.00-0.1)  %


 


Eos # (Auto)     (0-0.5)  x10^3/uL


 


Immature Gran # (Auto)     (0.00-0.03)  x10^3u/L


 


Absolute Lymphs (auto)     (1.0-4.6)  x10^3/uL


 


Absolute Monos (auto)     (0.0-1.3)  x10^3/uL


 


Absolute Nucleated RBC     (0.00-0.01)  x10^3u/L


 


Lymphocytes %     (24.0-44.0)  %


 


Monocytes %     (0.0-12.0)  %


 


Eosinophils %     (0.00-5.0)  %


 


Basophils %     (0.0-0.4)  %


 


Absolute Granulocytes     (1.4-6.9)  x10^3/uL


 


Basophils #     (0-0.4)  x10^3/uL


 


Sodium     (137-145)  mmol/L


 


Potassium     (3.5-5.1)  mmol/L


 


Chloride     ()  mmol/L


 


Carbon Dioxide     (22-30)  mmol/L


 


Anion Gap     (5-15)  MEQ/L


 


BUN     (9-20)  mg/dL


 


Creatinine     (0.66-1.25)  mg/dL


 


Estimated GFR     ML/MIN


 


Glucose     ()  mg/dL


 


Calcium     (8.4-10.2)  mg/dL


 


Total Bilirubin     (0.2-1.3)  mg/dL


 


AST     (17-59)  U/L


 


ALT     (0-50)  U/L


 


Alkaline Phosphatase     ()  U/L


 


Troponin I    < 0.012  (0.000-0.034)  ng/mL


 


Serum Total Protein     (6.3-8.2)  g/dL


 


Albumin     (3.5-5.0)  g/dL


 


Lipase     ()  U/L


 


Urine Color  Dark Yellow    (Yellow)  


 


Urine Appearance  Clear    (Clear)  


 


Urine pH  6.5    (4.6-8.0)  


 


Ur Specific Gravity  >=1.030 A    (1.005-1.030)  


 


Urine Protein  100 A    (Negative)  


 


Urine Glucose (UA)  Negative    (Negative)  mg/dL


 


Urine Ketones  Trace A    (Negative)  


 


Urine Blood  Negative    (Negative)  


 


Urine Nitrite  Negative    (Negative)  


 


Urine Bilirubin  Small A    (Negative)  


 


Urine Urobilinogen  1.0 A    (0.2)  mg/dL


 


Ur Leukocyte Esterase  Trace A    (Negative)  


 


U Hyaline Cast (Auto)  3-5 A    (0-2)  /LPF


 


Urine Microscopic RBC  0-2    (0-5)  /HPF


 


Urine Microscopic WBC  3-5    (0-5)  /HPF


 


Ur Epithelial Cells  None Seen    (None Seen)  /HPF


 


Urine Bacteria  None Seen    (None Seen)  /HPF


 


Urine Culture Reflexed  YES    (NO)  


 


Urine Opiates Level   NEGATIVE   (NEGATIVE)  


 


Ur Methadone   NEGATIVE   (NEGATIVE)  


 


Urine Barbiturates   NEGATIVE   (NEGATIVE)  


 


Ur Phencyclidine (PCP)   NEGATIVE   (NEGATIVE)  


 


Urine Amphetamine   NEGATIVE   (NEGATIVE)  


 


U Benzodiazepine Level   NEGATIVE   (NEGATIVE)  


 


Urine Cocaine   NEGATIVE   (NEGATIVE)  


 


Urine Marijuana (THC)   POSITIVE   (NEGATIVE)  


 


Ethyl Alcohol     (0-10)  mg/dL














- Radiology Impressions


Radiology Exams & Impressions: 


                              Radiology Procedures











 Category Date Time Status


 


 ABDOMEN AND PELVIS W/0 CONTRAS [CT] Stat Exams  08/09/23 19:32 Taken














Assessment/Plan


(1) Alcohol withdrawal


Current Visit: Yes   Status: Acute   


Assessment & Plan: 


Monitor in ICU on CIWA protocol. Received IV Ativan. Folate, thiamine, 

multivitamin. CM eval for outpatient alcohol rehab options. 


Code(s): F10.939 - ALCOHOL USE, UNSPECIFIED WITH WITHDRAWAL, UNSPECIFIED   





(2) Nausea and vomiting


Current Visit: Yes   Status: Acute   


Assessment & Plan: 


Antiemetics, IV fluids. Reported diarrhea; check stool studies. 


Code(s): R11.2 - NAUSEA WITH VOMITING, UNSPECIFIED   





(3) Hypokalemia


Current Visit: Yes   Status: Acute   


Assessment & Plan: 


Replete as needed. Check magnesium level. 


Code(s): E87.6 - HYPOKALEMIA   





Telemedicine Encounter





- Telemedicine Encounter


Telemedicine Encounter: 


The entirety of this encounter was performed via Telemedicine" no

## 2023-08-24 NOTE — ED PROVIDER NOTE - CPE EDP GASTRO NORM
Detail Level: Zone Render In Strict Bullet Format?: No Modify Regimen: Delight cream apply to face at night x 3 month off, then use for three months. normal... Initiate Treatment: Tretinoin 0.05% topical cream QHS

## 2023-10-17 NOTE — PROGRESS NOTE ADULT - PROVIDER SPECIALTY LIST ADULT
Hospitalist Metronidazole Counseling:  I discussed with the patient the risks of metronidazole including but not limited to seizures, nausea/vomiting, a metallic taste in the mouth, nausea/vomiting and severe allergy.

## 2023-12-08 NOTE — ED ADULT NURSE NOTE - NSFALLRSKOUTCOME_ED_ALL_ED
Fall with Harm Risk Price (Do Not Change): 0.00 Instructions: This plan will send the code FBSD to the PM system.  DO NOT or CHANGE the price. Detail Level: Simple

## 2024-01-04 NOTE — CONSULT NOTE ADULT - SUBJECTIVE AND OBJECTIVE BOX
Needs 6 month follow-up with Dr WONG and carotid us
HPI:  Pt is a 89 y/o female w/ pmh afib, arrhythmia on amiodarone, diverticulosis, glaucoma, htn, dementia, CKD,dvt,and  anemia comes in w/lle redness x 1 day.  daughter states that she just noticed pts lle with redness and skin breakdown.  she didnt check temp at home.  pt is w/dementia and doesnt report any problems.  Pt states she is just surprised to see this now, she nor daughter recall and trauma.  no sob, cp, palpitations, n/v/d/c no travels or sick contacts. (02 Jun 2019 20:42)      PAST MEDICAL & SURGICAL HISTORY:  Dementia  Trigger finger  CKD (chronic kidney disease)  DVT (deep venous thrombosis)  GI (gastrointestinal bleed)  Anemia Iron Deficiency  H/o Diverticulosis  Carpal Tunnel Syndrome  Dyslipidemia  Glaucoma  OA (Osteoarthritis)  HTN (Hypertension)  CAD (Coronary Artery Disease)  Ariel filter in place: placed 2012  H/o Ovarian Cyst  Cataract  bilateral 2008  Knee Arthroplasty right 8/2009 & left 9/2010      SOCHX:  unable to obtain    tobacco,  -  alcohol    FMHX: FA/MO  - contributory       Recent Travel:    Immunizations:    Allergies    No Known Drug Allergies  PCN, Sulfa (Urticaria)    Intolerances        MEDICATIONS  (STANDING):  amiodarone    Tablet 100 milliGRAM(s) Oral <User Schedule>  apixaban 2.5 milliGRAM(s) Oral every 12 hours  atorvastatin 10 milliGRAM(s) Oral at bedtime  cefTRIAXone   IVPB 1 Gram(s) IV Intermittent every 24 hours  clindamycin IVPB 600 milliGRAM(s) IV Intermittent every 8 hours  donepezil 10 milliGRAM(s) Oral at bedtime  ferrous    sulfate 325 milliGRAM(s) Oral daily  lactobacillus acidophilus 1 Tablet(s) Oral three times a day with meals  latanoprost 0.005% Ophthalmic Solution 1 Drop(s) Both EYES at bedtime    MEDICATIONS  (PRN):      REVIEW OF SYSTEMS:  unable to obtain   lives with her parents   goes to high school   has no clue that she has something wrong with her leg     VITAL SIGNS:    T(C): 36.5 (06-03-19 @ 05:59), Max: 37.4 (06-02-19 @ 16:11)  T(F): 97.7 (06-03-19 @ 05:59), Max: 99.3 (06-02-19 @ 16:11)  HR: 61 (06-03-19 @ 05:59) (59 - 68)  BP: 141/65 (06-03-19 @ 05:59) (99/50 - 141/65)  RR: 18 (06-03-19 @ 05:59) (16 - 18)  SpO2: 98% (06-03-19 @ 05:59) (98% - 99%)    PHYSICAL EXAM:    GENERAL: NAD, well-groomed, well-developed  HEAD:  Atraumatic, Normocephalic  EYES: EOMI, PERRLA, conjunctiva and sclera clear  ENMT: No tonsillar erythema, exudates, or enlargement; Moist mucous membranes  NECK: Supple, No JVD, Normal thyroid  NERVOUS SYSTEM:  Alert & pleasant and confused  CHEST/LUNG: Clear to auscultation bilaterally; No rales, rhonchi, wheezing bilaterally  HEART: Regular rate and rhythm; No murmurs, rubs, or gallops  ABDOMEN: Soft, Nontender, Nondistended; Bowel sounds present  EXTREMITIES:  2+ Peripheral Pulses, left lower extremity celluitis and scab   LYMPH: No lymphadenopathy noted  SKIN: scab left lower extremity   BACK: no pressor sore     LABS:                         9.1    5.21  )-----------( 258      ( 03 Jun 2019 08:13 )             29.4     06-03    146<H>  |  113<H>  |  49<H>  ----------------------------<  74  4.7   |  25  |  2.28<H>    Ca    9.2      03 Jun 2019 08:13  Mg     2.6     06-02    TPro  6.9  /  Alb  3.1<L>  /  TBili  0.3  /  DBili  x   /  AST  13<L>  /  ALT  14  /  AlkPhos  55  06-02    LIVER FUNCTIONS - ( 02 Jun 2019 20:34 )  Alb: 3.1 g/dL / Pro: 6.9 gm/dL / ALK PHOS: 55 U/L / ALT: 14 U/L / AST: 13 U/L / GGT: x           PT/INR - ( 02 Jun 2019 20:46 )   PT: 13.2 sec;   INR: 1.17 ratio         PTT - ( 02 Jun 2019 20:46 )  PTT:34.5 sec                                      Radiology:
NEPHROLOGY CONSULTATION    CHIEF COMPLAINT:  BLAYNE/CKD      HPI:  Admitted 2 days ago with cellultis and renal function slightly worse than baseline.      ROS:  denies SOB      PAST MEDICAL & SURGICAL HISTORY:  Dementia  Trigger finger  CKD (chronic kidney disease)  DVT (deep venous thrombosis)  GI (gastrointestinal bleed)  Anemia Iron Deficiency  H/o Diverticulosis  Carpal Tunnel Syndrome  Dyslipidemia  Glaucoma  OA (Osteoarthritis)  HTN (Hypertension)  CAD (Coronary Artery Disease)  Barry filter in place: placed 2012  H/o Ovarian Cyst  Cataract  bilateral 2008  Knee Arthroplasty right 8/2009 & left 9/2010      SOCIAL HISTORY:  NA    FAMILY HISTORY:  NA      MEDICATIONS  (STANDING):  amiodarone    Tablet 100 milliGRAM(s) Oral <User Schedule>  apixaban 2.5 milliGRAM(s) Oral every 12 hours  atorvastatin 10 milliGRAM(s) Oral at bedtime  ceFAZolin   IVPB 500 milliGRAM(s) IV Intermittent every 12 hours  docusate sodium 100 milliGRAM(s) Oral two times a day  donepezil 10 milliGRAM(s) Oral at bedtime  ferrous    sulfate 325 milliGRAM(s) Oral daily  lactobacillus acidophilus 1 Tablet(s) Oral three times a day with meals  latanoprost 0.005% Ophthalmic Solution 1 Drop(s) Both EYES at bedtime  sodium chloride 0.45%. 1000 milliLiter(s) (70 mL/Hr) IV Continuous <Continuous>      PHYSICAL EXAMINATION:  T(F): 97.2 (06-03-19 @ 12:20)  HR: 64 (06-03-19 @ 12:20)  BP: 150/72 (06-03-19 @ 12:20)  RR: 17 (06-03-19 @ 12:20)  SpO2: 97% (06-03-19 @ 12:20)  Conversant, no apparent distress  PERRLA, pink conjunctivae, no ptosis  Good dentition, no pharyngeal erythema  Neck non tender, no mass, no thyromegaly or nodules  Normal respiratory effort, lungs clear to auscultation  Heart with RRR, no murmurs or rubs, no peripheral edema  Abdomen soft, no masses, no organomegaly  Skin no rashes, ulcers or lesions, normal turgor and temperature  Appropriate affect, AO x 3    LABS:                        9.1    5.21  )-----------( 258      ( 03 Jun 2019 08:13 )             29.4     06-03    146<H>  |  113<H>  |  49<H>  ----------------------------<  74  4.7   |  25  |  2.28<H>    Ca    9.2      03 Jun 2019 08:13  Mg     2.6     06-02    TPro  6.9  /  Alb  3.1<L>  /  TBili  0.3  /  DBili  x   /  AST  13<L>  /  ALT  14  /  AlkPhos  55  06-02      ASSESSMENT:  1.  Chronic kidney disease - stage 4 - with small interval worsening  2.  Hypernatremia on normal saline    PLAN:  D/C IVF  Repeat BMP in AM      This consultation utilizes moderate risk medical decision making as evidenced by mild exacerbation of one chronic illness, two stable chronic illnesses, undiagnosed new problem, acute illness with systemic symptoms or prescription drug management.

## 2024-02-10 NOTE — PROGRESS NOTE ADULT - PROBLEM/PLAN-5
Patient discharged today at 1030 to home. Family friend to transport.                          DISPLAY PLAN FREE TEXT

## 2024-05-14 NOTE — H&P ADULT - BIRTH SEX

## 2024-05-31 NOTE — ED ADULT NURSE NOTE - NS ED NOTE ABUSE RESPONSE YN
PHI call made for patient to make an annual physical appointment with DR Nilay Salcedo and for a BP check.   unable to assess

## 2024-09-26 NOTE — ED ADULT TRIAGE NOTE - NS ED TRIAGE AVPU SCALE
Alert-The patient is alert, awake and responds to voice. The patient is oriented to time, place, and person. The triage nurse is able to obtain subjective information. Never

## 2025-02-27 NOTE — ED ADULT TRIAGE NOTE - RESPIRATORY RATE (BREATHS/MIN)
18 Pt c/o left arm spasm and left calf spasm that went up to her left side of body and chest causing pain she states.

## 2025-03-09 NOTE — PHYSICAL THERAPY INITIAL EVALUATION ADULT - ADDITIONAL COMMENTS
Chloe Blackmon is an 85 year old female with PMHx of HTN, paranoid schizophrenia, and dementia who presented to the ED on 3/8/25 for complaints of inability to care for herself and admitted for acute metabolic encephalopathy secondary to UTI.    ##Acute metabolic encephalopathy secondary to UTI  Son reports patient with declining mentation since he last saw her two weeks ago  Baseline mentation is AAO x 2 to person and place, now only AAO x 1 to person only  U/A with proteinuria, ketonuria, large bilirubin, small leuks, positive nitrites, WBC 10, RBC 30, moderate bacteria, squamous epithelial cells present, glucosuria, COVID/influenza/RSV negative  CT head without acute intracranial findings, CXR without infectious etiology but with moderate-sized hiatal hernia. S/p ceftriaxone 1 g IV in the ED  - Ceftriaxone 1 g IV continued for now  - Reorient PRN, avoid sedating meds  - F/u urine culture  - F/u TSH, RPR, vitamin B12, MRI brain to r/o other etiologies of AMS    #PATRICIO vs. elevated creatinine, suspect etiology prerenal secondary to decreased PO intake  BUN 25 and Cr 1.32 on admission, improved to 1.11.   S/p  cc bolus in the ED  Avoid nephrotoxins, renally dose meds  Encourage PO hydration  Monitor renal function    #Social admit  Son reports patient with bouts of paranoid schizophrenia where she will chain up her apartment door so no one can come in. Son did not notice movement on cameras in patient's apartment for a week, used  to cut chains upon arrival to her apartment. Found in bed soiled in urine and feces and apartment is unkempt  Previously declining coming to live with son and daughter-in-law in [place]. Son requesting placement in facility for safety as patient is unable to care for herself. Case management and  consulted for assistance in disposition    #Hiatal hernia, incidental finding  - Will need outpatient f/u, family aware     #HTN, uncontrolled secondary to medication noncompliance  BP as elevated as 198/83, will hold off on initiation of antihypertensives for now given patient refusal to take PO meds, [facility] indicates previously on amlodipine 10 mg    #Dementia: not on any PTA meds, turn and reposition q2    Diet: regular   DVT prophylaxis:   Dispo: Admit, will need placement   Code Status: FC     I have spent a total of *** minutes to prepare to see the patient, obtaining and reviewing history, physical examination, explaining the diagnosis, prognosis and treatment plan with the patient/family/caregiver. I also have spent the time ordering studies and testing, interpreting results, medicine reconciliation, IDRs, subspecialty consultation and documentation as above.   Chloe Blackmon is an 85 year old female with PMHx of HTN, paranoid schizophrenia, and dementia who presented to the ED on 3/8/25 for complaints of inability to care for herself and admitted for acute metabolic encephalopathy secondary to UTI.    ##Acute metabolic encephalopathy secondary to UTI  Son reports patient with declining mentation since he last saw her two weeks ago  Baseline mentation is AAO x 2 to person and place, now only AAO x 1 to person only  U/A with proteinuria, ketonuria, large bilirubin, small leuks, positive nitrites, WBC 10, RBC 30, moderate bacteria, squamous epithelial cells present, glucosuria, COVID/influenza/RSV negative  CT head without acute intracranial findings, CXR without infectious etiology but with moderate-sized hiatal hernia. S/p ceftriaxone 1 g IV in the ED. B12 elevated.   - Ceftriaxone 1 g IV continued for now  - Reorient PRN, avoid sedating meds  - F/u urine culture  - F/u TSH, RPR, MRI brain to r/o other etiologies of AMS    #PATRICIO vs. elevated creatinine, suspect etiology prerenal secondary to decreased PO intake  BUN 25 and Cr 1.32 on admission, improved to 1.11.   S/p  cc bolus in the ED  Avoid nephrotoxins, renally dose meds  Encourage PO hydration  Monitor renal function    #Social admit  Son reports patient with bouts of paranoid schizophrenia where she will chain up her apartment door so no one can come in. Son did not notice movement on cameras in patient's apartment for a week, used  to cut chains upon arrival to her apartment. Found in bed soiled in urine and feces and apartment is unkempt  Previously declining coming to live with son and daughter-in-law in [place]. Son requesting placement in facility for safety as patient is unable to care for herself. Case management and  consulted for assistance in disposition    #Hiatal hernia, incidental finding  - Will need outpatient f/u, family aware     #HTN, uncontrolled secondary to medication noncompliance  BP as elevated as 198/83, will hold off on initiation of antihypertensives for now given patient refusal to take PO meds, [facility] indicates previously on amlodipine 10 mg    #Dementia: not on any PTA meds, turn and reposition q2    Diet: regular   DVT prophylaxis:   Dispo: Admit, will need placement   Code Status: FC     I have spent a total of 38 minutes to prepare to see the patient, obtaining and reviewing history, physical examination, explaining the diagnosis, prognosis and treatment plan with the patient/family/caregiver. I also have spent the time ordering studies and testing, interpreting results, medicine reconciliation, IDRs, subspecialty consultation and documentation as above.   Pt lives in private home with family who is unavailable during day to assist patient. House has 4 steps to enter with rail. Pt bedroom on 2nd floor. Prior to admission, patient was independent with transfers using cane. Pt is R-hand dominant and wears corrective vision. Family assists with ADLs such as cooking and cleaning.

## 2025-07-15 NOTE — ED ADULT TRIAGE NOTE - CCCP TRG CHIEF CMPLNT
What Type Of Note Output Would You Prefer (Optional)?: Standard Output
How Severe Are Your Spot(S)?: moderate
Have Your Spot(S) Been Treated In The Past?: has not been treated
Hpi Title: Evaluation of Skin Lesions
constipation/abdominal pain